# Patient Record
Sex: MALE | Race: WHITE | Employment: OTHER | URBAN - METROPOLITAN AREA
[De-identification: names, ages, dates, MRNs, and addresses within clinical notes are randomized per-mention and may not be internally consistent; named-entity substitution may affect disease eponyms.]

---

## 2017-02-24 ENCOUNTER — HOSPITAL ENCOUNTER (OUTPATIENT)
Dept: OTHER | Age: 67
Discharge: HOME | End: 2017-02-24
Attending: OPHTHALMOLOGY | Admitting: OPHTHALMOLOGY

## 2017-02-24 DIAGNOSIS — H35.352: ICD-10-CM

## 2017-02-24 LAB
A/G RATIO: 1.3 (ref 1.1–2.2)
ALBUMIN SERPL-MCNC: 4.1 G/DL (ref 3.4–5)
ALP BLD-CCNC: 68 U/L (ref 40–129)
ALT SERPL-CCNC: 29 U/L (ref 10–40)
ANION GAP SERPL CALCULATED.3IONS-SCNC: 12 MMOL/L (ref 3–16)
AST SERPL-CCNC: 31 U/L (ref 15–37)
BASOPHILS ABSOLUTE: 0.1 K/UL (ref 0–0.2)
BASOPHILS RELATIVE PERCENT: 0.7 %
BILIRUB SERPL-MCNC: 0.5 MG/DL (ref 0–1)
BUN BLDV-MCNC: 12 MG/DL (ref 7–20)
CALCIUM SERPL-MCNC: 9.2 MG/DL (ref 8.3–10.6)
CHLORIDE BLD-SCNC: 99 MMOL/L (ref 99–110)
CO2: 24 MMOL/L (ref 21–32)
CREAT SERPL-MCNC: 1.1 MG/DL (ref 0.8–1.3)
EOSINOPHILS ABSOLUTE: 0.1 K/UL (ref 0–0.6)
EOSINOPHILS RELATIVE PERCENT: 1.5 %
GFR AFRICAN AMERICAN: >60
GFR NON-AFRICAN AMERICAN: >60
GLOBULIN: 3.2 G/DL
GLUCOSE BLD-MCNC: 91 MG/DL (ref 70–99)
HCT VFR BLD CALC: 39.6 % (ref 40.5–52.5)
HEMOGLOBIN: 13.3 G/DL (ref 13.5–17.5)
LYMPHOCYTES ABSOLUTE: 2.3 K/UL (ref 1–5.1)
LYMPHOCYTES RELATIVE PERCENT: 29.9 %
MCH RBC QN AUTO: 32.1 PG (ref 26–34)
MCHC RBC AUTO-ENTMCNC: 33.6 G/DL (ref 31–36)
MCV RBC AUTO: 95.6 FL (ref 80–100)
MONOCYTES ABSOLUTE: 0.9 K/UL (ref 0–1.3)
MONOCYTES RELATIVE PERCENT: 11.8 %
NEUTROPHILS ABSOLUTE: 4.3 K/UL (ref 1.7–7.7)
NEUTROPHILS RELATIVE PERCENT: 56.1 %
PDW BLD-RTO: 14.1 % (ref 12.4–15.4)
PLATELET # BLD: 208 K/UL (ref 135–450)
PMV BLD AUTO: 9 FL (ref 5–10.5)
POTASSIUM SERPL-SCNC: 4.8 MMOL/L (ref 3.5–5.1)
RBC # BLD: 4.14 M/UL (ref 4.2–5.9)
SODIUM BLD-SCNC: 135 MMOL/L (ref 136–145)
TOTAL PROTEIN: 7.3 G/DL (ref 6.4–8.2)
WBC # BLD: 7.7 K/UL (ref 4–11)

## 2017-02-25 LAB
ANGIOTENSIN CONVERTING ENZYME: 52 U/L (ref 9–67)
HLA B27: NEGATIVE
RPR: NORMAL
TOXOPLASMA GONDI AB IGM: 4.8 AU/ML
TOXOPLASMA GONDII AB IGG: 99.4 IU/ML

## 2017-02-26 LAB — T PALLIDUM ANTIBODIES (TP-PA): NON REACTIVE

## 2017-02-27 LAB
ANA INTERPRETATION: NORMAL
ANTI-NUCLEAR ANTIBODY (ANA): NEGATIVE
LYSOZYME: 1.56 UG/ML (ref 0–2.75)

## 2017-02-28 LAB
QUANTIFERON (R) TB GOLD (INCUBATED): NEGATIVE
QUANTIFERON MITOGEN: >10 IU/ML
QUANTIFERON NIL: 0.1 IU/ML
QUANTIFERON TB AG MINUS NIL: 0 IU/ML (ref 0–0.34)

## 2017-03-01 LAB — MISCELLANEOUS LAB TEST ORDER: NORMAL

## 2017-07-20 RX ORDER — DILTIAZEM HYDROCHLORIDE 240 MG/1
240 CAPSULE, EXTENDED RELEASE ORAL DAILY
Qty: 30 CAPSULE | Refills: 0 | Status: SHIPPED | OUTPATIENT
Start: 2017-07-20 | End: 2017-08-07 | Stop reason: SDUPTHER

## 2017-07-21 RX ORDER — ATORVASTATIN CALCIUM 20 MG/1
TABLET, FILM COATED ORAL
Qty: 30 TABLET | Refills: 0 | Status: SHIPPED | OUTPATIENT
Start: 2017-07-21 | End: 2018-07-23

## 2017-07-21 RX ORDER — HYDROCHLOROTHIAZIDE 25 MG/1
TABLET ORAL
Qty: 30 TABLET | Refills: 0 | Status: SHIPPED | OUTPATIENT
Start: 2017-07-21 | End: 2018-02-05 | Stop reason: ALTCHOICE

## 2017-08-07 ENCOUNTER — HOSPITAL ENCOUNTER (OUTPATIENT)
Dept: OTHER | Age: 67
Discharge: HOME | End: 2017-08-07
Attending: NURSE PRACTITIONER | Admitting: NURSE PRACTITIONER

## 2017-08-07 ENCOUNTER — OFFICE VISIT (OUTPATIENT)
Dept: CARDIOLOGY CLINIC | Age: 67
End: 2017-08-07

## 2017-08-07 VITALS
WEIGHT: 269.8 LBS | DIASTOLIC BLOOD PRESSURE: 70 MMHG | SYSTOLIC BLOOD PRESSURE: 120 MMHG | BODY MASS INDEX: 37.77 KG/M2 | HEIGHT: 71 IN | OXYGEN SATURATION: 95 % | HEART RATE: 66 BPM

## 2017-08-07 DIAGNOSIS — E78.2 MIXED HYPERLIPIDEMIA: ICD-10-CM

## 2017-08-07 DIAGNOSIS — I10 ESSENTIAL HYPERTENSION: ICD-10-CM

## 2017-08-07 DIAGNOSIS — I25.10 CORONARY ARTERY DISEASE INVOLVING NATIVE CORONARY ARTERY OF NATIVE HEART WITHOUT ANGINA PECTORIS: Primary | ICD-10-CM

## 2017-08-07 LAB
A/G RATIO: 1.4 (ref 1.1–2.2)
ALBUMIN SERPL-MCNC: 4.6 G/DL (ref 3.4–5)
ALP BLD-CCNC: 62 U/L (ref 40–129)
ALT SERPL-CCNC: 29 U/L (ref 10–40)
ANION GAP SERPL CALCULATED.3IONS-SCNC: 17 MMOL/L (ref 3–16)
AST SERPL-CCNC: 29 U/L (ref 15–37)
BILIRUB SERPL-MCNC: 0.7 MG/DL (ref 0–1)
BUN BLDV-MCNC: 21 MG/DL (ref 7–20)
CALCIUM SERPL-MCNC: 10 MG/DL (ref 8.3–10.6)
CHLORIDE BLD-SCNC: 99 MMOL/L (ref 99–110)
CHOLESTEROL, TOTAL: 160 MG/DL (ref 0–199)
CO2: 24 MMOL/L (ref 21–32)
CREAT SERPL-MCNC: 1.4 MG/DL (ref 0.8–1.3)
GFR AFRICAN AMERICAN: >60
GFR NON-AFRICAN AMERICAN: 50
GLOBULIN: 3.3 G/DL
GLUCOSE BLD-MCNC: 98 MG/DL (ref 70–99)
HDLC SERPL-MCNC: 38 MG/DL (ref 40–60)
LDL CHOLESTEROL CALCULATED: 95 MG/DL
POTASSIUM SERPL-SCNC: 4.2 MMOL/L (ref 3.5–5.1)
SODIUM BLD-SCNC: 140 MMOL/L (ref 136–145)
TOTAL PROTEIN: 7.9 G/DL (ref 6.4–8.2)
TRIGL SERPL-MCNC: 136 MG/DL (ref 0–150)
VLDLC SERPL CALC-MCNC: 27 MG/DL

## 2017-08-07 PROCEDURE — G8598 ASA/ANTIPLAT THER USED: HCPCS | Performed by: NURSE PRACTITIONER

## 2017-08-07 PROCEDURE — G8417 CALC BMI ABV UP PARAM F/U: HCPCS | Performed by: NURSE PRACTITIONER

## 2017-08-07 PROCEDURE — 1123F ACP DISCUSS/DSCN MKR DOCD: CPT | Performed by: NURSE PRACTITIONER

## 2017-08-07 PROCEDURE — 99214 OFFICE O/P EST MOD 30 MIN: CPT | Performed by: NURSE PRACTITIONER

## 2017-08-07 PROCEDURE — 3017F COLORECTAL CA SCREEN DOC REV: CPT | Performed by: NURSE PRACTITIONER

## 2017-08-07 PROCEDURE — 93000 ELECTROCARDIOGRAM COMPLETE: CPT | Performed by: NURSE PRACTITIONER

## 2017-08-07 PROCEDURE — 4040F PNEUMOC VAC/ADMIN/RCVD: CPT | Performed by: NURSE PRACTITIONER

## 2017-08-07 PROCEDURE — 1036F TOBACCO NON-USER: CPT | Performed by: NURSE PRACTITIONER

## 2017-08-07 PROCEDURE — G8427 DOCREV CUR MEDS BY ELIG CLIN: HCPCS | Performed by: NURSE PRACTITIONER

## 2017-08-07 RX ORDER — DILTIAZEM HYDROCHLORIDE 240 MG/1
240 CAPSULE, EXTENDED RELEASE ORAL DAILY
Qty: 90 CAPSULE | Refills: 3 | Status: ON HOLD | OUTPATIENT
Start: 2017-08-07 | End: 2018-07-27 | Stop reason: HOSPADM

## 2017-08-08 ENCOUNTER — TELEPHONE (OUTPATIENT)
Dept: CARDIOLOGY CLINIC | Age: 67
End: 2017-08-08

## 2017-08-08 DIAGNOSIS — Z79.899 MEDICATION MANAGEMENT: Primary | ICD-10-CM

## 2017-08-18 ENCOUNTER — HOSPITAL ENCOUNTER (OUTPATIENT)
Dept: CARDIOLOGY | Facility: CLINIC | Age: 67
Discharge: HOME | End: 2017-08-18
Attending: NURSE PRACTITIONER | Admitting: NURSE PRACTITIONER

## 2017-08-18 LAB
LV EF: 60 %
LVEF MODALITY: NORMAL

## 2017-08-21 ENCOUNTER — OFFICE VISIT (OUTPATIENT)
Dept: FAMILY MEDICINE CLINIC | Age: 67
End: 2017-08-21

## 2017-08-21 ENCOUNTER — TELEPHONE (OUTPATIENT)
Dept: CARDIOLOGY CLINIC | Age: 67
End: 2017-08-21

## 2017-08-21 VITALS
WEIGHT: 270 LBS | BODY MASS INDEX: 37.66 KG/M2 | OXYGEN SATURATION: 97 % | HEART RATE: 85 BPM | SYSTOLIC BLOOD PRESSURE: 110 MMHG | DIASTOLIC BLOOD PRESSURE: 80 MMHG

## 2017-08-21 DIAGNOSIS — I10 ESSENTIAL HYPERTENSION: ICD-10-CM

## 2017-08-21 DIAGNOSIS — R79.89 ELEVATED SERUM CREATININE: ICD-10-CM

## 2017-08-21 DIAGNOSIS — E55.9 VITAMIN D DEFICIENCY: Primary | ICD-10-CM

## 2017-08-21 DIAGNOSIS — E03.9 HYPOTHYROIDISM, UNSPECIFIED TYPE: ICD-10-CM

## 2017-08-21 LAB
ANION GAP SERPL CALCULATED.3IONS-SCNC: 16 MMOL/L (ref 3–16)
BUN BLDV-MCNC: 16 MG/DL (ref 7–20)
CALCIUM SERPL-MCNC: 9.9 MG/DL (ref 8.3–10.6)
CHLORIDE BLD-SCNC: 99 MMOL/L (ref 99–110)
CO2: 24 MMOL/L (ref 21–32)
CREAT SERPL-MCNC: 1.2 MG/DL (ref 0.8–1.3)
CREATININE URINE: 81.3 MG/DL (ref 39–259)
GFR AFRICAN AMERICAN: >60
GFR NON-AFRICAN AMERICAN: >60
GLUCOSE BLD-MCNC: 92 MG/DL (ref 70–99)
MICROALBUMIN UR-MCNC: <1.2 MG/DL
MICROALBUMIN/CREAT UR-RTO: NORMAL MG/G (ref 0–30)
POTASSIUM SERPL-SCNC: 4.3 MMOL/L (ref 3.5–5.1)
SODIUM BLD-SCNC: 139 MMOL/L (ref 136–145)
T4 FREE: 1 NG/DL (ref 0.9–1.8)
TSH SERPL DL<=0.05 MIU/L-ACNC: 6.69 UIU/ML (ref 0.27–4.2)
VITAMIN D 25-HYDROXY: 34 NG/ML

## 2017-08-21 PROCEDURE — 99214 OFFICE O/P EST MOD 30 MIN: CPT | Performed by: FAMILY MEDICINE

## 2017-08-21 PROCEDURE — G8417 CALC BMI ABV UP PARAM F/U: HCPCS | Performed by: FAMILY MEDICINE

## 2017-08-21 PROCEDURE — 4040F PNEUMOC VAC/ADMIN/RCVD: CPT | Performed by: FAMILY MEDICINE

## 2017-08-21 PROCEDURE — 3017F COLORECTAL CA SCREEN DOC REV: CPT | Performed by: FAMILY MEDICINE

## 2017-08-21 PROCEDURE — 1123F ACP DISCUSS/DSCN MKR DOCD: CPT | Performed by: FAMILY MEDICINE

## 2017-08-21 PROCEDURE — 36415 COLL VENOUS BLD VENIPUNCTURE: CPT | Performed by: FAMILY MEDICINE

## 2017-08-21 PROCEDURE — G8427 DOCREV CUR MEDS BY ELIG CLIN: HCPCS | Performed by: FAMILY MEDICINE

## 2017-08-21 PROCEDURE — 1036F TOBACCO NON-USER: CPT | Performed by: FAMILY MEDICINE

## 2017-08-21 PROCEDURE — G8598 ASA/ANTIPLAT THER USED: HCPCS | Performed by: FAMILY MEDICINE

## 2017-08-22 RX ORDER — LEVOTHYROXINE SODIUM 0.07 MG/1
TABLET ORAL
Qty: 90 TABLET | Refills: 3 | Status: SHIPPED | OUTPATIENT
Start: 2017-08-22 | End: 2018-08-25 | Stop reason: SDUPTHER

## 2018-02-05 ENCOUNTER — OFFICE VISIT (OUTPATIENT)
Dept: CARDIOLOGY CLINIC | Age: 68
End: 2018-02-05

## 2018-02-05 VITALS
DIASTOLIC BLOOD PRESSURE: 70 MMHG | WEIGHT: 279 LBS | SYSTOLIC BLOOD PRESSURE: 120 MMHG | HEART RATE: 69 BPM | OXYGEN SATURATION: 96 % | BODY MASS INDEX: 39.06 KG/M2 | HEIGHT: 71 IN

## 2018-02-05 DIAGNOSIS — I10 ESSENTIAL HYPERTENSION: ICD-10-CM

## 2018-02-05 DIAGNOSIS — I25.10 CORONARY ARTERY DISEASE INVOLVING NATIVE CORONARY ARTERY OF NATIVE HEART WITHOUT ANGINA PECTORIS: Primary | ICD-10-CM

## 2018-02-05 DIAGNOSIS — E78.2 MIXED HYPERLIPIDEMIA: ICD-10-CM

## 2018-02-05 PROCEDURE — G8417 CALC BMI ABV UP PARAM F/U: HCPCS | Performed by: NURSE PRACTITIONER

## 2018-02-05 PROCEDURE — G8598 ASA/ANTIPLAT THER USED: HCPCS | Performed by: NURSE PRACTITIONER

## 2018-02-05 PROCEDURE — 1036F TOBACCO NON-USER: CPT | Performed by: NURSE PRACTITIONER

## 2018-02-05 PROCEDURE — G8427 DOCREV CUR MEDS BY ELIG CLIN: HCPCS | Performed by: NURSE PRACTITIONER

## 2018-02-05 PROCEDURE — G8484 FLU IMMUNIZE NO ADMIN: HCPCS | Performed by: NURSE PRACTITIONER

## 2018-02-05 PROCEDURE — 3017F COLORECTAL CA SCREEN DOC REV: CPT | Performed by: NURSE PRACTITIONER

## 2018-02-05 PROCEDURE — 4040F PNEUMOC VAC/ADMIN/RCVD: CPT | Performed by: NURSE PRACTITIONER

## 2018-02-05 PROCEDURE — 1123F ACP DISCUSS/DSCN MKR DOCD: CPT | Performed by: NURSE PRACTITIONER

## 2018-02-05 PROCEDURE — 99214 OFFICE O/P EST MOD 30 MIN: CPT | Performed by: NURSE PRACTITIONER

## 2018-02-05 RX ORDER — COVID-19 ANTIGEN TEST
KIT MISCELLANEOUS PRN
COMMUNITY

## 2018-02-05 NOTE — PROGRESS NOTES
[address]     Outpatient Follow Up Note    Cindia Gaucher is 75 y.o. male who presents today with a history of CAD s/p CABG '06, HTN and hyperlipidemia . Interval hx: nuclear stress Aug '17: neg for ischemia     CHIEF COMPLAINT / HPI:  Follow Up secondary to coronary artery disease    Subjective:   He denies significant chest pain. There is SOB going up steps which he attributes to painful knees and being over weight. The patient denies orthopnea/PND. The patient does not have swelling. The patients weight is stable . The patient is not experiencing palpitations or dizziness. These symptoms show no change since the last OV. His home BP runs ~ 130/80  With regard to medication therapy the patient has been compliant with prescribed regimen. They have tolerated therapy to date. Past Medical History:   Diagnosis Date    Actinic keratosis     Allergic rhinitis     CAD (coronary artery disease)     [facility] cardiology. Dr. Deeann Bamberger (hard of hearing)     Hyperlipidemia     Hypertension     MI (myocardial infarction)     Osteoarthritis     knees,      Social History:    History   Smoking Status    Former Smoker    Types: Cigars   Smokeless Tobacco    Never Used     Comment: cigar occasionally     Current Medications:  Current Outpatient Prescriptions   Medication Sig Dispense Refill    levothyroxine (SYNTHROID) 75 MCG tablet TAKE 1 TABLET BY MOUTH EVERY DAY 90 tablet 3    metoprolol tartrate (LOPRESSOR) 25 MG tablet TAKE ONE-HALF TABLET BY MOUTH TWICE DAILY 90 tablet 3    diltiazem (DILT-XR) 240 MG extended release capsule Take 1 capsule by mouth daily 90 capsule 3    atorvastatin (LIPITOR) 20 MG tablet TAKE 1 TABLET BY MOUTH EVERY DAY 30 tablet 0    Multiple Vitamins-Minerals (MULTIVITAMIN PO) Take by mouth      MAGNESIUM OXIDE PO Take 1 tablet by mouth daily       aspirin 81 MG EC tablet Take 81 mg by mouth 2 times daily.         fish oil-omega-3 fatty acids 1000 MG capsule Take 2

## 2018-07-23 ENCOUNTER — TELEPHONE (OUTPATIENT)
Dept: CARDIOLOGY CLINIC | Age: 68
End: 2018-07-23

## 2018-07-23 ENCOUNTER — OFFICE VISIT (OUTPATIENT)
Dept: CARDIOLOGY CLINIC | Age: 68
End: 2018-07-23

## 2018-07-23 VITALS
DIASTOLIC BLOOD PRESSURE: 74 MMHG | WEIGHT: 273 LBS | HEIGHT: 71 IN | HEART RATE: 65 BPM | BODY MASS INDEX: 38.22 KG/M2 | OXYGEN SATURATION: 96 % | SYSTOLIC BLOOD PRESSURE: 122 MMHG

## 2018-07-23 DIAGNOSIS — I20.0 UNSTABLE ANGINA (HCC): Primary | ICD-10-CM

## 2018-07-23 DIAGNOSIS — R06.09 DOE (DYSPNEA ON EXERTION): ICD-10-CM

## 2018-07-23 DIAGNOSIS — I10 ESSENTIAL HYPERTENSION: ICD-10-CM

## 2018-07-23 DIAGNOSIS — E66.09 CLASS 1 OBESITY DUE TO EXCESS CALORIES WITH SERIOUS COMORBIDITY IN ADULT, UNSPECIFIED BMI: ICD-10-CM

## 2018-07-23 DIAGNOSIS — E78.2 MIXED HYPERLIPIDEMIA: ICD-10-CM

## 2018-07-23 PROBLEM — E66.811 CLASS 1 OBESITY DUE TO EXCESS CALORIES WITH SERIOUS COMORBIDITY IN ADULT: Status: ACTIVE | Noted: 2018-07-23

## 2018-07-23 PROCEDURE — 4040F PNEUMOC VAC/ADMIN/RCVD: CPT | Performed by: INTERNAL MEDICINE

## 2018-07-23 PROCEDURE — 1101F PT FALLS ASSESS-DOCD LE1/YR: CPT | Performed by: INTERNAL MEDICINE

## 2018-07-23 PROCEDURE — G8427 DOCREV CUR MEDS BY ELIG CLIN: HCPCS | Performed by: INTERNAL MEDICINE

## 2018-07-23 PROCEDURE — G8417 CALC BMI ABV UP PARAM F/U: HCPCS | Performed by: INTERNAL MEDICINE

## 2018-07-23 PROCEDURE — 1036F TOBACCO NON-USER: CPT | Performed by: INTERNAL MEDICINE

## 2018-07-23 PROCEDURE — 3017F COLORECTAL CA SCREEN DOC REV: CPT | Performed by: INTERNAL MEDICINE

## 2018-07-23 PROCEDURE — G8598 ASA/ANTIPLAT THER USED: HCPCS | Performed by: INTERNAL MEDICINE

## 2018-07-23 PROCEDURE — 1123F ACP DISCUSS/DSCN MKR DOCD: CPT | Performed by: INTERNAL MEDICINE

## 2018-07-23 PROCEDURE — 99215 OFFICE O/P EST HI 40 MIN: CPT | Performed by: INTERNAL MEDICINE

## 2018-07-23 NOTE — PROGRESS NOTES
[address] [facility]   Cardiovascular Evaluation    PATIENT: Reena Mohan  DATE: 2018  MRN: [id number]  CSN: [id number]  : 1950    Primary Care Doctor: Jazmine Molina MD    Reason for evaluation:   Coronary Artery Disease and Shortness of Breath (exertional.)      History of present illness:   Reena Mohan is a 75 y.o. patient who is seen today for follow up for coronary artery disease, hypertension and hyperlipidemia. Today he reports he feels well over all from a cardiac standpoint. He does states he is slowing down somewhat. He has days where he feels more fatigued than normal. He continues to be bothered by knee pain. He is unable to walk 1 mile without pain or shortness of breath. His spouse, who is present at today's visit, states he becomes very short of breath with even minimal activity. Stress test 17 showed no ischemia. His spouse signed him up for exercise and nutrition counseling at the [facility] beginning in August. 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 He denies chest pain, dizziness, palpitations, or syncope. Patient Active Problem List   Diagnosis    Essential hypertension    Hypothyroidism    Vitamin D deficiency    Hyperlipidemia    Coronary artery disease involving native coronary artery of native heart without angina pectoris    Obesity (BMI 30-39. 9)    HOGUE (dyspnea on exertion)    Class 1 obesity due to excess calories with serious comorbidity in adult    Unstable angina [facility])         Cardiac Testing: I have reviewed the findings below. STRESS TEST:      Summary  There is a moderate sized inferior defect consistent with mainly infarction  with mild minna-infarct ischemia. The possibility of attenuation cannot be  excluded due to normal wall motion. Left ventricular wall motion and function are normal.  Left ventricular ejection fraction of 63 %. The LV is mildly dilated.       ECHO: 2016  Summary   Normal left ventricle size 81 mg by mouth 2 times daily.  fish oil-omega-3 fatty acids 1000 MG capsule Take 2 g by mouth daily. No current facility-administered medications for this visit. Allergies:  Simvastatin     Review of Systems:   Review of Systems:   All 14 point review of symptoms completed. Pertinent positives identified in the HPI, all other review of symptoms negative as below.     Review of Systems - History obtained from the patient  General ROS: negative for - chills, fever or night sweats  Psychological ROS: negative for - disorientation or hallucinations  Ophthalmic ROS: negative for - dry eyes, eye pain or loss of vision  ENT ROS: negative for - nasal discharge or sore throat  Allergy and Immunology ROS: negative for - hives or itchy/watery eyes  Hematological and Lymphatic ROS: negative for - jaundice or night sweats  Endocrine ROS: negative for - mood swings or temperature intolerance  Breast ROS: deferred  Respiratory ROS: negative for - hemoptysis or stridor  Cardiovascular ROS: negative for - chest pain, dyspnea on exertion or palpitations  Gastrointestinal ROS: no abdominal pain, change in bowel habits, or black or bloody stools  Genito-Urinary ROS: no dysuria, trouble voiding, or hematuria  Musculoskeletal ROS: negative for - gait disturbance, joint pain or joint stiffness  Neurological ROS: negative for - seizures or speech problems  Dermatological ROS: negative for - rash or skin lesion changes      Physical Examination:    Vitals:    07/23/18 0749   BP: 122/74   Pulse: 65   SpO2: 96%    Weight: 273 lb (123.8 kg)     Wt Readings from Last 3 Encounters:   07/23/18 273 lb (123.8 kg)   02/05/18 279 lb (126.6 kg)   08/21/17 270 lb (122.5 kg)         General Appearance:  Alert, cooperative, no distress, appears stated age   Head:  Normocephalic, without obvious abnormality, atraumatic   Eyes:  PERRL, conjunctiva/corneas clear       Nose: Nares normal, no drainage or sinus tenderness   Throat: Lips, mucosa, and tongue normal   Neck: Supple, symmetrical, trachea midline, no adenopathy, thyroid: not enlarged, symmetric, no tenderness/mass/nodules, no carotid bruit or JVD       Lungs:   Clear to auscultation bilaterally, respirations unlabored   Chest Wall:  No tenderness or deformity   Heart:  Regular rhythm and normal rate; S1, S2 are normal; no murmur noted; no rub or gallop   Abdomen:   Soft, non-tender, bowel sounds active all four quadrants,  no masses, no organomegaly           Extremities: Extremities normal, atraumatic, no cyanosis or edema   Pulses: 2+ and symmetric   Skin: Skin color, texture, turgor normal, no rashes or lesions   Pysch: Normal mood and affect   Neurologic: Normal gross motor and sensory exam.         Labs  CBC:   Lab Results   Component Value Date    WBC 7.7 02/24/2017    RBC 4.14 02/24/2017    HGB 13.3 02/24/2017    HCT 39.6 02/24/2017    MCV 95.6 02/24/2017    RDW 14.1 02/24/2017     02/24/2017     CMP:    Lab Results   Component Value Date     08/21/2017    K 4.3 08/21/2017    CL 99 08/21/2017    CO2 24 08/21/2017    BUN 16 08/21/2017    CREATININE 1.2 08/21/2017    GFRAA >60 08/21/2017    GFRAA >60 01/25/2013    AGRATIO 1.4 08/07/2017    LABGLOM >60 08/21/2017    GLUCOSE 92 08/21/2017    PROT 7.9 08/07/2017    PROT 7.1 01/25/2013    CALCIUM 9.9 08/21/2017    BILITOT 0.7 08/07/2017    ALKPHOS 62 08/07/2017    AST 29 08/07/2017    ALT 29 08/07/2017     Lab Results   Component Value Date    TRIG 136 08/07/2017    TRIG 119 10/26/2015    TRIG 117 06/15/2015     Lab Results   Component Value Date    HDL 38 (L) 08/07/2017    HDL 40 10/26/2015    HDL 43 06/15/2015     Lab Results   Component Value Date    LDLCALC 95 08/07/2017    LDLCALC 67 10/26/2015    LDLCALC 103 (H) 06/15/2015     Lab Results   Component Value Date    LABVLDL 27 08/07/2017    LABVLDL 24 10/26/2015    LABVLDL 23 06/15/2015     Lab Results   Component Value Date    ALT 29 08/07/2017    AST 29 08/07/2017

## 2018-07-23 NOTE — LETTER
stress minimization. Encourage a minimum of 150 minutes of exercise weekly. Water exercises would be beneficial to you without injuring your knees. He has been historically non-complaint and not committed to his overall care. 2. Left Heart Cath is recommended  3. Follow up with me after procedure  4. Referral to Dr. Casey Robles for weight loss    If you have questions, please do not hesitate to call me. I look forward to following Acuñaedu Lozano along with you.     Sincerely,        Victorino Jewell MD

## 2018-07-23 NOTE — TELEPHONE ENCOUNTER
Patient is scheduled with Dr. Cecy Huff for Left Heart Cath on 7/27/18 at [address], arrival time of 6:30am to the Cath Lab. Please have patient arrive to the main entrance of [facility] at 6:15am and check in with the registration desk. Please call patient regarding medication instructions.

## 2018-07-23 NOTE — TELEPHONE ENCOUNTER
I called and spoke with patient giving him medication instructions prior to his procedure. He may take his Aspirin, Synthroid, Diltiazem, and metoprolol. He can hold OTC medications and supplements. He is to remain NPO after midnight.

## 2018-07-27 ENCOUNTER — HOSPITAL ENCOUNTER (OUTPATIENT)
Dept: CARDIAC CATH/INVASIVE PROCEDURES | Age: 68
Discharge: HOME | End: 2018-07-27
Attending: INTERNAL MEDICINE | Admitting: INTERNAL MEDICINE
Payer: MEDICARE

## 2018-07-27 VITALS — HEIGHT: 71 IN | WEIGHT: 273 LBS | BODY MASS INDEX: 38.22 KG/M2

## 2018-07-27 LAB
ANION GAP SERPL CALCULATED.3IONS-SCNC: 13 MMOL/L (ref 3–16)
APTT: 29.6 SEC (ref 26–36)
BASOPHILS ABSOLUTE: 0.1 K/UL (ref 0–0.2)
BASOPHILS RELATIVE PERCENT: 1 %
BUN BLDV-MCNC: 20 MG/DL (ref 7–20)
CALCIUM SERPL-MCNC: 9.4 MG/DL (ref 8.3–10.6)
CHLORIDE BLD-SCNC: 101 MMOL/L (ref 99–110)
CHOLESTEROL, TOTAL: 174 MG/DL (ref 0–199)
CO2: 24 MMOL/L (ref 21–32)
CREAT SERPL-MCNC: 1.5 MG/DL (ref 0.8–1.3)
EOSINOPHILS ABSOLUTE: 0.2 K/UL (ref 0–0.6)
EOSINOPHILS RELATIVE PERCENT: 3.8 %
GFR AFRICAN AMERICAN: 56
GFR NON-AFRICAN AMERICAN: 46
GLUCOSE BLD-MCNC: 104 MG/DL (ref 70–99)
HCT VFR BLD CALC: 41.3 % (ref 40.5–52.5)
HDLC SERPL-MCNC: 34 MG/DL (ref 40–60)
HEMOGLOBIN: 14.2 G/DL (ref 13.5–17.5)
INR BLD: 1 (ref 0.86–1.14)
LDL CHOLESTEROL CALCULATED: 113 MG/DL
LYMPHOCYTES ABSOLUTE: 1.8 K/UL (ref 1–5.1)
LYMPHOCYTES RELATIVE PERCENT: 29 %
MCH RBC QN AUTO: 32.1 PG (ref 26–34)
MCHC RBC AUTO-ENTMCNC: 34.3 G/DL (ref 31–36)
MCV RBC AUTO: 93.5 FL (ref 80–100)
MONOCYTES ABSOLUTE: 1.1 K/UL (ref 0–1.3)
MONOCYTES RELATIVE PERCENT: 18.1 %
NEUTROPHILS ABSOLUTE: 2.9 K/UL (ref 1.7–7.7)
NEUTROPHILS RELATIVE PERCENT: 48.1 %
PDW BLD-RTO: 14.3 % (ref 12.4–15.4)
PLATELET # BLD: 210 K/UL (ref 135–450)
PMV BLD AUTO: 7.4 FL (ref 5–10.5)
POTASSIUM SERPL-SCNC: 4 MMOL/L (ref 3.5–5.1)
PROTHROMBIN TIME: 11.4 SEC (ref 9.8–13)
RBC # BLD: 4.42 M/UL (ref 4.2–5.9)
SODIUM BLD-SCNC: 138 MMOL/L (ref 136–145)
TRIGL SERPL-MCNC: 134 MG/DL (ref 0–150)
VLDLC SERPL CALC-MCNC: 27 MG/DL
WBC # BLD: 6 K/UL (ref 4–11)

## 2018-07-27 PROCEDURE — 2580000003 HC RX 258

## 2018-07-27 PROCEDURE — 85730 THROMBOPLASTIN TIME PARTIAL: CPT

## 2018-07-27 PROCEDURE — 80048 BASIC METABOLIC PNL TOTAL CA: CPT

## 2018-07-27 PROCEDURE — 99152 MOD SED SAME PHYS/QHP 5/>YRS: CPT

## 2018-07-27 PROCEDURE — 6370000000 HC RX 637 (ALT 250 FOR IP)

## 2018-07-27 PROCEDURE — C1760 CLOSURE DEV, VASC: HCPCS

## 2018-07-27 PROCEDURE — 93459 L HRT ART/GRFT ANGIO: CPT | Performed by: INTERNAL MEDICINE

## 2018-07-27 PROCEDURE — 99153 MOD SED SAME PHYS/QHP EA: CPT

## 2018-07-27 PROCEDURE — 2500000003 HC RX 250 WO HCPCS

## 2018-07-27 PROCEDURE — C1769 GUIDE WIRE: HCPCS

## 2018-07-27 PROCEDURE — 93005 ELECTROCARDIOGRAM TRACING: CPT | Performed by: INTERNAL MEDICINE

## 2018-07-27 PROCEDURE — 93010 ELECTROCARDIOGRAM REPORT: CPT | Performed by: INTERNAL MEDICINE

## 2018-07-27 PROCEDURE — 99152 MOD SED SAME PHYS/QHP 5/>YRS: CPT | Performed by: INTERNAL MEDICINE

## 2018-07-27 PROCEDURE — 2709999900 HC NON-CHARGEABLE SUPPLY

## 2018-07-27 PROCEDURE — 85610 PROTHROMBIN TIME: CPT

## 2018-07-27 PROCEDURE — 85025 COMPLETE CBC W/AUTO DIFF WBC: CPT

## 2018-07-27 PROCEDURE — C1894 INTRO/SHEATH, NON-LASER: HCPCS

## 2018-07-27 PROCEDURE — 93459 L HRT ART/GRFT ANGIO: CPT

## 2018-07-27 PROCEDURE — 6360000002 HC RX W HCPCS

## 2018-07-27 PROCEDURE — 80061 LIPID PANEL: CPT

## 2018-07-27 PROCEDURE — 6360000004 HC RX CONTRAST MEDICATION: Performed by: INTERNAL MEDICINE

## 2018-07-27 RX ORDER — FENTANYL CITRATE 50 UG/ML
50 INJECTION, SOLUTION INTRAMUSCULAR; INTRAVENOUS ONCE
Status: COMPLETED | OUTPATIENT
Start: 2018-07-27 | End: 2018-07-27

## 2018-07-27 RX ORDER — SODIUM CHLORIDE 9 MG/ML
1000 INJECTION, SOLUTION INTRAVENOUS CONTINUOUS
Status: DISCONTINUED | OUTPATIENT
Start: 2018-07-27 | End: 2018-07-27 | Stop reason: HOSPADM

## 2018-07-27 RX ORDER — SODIUM CHLORIDE 0.9 % (FLUSH) 0.9 %
10 SYRINGE (ML) INJECTION PRN
Status: DISCONTINUED | OUTPATIENT
Start: 2018-07-27 | End: 2018-07-27 | Stop reason: HOSPADM

## 2018-07-27 RX ORDER — ASPIRIN 81 MG/1
324 TABLET, CHEWABLE ORAL ONCE
Status: COMPLETED | OUTPATIENT
Start: 2018-07-27 | End: 2018-07-27

## 2018-07-27 RX ORDER — MIDAZOLAM HYDROCHLORIDE 1 MG/ML
2 INJECTION INTRAMUSCULAR; INTRAVENOUS ONCE
Status: COMPLETED | OUTPATIENT
Start: 2018-07-27 | End: 2018-07-27

## 2018-07-27 RX ORDER — SODIUM CHLORIDE 0.9 % (FLUSH) 0.9 %
10 SYRINGE (ML) INJECTION EVERY 12 HOURS SCHEDULED
Status: DISCONTINUED | OUTPATIENT
Start: 2018-07-27 | End: 2018-07-27 | Stop reason: HOSPADM

## 2018-07-27 RX ORDER — AMLODIPINE BESYLATE 2.5 MG/1
2.5 TABLET ORAL DAILY
Qty: 30 TABLET | Refills: 3 | Status: SHIPPED | OUTPATIENT
Start: 2018-07-27 | End: 2018-08-17 | Stop reason: SDUPTHER

## 2018-07-27 RX ORDER — ISOSORBIDE MONONITRATE 30 MG/1
30 TABLET, EXTENDED RELEASE ORAL DAILY
Qty: 30 TABLET | Refills: 3 | Status: SHIPPED | OUTPATIENT
Start: 2018-07-27 | End: 2018-08-17 | Stop reason: SDUPTHER

## 2018-07-27 RX ADMIN — MIDAZOLAM HYDROCHLORIDE 2 MG: 1 INJECTION INTRAMUSCULAR; INTRAVENOUS at 09:21

## 2018-07-27 RX ADMIN — SODIUM CHLORIDE 1000 ML: 9 INJECTION, SOLUTION INTRAVENOUS at 07:26

## 2018-07-27 RX ADMIN — IOVERSOL 75 ML: 741 INJECTION INTRA-ARTERIAL; INTRAVENOUS at 09:22

## 2018-07-27 RX ADMIN — ASPIRIN 324 MG: 81 TABLET, CHEWABLE ORAL at 07:28

## 2018-07-27 RX ADMIN — FENTANYL CITRATE 50 MCG: 50 INJECTION, SOLUTION INTRAMUSCULAR; INTRAVENOUS at 09:21

## 2018-07-27 ASSESSMENT — PAIN SCALES - GENERAL: PAINLEVEL_OUTOF10: 0

## 2018-07-27 NOTE — H&P
[address] [facility]   Cardiovascular Evaluation    PATIENT: Sarah Zhang  DATE: 2018  MRN: [id number]  CSN: [id number]  : 1950    Primary Care Doctor: Katelynn Austin MD    Reason for evaluation:   Pre-cath evaluation    History of present illness:   Sarah Zhang is a 75 y.o. patient who is seen today for follow up for coronary artery disease, hypertension and hyperlipidemia. Today he reports he feels well over all from a cardiac standpoint. He does states he is slowing down somewhat. He has days where he feels more fatigued than normal. He continues to be bothered by knee pain. He is unable to walk 1 mile without pain or shortness of breath. His spouse, who is present at today's visit, states he becomes very short of breath with even minimal activity. Stress test 17 showed no ischemia. His spouse signed him up for exercise and nutrition counseling at the [facility] beginning in August. 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 He denies chest pain, dizziness, palpitations, or syncope. Patient Active Problem List   Diagnosis    Essential hypertension    Hypothyroidism    Vitamin D deficiency    Hyperlipidemia    Coronary artery disease involving native coronary artery of native heart without angina pectoris    Obesity (BMI 30-39. 9)    HOGUE (dyspnea on exertion)    Class 1 obesity due to excess calories with serious comorbidity in adult    Unstable angina [facility])         Cardiac Testing: I have reviewed the findings below. STRESS TEST:      Summary  There is a moderate sized inferior defect consistent with mainly infarction  with mild mnina-infarct ischemia. The possibility of attenuation cannot be  excluded due to normal wall motion. Left ventricular wall motion and function are normal.  Left ventricular ejection fraction of 63 %. The LV is mildly dilated.       ECHO: 2016  Summary   Normal left ventricle size with mild concentric left ventricular hypertrophy. Normal systolic function with an estimated ejection fraction of 55%. No   regional wall motion abnormalities are seen. Diastolic filling parameters suggest normal diastolic filing pressure. The mitral valve is normal in structure. Mild mitral regurgitation. Aortic valve appears sclerotic but opens adequately. Systolic pulmonary artery pressure (SPAP) is normal and estimated at 23 mmHg   (RA pressure 3 mmHg). Past Medical History:   has a past medical history of Actinic keratosis; Allergic rhinitis; CAD (coronary artery disease); [place] (hard of hearing); Hyperlipidemia; Hypertension; MI (myocardial infarction); and Osteoarthritis. Surgical History:   has a past surgical history that includes Diagnostic Cardiac Cath Lab Procedure; Appendectomy; fracture surgery; Coronary artery bypass graft (8/06); Colonoscopy (2005); and Tonsillectomy. Social History:   reports that he has quit smoking. His smoking use included Cigars. He has never used smokeless tobacco. He reports that he drinks alcohol. He reports that he does not use drugs. Family History:  No evidence for sudden cardiac death or premature CAD    Home Medications:  Reviewed and are listed in nursing record. and/or listed below  Current Facility-Administered Medications   Medication Dose Route Frequency Provider Last Rate Last Dose    0.9 % sodium chloride infusion  1,000 mL Intravenous Continuous Rickey Corbin MD 30 mL/hr at 07/27/18 0726 1,000 mL at 07/27/18 0788        Allergies:  Simvastatin     Review of Systems:   Review of Systems:   All 14 point review of symptoms completed. Pertinent positives identified in the HPI, all other review of symptoms negative as below.     Review of Systems - History obtained from the patient  General ROS: negative for - chills, fever or night sweats  Psychological ROS: negative for - disorientation or hallucinations  Ophthalmic ROS: negative for - dry eyes, eye pain or loss of vision  ENT ROS: negative for - nasal discharge or sore throat  Allergy and Immunology ROS: negative for - hives or itchy/watery eyes  Hematological and Lymphatic ROS: negative for - jaundice or night sweats  Endocrine ROS: negative for - mood swings or temperature intolerance  Breast ROS: deferred  Respiratory ROS: negative for - hemoptysis or stridor  Cardiovascular ROS: negative for - chest pain, dyspnea on exertion or palpitations  Gastrointestinal ROS: no abdominal pain, change in bowel habits, or black or bloody stools  Genito-Urinary ROS: no dysuria, trouble voiding, or hematuria  Musculoskeletal ROS: negative for - gait disturbance, joint pain or joint stiffness  Neurological ROS: negative for - seizures or speech problems  Dermatological ROS: negative for - rash or skin lesion changes      Physical Examination:    There were no vitals filed for this visit.  Weight: 273 lb (123.8 kg)     Wt Readings from Last 3 Encounters:   07/27/18 273 lb (123.8 kg)   07/23/18 273 lb (123.8 kg)   02/05/18 279 lb (126.6 kg)         General Appearance:  Alert, cooperative, no distress, appears stated age   Head:  Normocephalic, without obvious abnormality, atraumatic   Eyes:  PERRL, conjunctiva/corneas clear       Nose: Nares normal, no drainage or sinus tenderness   Throat: Lips, mucosa, and tongue normal   Neck: Supple, symmetrical, trachea midline, no adenopathy, thyroid: not enlarged, symmetric, no tenderness/mass/nodules, no carotid bruit or JVD       Lungs:   Clear to auscultation bilaterally, respirations unlabored   Chest Wall:  No tenderness or deformity   Heart:  Regular rhythm and normal rate; S1, S2 are normal; no murmur noted; no rub or gallop   Abdomen:   Soft, non-tender, bowel sounds active all four quadrants,  no masses, no organomegaly           Extremities: Extremities normal, atraumatic, no cyanosis or edema   Pulses: 2+ and symmetric   Skin: Skin color, texture, turgor normal, no rashes or lesions Pysch: Normal mood and affect   Neurologic: Normal gross motor and sensory exam.         Labs  CBC:   Lab Results   Component Value Date    WBC 6.0 07/27/2018    RBC 4.42 07/27/2018    HGB 14.2 07/27/2018    HCT 41.3 07/27/2018    MCV 93.5 07/27/2018    RDW 14.3 07/27/2018     07/27/2018     CMP:    Lab Results   Component Value Date     07/27/2018    K 4.0 07/27/2018     07/27/2018    CO2 24 07/27/2018    BUN 20 07/27/2018    CREATININE 1.5 07/27/2018    GFRAA 56 07/27/2018    GFRAA >60 01/25/2013    AGRATIO 1.4 08/07/2017    LABGLOM 46 07/27/2018    GLUCOSE 104 07/27/2018    PROT 7.9 08/07/2017    PROT 7.1 01/25/2013    CALCIUM 9.4 07/27/2018    BILITOT 0.7 08/07/2017    ALKPHOS 62 08/07/2017    AST 29 08/07/2017    ALT 29 08/07/2017     Lab Results   Component Value Date    TRIG 136 08/07/2017    TRIG 119 10/26/2015    TRIG 117 06/15/2015     Lab Results   Component Value Date    HDL 38 (L) 08/07/2017    HDL 40 10/26/2015    HDL 43 06/15/2015     Lab Results   Component Value Date    LDLCALC 95 08/07/2017    LDLCALC 67 10/26/2015    LDLCALC 103 (H) 06/15/2015     Lab Results   Component Value Date    LABVLDL 27 08/07/2017    LABVLDL 24 10/26/2015    LABVLDL 23 06/15/2015     Lab Results   Component Value Date    ALT 29 08/07/2017    AST 29 08/07/2017    ALKPHOS 62 08/07/2017    BILITOT 0.7 08/07/2017         Assessment:  75 y.o. male with:  1. Coronary artery disease   ~Increased shortness of breath anginal equivalent - unstable angina   ~Plan for [facility] to assess - Last angiogram 8/28/2006 Donny Florez MD) and s/p bypass   2. Hyperlipidemia   ~lipitor   ~optimal  3. Hypertension   ~    4. Leg pains   ~appear from non-cardiac issues: ongoing   5. Obesity   ~BMI 38.08 7/23/18    Plan:  1. The patient has been given instructions on addressing diet, regular exercise, weight control, smoking abstention, medication compliance, and stress minimization.   Encourage a minimum of 150 minutes of exercise weekly. Water exercises would be beneficial to you without injuring your knees. He has been historically non-complaint and not committed to his overall care. 2. Left Heart Cath is recommended  3. Follow up with me after procedure  4. Referral to Dr. Vijay Mohan for weight loss    All questions and concerns were addressed to the patient/family. Alternatives to my treatment were discussed. The note was completed using EMR. Every effort was made to ensure accuracy; however, inadvertent computerized transcription errors may be present.     Penny Gutiérrez MD, [address], [place] [address] Main central office  [phone] [facility] office  [phone] [facility] office  7/27/2018  8:29 AM
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
## 2018-07-31 LAB
EKG ATRIAL RATE: 64 BPM
EKG DIAGNOSIS: NORMAL
EKG P AXIS: 57 DEGREES
EKG P-R INTERVAL: 218 MS
EKG Q-T INTERVAL: 432 MS
EKG QRS DURATION: 90 MS
EKG QTC CALCULATION (BAZETT): 445 MS
EKG R AXIS: 5 DEGREES
EKG T AXIS: 75 DEGREES
EKG VENTRICULAR RATE: 64 BPM

## 2018-08-17 ENCOUNTER — HOSPITAL ENCOUNTER (OUTPATIENT)
Age: 68
Discharge: HOME | End: 2018-08-17
Payer: MEDICARE

## 2018-08-17 ENCOUNTER — OFFICE VISIT (OUTPATIENT)
Dept: CARDIOLOGY CLINIC | Age: 68
End: 2018-08-17

## 2018-08-17 VITALS
HEIGHT: 71 IN | SYSTOLIC BLOOD PRESSURE: 140 MMHG | HEART RATE: 69 BPM | BODY MASS INDEX: 37.8 KG/M2 | OXYGEN SATURATION: 95 % | DIASTOLIC BLOOD PRESSURE: 82 MMHG | WEIGHT: 270 LBS

## 2018-08-17 DIAGNOSIS — I25.10 CORONARY ARTERY DISEASE INVOLVING NATIVE CORONARY ARTERY OF NATIVE HEART WITHOUT ANGINA PECTORIS: Primary | ICD-10-CM

## 2018-08-17 DIAGNOSIS — I10 ESSENTIAL HYPERTENSION: ICD-10-CM

## 2018-08-17 DIAGNOSIS — I51.9 LV DYSFUNCTION: ICD-10-CM

## 2018-08-17 DIAGNOSIS — E78.2 MIXED HYPERLIPIDEMIA: ICD-10-CM

## 2018-08-17 DIAGNOSIS — I20.0 UNSTABLE ANGINA (HCC): ICD-10-CM

## 2018-08-17 LAB
ANION GAP SERPL CALCULATED.3IONS-SCNC: 12 MMOL/L (ref 3–16)
BUN BLDV-MCNC: 16 MG/DL (ref 7–20)
CALCIUM SERPL-MCNC: 9.4 MG/DL (ref 8.3–10.6)
CHLORIDE BLD-SCNC: 101 MMOL/L (ref 99–110)
CO2: 24 MMOL/L (ref 21–32)
CREAT SERPL-MCNC: 1.4 MG/DL (ref 0.8–1.3)
GFR AFRICAN AMERICAN: >60
GFR NON-AFRICAN AMERICAN: 50
GLUCOSE BLD-MCNC: 85 MG/DL (ref 70–99)
POTASSIUM SERPL-SCNC: 4.1 MMOL/L (ref 3.5–5.1)
SODIUM BLD-SCNC: 137 MMOL/L (ref 136–145)

## 2018-08-17 PROCEDURE — 99214 OFFICE O/P EST MOD 30 MIN: CPT | Performed by: NURSE PRACTITIONER

## 2018-08-17 PROCEDURE — G8427 DOCREV CUR MEDS BY ELIG CLIN: HCPCS | Performed by: NURSE PRACTITIONER

## 2018-08-17 PROCEDURE — G8417 CALC BMI ABV UP PARAM F/U: HCPCS | Performed by: NURSE PRACTITIONER

## 2018-08-17 PROCEDURE — 4040F PNEUMOC VAC/ADMIN/RCVD: CPT | Performed by: NURSE PRACTITIONER

## 2018-08-17 PROCEDURE — 80048 BASIC METABOLIC PNL TOTAL CA: CPT

## 2018-08-17 PROCEDURE — 1123F ACP DISCUSS/DSCN MKR DOCD: CPT | Performed by: NURSE PRACTITIONER

## 2018-08-17 PROCEDURE — G8598 ASA/ANTIPLAT THER USED: HCPCS | Performed by: NURSE PRACTITIONER

## 2018-08-17 PROCEDURE — 1036F TOBACCO NON-USER: CPT | Performed by: NURSE PRACTITIONER

## 2018-08-17 PROCEDURE — 36415 COLL VENOUS BLD VENIPUNCTURE: CPT

## 2018-08-17 PROCEDURE — 1101F PT FALLS ASSESS-DOCD LE1/YR: CPT | Performed by: NURSE PRACTITIONER

## 2018-08-17 PROCEDURE — 3017F COLORECTAL CA SCREEN DOC REV: CPT | Performed by: NURSE PRACTITIONER

## 2018-08-17 RX ORDER — ISOSORBIDE MONONITRATE 30 MG/1
30 TABLET, EXTENDED RELEASE ORAL DAILY
Qty: 90 TABLET | Refills: 3 | Status: SHIPPED | OUTPATIENT
Start: 2018-08-17 | End: 2019-07-01 | Stop reason: SDUPTHER

## 2018-08-17 RX ORDER — METOPROLOL SUCCINATE 25 MG/1
25 TABLET, EXTENDED RELEASE ORAL DAILY
Qty: 30 TABLET | Refills: 3 | Status: SHIPPED | OUTPATIENT
Start: 2018-08-17 | End: 2018-08-20 | Stop reason: SDUPTHER

## 2018-08-17 RX ORDER — AMLODIPINE BESYLATE 2.5 MG/1
2.5 TABLET ORAL DAILY
Qty: 90 TABLET | Refills: 3 | Status: SHIPPED | OUTPATIENT
Start: 2018-08-17 | End: 2019-02-28 | Stop reason: SDUPTHER

## 2018-08-17 RX ORDER — ATORVASTATIN CALCIUM 20 MG/1
20 TABLET, FILM COATED ORAL DAILY
Qty: 90 TABLET | Refills: 3 | Status: SHIPPED | OUTPATIENT
Start: 2018-08-17 | End: 2019-07-01 | Stop reason: SDUPTHER

## 2018-08-17 NOTE — PROGRESS NOTES
[facility]vi [facility]ays   Cardiac Evaluation    Primary Care Doctor:  Erica Faria MD    Chief Complaint   Patient presents with    Follow-Up from Hospital     s/p  cath 7/27/18 vsp /no cardiac complaints        History of Present Illness:   I had the pleasure of seeing Jeison Reza in follow up for CAD, s/p CABG, HTN, HLD. He underwent LHC for symptoms of increased shortness of breath felt to be anginal equivalent. His bypass grafts were patent. Antianginal medications were added (Imdur and amlodipine). Of note his creatinine was 1.5, was to be rechecked in 1-2 weeks, consider addition of ACEi for LV dysfunction and angina. He had some numbness and discomfort at groin site for a few days but has now resolved. His actvity is limited due to knee pain. 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 He has shortness of breath with exertion, worsened over the past year. He feels this is a bit better over the past couple of weeks since starting new medications. He reports taking Aleve once daily for knee pain. He has tried tylenol arthritis in past but not as good of relief. His sleep is good but has nocturia 2-3 times per night. He is not currently taking statin, unsure why it was stopped or not refilled. Jeison Reza describes symptoms including dyspnea, fatigue but denies chest pain, palpitations, orthopnea, PND, early saiety, edema, syncope. NYHA:   II  ACC/ AHA Stage:    C    Past Medical History:   has a past medical history of Actinic keratosis; Allergic rhinitis; CAD (coronary artery disease); [place] (hard of hearing); Hyperlipidemia; Hypertension; MI (myocardial infarction) ([facility] [address] and Osteoarthritis. Surgical History:   has a past surgical history that includes Diagnostic Cardiac Cath Lab Procedure; Appendectomy; fracture surgery; Coronary artery bypass graft (8/06); Colonoscopy (2005);  Tonsillectomy; and Coronary angioplasty (07/27/2018). Social History:   reports that he has quit smoking. His smoking use included Cigars. He has never used smokeless tobacco. He reports that he drinks alcohol. He reports that he does not use drugs. Family History:   Family History   Problem Relation Age of Onset    Diabetes Mother        Home Medications:  Prior to Admission medications    Medication Sig Start Date End Date Taking? Authorizing Provider   amLODIPine (NORVASC) 2.5 MG tablet Take 1 tablet by mouth daily 7/27/18  Yes Joseph Minaya MD   isosorbide mononitrate (IMDUR) 30 MG extended release tablet Take 1 tablet by mouth daily 7/27/18  Yes Joseph Minaya MD   Naproxen Sodium (ALEVE) 220 MG CAPS Take by mouth as needed for Pain   Yes Historical Provider, MD   levothyroxine (SYNTHROID) 75 MCG tablet TAKE 1 TABLET BY MOUTH EVERY DAY 8/22/17  Yes Giuliano Hernandez MD   metoprolol tartrate (LOPRESSOR) 25 MG tablet TAKE ONE-HALF TABLET BY MOUTH TWICE DAILY 8/18/17  Yes Joseph Minaya MD   aspirin 81 MG EC tablet Take 81 mg by mouth 2 times daily. Yes Historical Provider, MD   fish oil-omega-3 fatty acids 1000 MG capsule Take 2 g by mouth daily. Yes Historical Provider, MD   Multiple Vitamins-Minerals (MULTIVITAMIN PO) Take by mouth    Historical Provider, MD   MAGNESIUM OXIDE PO Take 1 tablet by mouth daily     Historical Provider, MD        Allergies:  Simvastatin     Review of Systems:   · Constitutional: there has been no unanticipated weight loss. There's been no change in energy level, sleep pattern, or activity level. · Eyes: No visual changes or diplopia. No scleral icterus. · ENT: No Headaches, hearing loss or vertigo. No mouth sores or sore throat. · Cardiovascular: Reviewed in HPI  · Respiratory: No cough or wheezing, no sputum production. No hematemesis. · Gastrointestinal: No abdominal pain, appetite loss, blood in stools. No change in bowel or bladder habits.   · Genitourinary: No dysuria, trouble voiding, or hematuria. · Musculoskeletal:  No gait disturbance, weakness or joint complaints. · Integumentary: No rash or pruritis. · Neurological: No headache, diplopia, change in muscle strength, numbness or tingling. No change in gait, balance, coordination, mood, affect, memory, mentation, behavior. · Psychiatric: No anxiety, no depression. · Endocrine: No malaise, fatigue or temperature intolerance. No excessive thirst, fluid intake, or urination. No tremor. · Hematologic/Lymphatic: No abnormal bruising or bleeding, blood clots or swollen lymph nodes. · Allergic/Immunologic: No nasal congestion or hives. Physical Examination:    Vitals:    08/17/18 1430 08/17/18 1448   BP: 138/88 (!) 140/82   Site:  Left Arm   Position:  Sitting   Cuff Size:  Large Adult   Pulse: 69    SpO2: 95%    Weight: 270 lb (122.5 kg)    Height: 5' 11\" (1.803 m)         Constitutional and General Appearance: Warm and dry, no apparent distress, normal coloration  HEENT:  Normocephalic, atraumatic  Respiratory:  · Normal excursion and expansion without use of accessory muscles  · Resp Auscultation: Normal breath sounds without dullness  Cardiovascular:  · The apical impulses not displaced  · Heart tones are crisp and normal  · JVP 8 cm H2O  · Regular rate and rhythm, normal S1S2, no m/g/r  · Peripheral pulses are symmetrical and full  · There is no clubbing, cyanosis of the extremities.   · No edema  · Pedal Pulses: 2+ and equal   Abdomen:  · No masses or tenderness  · Liver/Spleen: No Abnormalities Noted  Neurological/Psychiatric:  · Alert and oriented in all spheres  · Moves all extremities well  · Exhibits normal gait balance and coordination  · No abnormalities of mood, affect, memory, mentation, or behavior are noted    Lab Data:    CBC:   Lab Results   Component Value Date    WBC 6.0 07/27/2018    WBC 7.7 02/24/2017    WBC 7.3 01/25/2013    RBC 4.42 07/27/2018    RBC 4.14 02/24/2017    RBC 4.32 01/25/2013    HGB 14.2 07/27/2018    HGB parameters suggest normal diastolic filing pressure. The mitral valve is normal in structure. Mild mitral regurgitation. Aortic valve appears sclerotic but opens adequately. Systolic pulmonary artery pressure (SPAP) is normal and estimated at 23 mmHg   (RA pressure 3 mmHg). Assessment:    1. Coronary artery disease involving native coronary artery of native heart without angina pectoris    2. Unstable angina ([address]    3. Essential hypertension    4. Mixed hyperlipidemia    5. LV dysfunction          Plan:   1. Have blood work to recheck kidney function (consider adding ACEi if renal function stable)  2. Change the metoprolol tartrate (Lopressor) to metoprolol succinate (Toprol XL) 25 mg once daily (compliance and EBBB)  3. No change in other heart medicines  4. Consider alternating Aleve with Tylenol Arthritis to decrease the amount of NSAIDS you are taking (CRI, HTN)  5. Follow up with me in 6 weeks      I appreciate the opportunity of cooperating in the care of this individual.    David Dinh, CNP, 8/17/2018, 2:34 PM    QUALITY MEASURES  1. Tobacco Cessation Counseling: NA  2. Retake of BP if >140/90:   Yes  3. Documentation to PCP/referring for new patient:  Sent to PCP at close of office visit  4. CAD patient on anti-platelet: Yes  5. CAD patient on STATIN therapy:  Yes  6.  Patient with CHF and aFib on anticoagulation:  NA

## 2018-08-17 NOTE — PATIENT INSTRUCTIONS
1. Have blood work to recheck kidney function  2. Change the metoprolol tartrate (Lopressor) to metoprolol succinate (Toprol XL) 25 mg once daily  3. No change in other heart medicines  4. Consider alternating Aleve with Tylenol Arthritis to decrease the amount of NSAIDS you are taking  5. Restart the atorvastatin (Lipitor) 20 mg once daily  6.  Follow up with me in 6 weeks

## 2018-08-20 ENCOUNTER — TELEPHONE (OUTPATIENT)
Dept: CARDIOLOGY CLINIC | Age: 68
End: 2018-08-20

## 2018-08-20 RX ORDER — METOPROLOL SUCCINATE 25 MG/1
25 TABLET, EXTENDED RELEASE ORAL DAILY
Qty: 90 TABLET | Refills: 3 | Status: SHIPPED | OUTPATIENT
Start: 2018-08-20 | End: 2019-07-01 | Stop reason: SDUPTHER

## 2018-08-20 NOTE — TELEPHONE ENCOUNTER
Created telephone encounter. Per Pt HIPAA from can leave results on machine. LMOM relaying message per [employer] regarding lab results. Pt to call the office with any concerns.

## 2018-09-24 ENCOUNTER — OFFICE VISIT (OUTPATIENT)
Dept: CARDIOLOGY CLINIC | Age: 68
End: 2018-09-24
Payer: MEDICARE

## 2018-09-24 VITALS
HEIGHT: 71 IN | BODY MASS INDEX: 37.8 KG/M2 | OXYGEN SATURATION: 97 % | WEIGHT: 270 LBS | DIASTOLIC BLOOD PRESSURE: 84 MMHG | HEART RATE: 78 BPM | SYSTOLIC BLOOD PRESSURE: 122 MMHG

## 2018-09-24 DIAGNOSIS — I51.9 LV DYSFUNCTION: ICD-10-CM

## 2018-09-24 DIAGNOSIS — I10 ESSENTIAL HYPERTENSION: ICD-10-CM

## 2018-09-24 DIAGNOSIS — E78.2 MIXED HYPERLIPIDEMIA: ICD-10-CM

## 2018-09-24 DIAGNOSIS — I25.10 CORONARY ARTERY DISEASE INVOLVING NATIVE CORONARY ARTERY OF NATIVE HEART WITHOUT ANGINA PECTORIS: Primary | ICD-10-CM

## 2018-09-24 PROCEDURE — G8598 ASA/ANTIPLAT THER USED: HCPCS | Performed by: NURSE PRACTITIONER

## 2018-09-24 PROCEDURE — 1036F TOBACCO NON-USER: CPT | Performed by: NURSE PRACTITIONER

## 2018-09-24 PROCEDURE — G8427 DOCREV CUR MEDS BY ELIG CLIN: HCPCS | Performed by: NURSE PRACTITIONER

## 2018-09-24 PROCEDURE — G8417 CALC BMI ABV UP PARAM F/U: HCPCS | Performed by: NURSE PRACTITIONER

## 2018-09-24 PROCEDURE — 4040F PNEUMOC VAC/ADMIN/RCVD: CPT | Performed by: NURSE PRACTITIONER

## 2018-09-24 PROCEDURE — 99213 OFFICE O/P EST LOW 20 MIN: CPT | Performed by: NURSE PRACTITIONER

## 2018-09-24 PROCEDURE — 3017F COLORECTAL CA SCREEN DOC REV: CPT | Performed by: NURSE PRACTITIONER

## 2018-09-24 PROCEDURE — 1101F PT FALLS ASSESS-DOCD LE1/YR: CPT | Performed by: NURSE PRACTITIONER

## 2018-09-24 PROCEDURE — 1123F ACP DISCUSS/DSCN MKR DOCD: CPT | Performed by: NURSE PRACTITIONER

## 2018-09-24 NOTE — PROGRESS NOTES
[address]   Cardiac Evaluation    Primary Care Doctor:  Marguerite Jay MD    Chief Complaint   Patient presents with    1 Month Follow-Up        History of Present Illness:   I had the pleasure of seeing Amee Sherman in follow up for CAD, hx CABG, HTN, HLD with recent LHC showing patent grafts, LV dysfunction. At last visit the metoprolol was changed to Toprol XL 25 mg daily. Blood work reviewed and stable. He is feeling well. He still has some dyspnea on exertion but is improved. Though his weight has not changed he feels his clothes are looser. His sleep and appetite are good. Amee Sherman describes symptoms including dyspnea, fatigue but denies chest pain, palpitations, orthopnea, PND, early saiety, edema, syncope. NYHA:   II  ACC/ AHA Stage:    C    Past Medical History:   has a past medical history of Actinic keratosis; Allergic rhinitis; CAD (coronary artery disease); [place] (hard of hearing); Hyperlipidemia; Hypertension; MI (myocardial infarction) ([address] and Osteoarthritis. Surgical History:   has a past surgical history that includes Diagnostic Cardiac Cath Lab Procedure; Appendectomy; fracture surgery; Coronary artery bypass graft (8/06); Colonoscopy (2005); Tonsillectomy; and Coronary angioplasty (07/27/2018). Social History:   reports that he has quit smoking. His smoking use included Cigars. He has never used smokeless tobacco. He reports that he drinks alcohol. He reports that he does not use drugs. Family History:   Family History   Problem Relation Age of Onset    Diabetes Mother        Home Medications:  Prior to Admission medications    Medication Sig Start Date End Date Taking?  Authorizing Provider   levothyroxine (SYNTHROID) 75 MCG tablet TAKE 1 TABLET BY MOUTH EVERY DAY 8/27/18  Yes Marguerite Jay MD   metoprolol succinate (TOPROL XL) 25 MG extended release tablet Take 1 tablet by mouth daily 8/20/18  Yes Cortney Vines APRN - CNP   atorvastatin (LIPITOR) 20 nodes.  · Allergic/Immunologic: No nasal congestion or hives. Physical Examination:    Vitals:    09/24/18 0841   BP: 122/84   Pulse: 78   SpO2: 97%   Weight: 270 lb (122.5 kg)   Height: 5' 11\" (1.803 m)        Constitutional and General Appearance: Warm and dry, no apparent distress, normal coloration  HEENT:  Normocephalic, atraumatic  Respiratory:  · Normal excursion and expansion without use of accessory muscles  · Resp Auscultation: Normal breath sounds without dullness  Cardiovascular:  · The apical impulses not displaced  · Heart tones are crisp and normal  · JVP 8 cm H2O  · Regular rate and rhythm, normal S1S2, no m/g/r  · Peripheral pulses are symmetrical and full  · There is no clubbing, cyanosis of the extremities.   · Trace BLE edema  · Pedal Pulses: 2+ and equal   Abdomen:  · No masses or tenderness  · Liver/Spleen: No Abnormalities Noted  Neurological/Psychiatric:  · Alert and oriented in all spheres  · Moves all extremities well  · Exhibits normal gait balance and coordination  · No abnormalities of mood, affect, memory, mentation, or behavior are noted    Lab Data:    CBC:   Lab Results   Component Value Date    WBC 6.0 07/27/2018    WBC 7.7 02/24/2017    WBC 7.3 01/25/2013    RBC 4.42 07/27/2018    RBC 4.14 02/24/2017    RBC 4.32 01/25/2013    HGB 14.2 07/27/2018    HGB 13.3 02/24/2017    HGB 14.0 01/25/2013    HCT 41.3 07/27/2018    HCT 39.6 02/24/2017    HCT 40.5 01/25/2013    MCV 93.5 07/27/2018    MCV 95.6 02/24/2017    MCV 93.8 01/25/2013    RDW 14.3 07/27/2018    RDW 14.1 02/24/2017    RDW 14.7 01/25/2013     07/27/2018     02/24/2017     01/25/2013     BMP:  Lab Results   Component Value Date     08/17/2018     07/27/2018     08/21/2017    K 4.1 08/17/2018    K 4.0 07/27/2018    K 4.3 08/21/2017     08/17/2018     07/27/2018    CL 99 08/21/2017    CO2 24 08/17/2018    CO2 24 07/27/2018    CO2 24 08/21/2017    BUN 16 08/17/2018    BUN 20

## 2018-09-24 NOTE — LETTER
[address] Cardiology - [facility]  Phone: [phone]  Fax: [phone]    Barrera Thomas APRN - CNP        September 25, 2018     Lilli Myrick MD  [address] [facility]. [place] [id number]    Patient: Jose Alejandro Cohen  MR Number: [id number]  YOB: 1950  Date of Visit: 9/24/2018    Dear Dr. Lilli Myrick:    I recently saw our mutual patient, listed above. Below are the relevant portions of my assessment and plan of care. [address]   Cardiac Evaluation    Primary Care Doctor:  Lilli Myrick MD    Chief Complaint   Patient presents with    1 Month Follow-Up        History of Present Illness:   I had the pleasure of seeing Jose Alejandro Cohen in follow up for CAD, hx CABG, HTN, HLD with recent LHC showing patent grafts, LV dysfunction. At last visit the metoprolol was changed to Toprol XL 25 mg daily. Blood work reviewed and stable. He is feeling well. He still has some dyspnea on exertion but is improved. Though his weight has not changed he feels his clothes are looser. His sleep and appetite are good. Jose Alejandro Cohen describes symptoms including dyspnea, fatigue but denies chest pain, palpitations, orthopnea, PND, early saiety, edema, syncope. NYHA:   II  ACC/ AHA Stage:    C    Past Medical History:   has a past medical history of Actinic keratosis; Allergic rhinitis; CAD (coronary artery disease); [place] (hard of hearing); Hyperlipidemia; Hypertension; MI (myocardial infarction) ([address] and Osteoarthritis. Surgical History:   has a past surgical history that includes Diagnostic Cardiac Cath Lab Procedure; Appendectomy; fracture surgery; Coronary artery bypass graft (8/06); Colonoscopy (2005); Tonsillectomy; and Coronary angioplasty (07/27/2018). Social History:   reports that he has quit smoking. His smoking use included Cigars. He has never used smokeless tobacco. He reports that he drinks alcohol.  He reports that he does not use drugs. Family History:   Family History   Problem Relation Age of Onset    Diabetes Mother        Home Medications:  Prior to Admission medications    Medication Sig Start Date End Date Taking? Authorizing Provider   levothyroxine (SYNTHROID) 75 MCG tablet TAKE 1 TABLET BY MOUTH EVERY DAY 8/27/18  Yes Tramaine Leary MD   metoprolol succinate (TOPROL XL) 25 MG extended release tablet Take 1 tablet by mouth daily 8/20/18  Yes JOHNY Escamilla CNP   atorvastatin (LIPITOR) 20 MG tablet Take 1 tablet by mouth daily 8/17/18  Yes JOHNY Escamilla CNP   amLODIPine (NORVASC) 2.5 MG tablet Take 1 tablet by mouth daily 8/17/18  Yes JOHNY Escamilla CNP   isosorbide mononitrate (IMDUR) 30 MG extended release tablet Take 1 tablet by mouth daily 8/17/18  Yes JOHNY Escamilla CNP   Naproxen Sodium (ALEVE) 220 MG CAPS Take by mouth as needed for Pain   Yes Historical Provider, MD   Multiple Vitamins-Minerals (MULTIVITAMIN PO) Take by mouth   Yes Historical Provider, MD   MAGNESIUM OXIDE PO Take 1 tablet by mouth daily    Yes Historical Provider, MD   aspirin 81 MG EC tablet Take 81 mg by mouth 2 times daily. Yes Historical Provider, MD   fish oil-omega-3 fatty acids 1000 MG capsule Take 2 g by mouth daily. Yes Historical Provider, MD        Allergies:  Simvastatin     Review of Systems:   · Constitutional: there has been no unanticipated weight loss. There's been no change in energy level, sleep pattern, or activity level. · Eyes: No visual changes or diplopia. No scleral icterus. · ENT: No Headaches, hearing loss or vertigo. No mouth sores or sore throat. · Cardiovascular: Reviewed in HPI  · Respiratory: No cough or wheezing, no sputum production. No hematemesis. · Gastrointestinal: No abdominal pain, appetite loss, blood in stools. No change in bowel or bladder habits. · Genitourinary: No dysuria, trouble voiding, or hematuria. · Musculoskeletal:  No gait disturbance, weakness or joint complaints. · Integumentary: No rash or pruritis. · Neurological: No headache, diplopia, change in muscle strength, numbness or tingling. No change in gait, balance, coordination, mood, affect, memory, mentation, behavior. · Psychiatric: No anxiety, no depression. · Endocrine: No malaise, fatigue or temperature intolerance. No excessive thirst, fluid intake, or urination. No tremor. · Hematologic/Lymphatic: No abnormal bruising or bleeding, blood clots or swollen lymph nodes. · Allergic/Immunologic: No nasal congestion or hives. Physical Examination:    Vitals:    09/24/18 0841   BP: 122/84   Pulse: 78   SpO2: 97%   Weight: 270 lb (122.5 kg)   Height: 5' 11\" (1.803 m)        Constitutional and General Appearance: Warm and dry, no apparent distress, normal coloration  HEENT:  Normocephalic, atraumatic  Respiratory:  · Normal excursion and expansion without use of accessory muscles  · Resp Auscultation: Normal breath sounds without dullness  Cardiovascular:  · The apical impulses not displaced  · Heart tones are crisp and normal  · JVP 8 cm H2O  · Regular rate and rhythm, normal S1S2, no m/g/r  · Peripheral pulses are symmetrical and full  · There is no clubbing, cyanosis of the extremities.   · Trace BLE edema  · Pedal Pulses: 2+ and equal   Abdomen:  · No masses or tenderness  · Liver/Spleen: No Abnormalities Noted  Neurological/Psychiatric:  · Alert and oriented in all spheres  · Moves all extremities well  · Exhibits normal gait balance and coordination  · No abnormalities of mood, affect, memory, mentation, or behavior are noted    Lab Data:    CBC:   Lab Results   Component Value Date    WBC 6.0 07/27/2018    WBC 7.7 02/24/2017    WBC 7.3 01/25/2013    RBC 4.42 07/27/2018    RBC 4.14 02/24/2017    RBC 4.32 01/25/2013    HGB 14.2 07/27/2018    HGB 13.3 02/24/2017    HGB 14.0 01/25/2013    HCT 41.3 07/27/2018    HCT 39.6 02/24/2017

## 2018-09-25 NOTE — COMMUNICATION BODY
[facility]   [facility]    Primary Care Doctor:  Mira Ferrara MD    Chief Complaint   Patient presents with    1 Month Follow-Up        History of Present Illness:   I had the pleasure of seeing Leatha Putnam in follow up for CAD, hx CABG, HTN, HLD with recent LHC showing patent grafts, LV dysfunction. At last visit the metoprolol was changed to Toprol XL 25 mg daily. Blood work reviewed and stable. He is feeling well. He still has some dyspnea on exertion but is improved. Though his weight has not changed he feels his clothes are looser. His sleep and appetite are good. Leatha Putnam describes symptoms including dyspnea, fatigue but denies chest pain, palpitations, orthopnea, PND, early saiety, edema, syncope. NYHA:   II  ACC/ AHA Stage:    C    Past Medical History:   has a past medical history of Actinic keratosis; Allergic rhinitis; CAD (coronary artery disease); [place] (hard of hearing); Hyperlipidemia; Hypertension; MI (myocardial infarction) ([address] and Osteoarthritis. Surgical History:   has a past surgical history that includes Diagnostic Cardiac Cath Lab Procedure; Appendectomy; fracture surgery; Coronary artery bypass graft (8/06); Colonoscopy (2005); Tonsillectomy; and Coronary angioplasty (07/27/2018). Social History:   reports that he has quit smoking. His smoking use included Cigars. He has never used smokeless tobacco. He reports that he drinks alcohol. He reports that he does not use drugs. Family History:   Family History   Problem Relation Age of Onset    Diabetes Mother        Home Medications:  Prior to Admission medications    Medication Sig Start Date End Date Taking?  Authorizing Provider   levothyroxine (SYNTHROID) 75 MCG tablet TAKE 1 TABLET BY MOUTH EVERY DAY 8/27/18  Yes Mira Ferrara MD   metoprolol succinate (TOPROL XL) 25 MG extended release tablet Take 1 tablet by mouth daily 8/20/18  Yes Jessica Vanessa, APRN - CNP   atorvastatin (LIPITOR) 20 nodes.  · Allergic/Immunologic: No nasal congestion or hives. Physical Examination:    Vitals:    09/24/18 0841   BP: 122/84   Pulse: 78   SpO2: 97%   Weight: 270 lb (122.5 kg)   Height: 5' 11\" (1.803 m)        Constitutional and General Appearance: Warm and dry, no apparent distress, normal coloration  HEENT:  Normocephalic, atraumatic  Respiratory:  · Normal excursion and expansion without use of accessory muscles  · Resp Auscultation: Normal breath sounds without dullness  Cardiovascular:  · The apical impulses not displaced  · Heart tones are crisp and normal  · JVP 8 cm H2O  · Regular rate and rhythm, normal S1S2, no m/g/r  · Peripheral pulses are symmetrical and full  · There is no clubbing, cyanosis of the extremities.   · Trace BLE edema  · Pedal Pulses: 2+ and equal   Abdomen:  · No masses or tenderness  · Liver/Spleen: No Abnormalities Noted  Neurological/Psychiatric:  · Alert and oriented in all spheres  · Moves all extremities well  · Exhibits normal gait balance and coordination  · No abnormalities of mood, affect, memory, mentation, or behavior are noted    Lab Data:    CBC:   Lab Results   Component Value Date    WBC 6.0 07/27/2018    WBC 7.7 02/24/2017    WBC 7.3 01/25/2013    RBC 4.42 07/27/2018    RBC 4.14 02/24/2017    RBC 4.32 01/25/2013    HGB 14.2 07/27/2018    HGB 13.3 02/24/2017    HGB 14.0 01/25/2013    HCT 41.3 07/27/2018    HCT 39.6 02/24/2017    HCT 40.5 01/25/2013    MCV 93.5 07/27/2018    MCV 95.6 02/24/2017    MCV 93.8 01/25/2013    RDW 14.3 07/27/2018    RDW 14.1 02/24/2017    RDW 14.7 01/25/2013     07/27/2018     02/24/2017     01/25/2013     BMP:  Lab Results   Component Value Date     08/17/2018     07/27/2018     08/21/2017    K 4.1 08/17/2018    K 4.0 07/27/2018    K 4.3 08/21/2017     08/17/2018     07/27/2018    CL 99 08/21/2017    CO2 24 08/17/2018    CO2 24 07/27/2018    CO2 24 08/21/2017    BUN 16 08/17/2018    BUN 20 07/27/2018    BUN 16 08/21/2017    CREATININE 1.4 08/17/2018    CREATININE 1.5 07/27/2018    CREATININE 1.2 08/21/2017     BNP: No results found for: PROBNP     Cardiac Imaging:  Cardiac cath 7/27/18:  Procedure Performed:  1. Coronary angiogram  2. Left heart cath  3. Left ventriculogram  4. LIMA angiogram  5. Radial artery angiogram  6. SVG angiogram  7. Mynx   Findings:  1. Native three vessel CAD  2. Reduced LVEF 45%  3. Normal hemodynamics  4. Patent LIMA to the LAD  5. Radial to OM/CIRC patent  6. SVG to the PDA patent   Conclusion/plan of care:  1. Medical management  2. EECP  3. Add anti-anginals  4. Check CR in 1-2 weeks and start ACE if stable  5. See Nephrology if CR remains elevated      Stress MPI Aug 2017:    Abnormal low risk myocardial perfusion study.   Elda [place] is a fixed defect within the inferior wall and apex consistent with    prior infarction vs attenuation artifact.    There is no ischemia noted.    The left ventricular size is mildly dilated.    The estimated left ventricular function is 60%. STRESS TEST: 2014     Summary  There is a moderate sized inferior defect consistent with mainly infarction  with mild minna-infarct ischemia. The possibility of attenuation cannot be  excluded due to normal wall motion. Left ventricular wall motion and function are normal.  Left ventricular ejection fraction of 63 %. The LV is mildly dilated. ECHO: 2/19/2016  Summary   Normal left ventricle size with mild concentric left ventricular hypertrophy. Normal systolic function with an estimated ejection fraction of 55%. No   regional wall motion abnormalities are seen. Diastolic filling parameters suggest normal diastolic filing pressure. The mitral valve is normal in structure. Mild mitral regurgitation. Aortic valve appears sclerotic but opens adequately. Systolic pulmonary artery pressure (SPAP) is normal and estimated at 23 mmHg   (RA pressure 3 mmHg). Assessment:    1. Coronary artery disease involving native coronary artery of native heart without angina pectoris    2. Essential hypertension    3. Mixed hyperlipidemia    4. LV dysfunction          Plan:   1. Continue current medicine regimen   2. Follow up with Dr. Makenna Young in 3 months with limited echocardiogram      I appreciate the opportunity of cooperating in the care of this individual.    Elena Casey, JOHNY - CNP, 9/24/2018, 8:51 AM      QUALITY MEASURES  1. Tobacco Cessation Counseling: NA  2. Retake of BP if >140/90:   NA  3. Documentation to PCP/referring for new patient:  Sent to PCP at close of office visit  4. CAD patient on anti-platelet: Yes  5. CAD patient on STATIN therapy:  Yes  6.  Patient with CHF and aFib on anticoagulation:  NA

## 2018-12-20 ENCOUNTER — HOSPITAL ENCOUNTER (OUTPATIENT)
Dept: CARDIOLOGY | Age: 68
Discharge: HOME | End: 2018-12-20
Payer: MEDICARE

## 2018-12-20 ENCOUNTER — OFFICE VISIT (OUTPATIENT)
Dept: CARDIOLOGY CLINIC | Age: 68
End: 2018-12-20
Payer: MEDICARE

## 2018-12-20 VITALS
SYSTOLIC BLOOD PRESSURE: 130 MMHG | WEIGHT: 266 LBS | BODY MASS INDEX: 37.24 KG/M2 | DIASTOLIC BLOOD PRESSURE: 84 MMHG | HEIGHT: 71 IN | OXYGEN SATURATION: 98 % | HEART RATE: 68 BPM

## 2018-12-20 DIAGNOSIS — E78.2 MIXED HYPERLIPIDEMIA: ICD-10-CM

## 2018-12-20 DIAGNOSIS — I10 ESSENTIAL HYPERTENSION: ICD-10-CM

## 2018-12-20 DIAGNOSIS — E66.9 OBESITY (BMI 30-39.9): ICD-10-CM

## 2018-12-20 DIAGNOSIS — I20.8 CHRONIC STABLE ANGINA (HCC): ICD-10-CM

## 2018-12-20 DIAGNOSIS — I25.10 CORONARY ARTERY DISEASE INVOLVING NATIVE CORONARY ARTERY OF NATIVE HEART WITHOUT ANGINA PECTORIS: Primary | ICD-10-CM

## 2018-12-20 DIAGNOSIS — I51.9 LV DYSFUNCTION: ICD-10-CM

## 2018-12-20 DIAGNOSIS — I25.10 CORONARY ARTERY DISEASE INVOLVING NATIVE CORONARY ARTERY OF NATIVE HEART WITHOUT ANGINA PECTORIS: ICD-10-CM

## 2018-12-20 PROCEDURE — G8598 ASA/ANTIPLAT THER USED: HCPCS | Performed by: INTERNAL MEDICINE

## 2018-12-20 PROCEDURE — 93308 TTE F-UP OR LMTD: CPT

## 2018-12-20 PROCEDURE — 99214 OFFICE O/P EST MOD 30 MIN: CPT | Performed by: INTERNAL MEDICINE

## 2018-12-20 PROCEDURE — 1036F TOBACCO NON-USER: CPT | Performed by: INTERNAL MEDICINE

## 2018-12-20 PROCEDURE — G8427 DOCREV CUR MEDS BY ELIG CLIN: HCPCS | Performed by: INTERNAL MEDICINE

## 2018-12-20 PROCEDURE — 3017F COLORECTAL CA SCREEN DOC REV: CPT | Performed by: INTERNAL MEDICINE

## 2018-12-20 PROCEDURE — 1101F PT FALLS ASSESS-DOCD LE1/YR: CPT | Performed by: INTERNAL MEDICINE

## 2018-12-20 PROCEDURE — 6360000004 HC RX CONTRAST MEDICATION: Performed by: FAMILY MEDICINE

## 2018-12-20 PROCEDURE — 4040F PNEUMOC VAC/ADMIN/RCVD: CPT | Performed by: INTERNAL MEDICINE

## 2018-12-20 PROCEDURE — G8417 CALC BMI ABV UP PARAM F/U: HCPCS | Performed by: INTERNAL MEDICINE

## 2018-12-20 PROCEDURE — 1123F ACP DISCUSS/DSCN MKR DOCD: CPT | Performed by: INTERNAL MEDICINE

## 2018-12-20 PROCEDURE — G8484 FLU IMMUNIZE NO ADMIN: HCPCS | Performed by: INTERNAL MEDICINE

## 2018-12-20 RX ADMIN — PERFLUTREN 2.2 MG: 6.52 INJECTION, SUSPENSION INTRAVENOUS at 13:00

## 2018-12-20 NOTE — PATIENT INSTRUCTIONS
Plan:  1. The patient was seen for >25 minutes. >50% of the time was devoted to giving the patient detailed instructions instructions on addressing diet, regular exercise, weight control, smoking abstention, medication compliance, and stress minimization. The patient was provided written and verbal instructions regarding risk factor modification. 2. I recommend that the patient continue their currently prescribed medications. Their drug modifiable risk factors appear to be well controlled. I will continue to address the need/dosing of medications in future visits.    3. Referral to weight loss team.   4. Referral cardiac rehab.   5. Follow up with me in  6 month

## 2019-02-20 RX ORDER — LEVOTHYROXINE SODIUM 0.07 MG/1
TABLET ORAL
Qty: 90 TABLET | Refills: 1 | OUTPATIENT
Start: 2019-02-20

## 2019-02-26 ENCOUNTER — OFFICE VISIT (OUTPATIENT)
Dept: FAMILY MEDICINE CLINIC | Age: 69
End: 2019-02-26
Payer: MEDICARE

## 2019-02-26 VITALS
OXYGEN SATURATION: 97 % | SYSTOLIC BLOOD PRESSURE: 118 MMHG | WEIGHT: 274 LBS | DIASTOLIC BLOOD PRESSURE: 78 MMHG | HEART RATE: 81 BPM | BODY MASS INDEX: 38.22 KG/M2

## 2019-02-26 DIAGNOSIS — R20.9 DISTURBANCE OF SKIN SENSATION: ICD-10-CM

## 2019-02-26 DIAGNOSIS — I10 ESSENTIAL HYPERTENSION: ICD-10-CM

## 2019-02-26 DIAGNOSIS — E03.9 HYPOTHYROIDISM, UNSPECIFIED TYPE: Primary | ICD-10-CM

## 2019-02-26 DIAGNOSIS — B07.9 VIRAL WARTS, UNSPECIFIED TYPE: ICD-10-CM

## 2019-02-26 DIAGNOSIS — E55.9 VITAMIN D DEFICIENCY: ICD-10-CM

## 2019-02-26 LAB
A/G RATIO: 1.5 (ref 1.1–2.2)
ALBUMIN SERPL-MCNC: 4.2 G/DL (ref 3.4–5)
ALP BLD-CCNC: 85 U/L (ref 40–129)
ALT SERPL-CCNC: 35 U/L (ref 10–40)
ANION GAP SERPL CALCULATED.3IONS-SCNC: 15 MMOL/L (ref 3–16)
AST SERPL-CCNC: 40 U/L (ref 15–37)
BILIRUB SERPL-MCNC: 0.5 MG/DL (ref 0–1)
BUN BLDV-MCNC: 21 MG/DL (ref 7–20)
CALCIUM SERPL-MCNC: 9.6 MG/DL (ref 8.3–10.6)
CHLORIDE BLD-SCNC: 99 MMOL/L (ref 99–110)
CO2: 25 MMOL/L (ref 21–32)
CREAT SERPL-MCNC: 1.4 MG/DL (ref 0.8–1.3)
GFR AFRICAN AMERICAN: >60
GFR NON-AFRICAN AMERICAN: 50
GLOBULIN: 2.8 G/DL
GLUCOSE BLD-MCNC: 108 MG/DL (ref 70–99)
POTASSIUM SERPL-SCNC: 4.6 MMOL/L (ref 3.5–5.1)
SODIUM BLD-SCNC: 139 MMOL/L (ref 136–145)
T4 FREE: 1.4 NG/DL (ref 0.9–1.8)
TOTAL PROTEIN: 7 G/DL (ref 6.4–8.2)
TSH SERPL DL<=0.05 MIU/L-ACNC: 2.21 UIU/ML (ref 0.27–4.2)
VITAMIN D 25-HYDROXY: 33.2 NG/ML

## 2019-02-26 PROCEDURE — 1123F ACP DISCUSS/DSCN MKR DOCD: CPT | Performed by: FAMILY MEDICINE

## 2019-02-26 PROCEDURE — G8484 FLU IMMUNIZE NO ADMIN: HCPCS | Performed by: FAMILY MEDICINE

## 2019-02-26 PROCEDURE — G8598 ASA/ANTIPLAT THER USED: HCPCS | Performed by: FAMILY MEDICINE

## 2019-02-26 PROCEDURE — 3017F COLORECTAL CA SCREEN DOC REV: CPT | Performed by: FAMILY MEDICINE

## 2019-02-26 PROCEDURE — 36415 COLL VENOUS BLD VENIPUNCTURE: CPT | Performed by: FAMILY MEDICINE

## 2019-02-26 PROCEDURE — 1036F TOBACCO NON-USER: CPT | Performed by: FAMILY MEDICINE

## 2019-02-26 PROCEDURE — G8427 DOCREV CUR MEDS BY ELIG CLIN: HCPCS | Performed by: FAMILY MEDICINE

## 2019-02-26 PROCEDURE — 1101F PT FALLS ASSESS-DOCD LE1/YR: CPT | Performed by: FAMILY MEDICINE

## 2019-02-26 PROCEDURE — 17110 DESTRUCTION B9 LES UP TO 14: CPT | Performed by: FAMILY MEDICINE

## 2019-02-26 PROCEDURE — 99214 OFFICE O/P EST MOD 30 MIN: CPT | Performed by: FAMILY MEDICINE

## 2019-02-26 PROCEDURE — 4040F PNEUMOC VAC/ADMIN/RCVD: CPT | Performed by: FAMILY MEDICINE

## 2019-02-26 PROCEDURE — G8417 CALC BMI ABV UP PARAM F/U: HCPCS | Performed by: FAMILY MEDICINE

## 2019-02-26 ASSESSMENT — PATIENT HEALTH QUESTIONNAIRE - PHQ9
SUM OF ALL RESPONSES TO PHQ QUESTIONS 1-9: 0
SUM OF ALL RESPONSES TO PHQ9 QUESTIONS 1 & 2: 0
SUM OF ALL RESPONSES TO PHQ QUESTIONS 1-9: 0
2. FEELING DOWN, DEPRESSED OR HOPELESS: 0
1. LITTLE INTEREST OR PLEASURE IN DOING THINGS: 0

## 2019-02-28 RX ORDER — LEVOTHYROXINE SODIUM 0.07 MG/1
TABLET ORAL
Qty: 90 TABLET | Refills: 1 | Status: SHIPPED | OUTPATIENT
Start: 2019-02-28 | End: 2019-08-23 | Stop reason: SDUPTHER

## 2019-03-01 RX ORDER — AMLODIPINE BESYLATE 2.5 MG/1
2.5 TABLET ORAL DAILY
Qty: 90 TABLET | Refills: 5 | Status: SHIPPED | OUTPATIENT
Start: 2019-03-01 | End: 2019-07-01 | Stop reason: SDUPTHER

## 2019-03-25 ENCOUNTER — PROCEDURE VISIT (OUTPATIENT)
Dept: FAMILY MEDICINE CLINIC | Age: 69
End: 2019-03-25
Payer: MEDICARE

## 2019-03-25 VITALS
HEART RATE: 68 BPM | OXYGEN SATURATION: 98 % | DIASTOLIC BLOOD PRESSURE: 80 MMHG | BODY MASS INDEX: 37.66 KG/M2 | WEIGHT: 270 LBS | SYSTOLIC BLOOD PRESSURE: 138 MMHG

## 2019-03-25 DIAGNOSIS — J40 BRONCHITIS: Primary | ICD-10-CM

## 2019-03-25 DIAGNOSIS — B07.9 VIRAL WARTS, UNSPECIFIED TYPE: ICD-10-CM

## 2019-03-25 DIAGNOSIS — R20.9 DISTURBANCE OF SKIN SENSATION: ICD-10-CM

## 2019-03-25 PROCEDURE — 17110 DESTRUCTION B9 LES UP TO 14: CPT | Performed by: FAMILY MEDICINE

## 2019-03-25 RX ORDER — BENZONATATE 100 MG/1
100-200 CAPSULE ORAL 3 TIMES DAILY PRN
Qty: 60 CAPSULE | Refills: 0 | Status: SHIPPED | OUTPATIENT
Start: 2019-03-25 | End: 2019-04-01

## 2019-03-25 RX ORDER — AZITHROMYCIN 250 MG/1
250 TABLET, FILM COATED ORAL SEE ADMIN INSTRUCTIONS
Qty: 6 TABLET | Refills: 0 | Status: SHIPPED | OUTPATIENT
Start: 2019-03-25 | End: 2019-03-30

## 2019-03-25 ASSESSMENT — ENCOUNTER SYMPTOMS: COUGH: 1

## 2019-04-15 ENCOUNTER — OFFICE VISIT (OUTPATIENT)
Dept: ORTHOPEDIC SURGERY | Age: 69
End: 2019-04-15

## 2019-04-15 VITALS — WEIGHT: 270.06 LBS | HEIGHT: 71 IN | BODY MASS INDEX: 37.81 KG/M2

## 2019-04-15 DIAGNOSIS — R52 PAIN: Primary | ICD-10-CM

## 2019-04-15 DIAGNOSIS — M17.0 PRIMARY OSTEOARTHRITIS OF BOTH KNEES: ICD-10-CM

## 2019-04-15 PROCEDURE — 1123F ACP DISCUSS/DSCN MKR DOCD: CPT | Performed by: ORTHOPAEDIC SURGERY

## 2019-04-15 PROCEDURE — G8427 DOCREV CUR MEDS BY ELIG CLIN: HCPCS | Performed by: ORTHOPAEDIC SURGERY

## 2019-04-15 PROCEDURE — 3017F COLORECTAL CA SCREEN DOC REV: CPT | Performed by: ORTHOPAEDIC SURGERY

## 2019-04-15 PROCEDURE — G8598 ASA/ANTIPLAT THER USED: HCPCS | Performed by: ORTHOPAEDIC SURGERY

## 2019-04-15 PROCEDURE — G8417 CALC BMI ABV UP PARAM F/U: HCPCS | Performed by: ORTHOPAEDIC SURGERY

## 2019-04-15 PROCEDURE — 4040F PNEUMOC VAC/ADMIN/RCVD: CPT | Performed by: ORTHOPAEDIC SURGERY

## 2019-04-15 PROCEDURE — 1036F TOBACCO NON-USER: CPT | Performed by: ORTHOPAEDIC SURGERY
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
## 2019-04-22 ENCOUNTER — OFFICE VISIT (OUTPATIENT)
Dept: ORTHOPEDIC SURGERY | Age: 69
End: 2019-04-22
Payer: MEDICARE

## 2019-04-22 DIAGNOSIS — M17.0 PRIMARY OSTEOARTHRITIS OF BOTH KNEES: Primary | ICD-10-CM

## 2019-04-22 PROCEDURE — 99999 PR OFFICE/OUTPT VISIT,PROCEDURE ONLY: CPT | Performed by: PHYSICIAN ASSISTANT

## 2019-04-22 NOTE — PROGRESS NOTES
Chief Complaint   Patient presents with    Injections     #2 Feli Rios     The patient returns today for their second right and left knee injection for osteoarthritis. The risks, benefits, and complications of the injections were again discussed in detail with the patient. The risks discussed included but are not limited to infection, skin reactions, hot swollen joints, and anaphylaxis. The patient gave verbal informed consent for the injection. The patient skin was prepped with 3 sterile gauze pads soaked with alcohol solution and the knee joint was injected with 2cc Supartz intra-articularly under sterile conditions . Technique: Under sterile conditions a [employer] ultrasound unit with a variable frequency (6.0-15.0 MHz) linear transducer was used to localize the placement of a 22-gauge needle into the right and left knee joint. Findings: Successful needle placement for intra-articular Visco supplementation injection. Final images were taken and saved for permanent record. The patient tolerated the injection reasonably well. The patient was given instructions to ice the the knee and avoid strenuous activities for 24-48 hours. The patient was instructed to call the office immediately if there is increased pain, redness, warmth, fever, or chills. We will see the patient back in one week for the third injection. Izabela Alfonso, [facility]    This dictation was performed with a verbal recognition program [facility]) and it was checked for errors. It is possible that there are still dictated errors within this office note. If so, please bring any errors to my attention for an addendum. All efforts were made to ensure that this office note is accurate.

## 2019-04-23 ENCOUNTER — OFFICE VISIT (OUTPATIENT)
Dept: FAMILY MEDICINE CLINIC | Age: 69
End: 2019-04-23
Payer: MEDICARE

## 2019-04-23 VITALS
WEIGHT: 269 LBS | SYSTOLIC BLOOD PRESSURE: 124 MMHG | HEART RATE: 73 BPM | OXYGEN SATURATION: 98 % | DIASTOLIC BLOOD PRESSURE: 76 MMHG | BODY MASS INDEX: 37.53 KG/M2

## 2019-04-23 DIAGNOSIS — B07.9 VIRAL WARTS, UNSPECIFIED TYPE: Primary | ICD-10-CM

## 2019-04-23 PROCEDURE — 17110 DESTRUCTION B9 LES UP TO 14: CPT | Performed by: FAMILY MEDICINE

## 2019-04-23 ASSESSMENT — PATIENT HEALTH QUESTIONNAIRE - PHQ9
SUM OF ALL RESPONSES TO PHQ QUESTIONS 1-9: 0
SUM OF ALL RESPONSES TO PHQ9 QUESTIONS 1 & 2: 0
1. LITTLE INTEREST OR PLEASURE IN DOING THINGS: 0
SUM OF ALL RESPONSES TO PHQ QUESTIONS 1-9: 0
2. FEELING DOWN, DEPRESSED OR HOPELESS: 0

## 2019-04-29 ENCOUNTER — OFFICE VISIT (OUTPATIENT)
Dept: ORTHOPEDIC SURGERY | Age: 69
End: 2019-04-29
Payer: MEDICARE

## 2019-04-29 DIAGNOSIS — M17.0 PRIMARY OSTEOARTHRITIS OF BOTH KNEES: Primary | ICD-10-CM

## 2019-04-29 PROCEDURE — 20610 DRAIN/INJ JOINT/BURSA W/O US: CPT | Performed by: PHYSICIAN ASSISTANT

## 2019-04-29 NOTE — LETTER
[place] [address]  Phone: [phone]  Fax: Debra [address]        April 29, 2019     Patient: Milan Bianchi   YOB: 1950   Date of Visit: 4/29/2019       To Whom It May Concern: It is my medical opinion that Milan Bianchi requires a disability parking placard for the following reasons:  He has limited walking ability due to an orthopedic condition. Duration of need: 3 months    If you have any questions or concerns, please don't hesitate to call.     Sincerely,        JOSIE Almonte

## 2019-04-29 NOTE — PROGRESS NOTES
Chief Complaint   Patient presents with    Injections     #3 SUPARTZ CANDICE KNEES     The patient returns today for their third and final right and left knee injection for osteoarthritis. The risks, benefits, and complications of the injections were again discussed in detail with the patient. The risks discussed include but are not limited to infection, skin reactions, hot swollen joints, and anaphylaxis. The patient gave verbal informed consent for the injection. The patient's skin was prepped with 3 sterile gauze pads soaked with alcohol solution and the knee joint was injected with 2cc Supartz intra-articularly under sterile conditions. Technique: Under sterile conditions a [employer] ultrasound unit with a variable frequency (6.0-15.0 MHz) linear transducer was used to localize the placement of a 22-gauge needle into the right and left knee joint. Findings: Successful needle placement for intra-articular Visco supplementation injection. Final images were taken and saved for permanent record. The patient tolerated the injection reasonably well. The patient was given instructions to ice of the knee and avoid strenuous activity for 24-48 hours. The patient was instructed to call the office immediately if there is increased pain, redness, warmth, fever, or chills. We will see the patient back on an as-needed basis  from this point. He was given a temporary handicap placard as he states he has been having some difficulty with walking. He states that really struggles most with shortness of breath when walking longer distances. I discussed with him that he will need to obtain any long-term handicap placard from his cardiologist or primary care physician as it is not an orthopedic related issue. Mario Quiroga, [facility]    This dictation was performed with a verbal recognition program [facility]) and it was checked for errors. It is possible that there are still dictated errors within this office note.  If so, please bring any errors to my attention for an addendum. All efforts were made to ensure that this office note is accurate.

## 2019-05-04 NOTE — PROGRESS NOTES
SpO2: 98%   Weight: 269 lb (122 kg)     Estimated body mass index is 37.53 kg/m² as calculated from the following:    Height as of 4/15/19: 5' 10.98\" (1.803 m). Weight as of this encounter: 269 lb (122 kg). Physical Exam   Constitutional: He is oriented to person, place, and time. He appears well-developed and well-nourished. HENT:   Head: Normocephalic and atraumatic. Right Ear: External ear normal.   Left Ear: External ear normal.   Nose: Nose normal.   Mouth/Throat: Oropharynx is clear and moist.   Eyes: Pupils are equal, round, and reactive to light. Conjunctivae and EOM are normal.   Neck: Normal range of motion. Neck supple. Musculoskeletal: Normal range of motion. He exhibits no edema or tenderness. Neurological: He is alert and oriented to person, place, and time. He has normal reflexes. Skin: Skin is warm and dry. Right hand with wart. Liquid nitrogen was applied for 10-12 seconds to the skin lesions and the expected blistering or scabbing reaction explained. Do not pick at the areas. Patient reminded to expect hypopigmented scars from the procedure. Return if lesions fail to fully resolve. Psychiatric: He has a normal mood and affect. His behavior is normal. Judgment and thought content normal.   Nursing note and vitals reviewed. ASSESSMENT/PLAN:  1. Viral warts, unspecified type    - 77735 - OR DESTRUCTION BENIGN LESIONS UP TO 14            Return in about 1 month (around 5/21/2019) for skin procedure-30 min. An electronic signature was used to authenticate this note.     --Morales Mi MD on 5/4/2019 at 10:28 AM

## 2019-05-28 ENCOUNTER — OFFICE VISIT (OUTPATIENT)
Dept: FAMILY MEDICINE CLINIC | Age: 69
End: 2019-05-28
Payer: MEDICARE

## 2019-05-28 VITALS
HEART RATE: 78 BPM | DIASTOLIC BLOOD PRESSURE: 80 MMHG | OXYGEN SATURATION: 97 % | BODY MASS INDEX: 36.7 KG/M2 | SYSTOLIC BLOOD PRESSURE: 130 MMHG | WEIGHT: 263 LBS

## 2019-05-28 DIAGNOSIS — B07.9 VIRAL WARTS, UNSPECIFIED TYPE: Primary | ICD-10-CM

## 2019-05-28 PROCEDURE — 17110 DESTRUCTION B9 LES UP TO 14: CPT | Performed by: FAMILY MEDICINE

## 2019-06-03 NOTE — PROGRESS NOTES
2019     Britton Contreras (:  1950) is a 70 y.o. male, here for evaluation of the following medical concerns:    Britton Contreras is a 70 y.o. male. Patient presents with:  Procedure: wart removal   Cough      Patient is here for wart retreatment on right hand. It is smaller now. He notes it has been doing much better. He notes no other problems with that. The patients PMH, surgical history, family history, medications, allergies were all reviewed and updated as appropriate today. Review of Systems    Prior to Visit Medications    Medication Sig Taking? Authorizing Provider   amLODIPine (NORVASC) 2.5 MG tablet Take 1 tablet by mouth daily Yes Nilda Rivero MD   levothyroxine (SYNTHROID) 75 MCG tablet TAKE 1 TABLET BY MOUTH EVERY DAY Yes JOHNY Marie CNP   metoprolol succinate (TOPROL XL) 25 MG extended release tablet Take 1 tablet by mouth daily Yes JOHNY Sanz CNP   atorvastatin (LIPITOR) 20 MG tablet Take 1 tablet by mouth daily Yes JOHNY Sanz CNP   isosorbide mononitrate (IMDUR) 30 MG extended release tablet Take 1 tablet by mouth daily Yes JOHNY Sanz CNP   Naproxen Sodium (ALEVE) 220 MG CAPS Take by mouth as needed for Pain Yes Historical Provider, MD   Multiple Vitamins-Minerals (MULTIVITAMIN PO) Take by mouth Yes Historical Provider, MD   MAGNESIUM OXIDE PO Take 1 tablet by mouth daily  Yes Historical Provider, MD   aspirin 81 MG EC tablet Take 81 mg by mouth 2 times daily. Yes Historical Provider, MD   fish oil-omega-3 fatty acids 1000 MG capsule Take 2 g by mouth daily. Yes Historical Provider, MD        Social History     Tobacco Use    Smoking status: Former Smoker     Packs/day: 0.00     Types: Cigars    Smokeless tobacco: Never Used    Tobacco comment: cigar occasionally   Substance Use Topics    Alcohol use:  Yes     Alcohol/week: 0.0 oz     Comment: occ        Vitals:    19 0824   BP: 130/80   Pulse: 78 SpO2: 97%   Weight: 263 lb (119.3 kg)     Estimated body mass index is 36.7 kg/m² as calculated from the following:    Height as of 4/15/19: 5' 10.98\" (1.803 m). Weight as of this encounter: 263 lb (119.3 kg). Physical Exam   Constitutional: He is oriented to person, place, and time. He appears well-developed and well-nourished. HENT:   Head: Normocephalic and atraumatic. Right Ear: External ear normal.   Left Ear: External ear normal.   Nose: Nose normal.   Mouth/Throat: Oropharynx is clear and moist.   Eyes: Pupils are equal, round, and reactive to light. Conjunctivae and EOM are normal.   Neck: Normal range of motion. Neck supple. Musculoskeletal: Normal range of motion. He exhibits no edema or tenderness. Neurological: He is alert and oriented to person, place, and time. He has normal reflexes. Skin: Skin is warm and dry. Right hand with wart. Liquid nitrogen was applied for 10-12 seconds to the skin lesions and the expected blistering or scabbing reaction explained. Do not pick at the areas. Patient reminded to expect hypopigmented scars from the procedure. Return if lesions fail to fully resolve. Psychiatric: He has a normal mood and affect. His behavior is normal. Judgment and thought content normal.   Nursing note and vitals reviewed. ASSESSMENT/PLAN:  1. Viral warts, unspecified type    - 44922 - ME DESTRUCTION BENIGN LESIONS UP TO 14            Return in about 1 month (around 6/25/2019). An electronic signature was used to authenticate this note.     --Bridgette Krishnan MD on 6/3/2019 at 12:36 PM

## 2019-06-28 NOTE — PROGRESS NOTES
Hyperlipidemia   ~Lipitor   ~Stable  3. Hypertension   ~BP: 132/78   4. Leg pains   ~appear from non-cardiac issues: ongoing    ~improved with injections   5. Obesity   ~Body mass index is 36.71 kg/m². Plan:  1. The patient was seen for >25 minutes. >50% of the time was devoted to giving the patient detailed instructions instructions on addressing diet, regular exercise, weight control, smoking abstention, medication compliance, and stress minimization. The patient was provided written and verbal instructions regarding risk factor modification. 2. I recommend that the patient continue their currently prescribed medications. Their drug modifiable risk factors appear to be well controlled. I will continue to address the need/dosing of medications in future visits. 3. Follow up with me in 1 year. This note was scribed in the presence of Dr. Baron To HERNÁNDEZ by Mary Scott RN.      I, Dr. Baron Ariza, personally performed the services described in this documentation, as scribed by the above signed scribe in my presence. It is both accurate and complete to my knowledge. I agree with the details independently gathered by the clinical support staff, while the remaining scribed note accurately describes my personal service to the patient. All questions and concerns were addressed to the patient/family. Alternatives to my treatment were discussed. The note was completed using EMR. Every effort was made to ensure accuracy; however, inadvertent computerized transcription errors may be present.     Baron To MD, [facility] [address], [address] central office  [phone] [facility] office  [phone] [facility] office  7/1/2019  9:09 AM

## 2019-07-01 ENCOUNTER — OFFICE VISIT (OUTPATIENT)
Dept: CARDIOLOGY CLINIC | Age: 69
End: 2019-07-01
Payer: MEDICARE

## 2019-07-01 VITALS
WEIGHT: 263.2 LBS | SYSTOLIC BLOOD PRESSURE: 132 MMHG | OXYGEN SATURATION: 98 % | HEART RATE: 77 BPM | DIASTOLIC BLOOD PRESSURE: 78 MMHG | HEIGHT: 71 IN | BODY MASS INDEX: 36.85 KG/M2

## 2019-07-01 DIAGNOSIS — E78.2 MIXED HYPERLIPIDEMIA: ICD-10-CM

## 2019-07-01 DIAGNOSIS — I25.10 CORONARY ARTERY DISEASE INVOLVING NATIVE CORONARY ARTERY OF NATIVE HEART WITHOUT ANGINA PECTORIS: Primary | ICD-10-CM

## 2019-07-01 DIAGNOSIS — I10 ESSENTIAL HYPERTENSION: ICD-10-CM

## 2019-07-01 PROCEDURE — G8417 CALC BMI ABV UP PARAM F/U: HCPCS | Performed by: INTERNAL MEDICINE

## 2019-07-01 PROCEDURE — 99214 OFFICE O/P EST MOD 30 MIN: CPT | Performed by: INTERNAL MEDICINE

## 2019-07-01 PROCEDURE — 3017F COLORECTAL CA SCREEN DOC REV: CPT | Performed by: INTERNAL MEDICINE

## 2019-07-01 PROCEDURE — G8427 DOCREV CUR MEDS BY ELIG CLIN: HCPCS | Performed by: INTERNAL MEDICINE

## 2019-07-01 PROCEDURE — G8598 ASA/ANTIPLAT THER USED: HCPCS | Performed by: INTERNAL MEDICINE

## 2019-07-01 PROCEDURE — 4040F PNEUMOC VAC/ADMIN/RCVD: CPT | Performed by: INTERNAL MEDICINE

## 2019-07-01 PROCEDURE — 1123F ACP DISCUSS/DSCN MKR DOCD: CPT | Performed by: INTERNAL MEDICINE

## 2019-07-01 PROCEDURE — 1036F TOBACCO NON-USER: CPT | Performed by: INTERNAL MEDICINE

## 2019-07-01 RX ORDER — ATORVASTATIN CALCIUM 20 MG/1
20 TABLET, FILM COATED ORAL DAILY
Qty: 90 TABLET | Refills: 3 | Status: SHIPPED | OUTPATIENT
Start: 2019-07-01 | End: 2021-09-07 | Stop reason: SDUPTHER

## 2019-07-01 RX ORDER — AMLODIPINE BESYLATE 2.5 MG/1
2.5 TABLET ORAL DAILY
Qty: 90 TABLET | Refills: 3 | Status: SHIPPED | OUTPATIENT
Start: 2019-07-01 | End: 2020-04-20 | Stop reason: SDUPTHER

## 2019-07-01 RX ORDER — ISOSORBIDE MONONITRATE 30 MG/1
30 TABLET, EXTENDED RELEASE ORAL DAILY
Qty: 90 TABLET | Refills: 3 | Status: SHIPPED | OUTPATIENT
Start: 2019-07-01 | End: 2019-09-09 | Stop reason: SDUPTHER

## 2019-07-01 RX ORDER — METOPROLOL SUCCINATE 25 MG/1
25 TABLET, EXTENDED RELEASE ORAL DAILY
Qty: 90 TABLET | Refills: 3 | Status: SHIPPED | OUTPATIENT
Start: 2019-07-01 | End: 2019-09-09 | Stop reason: SDUPTHER

## 2019-07-05 ENCOUNTER — OFFICE VISIT (OUTPATIENT)
Dept: FAMILY MEDICINE CLINIC | Age: 69
End: 2019-07-05
Payer: MEDICARE

## 2019-07-05 VITALS
HEART RATE: 68 BPM | DIASTOLIC BLOOD PRESSURE: 80 MMHG | BODY MASS INDEX: 36.96 KG/M2 | SYSTOLIC BLOOD PRESSURE: 136 MMHG | WEIGHT: 265 LBS | OXYGEN SATURATION: 96 % | TEMPERATURE: 98.1 F

## 2019-07-05 DIAGNOSIS — B07.9 VIRAL WARTS, UNSPECIFIED TYPE: Primary | ICD-10-CM

## 2019-07-05 PROCEDURE — 17110 DESTRUCTION B9 LES UP TO 14: CPT | Performed by: FAMILY MEDICINE

## 2019-07-14 NOTE — PROGRESS NOTES
standard drinks     Comment: occ        Vitals:    07/05/19 0811   BP: 136/80   Pulse: 68   Temp: 98.1 °F (36.7 °C)   TempSrc: Temporal   SpO2: 96%   Weight: 265 lb (120.2 kg)     Estimated body mass index is 36.96 kg/m² as calculated from the following:    Height as of 7/1/19: 5' 11\" (1.803 m). Weight as of this encounter: 265 lb (120.2 kg). Physical Exam   Constitutional: He is oriented to person, place, and time. He appears well-developed and well-nourished. HENT:   Head: Normocephalic and atraumatic. Right Ear: External ear normal.   Left Ear: External ear normal.   Nose: Nose normal.   Mouth/Throat: Oropharynx is clear and moist.   Eyes: Pupils are equal, round, and reactive to light. Conjunctivae and EOM are normal.   Neck: Normal range of motion. Neck supple. Musculoskeletal: Normal range of motion. He exhibits no edema or tenderness. Neurological: He is alert and oriented to person, place, and time. He has normal reflexes. Skin: Skin is warm and dry. Right hand with wart. Liquid nitrogen was applied for 10-12 seconds to the skin lesions and the expected blistering or scabbing reaction explained. Do not pick at the areas. Patient reminded to expect hypopigmented scars from the procedure. Return if lesions fail to fully resolve. Psychiatric: He has a normal mood and affect. His behavior is normal. Judgment and thought content normal.   Nursing note and vitals reviewed. ASSESSMENT/PLAN:  1. Viral warts, unspecified type    - 85106 - MS DESTRUCTION BENIGN LESIONS UP TO 14            No follow-ups on file. An electronic signature was used to authenticate this note.     --Cecelia Summers MD on 7/14/2019 at 9:57 AM

## 2019-08-23 ENCOUNTER — OFFICE VISIT (OUTPATIENT)
Dept: FAMILY MEDICINE CLINIC | Age: 69
End: 2019-08-23
Payer: MEDICARE

## 2019-08-23 VITALS
SYSTOLIC BLOOD PRESSURE: 138 MMHG | WEIGHT: 270 LBS | BODY MASS INDEX: 37.66 KG/M2 | HEART RATE: 61 BPM | DIASTOLIC BLOOD PRESSURE: 84 MMHG | OXYGEN SATURATION: 100 %

## 2019-08-23 DIAGNOSIS — Z12.5 SCREENING FOR PROSTATE CANCER: ICD-10-CM

## 2019-08-23 DIAGNOSIS — B07.9 VIRAL WARTS, UNSPECIFIED TYPE: ICD-10-CM

## 2019-08-23 DIAGNOSIS — Z23 NEED FOR PNEUMOCOCCAL VACCINE: ICD-10-CM

## 2019-08-23 DIAGNOSIS — E03.9 HYPOTHYROIDISM, UNSPECIFIED TYPE: Primary | ICD-10-CM

## 2019-08-23 LAB
PROSTATE SPECIFIC ANTIGEN: 0.98 NG/ML (ref 0–4)
T4 FREE: 1.4 NG/DL (ref 0.9–1.8)
TSH SERPL DL<=0.05 MIU/L-ACNC: 1.9 UIU/ML (ref 0.27–4.2)

## 2019-08-23 PROCEDURE — G8598 ASA/ANTIPLAT THER USED: HCPCS | Performed by: FAMILY MEDICINE

## 2019-08-23 PROCEDURE — G8417 CALC BMI ABV UP PARAM F/U: HCPCS | Performed by: FAMILY MEDICINE

## 2019-08-23 PROCEDURE — 1036F TOBACCO NON-USER: CPT | Performed by: FAMILY MEDICINE

## 2019-08-23 PROCEDURE — 99213 OFFICE O/P EST LOW 20 MIN: CPT | Performed by: FAMILY MEDICINE

## 2019-08-23 PROCEDURE — 36415 COLL VENOUS BLD VENIPUNCTURE: CPT | Performed by: FAMILY MEDICINE

## 2019-08-23 PROCEDURE — G0009 ADMIN PNEUMOCOCCAL VACCINE: HCPCS | Performed by: FAMILY MEDICINE

## 2019-08-23 PROCEDURE — 4040F PNEUMOC VAC/ADMIN/RCVD: CPT | Performed by: FAMILY MEDICINE

## 2019-08-23 PROCEDURE — 1123F ACP DISCUSS/DSCN MKR DOCD: CPT | Performed by: FAMILY MEDICINE

## 2019-08-23 PROCEDURE — 90732 PPSV23 VACC 2 YRS+ SUBQ/IM: CPT | Performed by: FAMILY MEDICINE

## 2019-08-23 PROCEDURE — G8427 DOCREV CUR MEDS BY ELIG CLIN: HCPCS | Performed by: FAMILY MEDICINE

## 2019-08-23 PROCEDURE — 3017F COLORECTAL CA SCREEN DOC REV: CPT | Performed by: FAMILY MEDICINE

## 2019-08-23 RX ORDER — LEVOTHYROXINE SODIUM 0.07 MG/1
TABLET ORAL
Qty: 90 TABLET | Refills: 1 | Status: SHIPPED | OUTPATIENT
Start: 2019-08-23 | End: 2019-09-13 | Stop reason: SDUPTHER

## 2019-09-03 NOTE — PROGRESS NOTES
Never Used    Tobacco comment: cigar occasionally   Substance Use Topics    Alcohol use: Yes     Alcohol/week: 0.0 standard drinks     Comment: occ        Vitals:    08/23/19 0856 08/23/19 0908   BP: (!) 142/84 138/84   Pulse: 61    SpO2: 100%    Weight: 270 lb (122.5 kg)      Estimated body mass index is 37.66 kg/m² as calculated from the following:    Height as of 7/1/19: 5' 11\" (1.803 m). Weight as of this encounter: 270 lb (122.5 kg). Physical Exam   Constitutional: He is oriented to person, place, and time. He appears well-developed and well-nourished. HENT:   Head: Normocephalic and atraumatic. Right Ear: External ear normal.   Left Ear: External ear normal.   Nose: Nose normal.   Mouth/Throat: Oropharynx is clear and moist.   Eyes: Pupils are equal, round, and reactive to light. Conjunctivae and EOM are normal.   Neck: Normal range of motion. Neck supple. Cardiovascular: Normal rate, regular rhythm, normal heart sounds and intact distal pulses. Exam reveals no gallop and no friction rub. No murmur heard. Pulmonary/Chest: Effort normal and breath sounds normal. No respiratory distress. He has no wheezes. Abdominal: Soft. Bowel sounds are normal. He exhibits no distension. There is no tenderness. Musculoskeletal: Normal range of motion. He exhibits no edema or tenderness. Neurological: He is alert and oriented to person, place, and time. He has normal reflexes. Skin: Skin is warm and dry. Psychiatric: He has a normal mood and affect. His behavior is normal. Judgment and thought content normal.   Nursing note and vitals reviewed. ASSESSMENT/PLAN:  1. Need for pneumococcal vaccine    - Pneumococcal polysaccharide vaccine 23-valent greater than or equal to 3yo subcutaneous/IM    2. Screening for prostate cancer    - Psa screening    3. Viral warts, unspecified type      4.  Hypothyroidism, unspecified type    - TSH without Reflex  - T4, FREE  - levothyroxine (SYNTHROID) 75 MCG

## 2019-09-09 RX ORDER — ISOSORBIDE MONONITRATE 30 MG/1
30 TABLET, EXTENDED RELEASE ORAL DAILY
Qty: 90 TABLET | Refills: 3 | Status: SHIPPED | OUTPATIENT
Start: 2019-09-09 | End: 2021-09-07 | Stop reason: SDUPTHER

## 2019-09-09 RX ORDER — METOPROLOL SUCCINATE 25 MG/1
25 TABLET, EXTENDED RELEASE ORAL DAILY
Qty: 90 TABLET | Refills: 3 | Status: SHIPPED | OUTPATIENT
Start: 2019-09-09 | End: 2020-04-20

## 2019-09-13 DIAGNOSIS — E03.9 HYPOTHYROIDISM, UNSPECIFIED TYPE: ICD-10-CM

## 2019-09-13 RX ORDER — LEVOTHYROXINE SODIUM 0.07 MG/1
TABLET ORAL
Qty: 90 TABLET | Refills: 3 | Status: SHIPPED | OUTPATIENT
Start: 2019-09-13 | End: 2020-04-20 | Stop reason: SDUPTHER

## 2019-09-13 NOTE — TELEPHONE ENCOUNTER
[place] [facility] [place]  Last seen 8/23/19

## 2020-02-10 ENCOUNTER — OFFICE VISIT (OUTPATIENT)
Dept: ORTHOPEDIC SURGERY | Age: 70
End: 2020-02-10
Payer: MEDICARE

## 2020-02-17 ENCOUNTER — NURSE ONLY (OUTPATIENT)
Dept: ORTHOPEDIC SURGERY | Age: 70
End: 2020-02-17
Payer: MEDICARE

## 2020-02-17 PROCEDURE — 20611 DRAIN/INJ JOINT/BURSA W/US: CPT | Performed by: PHYSICIAN ASSISTANT

## 2020-02-17 NOTE — PROGRESS NOTES
Chief Complaint   Patient presents with    Knee Pain     #2 Charls Mukasey     The patient returns today for their second right and left knee Visco supplementation injection. The risks, benefits, and complications of the injections were again discussed in detail with the patient. The risks discussed included but are not limited to infection, skin reactions, hot swollen joints, and anaphylaxis. The patient gave verbal informed consent for the injection. The patient skin was prepped with 3 sterile gauze pads soaked with alcohol solution and the knee joint was injected with 2cc Supartz intra-articularly under sterile conditions . Technique: Under sterile conditions a [employer] ultrasound unit with a variable frequency (6.0-15.0 MHz) linear transducer was used to localize the placement of a 22-gauge needle into the right and left knee joint. Findings: Successful needle placement for intra-articular Visco supplementation injection. Final images were taken and saved for permanent record. The patient tolerated the injection reasonably well. The patient was given instructions to ice the the knee and avoid strenuous activities for 24-48 hours. The patient was instructed to call the office immediately if there is increased pain, redness, warmth, fever, or chills. We will see the patient back in one week for the third injection. Danilo Alvarez, [facility]    This dictation was performed with a verbal recognition program [facility]) and it was checked for errors. It is possible that there are still dictated errors within this office note. If so, please bring any errors to my attention for an addendum. All efforts were made to ensure that this office note is accurate.

## 2020-02-24 ENCOUNTER — NURSE ONLY (OUTPATIENT)
Dept: ORTHOPEDIC SURGERY | Age: 70
End: 2020-02-24
Payer: MEDICARE

## 2020-02-24 PROCEDURE — 20611 DRAIN/INJ JOINT/BURSA W/US: CPT | Performed by: PHYSICIAN ASSISTANT

## 2020-02-24 NOTE — PROGRESS NOTES
Supartz No. 3 right and Left knee  Osteoarthritis of the right and LEFT knee    The patient returns today for their third and final Right and LEFT knee Visco supplementation injection. The risks, benefits, and complications of the injections were again discussed in detail with the patient. The risks discussed include but are not limited to infection, skin reactions, hot swollen joints, and anaphylaxis. The patient gave verbal informed consent for the injection. The patient's skin was prepped with 3 sterile gauze pads soaked with alcohol solution and the knee joint was injected with 2cc Supartz right and LEFT intra-articularly under sterile conditions. Technique: Under sterile conditions a [employer] ultrasound unit with a variable frequency (6.0-15.0 MHz) linear transducer was used to localize the placement of a 22-gauge needle into the Right and Left knee joint. Findings: Successful needle placement for intra-articular Visco supplementation injection. Final images were taken and saved for permanent record. The patient tolerated the injection reasonably well. The patient was given instructions to ice of the knee and avoid strenuous activity for 24-48 hours. The patient was instructed to call the office immediately if there is increased pain, redness, warmth, fever, or chills. We will see the patient back on an as-needed basis  from this point.

## 2020-03-24 ENCOUNTER — OFFICE VISIT (OUTPATIENT)
Dept: FAMILY MEDICINE CLINIC | Age: 70
End: 2020-03-24
Payer: MEDICARE

## 2020-03-24 VITALS
TEMPERATURE: 98 F | OXYGEN SATURATION: 96 % | HEART RATE: 71 BPM | SYSTOLIC BLOOD PRESSURE: 122 MMHG | BODY MASS INDEX: 36.4 KG/M2 | DIASTOLIC BLOOD PRESSURE: 86 MMHG | WEIGHT: 261 LBS

## 2020-03-24 PROCEDURE — 4040F PNEUMOC VAC/ADMIN/RCVD: CPT | Performed by: NURSE PRACTITIONER

## 2020-03-24 PROCEDURE — 3017F COLORECTAL CA SCREEN DOC REV: CPT | Performed by: NURSE PRACTITIONER

## 2020-03-24 PROCEDURE — 1036F TOBACCO NON-USER: CPT | Performed by: NURSE PRACTITIONER

## 2020-03-24 PROCEDURE — G8427 DOCREV CUR MEDS BY ELIG CLIN: HCPCS | Performed by: NURSE PRACTITIONER

## 2020-03-24 PROCEDURE — G8482 FLU IMMUNIZE ORDER/ADMIN: HCPCS | Performed by: NURSE PRACTITIONER

## 2020-03-24 PROCEDURE — 99213 OFFICE O/P EST LOW 20 MIN: CPT | Performed by: NURSE PRACTITIONER

## 2020-03-24 PROCEDURE — 1123F ACP DISCUSS/DSCN MKR DOCD: CPT | Performed by: NURSE PRACTITIONER

## 2020-03-24 PROCEDURE — G8417 CALC BMI ABV UP PARAM F/U: HCPCS | Performed by: NURSE PRACTITIONER

## 2020-03-24 RX ORDER — AMOXICILLIN AND CLAVULANATE POTASSIUM 875; 125 MG/1; MG/1
1 TABLET, FILM COATED ORAL 2 TIMES DAILY
Qty: 20 TABLET | Refills: 0 | Status: SHIPPED | OUTPATIENT
Start: 2020-03-24 | End: 2020-04-03

## 2020-03-24 ASSESSMENT — ENCOUNTER SYMPTOMS
SHORTNESS OF BREATH: 0
WHEEZING: 0
GASTROINTESTINAL NEGATIVE: 1
RHINORRHEA: 1
COUGH: 0
SORE THROAT: 0
SINUS PRESSURE: 1
SINUS PAIN: 1

## 2020-03-24 NOTE — PROGRESS NOTES
Vitamins-Minerals (MULTIVITAMIN PO) Take by mouth Yes Historical Provider, MD   MAGNESIUM OXIDE PO Take 1 tablet by mouth daily  Yes Historical Provider, MD   aspirin 81 MG EC tablet Take 81 mg by mouth 2 times daily. Yes Historical Provider, MD   fish oil-omega-3 fatty acids 1000 MG capsule Take 2 g by mouth daily. Yes Historical Provider, MD        Social History     Tobacco Use    Smoking status: Former Smoker     Packs/day: 0.00     Years: 10.00     Pack years: 0.00     Types: Cigars     Last attempt to quit: 2013     Years since quittin.7    Smokeless tobacco: Never Used    Tobacco comment: cigar occasionally   Substance Use Topics    Alcohol use: Yes     Alcohol/week: 0.0 standard drinks     Comment: occ        Vitals:    20 1019   BP: 122/86   Pulse: 71   Temp: 98 °F (36.7 °C)   TempSrc: Oral   SpO2: 96%   Weight: 261 lb (118.4 kg)     Estimated body mass index is 36.4 kg/m² as calculated from the following:    Height as of 19: 5' 11\" (1.803 m). Weight as of this encounter: 261 lb (118.4 kg). Physical Exam  Vitals signs reviewed. HENT:      Head: Normocephalic. Right Ear: Hearing, tympanic membrane, ear canal and external ear normal.      Left Ear: Hearing, tympanic membrane, ear canal and external ear normal.      Nose: Nasal tenderness and congestion present. Right Sinus: No maxillary sinus tenderness or frontal sinus tenderness. Left Sinus: Maxillary sinus tenderness and frontal sinus tenderness present. Mouth/Throat:      Lips: Pink. Mouth: Mucous membranes are moist.      Pharynx: Posterior oropharyngeal erythema present. No pharyngeal swelling. Cardiovascular:      Rate and Rhythm: Normal rate and regular rhythm. Heart sounds: Normal heart sounds. Pulmonary:      Effort: Pulmonary effort is normal.      Breath sounds: Normal breath sounds. Skin:     General: Skin is warm and dry.    Neurological:      Mental Status: He is alert and

## 2020-04-20 RX ORDER — AMLODIPINE BESYLATE 2.5 MG/1
2.5 TABLET ORAL DAILY
Qty: 90 TABLET | Refills: 1 | Status: SHIPPED | OUTPATIENT
Start: 2020-04-20 | End: 2021-01-04 | Stop reason: SDUPTHER

## 2020-04-20 RX ORDER — LEVOTHYROXINE SODIUM 0.07 MG/1
TABLET ORAL
Qty: 90 TABLET | Refills: 3 | Status: SHIPPED | OUTPATIENT
Start: 2020-04-20 | End: 2021-06-07

## 2020-04-20 RX ORDER — METOPROLOL SUCCINATE 25 MG/1
25 TABLET, EXTENDED RELEASE ORAL DAILY
Qty: 90 TABLET | Refills: 3 | Status: SHIPPED | OUTPATIENT
Start: 2020-04-20 | End: 2021-04-26

## 2020-05-11 ENCOUNTER — TELEPHONE (OUTPATIENT)
Dept: FAMILY MEDICINE CLINIC | Age: 70
End: 2020-05-11

## 2020-05-11 DIAGNOSIS — H53.2 DIPLOPIA: Primary | ICD-10-CM

## 2020-05-15 DIAGNOSIS — H53.2 DIPLOPIA: ICD-10-CM

## 2020-05-15 LAB
A/G RATIO: 1.4 (ref 1.1–2.2)
ALBUMIN SERPL-MCNC: 4.3 G/DL (ref 3.4–5)
ALP BLD-CCNC: 66 U/L (ref 40–129)
ALT SERPL-CCNC: 34 U/L (ref 10–40)
ANION GAP SERPL CALCULATED.3IONS-SCNC: 12 MMOL/L (ref 3–16)
AST SERPL-CCNC: 40 U/L (ref 15–37)
BILIRUB SERPL-MCNC: 0.8 MG/DL (ref 0–1)
BUN BLDV-MCNC: 19 MG/DL (ref 7–20)
CALCIUM SERPL-MCNC: 9.3 MG/DL (ref 8.3–10.6)
CHLORIDE BLD-SCNC: 100 MMOL/L (ref 99–110)
CO2: 24 MMOL/L (ref 21–32)
CREAT SERPL-MCNC: 1.4 MG/DL (ref 0.8–1.3)
GFR AFRICAN AMERICAN: >60
GFR NON-AFRICAN AMERICAN: 50
GLOBULIN: 3.1 G/DL
GLUCOSE BLD-MCNC: 99 MG/DL (ref 70–99)
HCT VFR BLD CALC: 40.4 % (ref 40.5–52.5)
HEMOGLOBIN: 13.7 G/DL (ref 13.5–17.5)
MCH RBC QN AUTO: 32.6 PG (ref 26–34)
MCHC RBC AUTO-ENTMCNC: 33.8 G/DL (ref 31–36)
MCV RBC AUTO: 96.2 FL (ref 80–100)
PDW BLD-RTO: 14.8 % (ref 12.4–15.4)
PLATELET # BLD: 228 K/UL (ref 135–450)
PMV BLD AUTO: 8.6 FL (ref 5–10.5)
POTASSIUM SERPL-SCNC: 4.3 MMOL/L (ref 3.5–5.1)
RBC # BLD: 4.2 M/UL (ref 4.2–5.9)
SODIUM BLD-SCNC: 136 MMOL/L (ref 136–145)
TOTAL PROTEIN: 7.4 G/DL (ref 6.4–8.2)
TSH REFLEX: 1.12 UIU/ML (ref 0.27–4.2)
WBC # BLD: 7.3 K/UL (ref 4–11)

## 2020-06-19 NOTE — PROGRESS NOTES
[address] [facility]   Cardiovascular Evaluation    PATIENT: Camryn Iqbal  DATE: 2020  MRN: <[id number]>  CSN: [id number]  : 1950    Primary Care Doctor: Emmy Castro MD    Reason for evaluation:   1 Year Follow Up; Coronary Artery Disease; and Hyperlipidemia    Subjective:     History of present illness:   Camryn Iqbal is a 78 y.o. male who is here today for follow up for coronary artery disease, hypertension, and hyperlipidemia. He has a history of a cardiac cath in  and then coronary artery bypass surgery. He underwent a repeat cath in 2018 and medication management was recommended. Most recent limited echocardiogram from 18 showed an ejection fraction of 45-50%, LV gram showed EF at 45%. Today he states he feel well overall. 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 He states he works morning to night every day. He was having knee pain that has decreased with CBD oil and cortisone injections. He is considering a knee replacement at some point but is unsure. He has been taking his medications as prescribed and is tolerating them well. His weight is down 13 lbs from 2019. Patient Active Problem List   Diagnosis    Essential hypertension    Hypothyroidism    Vitamin D deficiency    Hyperlipidemia    Coronary artery disease involving native coronary artery of native heart without angina pectoris    Obesity (BMI 30-39. 9)    HOGUE (dyspnea on exertion)    Class 1 obesity due to excess calories with serious comorbidity in adult    Unstable angina (HCC)    LV dysfunction         Cardiac Testing: I have reviewed the findings below. STRESS TEST: 2014     Summary  There is a moderate sized inferior defect consistent with mainly infarction  with mild minna-infarct ischemia. The possibility of attenuation cannot be  excluded due to normal wall motion. Left ventricular wall motion and function are normal.  Left ventricular ejection fraction of 63 %. The LV is mildly dilated. ECHO: 2/19/2016  Summary   Normal left ventricle size with mild concentric left ventricular   hypertrophy. Normal systolic function with an estimated ejection fraction of 55%. No   regional wall motion abnormalities are seen. Diastolic filling parameters suggest normal diastolic filing pressure. The mitral valve is normal in structure. Mild mitral regurgitation. Aortic valve appears sclerotic but opens adequately. Systolic pulmonary artery pressure (SPAP) is normal and estimated at 23 mmHg   (RA pressure 3 mmHg). Past Medical History:   has a past medical history of Actinic keratosis, Allergic rhinitis, CAD (coronary artery disease), [place] (hard of hearing), Hyperlipidemia, Hypertension, MI (myocardial infarction) ([facility] [address] and Osteoarthritis. Surgical History:   has a past surgical history that includes Diagnostic Cardiac Cath Lab Procedure; Appendectomy; fracture surgery; Coronary artery bypass graft (8/06); Colonoscopy (2005); Tonsillectomy; and Coronary angioplasty (07/27/2018). Social History:   reports that he quit smoking about 6 years ago. His smoking use included cigars. He smoked 0.00 packs per day for 10.00 years. He has never used smokeless tobacco. He reports current alcohol use. He reports that he does not use drugs. Family History:  No evidence for sudden cardiac death or premature CAD    Home Medications:  Reviewed and are listed in nursing record.  and/or listed below  Current Outpatient Medications   Medication Sig Dispense Refill    metoprolol succinate (TOPROL XL) 25 MG extended release tablet TAKE 1 TABLET BY MOUTH DAILY 90 tablet 3    levothyroxine (SYNTHROID) 75 MCG tablet TAKE 1 TABLET BY MOUTH EVERY DAY 90 tablet 3    amLODIPine (NORVASC) 2.5 MG tablet Take 1 tablet by mouth daily 90 tablet 1    NONFORMULARY cbd gummies      Cetirizine HCl (ZYRTEC PO) Take by mouth      isosorbide mononitrate (IMDUR) 30 MG extended release tablet Wt Readings from Last 3 Encounters:   06/22/20 257 lb (116.6 kg)   03/24/20 261 lb (118.4 kg)   08/23/19 270 lb (122.5 kg)         General Appearance:  Alert, cooperative, no distress, appears stated age   Head:  Normocephalic, without obvious abnormality, atraumatic   Eyes:  PERRL, conjunctiva/corneas clear       Nose: Nares normal, no drainage or sinus tenderness   Throat: Lips, mucosa, and tongue normal   Neck: Supple, symmetrical, trachea midline, no adenopathy, thyroid: not enlarged, symmetric, no tenderness/mass/nodules, no carotid bruit or JVD       Lungs:   Clear to auscultation bilaterally, respirations unlabored   Chest Wall:  No tenderness or deformity   Heart:  Regular rhythm and normal rate; S1, S2 are normal; no murmur noted; no rub or gallop   Abdomen:   Soft, non-tender, bowel sounds active all four quadrants,  no masses, no organomegaly           Extremities: Extremities normal, atraumatic, no cyanosis or edema   Pulses: 2+ and symmetric   Skin: Skin color, texture, turgor normal, no rashes or lesions   Pysch: Normal mood and affect   Neurologic: Normal gross motor and sensory exam.         Labs  CBC:   Lab Results   Component Value Date    WBC 7.3 05/15/2020    RBC 4.20 05/15/2020    HGB 13.7 05/15/2020    HCT 40.4 05/15/2020    MCV 96.2 05/15/2020    RDW 14.8 05/15/2020     05/15/2020     CMP:    Lab Results   Component Value Date     05/15/2020    K 4.3 05/15/2020     05/15/2020    CO2 24 05/15/2020    BUN 19 05/15/2020    CREATININE 1.4 05/15/2020    GFRAA >60 05/15/2020    GFRAA >60 01/25/2013    AGRATIO 1.4 05/15/2020    LABGLOM 50 05/15/2020    GLUCOSE 99 05/15/2020    PROT 7.4 05/15/2020    PROT 7.1 01/25/2013    CALCIUM 9.3 05/15/2020    BILITOT 0.8 05/15/2020    ALKPHOS 66 05/15/2020    AST 40 05/15/2020    ALT 34 05/15/2020     Lab Results   Component Value Date    TRIG 134 07/27/2018    TRIG 136 08/07/2017    TRIG 119 10/26/2015     Lab Results   Component Value Date

## 2020-06-22 ENCOUNTER — OFFICE VISIT (OUTPATIENT)
Dept: CARDIOLOGY CLINIC | Age: 70
End: 2020-06-22
Payer: MEDICARE

## 2020-06-22 VITALS
BODY MASS INDEX: 35.98 KG/M2 | SYSTOLIC BLOOD PRESSURE: 136 MMHG | OXYGEN SATURATION: 98 % | HEART RATE: 63 BPM | WEIGHT: 257 LBS | HEIGHT: 71 IN | DIASTOLIC BLOOD PRESSURE: 80 MMHG

## 2020-06-22 PROCEDURE — 99214 OFFICE O/P EST MOD 30 MIN: CPT | Performed by: INTERNAL MEDICINE

## 2020-06-22 PROCEDURE — 1123F ACP DISCUSS/DSCN MKR DOCD: CPT | Performed by: INTERNAL MEDICINE

## 2020-06-22 PROCEDURE — G8427 DOCREV CUR MEDS BY ELIG CLIN: HCPCS | Performed by: INTERNAL MEDICINE

## 2020-06-22 PROCEDURE — 1036F TOBACCO NON-USER: CPT | Performed by: INTERNAL MEDICINE

## 2020-06-22 PROCEDURE — 3017F COLORECTAL CA SCREEN DOC REV: CPT | Performed by: INTERNAL MEDICINE

## 2020-06-22 PROCEDURE — 4040F PNEUMOC VAC/ADMIN/RCVD: CPT | Performed by: INTERNAL MEDICINE

## 2020-06-22 PROCEDURE — G8417 CALC BMI ABV UP PARAM F/U: HCPCS | Performed by: INTERNAL MEDICINE

## 2020-06-22 NOTE — PATIENT INSTRUCTIONS
Plan:  1. I recommend that the patient continue their currently prescribed medications. Their drug modifiable risk factors appear to be well controlled. I will continue to address the need/dosing of medications in future visits. 2. The patient was seen for >25 minutes. >50% of the time was devoted to giving the patient detailed instructions instructions on addressing diet, regular exercise, weight control, smoking abstention, medication compliance, and stress minimization. The patient was provided written and verbal instructions regarding risk factor modification  3.  Follow up with me in 1 year

## 2020-06-22 NOTE — LETTER
ejection fraction of 63 %. The LV is mildly dilated. ECHO: 2/19/2016  Summary   Normal left ventricle size with mild concentric left ventricular   hypertrophy. Normal systolic function with an estimated ejection fraction of 55%. No   regional wall motion abnormalities are seen. Diastolic filling parameters suggest normal diastolic filing pressure. The mitral valve is normal in structure. Mild mitral regurgitation. Aortic valve appears sclerotic but opens adequately. Systolic pulmonary artery pressure (SPAP) is normal and estimated at 23 mmHg   (RA pressure 3 mmHg). Past Medical History:   has a past medical history of Actinic keratosis, Allergic rhinitis, CAD (coronary artery disease), [place] (hard of hearing), Hyperlipidemia, Hypertension, MI (myocardial infarction) ([facility] [address] and Osteoarthritis. Surgical History:   has a past surgical history that includes Diagnostic Cardiac Cath Lab Procedure; Appendectomy; fracture surgery; Coronary artery bypass graft (8/06); Colonoscopy (2005); Tonsillectomy; and Coronary angioplasty (07/27/2018). Social History:   reports that he quit smoking about 6 years ago. His smoking use included cigars. He smoked 0.00 packs per day for 10.00 years. He has never used smokeless tobacco. He reports current alcohol use. He reports that he does not use drugs. Family History:  No evidence for sudden cardiac death or premature CAD    Home Medications:  Reviewed and are listed in nursing record.  and/or listed below  Current Outpatient Medications   Medication Sig Dispense Refill    metoprolol succinate (TOPROL XL) 25 MG extended release tablet TAKE 1 TABLET BY MOUTH DAILY 90 tablet 3    levothyroxine (SYNTHROID) 75 MCG tablet TAKE 1 TABLET BY MOUTH EVERY DAY 90 tablet 3    amLODIPine (NORVASC) 2.5 MG tablet Take 1 tablet by mouth daily 90 tablet 1    NONFORMULARY cbd gummies      Cetirizine HCl (ZYRTEC PO) Take by mouth TRIG 136 08/07/2017    TRIG 119 10/26/2015     Lab Results   Component Value Date    HDL 34 (L) 07/27/2018    HDL 38 (L) 08/07/2017    HDL 40 10/26/2015     Lab Results   Component Value Date    LDLCALC 113 (H) 07/27/2018    LDLCALC 95 08/07/2017    LDLCALC 67 10/26/2015     Lab Results   Component Value Date    LABVLDL 27 07/27/2018    LABVLDL 27 08/07/2017    LABVLDL 24 10/26/2015     Lab Results   Component Value Date    ALT 34 05/15/2020    AST 40 (H) 05/15/2020    ALKPHOS 66 05/15/2020    BILITOT 0.8 05/15/2020         Assessment:  78 y.o. male with:  1. Coronary artery disease   ~Angiogram 8/28/2006 Daryl Mejia MD) and s/p bypass 2006   [facility] 7/2018 med management  EF 45%   ~Limited echo 12/20/2018 45%   ~Stable   2. Hyperlipidemia   ~Lipitor   ~Stable   3. Hypertension   ~BP: 136/80   4. Leg/knee pains   ~appear from non-cardiac issues: ongoing    ~improved with injections and CBD oil   5. Obesity   ~Body mass index is 35.84 kg/m². ~weight is down 13 lbs from 8/2019        Plan:  1. I recommend that the patient continue their currently prescribed medications. Their drug modifiable risk factors appear to be well controlled. I will continue to address the need/dosing of medications in future visits. 2. The patient was seen for >25 minutes. >50% of the time was devoted to giving the patient detailed instructions instructions on addressing diet, regular exercise, weight control, smoking abstention, medication compliance, and stress minimization. The patient was provided written and verbal instructions regarding risk factor modification  3. Follow up with me in 1 year     Scribe's attestation: This note was scribed in the presence of Dr. Clarence Fernández M.D. By Tracey Cortés, Dr. Clarence Fernández, personally performed the services described in this documentation, as scribed by the above signed scribe in my presence. It is both accurate and complete to my knowledge.  I agree with the details

## 2020-09-10 ENCOUNTER — NURSE TRIAGE (OUTPATIENT)
Dept: OTHER | Facility: CLINIC | Age: 70
End: 2020-09-10

## 2020-09-10 NOTE — TELEPHONE ENCOUNTER
Reason for Disposition   Nasal discharge present > 10 days    Answer Assessment - Initial Assessment Questions  1. LOCATION: \"Where does it hurt? \"       *No Answer*  2. ONSET: \"When did the sinus pain start? \"  (e.g., hours, days)       *No Answer*  3. SEVERITY: \"How bad is the pain? \"   (Scale 1-10; mild, moderate or severe)    - MILD (1-3): doesn't interfere with normal activities     - MODERATE (4-7): interferes with normal activities (e.g., work or school) or awakens from sleep    - SEVERE (8-10): excruciating pain and patient unable to do any normal activities         *No Answer*  4. RECURRENT SYMPTOM: \"Have you ever had sinus problems before? \" If so, ask: \"When was the last time? \" and \"What happened that time? \"       *No Answer*  5. NASAL CONGESTION: \"Is the nose blocked? \" If so, ask, \"Can you open it or must you breathe through the mouth? \"      *No Answer*  6. NASAL DISCHARGE: \"Do you have discharge from your nose? \" If so ask, \"What color? \"      *No Answer*  7. FEVER: \"Do you have a fever? \" If so, ask: \"What is it, how was it measured, and when did it start? \"       *No Answer*  8. OTHER SYMPTOMS: \"Do you have any other symptoms? \" (e.g., sore throat, cough, earache, difficulty breathing)      *No Answer*  9. PREGNANCY: \"Is there any chance you are pregnant? \" \"When was your last menstrual period? \"      *No Answer*    Protocols used: SINUS PAIN AND CONGESTION-ADULT-[place]    Call transferred to [facility]

## 2020-09-14 ENCOUNTER — OFFICE VISIT (OUTPATIENT)
Dept: FAMILY MEDICINE CLINIC | Age: 70
End: 2020-09-14
Payer: MEDICARE

## 2020-09-14 VITALS
TEMPERATURE: 97.7 F | WEIGHT: 263 LBS | OXYGEN SATURATION: 95 % | SYSTOLIC BLOOD PRESSURE: 110 MMHG | HEART RATE: 76 BPM | DIASTOLIC BLOOD PRESSURE: 76 MMHG | BODY MASS INDEX: 36.68 KG/M2

## 2020-09-14 PROCEDURE — 99213 OFFICE O/P EST LOW 20 MIN: CPT | Performed by: NURSE PRACTITIONER

## 2020-09-14 PROCEDURE — G8427 DOCREV CUR MEDS BY ELIG CLIN: HCPCS | Performed by: NURSE PRACTITIONER

## 2020-09-14 PROCEDURE — 1036F TOBACCO NON-USER: CPT | Performed by: NURSE PRACTITIONER

## 2020-09-14 PROCEDURE — 1123F ACP DISCUSS/DSCN MKR DOCD: CPT | Performed by: NURSE PRACTITIONER

## 2020-09-14 PROCEDURE — 4040F PNEUMOC VAC/ADMIN/RCVD: CPT | Performed by: NURSE PRACTITIONER

## 2020-09-14 PROCEDURE — 3017F COLORECTAL CA SCREEN DOC REV: CPT | Performed by: NURSE PRACTITIONER

## 2020-09-14 PROCEDURE — G8417 CALC BMI ABV UP PARAM F/U: HCPCS | Performed by: NURSE PRACTITIONER

## 2020-09-14 RX ORDER — LORATADINE 10 MG/1
10 TABLET ORAL DAILY
Qty: 30 TABLET | Refills: 3 | Status: SHIPPED | OUTPATIENT
Start: 2020-09-14 | End: 2020-10-01 | Stop reason: SDUPTHER

## 2020-09-14 ASSESSMENT — ENCOUNTER SYMPTOMS
SINUS PAIN: 0
WHEEZING: 0
SINUS PRESSURE: 0
SORE THROAT: 0
COUGH: 1
SHORTNESS OF BREATH: 0
GASTROINTESTINAL NEGATIVE: 1

## 2020-09-14 NOTE — PROGRESS NOTES
2020     Kirsten Corbett (:  1950) is a 69 y.o. male, here for evaluation of the following medical concerns:    HPI  Patient presents today with complaints of cough and congestion. Cough for the past 6 months and congestion for the past 2 months. He has been using mucinex frequently. He states he stopped taking it about a week ago. He was using flonase along with a daily zyrtec. He stopped the flonase completely and is currently on using the zyrtec as needed. He has been having issues with double vision. He feels it has been off and on for the past year maybe. Since stopping the flonase he has noticed improvement with the double vision, but his eye doctor also gave him glasses with prisms to help sunny his eyes. He has seen an optometrist but not an ophthalmologist  He goes through coughing spells. Sometimes at night it wakes him up. He knows he has seasonal allergies and states he has been dealing with them for most of his life. He is still active outside and works outdoors. Review of Systems   Constitutional: Negative for fatigue. HENT: Positive for sneezing. Negative for congestion, sinus pressure, sinus pain and sore throat. Eyes:        Double vision   Respiratory: Positive for cough. Negative for shortness of breath and wheezing. Cardiovascular: Negative. Negative for chest pain and palpitations. Gastrointestinal: Negative. Genitourinary: Negative. Musculoskeletal: Negative. Skin: Negative. Neurological: Negative. Negative for dizziness and headaches. Psychiatric/Behavioral: Negative. Prior to Visit Medications    Medication Sig Taking?  Authorizing Provider   loratadine (CLARITIN) 10 MG tablet Take 1 tablet by mouth daily Yes JOHNY Girard CNP   metoprolol succinate (TOPROL XL) 25 MG extended release tablet TAKE 1 TABLET BY MOUTH DAILY Yes Ethan Richmond MD   levothyroxine (SYNTHROID) 75 MCG tablet TAKE 1 TABLET BY MOUTH EVERY DAY Yes Mady Lennox Shahriar Paez MD   amLODIPine (NORVASC) 2.5 MG tablet Take 1 tablet by mouth daily Yes Steven Willard MD   NONFORMULARY cbd gummies Yes Historical Provider, MD   isosorbide mononitrate (IMDUR) 30 MG extended release tablet Take 1 tablet by mouth daily Yes Steven Willard MD   atorvastatin (LIPITOR) 20 MG tablet Take 1 tablet by mouth daily Yes Steven Willard MD   Naproxen Sodium (ALEVE) 220 MG CAPS Take by mouth as needed for Pain Yes Historical Provider, MD   Multiple Vitamins-Minerals (MULTIVITAMIN PO) Take by mouth Yes Historical Provider, MD   aspirin 81 MG EC tablet Take 81 mg by mouth 2 times daily. Yes Historical Provider, MD   fish oil-omega-3 fatty acids 1000 MG capsule Take 2 g by mouth daily. Yes Historical Provider, MD        Social History     Tobacco Use    Smoking status: Former Smoker     Packs/day: 0.00     Years: 10.00     Pack years: 0.00     Types: Cigars     Last attempt to quit: 2013     Years since quittin.2    Smokeless tobacco: Never Used    Tobacco comment: cigar occasionally   Substance Use Topics    Alcohol use: Yes     Alcohol/week: 0.0 standard drinks     Comment: occ        Vitals:    20 0913   BP: 110/76   Pulse: 76   Temp: 97.7 °F (36.5 °C)   TempSrc: Temporal   SpO2: 95%   Weight: 263 lb (119.3 kg)     Estimated body mass index is 36.68 kg/m² as calculated from the following:    Height as of 20: 5' 11\" (1.803 m). Weight as of this encounter: 263 lb (119.3 kg). Physical Exam  Vitals signs reviewed. HENT:      Right Ear: Tympanic membrane, ear canal and external ear normal.      Left Ear: Tympanic membrane, ear canal and external ear normal.      Nose: Nose normal. No congestion or rhinorrhea. Right Sinus: No maxillary sinus tenderness or frontal sinus tenderness. Left Sinus: No maxillary sinus tenderness or frontal sinus tenderness. Cardiovascular:      Rate and Rhythm: Normal rate and regular rhythm.       Heart sounds: Normal heart sounds. Pulmonary:      Effort: Pulmonary effort is normal.      Breath sounds: Normal breath sounds. Skin:     General: Skin is warm and dry. Neurological:      Mental Status: He is alert. ASSESSMENT/PLAN:  1. Cough  Likely related to allergies. Will switch to Claritin daily and stop zyrtec and Flonase. 2. Double vision  Seems to be getting better. I advised him to follow up with [facility]. We did discuss the possibility of this being caused from a stroke but the symptom has been going on for so long that I am not sure a Ct scan or MrI would show anything at this point and patient has no other stroke like symptoms. Keep follow up with Dr. Joseluis Gomez    An electronic signature was used to authenticate this note.     --JOHNY Sutton - CNP on 9/14/2020 at 12:49 PM

## 2020-10-01 ENCOUNTER — OFFICE VISIT (OUTPATIENT)
Dept: FAMILY MEDICINE CLINIC | Age: 70
End: 2020-10-01
Payer: MEDICARE

## 2020-10-01 VITALS
OXYGEN SATURATION: 98 % | WEIGHT: 261 LBS | TEMPERATURE: 96 F | HEART RATE: 69 BPM | DIASTOLIC BLOOD PRESSURE: 80 MMHG | BODY MASS INDEX: 36.4 KG/M2 | SYSTOLIC BLOOD PRESSURE: 130 MMHG

## 2020-10-01 PROBLEM — I20.8 CHRONIC STABLE ANGINA (HCC): Status: ACTIVE | Noted: 2020-10-01

## 2020-10-01 PROBLEM — I20.89 CHRONIC STABLE ANGINA: Status: ACTIVE | Noted: 2020-10-01

## 2020-10-01 PROBLEM — E66.01 MORBIDLY OBESE (HCC): Status: ACTIVE | Noted: 2020-10-01

## 2020-10-01 PROCEDURE — 3017F COLORECTAL CA SCREEN DOC REV: CPT | Performed by: FAMILY MEDICINE

## 2020-10-01 PROCEDURE — 1036F TOBACCO NON-USER: CPT | Performed by: FAMILY MEDICINE

## 2020-10-01 PROCEDURE — G8427 DOCREV CUR MEDS BY ELIG CLIN: HCPCS | Performed by: FAMILY MEDICINE

## 2020-10-01 PROCEDURE — 1123F ACP DISCUSS/DSCN MKR DOCD: CPT | Performed by: FAMILY MEDICINE

## 2020-10-01 PROCEDURE — 4040F PNEUMOC VAC/ADMIN/RCVD: CPT | Performed by: FAMILY MEDICINE

## 2020-10-01 PROCEDURE — G8484 FLU IMMUNIZE NO ADMIN: HCPCS | Performed by: FAMILY MEDICINE

## 2020-10-01 PROCEDURE — G8417 CALC BMI ABV UP PARAM F/U: HCPCS | Performed by: FAMILY MEDICINE

## 2020-10-01 PROCEDURE — 99214 OFFICE O/P EST MOD 30 MIN: CPT | Performed by: FAMILY MEDICINE

## 2020-10-01 RX ORDER — FLUTICASONE PROPIONATE 50 MCG
1 SPRAY, SUSPENSION (ML) NASAL DAILY
COMMUNITY
End: 2020-10-01

## 2020-10-01 RX ORDER — LORATADINE 10 MG/1
10 TABLET ORAL DAILY
Qty: 30 TABLET | Refills: 3 | Status: SHIPPED | OUTPATIENT
Start: 2020-10-01 | End: 2022-01-25

## 2020-10-01 RX ORDER — AZELASTINE 1 MG/ML
2 SPRAY, METERED NASAL 2 TIMES DAILY
Qty: 4 BOTTLE | Refills: 1 | Status: SHIPPED | OUTPATIENT
Start: 2020-10-01 | End: 2021-08-31 | Stop reason: SDUPTHER

## 2020-10-01 ASSESSMENT — ENCOUNTER SYMPTOMS
COUGH: 1
RHINORRHEA: 1
SHORTNESS OF BREATH: 1
BLURRED VISION: 0
HEMOPTYSIS: 0

## 2020-10-01 ASSESSMENT — PATIENT HEALTH QUESTIONNAIRE - PHQ9
SUM OF ALL RESPONSES TO PHQ QUESTIONS 1-9: 0
2. FEELING DOWN, DEPRESSED OR HOPELESS: 0
SUM OF ALL RESPONSES TO PHQ QUESTIONS 1-9: 0
1. LITTLE INTEREST OR PLEASURE IN DOING THINGS: 0
SUM OF ALL RESPONSES TO PHQ9 QUESTIONS 1 & 2: 0

## 2020-10-01 NOTE — PROGRESS NOTES
10/1/2020     Dawit Hameed (:  1950) is a 69 y.o. male, here for evaluation of the following medical concerns:    Dawit Hameed is a 69 y.o. male. Patient presents with:  Hypothyroidism  Cough: all year  Hypertension  Other: double vision     Has been using flonase for allergies. Had some double vision for a couple months. Went to eye doctor in [facility]. Was diagnosed with weak muscle of the eyes that were not 'syncing\" together. The patients PMH, surgical history, family history, medications, allergies were all reviewed and updated as appropriate today. Cough   This is a chronic problem. The current episode started more than 1 year ago. The problem has been waxing and waning. The problem occurs constantly. The cough is non-productive. Associated symptoms include nasal congestion, postnasal drip, rhinorrhea and shortness of breath. Pertinent negatives include no chest pain or hemoptysis. Nothing aggravates the symptoms. He has tried nothing for the symptoms. The treatment provided no relief. Hypertension   This is a chronic problem. The current episode started more than 1 year ago. The problem is unchanged. The problem is controlled. Associated symptoms include shortness of breath. Pertinent negatives include no anxiety, blurred vision or chest pain. There are no associated agents to hypertension. There are no known risk factors for coronary artery disease. Past treatments include nothing. There are no compliance problems. There is no history of angina. There is no history of chronic renal disease. Other   Associated symptoms include coughing. Pertinent negatives include no chest pain. Review of Systems   HENT: Positive for postnasal drip and rhinorrhea. Eyes: Negative for blurred vision. Respiratory: Positive for cough and shortness of breath. Negative for hemoptysis. Cardiovascular: Negative for chest pain. Prior to Visit Medications    Medication Sig Taking? Constitutional:       Appearance: Normal appearance. He is well-developed. HENT:      Head: Normocephalic and atraumatic. Right Ear: External ear normal.      Left Ear: External ear normal.      Nose: Nose normal.   Eyes:      Conjunctiva/sclera: Conjunctivae normal.      Pupils: Pupils are equal, round, and reactive to light. Neck:      Musculoskeletal: Normal range of motion and neck supple. Cardiovascular:      Rate and Rhythm: Normal rate and regular rhythm. Heart sounds: Normal heart sounds. No murmur. No friction rub. No gallop. Pulmonary:      Effort: Pulmonary effort is normal. No respiratory distress. Breath sounds: Normal breath sounds. No wheezing. Abdominal:      General: Bowel sounds are normal. There is no distension. Palpations: Abdomen is soft. Tenderness: There is no abdominal tenderness. Musculoskeletal: Normal range of motion. General: No tenderness. Skin:     General: Skin is warm and dry. Neurological:      Mental Status: He is alert and oriented to person, place, and time. Deep Tendon Reflexes: Reflexes are normal and symmetric. Psychiatric:         Behavior: Behavior normal.         Thought Content: Thought content normal.         Judgment: Judgment normal.         ASSESSMENT/PLAN:  1. Diplopia  MRI will get scheduled. R/o CVA, mass  - MRI BRAIN W WO CONTRAST; Future    2. Chronic stable angina (HCC)  stable    3. Morbidly obese (HCC)  Stable    4. Seasonal allergies  Following orders, stop flonase, may be related to diplopia  - azelastine (ASTELIN) 0.1 % nasal spray; 2 sprays by Nasal route 2 times daily Use in each nostril as directed  Dispense: 4 Bottle; Refill: 1  - loratadine (CLARITIN) 10 MG tablet; Take 1 tablet by mouth daily  Dispense: 30 tablet; Refill: 3    5. Essential hypertension  Stable, continue current meds. No follow-ups on file. An electronic signature was used to authenticate this note.     --Julianne Remy, MD on 10/1/2020 at 9:05 AM

## 2020-10-20 ENCOUNTER — TELEPHONE (OUTPATIENT)
Dept: FAMILY MEDICINE CLINIC | Age: 70
End: 2020-10-20

## 2020-10-20 NOTE — TELEPHONE ENCOUNTER
Consuelo harding contacted the office, patients is scheduled for test tomorrow and they need the order to state creat serum.       Please advise [facility] imaging

## 2020-10-21 ENCOUNTER — HOSPITAL ENCOUNTER (OUTPATIENT)
Dept: MRI IMAGING | Age: 70
Discharge: HOME | End: 2020-10-21
Payer: MEDICARE

## 2020-10-21 ENCOUNTER — TELEPHONE (OUTPATIENT)
Dept: FAMILY MEDICINE CLINIC | Age: 70
End: 2020-10-21

## 2020-10-21 LAB
GFR AFRICAN AMERICAN: 48
GFR NON-AFRICAN AMERICAN: 40
PERFORMED ON: ABNORMAL
POC CREATININE: 1.7 MG/DL (ref 0.8–1.3)
POC SAMPLE TYPE: ABNORMAL

## 2020-10-21 PROCEDURE — 70543 MRI ORBT/FAC/NCK W/O &W/DYE: CPT

## 2020-10-21 PROCEDURE — 6360000004 HC RX CONTRAST MEDICATION: Performed by: FAMILY MEDICINE

## 2020-10-21 PROCEDURE — 70553 MRI BRAIN STEM W/O & W/DYE: CPT

## 2020-10-21 PROCEDURE — A9579 GAD-BASE MR CONTRAST NOS,1ML: HCPCS | Performed by: FAMILY MEDICINE

## 2020-10-21 PROCEDURE — 82565 ASSAY OF CREATININE: CPT

## 2020-10-21 RX ADMIN — GADOTERIDOL 20 ML: 279.3 INJECTION, SOLUTION INTRAVENOUS at 09:45

## 2020-10-21 NOTE — TELEPHONE ENCOUNTER
Patient was scheduled for MRI of brain w/wo contrast. Since patient is having double tigre orbital one was done. Lukasz Levine from MRI dept, [facility] is requesting a new order.  Call back number [phone] Advise

## 2020-11-24 ENCOUNTER — OFFICE VISIT (OUTPATIENT)
Dept: PRIMARY CARE CLINIC | Age: 70
End: 2020-11-24
Payer: MEDICARE

## 2020-11-24 PROCEDURE — G8428 CUR MEDS NOT DOCUMENT: HCPCS | Performed by: NURSE PRACTITIONER

## 2020-11-24 PROCEDURE — G8417 CALC BMI ABV UP PARAM F/U: HCPCS | Performed by: NURSE PRACTITIONER

## 2020-11-24 PROCEDURE — 99211 OFF/OP EST MAY X REQ PHY/QHP: CPT | Performed by: NURSE PRACTITIONER

## 2020-11-24 NOTE — PROGRESS NOTES
Lexi Alamo received a viral test for COVID-19. They were educated on isolation and quarantine as appropriate. For any symptoms, they were directed to seek care from their PCP, given contact information to establish with a doctor, directed to an urgent care or the emergency room.

## 2020-11-24 NOTE — PATIENT INSTRUCTIONS

## 2020-11-26 LAB — SARS-COV-2, NAA: NOT DETECTED

## 2020-12-03 ENCOUNTER — NURSE ONLY (OUTPATIENT)
Dept: ORTHOPEDIC SURGERY | Age: 70
End: 2020-12-03
Payer: MEDICARE

## 2020-12-03 PROCEDURE — 20611 DRAIN/INJ JOINT/BURSA W/US: CPT | Performed by: PHYSICIAN ASSISTANT

## 2020-12-11 ENCOUNTER — NURSE ONLY (OUTPATIENT)
Dept: ORTHOPEDIC SURGERY | Age: 70
End: 2020-12-11
Payer: MEDICARE

## 2020-12-11 PROCEDURE — 20611 DRAIN/INJ JOINT/BURSA W/US: CPT | Performed by: PHYSICIAN ASSISTANT

## 2020-12-11 NOTE — PROGRESS NOTES
Chief Complaint   Patient presents with    Injections     #2 Edosn Pastor     Dx: Osteoarthritis right and left knee      The patient returns today for their second right and left knee Visco supplementation injection. The risks, benefits, and complications of the injections were again discussed in detail with the patient. The risks discussed included but are not limited to infection, skin reactions, hot swollen joints, and anaphylaxis. The patient gave verbal informed consent for the injection. The patient skin was prepped with 3 sterile gauze pads soaked with alcohol solution and the knee joint was injected with 2cc Supartz intra-articularly under sterile conditions . Technique: Under sterile conditions a SonSidestage ultrasound unit with a variable frequency (6.0-15.0 MHz) linear transducer was used to localize the placement of a 22-gauge needle into the right and left knee joint. Findings: Successful needle placement for intra-articular Visco supplementation injection. Final images were taken and saved for permanent record. The patient tolerated the injection reasonably well. The patient was given instructions to ice the the knee and avoid strenuous activities for 24-48 hours. The patient was instructed to call the office immediately if there is increased pain, redness, warmth, fever, or chills. We will see the patient back in one week for the third injection. [facility]    This dictation was performed with a verbal recognition program [facility]) and it was checked for errors. It is possible that there are still dictated errors within this office note. If so, please bring any errors to my attention for an addendum. All efforts were made to ensure that this office note is accurate.

## 2020-12-18 ENCOUNTER — NURSE ONLY (OUTPATIENT)
Dept: ORTHOPEDIC SURGERY | Age: 70
End: 2020-12-18
Payer: MEDICARE

## 2020-12-18 NOTE — PROGRESS NOTES
Chief Complaint   Patient presents with    Injections     #3 CANDICE Jed Model     Dx: Osteoarthritis right and left knee      The patient returns today for their third and final right and left knee Visco supplementation injection. The risks, benefits, and complications of the injections were again discussed in detail with the patient. The risks discussed include but are not limited to infection, skin reactions, hot swollen joints, and anaphylaxis. The patient gave verbal informed consent for the injection. The patient's skin was prepped with 3 sterile gauze pads soaked with alcohol solution and the knee joint was injected with 2cc Supartz intra-articularly under sterile conditions. Technique: Under sterile conditions a SonStyleQ ultrasound unit with a variable frequency (6.0-15.0 MHz) linear transducer was used to localize the placement of a 22-gauge needle into the right and left knee joint. Findings: Successful needle placement for intra-articular Visco supplementation injection. Final images were taken and saved for permanent record. The patient tolerated the injection reasonably well. The patient was given instructions to ice of the knee and avoid strenuous activity for 24-48 hours. The patient was instructed to call the office immediately if there is increased pain, redness, warmth, fever, or chills. We will see the patient back on an as-needed basis  from this point. [facility]    This dictation was performed with a verbal recognition program [facility]) and it was checked for errors. It is possible that there are still dictated errors within this office note. If so, please bring any errors to my attention for an addendum. All efforts were made to ensure that this office note is accurate.

## 2021-01-04 RX ORDER — AMLODIPINE BESYLATE 2.5 MG/1
2.5 TABLET ORAL DAILY
Qty: 90 TABLET | Refills: 1 | Status: SHIPPED | OUTPATIENT
Start: 2021-01-04 | End: 2021-07-20

## 2021-04-26 RX ORDER — METOPROLOL SUCCINATE 25 MG/1
TABLET, EXTENDED RELEASE ORAL
Qty: 90 TABLET | Refills: 1 | Status: SHIPPED | OUTPATIENT
Start: 2021-04-26 | End: 2021-11-02

## 2021-06-07 DIAGNOSIS — E03.9 HYPOTHYROIDISM, UNSPECIFIED TYPE: ICD-10-CM

## 2021-06-07 RX ORDER — LEVOTHYROXINE SODIUM 0.07 MG/1
TABLET ORAL
Qty: 90 TABLET | Refills: 3 | Status: SHIPPED | OUTPATIENT
Start: 2021-06-07 | End: 2021-08-31 | Stop reason: SDUPTHER

## 2021-06-07 NOTE — TELEPHONE ENCOUNTER
Last Office Visit  -  10/1/20  Next Office Visit  -  n/a    Last Filled  -    Last UDS -    Contract -

## 2021-06-24 ENCOUNTER — OFFICE VISIT (OUTPATIENT)
Dept: FAMILY MEDICINE CLINIC | Age: 71
End: 2021-06-24
Payer: MEDICARE

## 2021-06-24 ENCOUNTER — HOSPITAL ENCOUNTER (OUTPATIENT)
Age: 71
Discharge: HOME | End: 2021-06-24
Payer: MEDICARE

## 2021-06-24 ENCOUNTER — HOSPITAL ENCOUNTER (OUTPATIENT)
Dept: GENERAL RADIOLOGY | Age: 71
Discharge: HOME | End: 2021-06-24
Payer: MEDICARE

## 2021-06-24 VITALS
OXYGEN SATURATION: 99 % | HEART RATE: 83 BPM | HEIGHT: 71 IN | WEIGHT: 262 LBS | SYSTOLIC BLOOD PRESSURE: 120 MMHG | DIASTOLIC BLOOD PRESSURE: 78 MMHG | BODY MASS INDEX: 36.68 KG/M2

## 2021-06-24 DIAGNOSIS — I20.8 CHRONIC STABLE ANGINA (HCC): ICD-10-CM

## 2021-06-24 DIAGNOSIS — R05.3 CHRONIC COUGH: ICD-10-CM

## 2021-06-24 DIAGNOSIS — R05.3 CHRONIC COUGH: Primary | ICD-10-CM

## 2021-06-24 DIAGNOSIS — E66.01 SEVERE OBESITY (BMI 35.0-35.9 WITH COMORBIDITY) (HCC): ICD-10-CM

## 2021-06-24 PROCEDURE — G8427 DOCREV CUR MEDS BY ELIG CLIN: HCPCS | Performed by: FAMILY MEDICINE

## 2021-06-24 PROCEDURE — 99214 OFFICE O/P EST MOD 30 MIN: CPT | Performed by: FAMILY MEDICINE

## 2021-06-24 PROCEDURE — 1036F TOBACCO NON-USER: CPT | Performed by: FAMILY MEDICINE

## 2021-06-24 PROCEDURE — G8417 CALC BMI ABV UP PARAM F/U: HCPCS | Performed by: FAMILY MEDICINE

## 2021-06-24 PROCEDURE — 4040F PNEUMOC VAC/ADMIN/RCVD: CPT | Performed by: FAMILY MEDICINE

## 2021-06-24 PROCEDURE — 3017F COLORECTAL CA SCREEN DOC REV: CPT | Performed by: FAMILY MEDICINE

## 2021-06-24 PROCEDURE — 1123F ACP DISCUSS/DSCN MKR DOCD: CPT | Performed by: FAMILY MEDICINE

## 2021-06-24 PROCEDURE — 71046 X-RAY EXAM CHEST 2 VIEWS: CPT

## 2021-06-24 RX ORDER — METHYLPREDNISOLONE 4 MG/1
TABLET ORAL
Qty: 1 KIT | Refills: 0 | Status: SHIPPED | OUTPATIENT
Start: 2021-06-24

## 2021-06-24 ASSESSMENT — PATIENT HEALTH QUESTIONNAIRE - PHQ9
SUM OF ALL RESPONSES TO PHQ QUESTIONS 1-9: 0
SUM OF ALL RESPONSES TO PHQ QUESTIONS 1-9: 0
2. FEELING DOWN, DEPRESSED OR HOPELESS: 0
SUM OF ALL RESPONSES TO PHQ QUESTIONS 1-9: 0
1. LITTLE INTEREST OR PLEASURE IN DOING THINGS: 0
SUM OF ALL RESPONSES TO PHQ9 QUESTIONS 1 & 2: 0

## 2021-07-02 ASSESSMENT — ENCOUNTER SYMPTOMS: COUGH: 1

## 2021-07-02 NOTE — PROGRESS NOTES
Julia De Leon (:  1950) is a 69 y.o. male,Established patient, here for evaluation of the following chief complaint(s):  Cough (x 1 year )         ASSESSMENT/PLAN:  1. Chronic cough  -     XR CHEST STANDARD (2 VW); Future  -     methylPREDNISolone (MEDROL, XIAO,) 4 MG tablet; Take by mouth as directed, Disp-1 kit, R-0Normal  2. Chronic stable angina ([address]  3. Severe obesity (BMI 35.0-35.9 with comorbidity) (HCC)      No follow-ups on file. Subjective   SUBJECTIVE/OBJECTIVE:  Julia De Leon is a 69 y.o. male. Patient presents with:  Cough: x 1 year       2year-old male who has had 1 year of cough. This has been getting worse over time. Patient notes it is a dry cough. He notes he occasionally feels wheezy. He has been having some increased allergy issues. He notes some fullness in his sinuses. He notes some nasal drainage. He has otherwise had no chest pain or shortness of breath with this. He is not wheezing. He is otherwise stable. The patients PMH, surgical history, family history, medications, allergies were all reviewed and updated as appropriate today. Cough        Review of Systems   Respiratory: Positive for cough. Objective   Physical Exam  Vitals and nursing note reviewed. Constitutional:       Appearance: Normal appearance. He is well-developed. HENT:      Head: Normocephalic and atraumatic. Right Ear: External ear normal.      Left Ear: External ear normal.      Nose: Nose normal.   Eyes:      Conjunctiva/sclera: Conjunctivae normal.      Pupils: Pupils are equal, round, and reactive to light. Cardiovascular:      Rate and Rhythm: Normal rate and regular rhythm. Heart sounds: Normal heart sounds. No murmur heard. No friction rub. No gallop. Pulmonary:      Effort: Pulmonary effort is normal. No respiratory distress. Breath sounds: Normal breath sounds. No wheezing.    Abdominal:      General: Bowel sounds are normal. There is no distension. Palpations: Abdomen is soft. Tenderness: There is no abdominal tenderness. Musculoskeletal:         General: No tenderness. Normal range of motion. Cervical back: Normal range of motion and neck supple. Skin:     General: Skin is warm and dry. Neurological:      Mental Status: He is alert and oriented to person, place, and time. Deep Tendon Reflexes: Reflexes are normal and symmetric. Psychiatric:         Behavior: Behavior normal.         Thought Content: Thought content normal.         Judgment: Judgment normal.              An electronic signature was used to authenticate this note.     --Ambreen Muhammad MD

## 2021-07-20 RX ORDER — AMLODIPINE BESYLATE 2.5 MG/1
2.5 TABLET ORAL DAILY
Qty: 90 TABLET | Refills: 1 | Status: SHIPPED | OUTPATIENT
Start: 2021-07-20 | End: 2022-01-21

## 2021-08-31 DIAGNOSIS — E03.9 HYPOTHYROIDISM, UNSPECIFIED TYPE: ICD-10-CM

## 2021-08-31 DIAGNOSIS — J30.2 SEASONAL ALLERGIES: ICD-10-CM

## 2021-08-31 RX ORDER — AZELASTINE 1 MG/ML
2 SPRAY, METERED NASAL 2 TIMES DAILY
Qty: 4 BOTTLE | Refills: 1 | Status: SHIPPED | OUTPATIENT
Start: 2021-08-31

## 2021-08-31 RX ORDER — LEVOTHYROXINE SODIUM 0.07 MG/1
TABLET ORAL
Qty: 90 TABLET | Refills: 3 | Status: SHIPPED | OUTPATIENT
Start: 2021-08-31 | End: 2022-06-16

## 2021-08-31 NOTE — TELEPHONE ENCOUNTER
Last Office Visit  -6-  Next Office Visit  -      Last Filled  -   Last UDS -   Contract -     Refill azelastine 137 mcg 0.1 % nasel spray   Refill levothyroxine 75 mcg

## 2021-09-07 ENCOUNTER — OFFICE VISIT (OUTPATIENT)
Dept: CARDIOLOGY CLINIC | Age: 71
End: 2021-09-07
Payer: MEDICARE

## 2021-09-07 VITALS
HEIGHT: 71 IN | HEART RATE: 76 BPM | DIASTOLIC BLOOD PRESSURE: 72 MMHG | OXYGEN SATURATION: 98 % | BODY MASS INDEX: 36.12 KG/M2 | SYSTOLIC BLOOD PRESSURE: 122 MMHG | WEIGHT: 258 LBS

## 2021-09-07 DIAGNOSIS — I25.10 CORONARY ARTERY DISEASE INVOLVING NATIVE CORONARY ARTERY OF NATIVE HEART WITHOUT ANGINA PECTORIS: Primary | ICD-10-CM

## 2021-09-07 DIAGNOSIS — I10 ESSENTIAL HYPERTENSION: ICD-10-CM

## 2021-09-07 DIAGNOSIS — E78.2 MIXED HYPERLIPIDEMIA: ICD-10-CM

## 2021-09-07 DIAGNOSIS — I20.0 UNSTABLE ANGINA (HCC): Primary | ICD-10-CM

## 2021-09-07 DIAGNOSIS — R06.02 SOB (SHORTNESS OF BREATH): ICD-10-CM

## 2021-09-07 DIAGNOSIS — N17.9 ACUTE KIDNEY INJURY (HCC): ICD-10-CM

## 2021-09-07 PROCEDURE — 1123F ACP DISCUSS/DSCN MKR DOCD: CPT | Performed by: INTERNAL MEDICINE

## 2021-09-07 PROCEDURE — 4040F PNEUMOC VAC/ADMIN/RCVD: CPT | Performed by: INTERNAL MEDICINE

## 2021-09-07 PROCEDURE — G8417 CALC BMI ABV UP PARAM F/U: HCPCS | Performed by: INTERNAL MEDICINE

## 2021-09-07 PROCEDURE — 3017F COLORECTAL CA SCREEN DOC REV: CPT | Performed by: INTERNAL MEDICINE

## 2021-09-07 PROCEDURE — G8427 DOCREV CUR MEDS BY ELIG CLIN: HCPCS | Performed by: INTERNAL MEDICINE

## 2021-09-07 PROCEDURE — 1036F TOBACCO NON-USER: CPT | Performed by: INTERNAL MEDICINE

## 2021-09-07 PROCEDURE — 99214 OFFICE O/P EST MOD 30 MIN: CPT | Performed by: INTERNAL MEDICINE

## 2021-09-07 RX ORDER — ATORVASTATIN CALCIUM 20 MG/1
20 TABLET, FILM COATED ORAL DAILY
Qty: 90 TABLET | Refills: 3 | Status: SHIPPED | OUTPATIENT
Start: 2021-09-07

## 2021-09-07 RX ORDER — ISOSORBIDE MONONITRATE 30 MG/1
30 TABLET, EXTENDED RELEASE ORAL DAILY
Qty: 90 TABLET | Refills: 3 | Status: SHIPPED | OUTPATIENT
Start: 2021-09-07

## 2021-09-07 NOTE — PROGRESS NOTES
[facility]   Cardiovascular Evaluation    PATIENT: Sherwin Bazzi  DATE: 2021  MRN: <[id number]>  CSN: [id number]  : 1950    Primary Care Doctor: Ilsa Medellin MD    Reason for evaluation:   1 Year Follow Up, Shortness of Breath, and Coronary Artery Disease    Subjective:     History of present illness:   Sherwin Bazzi is a 66 y.o. male who is here today for follow up for coronary artery disease, hypertension, and hyperlipidemia. He has a history of a cardiac cath in  and then coronary artery bypass surgery. He underwent a repeat cath in 2018 and medication management was recommended. Most recent limited echocardiogram from 18 showed an ejection fraction of 45-50%, LV gram showed EF at 45%. Today he states he is doing better than he has for a long time. He states this year he has dealt with sinus and allergies. He states he experiences shortness of breath and cough regularly. He states the shortness of breath has been chronic since his bypass. Patient currently denies any weight gain, edema, palpitations, chest pain, dizziness, and syncope. He is taking all medications as prescribed, tolerating well. He states he is not following with nephrologist, states he take Aleve for arthritis pain. He states this year he is planning to retire. Patient Active Problem List   Diagnosis    Essential hypertension    Hypothyroidism    Vitamin D deficiency    Hyperlipidemia    Coronary artery disease involving native coronary artery of native heart without angina pectoris    Obesity (BMI 30-39. 9)    SOB (shortness of breath)    Class 1 obesity due to excess calories with serious comorbidity in adult    Unstable angina (HCC)    LV dysfunction    Chronic stable angina ([address]    Morbidly obese ([address]         Cardiac Testing: I have reviewed the findings below.     STRESS TEST: 2014     Summary  There is a moderate sized inferior defect consistent with mainly infarction levothyroxine (SYNTHROID) 75 MCG tablet TAKE 1 TABLET BY MOUTH EVERY DAY 90 tablet 3    amLODIPine (NORVASC) 2.5 MG tablet TAKE 1 TABLET BY MOUTH DAILY 90 tablet 1    metoprolol succinate (TOPROL XL) 25 MG extended release tablet TAKE ONE TABLET BY MOUTH DAILY 90 tablet 1    NONFORMULARY cbd gummies      isosorbide mononitrate (IMDUR) 30 MG extended release tablet Take 1 tablet by mouth daily 90 tablet 3    Naproxen Sodium (ALEVE) 220 MG CAPS Take by mouth as needed for Pain      Multiple Vitamins-Minerals (MULTIVITAMIN PO) Take by mouth      fish oil-omega-3 fatty acids 1000 MG capsule Take 2 g by mouth daily.  methylPREDNISolone (MEDROL, XIAO,) 4 MG tablet Take by mouth as directed (Patient not taking: Reported on 9/7/2021) 1 kit 0    loratadine (CLARITIN) 10 MG tablet Take 1 tablet by mouth daily (Patient not taking: Reported on 6/24/2021) 30 tablet 3    aspirin 81 MG EC tablet Take 81 mg by mouth 2 times daily. (Patient not taking: Reported on 9/7/2021)       No current facility-administered medications for this visit. Allergies:  Simvastatin     Review of Systems:   All 14 point review of symptoms completed. Pertinent positives identified in the HPI, all other review of symptoms negative as below.     Review of Systems - History obtained from the patient  General ROS: negative for - chills, fever or night sweats  Psychological ROS: negative for - disorientation or hallucinations  Ophthalmic ROS: negative for - dry eyes, eye pain or loss of vision  ENT ROS: negative for - nasal discharge or sore throat  Allergy and Immunology ROS: negative for - hives or itchy/watery eyes  Hematological and Lymphatic ROS: negative for - jaundice or night sweats  Endocrine ROS: negative for - mood swings or temperature intolerance  Breast ROS: deferred  Respiratory ROS: negative for - hemoptysis or stridor  Cardiovascular ROS: negative for - chest pain, dyspnea on exertion or palpitations  Gastrointestinal 05/15/2020    CREATININE 1.7 10/21/2020    CREATININE 1.4 05/15/2020    GFRAA 48 10/21/2020    GFRAA >60 01/25/2013    AGRATIO 1.4 05/15/2020    LABGLOM 40 10/21/2020    GLUCOSE 99 05/15/2020    PROT 7.4 05/15/2020    PROT 7.1 01/25/2013    CALCIUM 9.3 05/15/2020    BILITOT 0.8 05/15/2020    ALKPHOS 66 05/15/2020    AST 40 05/15/2020    ALT 34 05/15/2020     Lab Results   Component Value Date    TRIG 134 07/27/2018    TRIG 136 08/07/2017    TRIG 119 10/26/2015     Lab Results   Component Value Date    HDL 34 (L) 07/27/2018    HDL 38 (L) 08/07/2017    HDL 40 10/26/2015     Lab Results   Component Value Date    LDLCALC 113 (H) 07/27/2018    LDLCALC 95 08/07/2017    LDLCALC 67 10/26/2015     Lab Results   Component Value Date    LABVLDL 27 07/27/2018    LABVLDL 27 08/07/2017    LABVLDL 24 10/26/2015     Lab Results   Component Value Date    ALT 34 05/15/2020    AST 40 (H) 05/15/2020    ALKPHOS 66 05/15/2020    BILITOT 0.8 05/15/2020         Assessment:  66 y.o. male with:  1. Coronary artery disease   ~Angiogram 8/28/2006 Kings Sapp MD) and s/p bypass 2006   [facility] 7/2018 med management  EF 45%   ~Limited echo 12/20/2018 45%   ~Stable   2. Hyperlipidemia   ~Lipitor   ~Stable   3. Hypertension   ~BP: 122/72   4. AMARILYS  5. SOBOE      Plan:  1. Echocardiogram ~ evaluate shortness of breath  ~ A test that records movement of your heart valves and chambers by ultrasound. Evaluates heart valves, any chamber enlargement, abnormal openings, or any fluid in the sac surrounding the heart.   ~We will call you with these results. 2. Referral to Nephrology ~ acute kidney injury   3. Continue current medication regimen. No changes. 4. Follow up in one year. Scribe's attestation: This note was scribed in the presence of David Clark by Beto Duggan RN     I, Dr. Vanesa Knott, personally performed the services described in this documentation, as scribed by the above signed scribe in my presence.  It is both accurate and complete to my knowledge. I agree with the details independently gathered by the clinical support staff, while the remaining scribed note accurately describes my personal service to the patient. Medical Decision Making: The following items were considered in medical decision making:  Independent review of images  Review / order clinical lab tests  Review / order radiology tests  Decision to obtain old records  Review and summation of old records as accessed through [employer] (a summary of my findings in these old records)                All questions and concerns were addressed to the patient/family. Alternatives to my treatment were discussed. The note was completed using EMR. Every effort was made to ensure accuracy; however, inadvertent computerized transcription errors may be present.     Elizabeth Luna MD, Daren Dom[address]uez [address], [address] central office  [phone] [facility] office  [phone] [facility] office  9/7/2021  8:30 AM

## 2021-09-07 NOTE — TELEPHONE ENCOUNTER
[address] called on answering service for the following refill.  Pls call to advise THank you      Medication Refill    Medication needing refilled:isosorbide mononitrate (IMDUR) 30 MG extended release tablet     Dosage of the medication: 30 mg    How are you taking this medication (QD, BID, TID, QID, PRN): 1 tab daily    30 or 90 day supply called in: 90    When will you run out of your medication:    Which Pharmacy are we sending the medication to?:[facility] - [place] [phone] - F [phone]   [address] [place] [address]. [address]   Phone:  [phone]  Fax:  [phone]

## 2021-10-08 ENCOUNTER — TELEPHONE (OUTPATIENT)
Dept: CARDIOLOGY CLINIC | Age: 71
End: 2021-10-08

## 2021-10-08 ENCOUNTER — HOSPITAL ENCOUNTER (OUTPATIENT)
Dept: CARDIOLOGY | Age: 71
Discharge: HOME | End: 2021-10-08
Payer: MEDICARE

## 2021-10-08 DIAGNOSIS — R06.02 SOB (SHORTNESS OF BREATH): ICD-10-CM

## 2021-10-08 LAB
LV EF: 43 %
LVEF MODALITY: NORMAL

## 2021-10-08 PROCEDURE — 93306 TTE W/DOPPLER COMPLETE: CPT

## 2021-10-08 NOTE — TELEPHONE ENCOUNTER
VSP OOT. please advise, per last office visit 09/2021. He has a history of CAD with bypass 2006. [facility] 7/2018 non-obstructive CAD EF 45%. I will be able to provide handicap letter if you approve.

## 2021-10-08 NOTE — LETTER
[address] Cardiology - [address]  Phone: [phone]  Fax: [phone]    Jaleesa Cook MD         October 8, 2021     Patient: Deanna Morrison   YOB: 1950   Date of Visit: 10/8/2021       To Whom It May Concern: It is my medical opinion that Desirae Henderson requires a disability parking placard for the following reasons:  He cannot walk 200 feet without stopping to rest.  Duration of need: 5 years    If you have any questions or concerns, please don't hesitate to call.     Sincerely,        Jaleesa Cook MD

## 2021-10-11 ENCOUNTER — TELEPHONE (OUTPATIENT)
Dept: CARDIOLOGY CLINIC | Age: 71
End: 2021-10-11

## 2021-10-11 DIAGNOSIS — I25.10 CORONARY ARTERY DISEASE INVOLVING NATIVE CORONARY ARTERY OF NATIVE HEART WITHOUT ANGINA PECTORIS: ICD-10-CM

## 2021-10-11 DIAGNOSIS — I20.0 UNSTABLE ANGINA (HCC): ICD-10-CM

## 2021-10-11 DIAGNOSIS — I20.8 CHRONIC STABLE ANGINA (HCC): Primary | ICD-10-CM

## 2021-10-11 DIAGNOSIS — E78.2 MIXED HYPERLIPIDEMIA: ICD-10-CM

## 2021-10-11 DIAGNOSIS — R06.02 SOB (SHORTNESS OF BREATH): ICD-10-CM

## 2021-10-11 DIAGNOSIS — I10 ESSENTIAL HYPERTENSION: ICD-10-CM

## 2021-10-11 NOTE — TELEPHONE ENCOUNTER
----- Message from Shima Villafuerte MD sent at 10/10/2021  1:08 PM EDT -----  Let patient know their echo test is similar to prev results but was a technically difficult study, rec: contrast echo to f/u on this study. Thank you.

## 2021-10-11 NOTE — TELEPHONE ENCOUNTER
Created telephone encounter. Spoke with Ian Wagner relayed message per [facility] regarding echo. Pt verbalized understanding. Order placed in epic per RN Rima Anderson.

## 2021-10-12 ENCOUNTER — OFFICE VISIT (OUTPATIENT)
Dept: ORTHOPEDIC SURGERY | Age: 71
End: 2021-10-12
Payer: MEDICARE

## 2021-10-12 VITALS — BODY MASS INDEX: 35.84 KG/M2 | HEIGHT: 71 IN | WEIGHT: 256 LBS

## 2021-10-12 DIAGNOSIS — M25.562 PAIN IN BOTH KNEES, UNSPECIFIED CHRONICITY: Primary | ICD-10-CM

## 2021-10-12 DIAGNOSIS — M17.0 PRIMARY OSTEOARTHRITIS OF BOTH KNEES: ICD-10-CM

## 2021-10-12 DIAGNOSIS — E66.01 CLASS 2 SEVERE OBESITY DUE TO EXCESS CALORIES WITH SERIOUS COMORBIDITY AND BODY MASS INDEX (BMI) OF 35.0 TO 35.9 IN ADULT (HCC): ICD-10-CM

## 2021-10-12 DIAGNOSIS — M25.561 PAIN IN BOTH KNEES, UNSPECIFIED CHRONICITY: Primary | ICD-10-CM

## 2021-10-12 PROCEDURE — 3017F COLORECTAL CA SCREEN DOC REV: CPT | Performed by: PHYSICIAN ASSISTANT

## 2021-10-12 PROCEDURE — 1036F TOBACCO NON-USER: CPT | Performed by: PHYSICIAN ASSISTANT

## 2021-10-12 PROCEDURE — 4040F PNEUMOC VAC/ADMIN/RCVD: CPT | Performed by: PHYSICIAN ASSISTANT

## 2021-10-12 PROCEDURE — 1123F ACP DISCUSS/DSCN MKR DOCD: CPT | Performed by: PHYSICIAN ASSISTANT

## 2021-10-12 PROCEDURE — G8427 DOCREV CUR MEDS BY ELIG CLIN: HCPCS | Performed by: PHYSICIAN ASSISTANT

## 2021-10-12 PROCEDURE — G8417 CALC BMI ABV UP PARAM F/U: HCPCS | Performed by: PHYSICIAN ASSISTANT

## 2021-10-12 PROCEDURE — 20611 DRAIN/INJ JOINT/BURSA W/US: CPT | Performed by: PHYSICIAN ASSISTANT

## 2021-10-12 PROCEDURE — 99214 OFFICE O/P EST MOD 30 MIN: CPT | Performed by: PHYSICIAN ASSISTANT

## 2021-10-12 PROCEDURE — G8484 FLU IMMUNIZE NO ADMIN: HCPCS | Performed by: PHYSICIAN ASSISTANT

## 2021-10-12 RX ORDER — HYALURONATE SODIUM 10 MG/ML
40 SYRINGE (ML) INTRAARTICULAR ONCE
Status: COMPLETED | OUTPATIENT
Start: 2021-10-12 | End: 2021-10-12

## 2021-10-12 RX ADMIN — Medication 40 MG: at 08:58
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
## 2021-10-19 ENCOUNTER — NURSE ONLY (OUTPATIENT)
Dept: ORTHOPEDIC SURGERY | Age: 71
End: 2021-10-19
Payer: MEDICARE

## 2021-10-19 DIAGNOSIS — M17.0 PRIMARY OSTEOARTHRITIS OF BOTH KNEES: Primary | ICD-10-CM

## 2021-10-19 PROCEDURE — 20611 DRAIN/INJ JOINT/BURSA W/US: CPT | Performed by: PHYSICIAN ASSISTANT

## 2021-10-19 RX ORDER — HYALURONATE SODIUM 10 MG/ML
40 SYRINGE (ML) INTRAARTICULAR ONCE
Status: COMPLETED | OUTPATIENT
Start: 2021-10-19 | End: 2021-10-19

## 2021-10-19 RX ADMIN — Medication 40 MG: at 08:44

## 2021-10-26 ENCOUNTER — OFFICE VISIT (OUTPATIENT)
Dept: ORTHOPEDIC SURGERY | Age: 71
End: 2021-10-26
Payer: MEDICARE

## 2021-10-26 DIAGNOSIS — M17.0 PRIMARY OSTEOARTHRITIS OF BOTH KNEES: Primary | ICD-10-CM

## 2021-10-26 PROCEDURE — 20611 DRAIN/INJ JOINT/BURSA W/US: CPT | Performed by: ORTHOPAEDIC SURGERY

## 2021-10-26 RX ORDER — HYALURONATE SODIUM 10 MG/ML
40 SYRINGE (ML) INTRAARTICULAR ONCE
Status: COMPLETED | OUTPATIENT
Start: 2021-10-26 | End: 2021-10-26

## 2021-10-26 RX ADMIN — Medication 40 MG: at 08:25

## 2021-10-26 NOTE — PROGRESS NOTES
Diagnosis: Right and left knee osteoarthritis    Procedure: Right and left knee viscosupplementation #3    The patient returns today for their third and final Euflexxa injection in the right and left knee. The risks, benefits, and complications of the injections were again discussed in detail with the patient. The risks discussed include but are not limited to infection, skin reactions, hot swollen joints, and anaphylaxis. The patient gave verbal informed consent for the injection. The patient's skin was prepped with 3 sterile gauze pads soaked with alcohol solution and the knee joint was injected with 2 mL of Euflexxa intra-articularly under sterile conditions. Technique: Under sterile conditions a [employer] ultrasound unit with a variable frequency (6.0-15.0 MHz) linear transducer was used to localize the placement of a 22-gauge needle into the knee joint. Findings: Successful needle placement for intra-articular Visco supplementation injection. Final images were taken and saved for permanent record. The patient tolerated the injection reasonably well. The patient was given instructions to ice of the knee and avoid strenuous activity for 24-48 hours. The patient was instructed to call the office immediately if there is increased pain, redness, warmth, fever, or chills. We will see the patient back on an as-needed basis  from this point.

## 2021-11-02 RX ORDER — METOPROLOL SUCCINATE 25 MG/1
TABLET, EXTENDED RELEASE ORAL
Qty: 90 TABLET | Refills: 3 | Status: SHIPPED | OUTPATIENT
Start: 2021-11-02 | End: 2022-09-15

## 2021-12-02 ENCOUNTER — VIRTUAL VISIT (OUTPATIENT)
Dept: FAMILY MEDICINE CLINIC | Age: 71
End: 2021-12-02
Payer: MEDICARE

## 2021-12-02 DIAGNOSIS — Z00.00 ROUTINE GENERAL MEDICAL EXAMINATION AT A HEALTH CARE FACILITY: Primary | ICD-10-CM

## 2021-12-02 PROCEDURE — 4040F PNEUMOC VAC/ADMIN/RCVD: CPT | Performed by: FAMILY MEDICINE

## 2021-12-02 PROCEDURE — 3017F COLORECTAL CA SCREEN DOC REV: CPT | Performed by: FAMILY MEDICINE

## 2021-12-02 PROCEDURE — G0439 PPPS, SUBSEQ VISIT: HCPCS | Performed by: FAMILY MEDICINE

## 2021-12-02 PROCEDURE — 1123F ACP DISCUSS/DSCN MKR DOCD: CPT | Performed by: FAMILY MEDICINE

## 2021-12-02 SDOH — ECONOMIC STABILITY: FOOD INSECURITY: WITHIN THE PAST 12 MONTHS, YOU WORRIED THAT YOUR FOOD WOULD RUN OUT BEFORE YOU GOT MONEY TO BUY MORE.: NEVER TRUE

## 2021-12-02 SDOH — ECONOMIC STABILITY: FOOD INSECURITY: WITHIN THE PAST 12 MONTHS, THE FOOD YOU BOUGHT JUST DIDN'T LAST AND YOU DIDN'T HAVE MONEY TO GET MORE.: NEVER TRUE

## 2021-12-02 ASSESSMENT — LIFESTYLE VARIABLES
HOW OFTEN DO YOU HAVE SIX OR MORE DRINKS ON ONE OCCASION: 0
HOW OFTEN DO YOU HAVE A DRINK CONTAINING ALCOHOL: 1
AUDIT TOTAL SCORE: 1
HOW OFTEN DURING THE LAST YEAR HAVE YOU HAD A FEELING OF GUILT OR REMORSE AFTER DRINKING: 0
AUDIT-C TOTAL SCORE: 1
HOW OFTEN DURING THE LAST YEAR HAVE YOU NEEDED AN ALCOHOLIC DRINK FIRST THING IN THE MORNING TO GET YOURSELF GOING AFTER A NIGHT OF HEAVY DRINKING: 0
HAS A RELATIVE, FRIEND, DOCTOR, OR ANOTHER HEALTH PROFESSIONAL EXPRESSED CONCERN ABOUT YOUR DRINKING OR SUGGESTED YOU CUT DOWN: 0
HOW MANY STANDARD DRINKS CONTAINING ALCOHOL DO YOU HAVE ON A TYPICAL DAY: 0
HOW OFTEN DURING THE LAST YEAR HAVE YOU FOUND THAT YOU WERE NOT ABLE TO STOP DRINKING ONCE YOU HAD STARTED: 0
HOW OFTEN DURING THE LAST YEAR HAVE YOU BEEN UNABLE TO REMEMBER WHAT HAPPENED THE NIGHT BEFORE BECAUSE YOU HAD BEEN DRINKING: 0
HAVE YOU OR SOMEONE ELSE BEEN INJURED AS A RESULT OF YOUR DRINKING: 0
HOW OFTEN DURING THE LAST YEAR HAVE YOU FAILED TO DO WHAT WAS NORMALLY EXPECTED FROM YOU BECAUSE OF DRINKING: 0

## 2021-12-02 ASSESSMENT — PATIENT HEALTH QUESTIONNAIRE - PHQ9
SUM OF ALL RESPONSES TO PHQ9 QUESTIONS 1 & 2: 0
2. FEELING DOWN, DEPRESSED OR HOPELESS: 0
SUM OF ALL RESPONSES TO PHQ QUESTIONS 1-9: 0
1. LITTLE INTEREST OR PLEASURE IN DOING THINGS: 0

## 2021-12-02 ASSESSMENT — SOCIAL DETERMINANTS OF HEALTH (SDOH): HOW HARD IS IT FOR YOU TO PAY FOR THE VERY BASICS LIKE FOOD, HOUSING, MEDICAL CARE, AND HEATING?: NOT HARD AT ALL
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
## 2021-12-02 NOTE — PATIENT INSTRUCTIONS
Personalized Preventive Plan for Nancy Olvera - 12/2/2021  Medicare offers a range of preventive health benefits. Some of the tests and screenings are paid in full while other may be subject to a deductible, co-insurance, and/or copay. Some of these benefits include a comprehensive review of your medical history including lifestyle, illnesses that may run in your family, and various assessments and screenings as appropriate. After reviewing your medical record and screening and assessments performed today your provider may have ordered immunizations, labs, imaging, and/or referrals for you. A list of these orders (if applicable) as well as your Preventive Care list are included within your After Visit Summary for your review. Other Preventive Recommendations:    · A preventive eye exam performed by an eye specialist is recommended every 1-2 years to screen for glaucoma; cataracts, macular degeneration, and other eye disorders. · A preventive dental visit is recommended every 6 months. · Try to get at least 150 minutes of exercise per week or 10,000 steps per day on a pedometer . · Order or download the FREE \"Exercise & Physical Activity: Your Everyday Guide\" from [employer]. Call 9-265.722.2714 or search [employer] online. · You need 6537-6901 mg of calcium and 9712-8890 IU of vitamin D per day. It is possible to meet your calcium requirement with diet alone, but a vitamin D supplement is usually necessary to meet this goal.  · When exposed to the sun, use a sunscreen that protects against both UVA and UVB radiation with an SPF of 30 or greater. Reapply every 2 to 3 hours or after sweating, drying off with a towel, or swimming. · Always wear a seat belt when traveling in a car. Always wear a helmet when riding a bicycle or motorcycle.

## 2022-01-21 RX ORDER — AMLODIPINE BESYLATE 2.5 MG/1
TABLET ORAL
Qty: 90 TABLET | Refills: 1 | Status: SHIPPED | OUTPATIENT
Start: 2022-01-21 | End: 2022-06-16 | Stop reason: SDUPTHER

## 2022-01-24 DIAGNOSIS — J30.2 SEASONAL ALLERGIES: ICD-10-CM

## 2022-01-25 RX ORDER — LORATADINE 10 MG/1
10 TABLET ORAL DAILY
Qty: 30 TABLET | Refills: 3 | Status: SHIPPED | OUTPATIENT
Start: 2022-01-25

## 2022-06-13 ENCOUNTER — OFFICE VISIT (OUTPATIENT)
Dept: ORTHOPEDIC SURGERY | Age: 72
End: 2022-06-13
Payer: MEDICARE

## 2022-06-13 VITALS — HEIGHT: 66 IN | WEIGHT: 262 LBS | BODY MASS INDEX: 42.11 KG/M2

## 2022-06-13 DIAGNOSIS — M17.0 PRIMARY OSTEOARTHRITIS OF BOTH KNEES: ICD-10-CM

## 2022-06-13 DIAGNOSIS — M25.562 PAIN IN BOTH KNEES, UNSPECIFIED CHRONICITY: Primary | ICD-10-CM

## 2022-06-13 DIAGNOSIS — M25.561 PAIN IN BOTH KNEES, UNSPECIFIED CHRONICITY: Primary | ICD-10-CM

## 2022-06-13 PROCEDURE — 20611 DRAIN/INJ JOINT/BURSA W/US: CPT | Performed by: PHYSICIAN ASSISTANT

## 2022-06-13 RX ORDER — HYALURONATE SODIUM 10 MG/ML
40 SYRINGE (ML) INTRAARTICULAR ONCE
Status: COMPLETED | OUTPATIENT
Start: 2022-06-13 | End: 2022-06-13

## 2022-06-13 RX ADMIN — Medication 40 MG: at 08:28
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
## 2022-06-15 DIAGNOSIS — E03.9 HYPOTHYROIDISM, UNSPECIFIED TYPE: ICD-10-CM

## 2022-06-16 RX ORDER — AMLODIPINE BESYLATE 2.5 MG/1
TABLET ORAL
Qty: 90 TABLET | Refills: 1 | Status: SHIPPED | OUTPATIENT
Start: 2022-06-16

## 2022-06-16 RX ORDER — AMLODIPINE BESYLATE 2.5 MG/1
TABLET ORAL
Qty: 90 TABLET | Refills: 1 | OUTPATIENT
Start: 2022-06-16

## 2022-06-16 RX ORDER — LEVOTHYROXINE SODIUM 0.07 MG/1
TABLET ORAL
Qty: 90 TABLET | Refills: 3 | Status: SHIPPED | OUTPATIENT
Start: 2022-06-16

## 2022-06-21 ENCOUNTER — NURSE ONLY (OUTPATIENT)
Dept: ORTHOPEDIC SURGERY | Age: 72
End: 2022-06-21
Payer: MEDICARE

## 2022-06-21 DIAGNOSIS — M17.0 PRIMARY OSTEOARTHRITIS OF BOTH KNEES: Primary | ICD-10-CM

## 2022-06-21 PROCEDURE — 99999 PR OFFICE/OUTPT VISIT,PROCEDURE ONLY: CPT | Performed by: PHYSICIAN ASSISTANT

## 2022-06-21 PROCEDURE — 20611 DRAIN/INJ JOINT/BURSA W/US: CPT | Performed by: PHYSICIAN ASSISTANT

## 2022-06-21 RX ORDER — HYALURONATE SODIUM 10 MG/ML
40 SYRINGE (ML) INTRAARTICULAR ONCE
Status: COMPLETED | OUTPATIENT
Start: 2022-06-21 | End: 2022-06-21

## 2022-06-21 RX ADMIN — Medication 40 MG: at 08:48

## 2022-06-21 NOTE — PROGRESS NOTES
Diagnosis: Right and left knee osteoarthritis    Procedure: Right and left knee viscosupplementation #2    The patient returns today for their second Euflexxa injection in the right and left knee. The risks, benefits, and complications of the injections were again discussed in detail with the patient. The risks discussed included but are not limited to infection, skin reactions, hot swollen joints, and anaphylaxis. The patient gave verbal informed consent for the injection. The patient skin was prepped with 3 sterile gauze pads soaked with alcohol solution and the knee joint was injected with 2 mL of Euflexxa intra-articularly under sterile conditions . Technique: Under sterile conditions a Son[employer] ultrasound unit with a variable frequency (6.0-15.0 MHz) linear transducer was used to localize the placement of a 22-gauge needle into the jenny joint. Findings: Successful needle placement for intra-articular Visco supplementation injection. Final images were taken and saved for permanent record. The patient tolerated the injection reasonably well. The patient was given instructions to ice the the knee and avoid strenuous activities for 24-48 hours. The patient was instructed to call the office immediately if there is increased pain, redness, warmth, fever, or chills. We will see the patient back in one week for the third injection.

## 2022-06-27 ENCOUNTER — NURSE ONLY (OUTPATIENT)
Dept: ORTHOPEDIC SURGERY | Age: 72
End: 2022-06-27
Payer: MEDICARE

## 2022-06-27 DIAGNOSIS — M17.0 PRIMARY OSTEOARTHRITIS OF BOTH KNEES: Primary | ICD-10-CM

## 2022-06-27 PROCEDURE — 99999 PR OFFICE/OUTPT VISIT,PROCEDURE ONLY: CPT | Performed by: PHYSICIAN ASSISTANT

## 2022-06-27 PROCEDURE — 20611 DRAIN/INJ JOINT/BURSA W/US: CPT | Performed by: PHYSICIAN ASSISTANT

## 2022-06-27 RX ORDER — HYALURONATE SODIUM 10 MG/ML
40 SYRINGE (ML) INTRAARTICULAR ONCE
Status: COMPLETED | OUTPATIENT
Start: 2022-06-27 | End: 2022-06-27

## 2022-06-27 RX ADMIN — Medication 40 MG: at 08:40

## 2022-06-27 NOTE — PROGRESS NOTES
Diagnosis: Right and left knee osteoarthritis    Procedure: Right and left knee viscosupplementation #3    The patient returns today for their third and final Euflexxa injection in the right and left knee. The risks, benefits, and complications of the injections were again discussed in detail with the patient. The risks discussed include but are not limited to infection, skin reactions, hot swollen joints, and anaphylaxis. The patient gave verbal informed consent for the injection. The patient's skin was prepped with 3 sterile gauze pads soaked with alcohol solution and the knee joint was injected with 2 mL of Euflexxa intra-articularly under sterile conditions. Technique: Under sterile conditions a SonFormotus ultrasound unit with a variable frequency (6.0-15.0 MHz) linear transducer was used to localize the placement of a 22-gauge needle into the knee joint. Findings: Successful needle placement for intra-articular Visco supplementation injection. Final images were taken and saved for permanent record. The patient tolerated the injection reasonably well. The patient was given instructions to ice of the knee and avoid strenuous activity for 24-48 hours. The patient was instructed to call the office immediately if there is increased pain, redness, warmth, fever, or chills. We will see the patient back on an as-needed basis  from this point.

## 2022-09-15 RX ORDER — METOPROLOL SUCCINATE 25 MG/1
TABLET, EXTENDED RELEASE ORAL
Qty: 90 TABLET | Refills: 1 | Status: SHIPPED | OUTPATIENT
Start: 2022-09-15

## 2022-10-17 NOTE — PROGRESS NOTES
[facility]   Cardiovascular Evaluation    PATIENT: Fidencio Boone  DATE: 10/18/2022  MRN: [id number]  CSN: [id number]  : 1950    Primary Care Doctor: Mario Alberto Sherwood MD    Reason for evaluation:   Follow-up, Coronary Artery Disease, Hypertension, Hyperlipidemia, and Shortness of Breath (Always has SOB)    Subjective:     History of present illness:   Fidencio Boone is a 67 y.o. male who is here today for follow up for coronary artery disease, hypertension, and hyperlipidemia. He has a history of a cardiac cath in  and then coronary artery bypass surgery. He underwent a repeat cath in 2018 and medication management was recommended. Most recent limited echocardiogram from 18 showed an ejection fraction of 45-50%, LV gram showed EF at 45%. Since last office visit he had a echo 10/08/21 EF 40-45%, mild aortic regurgitation, and dilation of aortic arch, ascending aorta, and aortic root. Today he states he has SOB with exertion. He states SOB with stairs. Patient currently denies any weight gain, edema, palpitations, chest pain, dizziness, and syncope. He is taking medications as prescribed, tolerating well. He is following with [facility] for issues with vision, this has improved with medication management. Patient Active Problem List   Diagnosis    Essential hypertension    Hypothyroidism    Vitamin D deficiency    Hyperlipidemia    Coronary artery disease involving native coronary artery of native heart without angina pectoris    Obesity (BMI 30-39. 9)    SOB (shortness of breath)    Class 1 obesity due to excess calories with serious comorbidity in adult    Unstable angina (HCC)    LV dysfunction    Chronic stable angina (HCC)    Morbidly obese ([address] [address]    Acute kidney injury [facility])         Cardiac Testing: I have reviewed the findings below.     STRESS TEST: 2014     Summary  There is a moderate sized inferior defect consistent with mainly infarction  with mild minna-infarct ischemia. The possibility of attenuation cannot be  excluded due to normal wall motion. Left ventricular wall motion and function are normal.  Left ventricular ejection fraction of 63 %. The LV is mildly dilated. ECHO: 2/19/2016  Summary   Normal left ventricle size with mild concentric left ventricular   hypertrophy. Normal systolic function with an estimated ejection fraction of 55%. No   regional wall motion abnormalities are seen. Diastolic filling parameters suggest normal diastolic filing pressure. The mitral valve is normal in structure. Mild mitral regurgitation. Aortic valve appears sclerotic but opens adequately. Systolic pulmonary artery pressure (SPAP) is normal and estimated at 23 mmHg   (RA pressure 3 mmHg). Past Medical History:   has a past medical history of Actinic keratosis, Allergic rhinitis, CAD (coronary artery disease), [place] (hard of hearing), Hyperlipidemia, Hypertension, MI (myocardial infarction) ([facility] [address] and Osteoarthritis. Surgical History:   has a past surgical history that includes Diagnostic Cardiac Cath Lab Procedure; Appendectomy; fracture surgery; Coronary artery bypass graft (8/06); Colonoscopy (2005); Tonsillectomy; and Coronary angioplasty (07/27/2018). Social History:   reports that he quit smoking about 9 years ago. His smoking use included cigars and cigarettes. He has never used smokeless tobacco. He reports current alcohol use. He reports that he does not use drugs. Family History:  No evidence for sudden cardiac death or premature CAD    Home Medications:  Reviewed and are listed in nursing record.  and/or listed below  Current Outpatient Medications   Medication Sig Dispense Refill    metoprolol succinate (TOPROL XL) 25 MG extended release tablet TAKE 1 TABLET EVERY DAY 90 tablet 1    levothyroxine (SYNTHROID) 75 MCG tablet TAKE 1 TABLET EVERY DAY 90 tablet 3    amLODIPine (NORVASC) 2.5 MG tablet TAKE 1 TABLET EVERY DAY 90 tablet 1 atorvastatin (LIPITOR) 20 MG tablet Take 1 tablet by mouth daily 90 tablet 3    azelastine (ASTELIN) 0.1 % nasal spray 2 sprays by Nasal route 2 times daily Use in each nostril as directed 4 Bottle 1    NONFORMULARY cbd gummies      Multiple Vitamins-Minerals (MULTIVITAMIN PO) Take by mouth      aspirin 81 MG EC tablet Take 81 mg by mouth in the morning and 81 mg in the evening. .      fish oil-omega-3 fatty acids 1000 MG capsule Take 2 g by mouth daily. loratadine (CLARITIN) 10 MG tablet TAKE 1 TABLET BY MOUTH DAILY (Patient not taking: Reported on 10/18/2022) 30 tablet 3    isosorbide mononitrate (IMDUR) 30 MG extended release tablet Take 1 tablet by mouth daily 90 tablet 3    methylPREDNISolone (MEDROL, XIAO,) 4 MG tablet Take by mouth as directed (Patient not taking: No sig reported) 1 kit 0    Naproxen Sodium 220 MG CAPS Take by mouth as needed for Pain (Patient not taking: No sig reported)       No current facility-administered medications for this visit. Allergies:  Simvastatin     Review of Systems:   All 14 point review of symptoms completed. Pertinent positives identified in the HPI, all other review of symptoms negative as below.     Review of Systems - History obtained from the patient  General ROS: negative for - chills, fever or night sweats  Psychological ROS: negative for - disorientation or hallucinations  Ophthalmic ROS: negative for - dry eyes, eye pain or loss of vision  ENT ROS: negative for - nasal discharge or sore throat  Allergy and Immunology ROS: negative for - hives or itchy/watery eyes  Hematological and Lymphatic ROS: negative for - jaundice or night sweats  Endocrine ROS: negative for - mood swings or temperature intolerance  Breast ROS: deferred  Respiratory ROS: negative for - hemoptysis or stridor  Cardiovascular ROS: negative for - chest pain, dyspnea on exertion or palpitations  Gastrointestinal ROS: no abdominal pain, change in bowel habits, or black or bloody stools  Genito-Urinary ROS: no dysuria, trouble voiding, or hematuria  Musculoskeletal ROS: negative for - gait disturbance, joint pain or joint stiffness  Neurological ROS: negative for - seizures or speech problems  Dermatological ROS: negative for - rash or skin lesion changes      Physical Examination:    Vitals:    10/18/22 0914   BP: (!) 147/89   Pulse:    SpO2:       Weight: 262 lb (118.8 kg)     Wt Readings from Last 3 Encounters:   10/18/22 262 lb (118.8 kg)   06/13/22 262 lb (118.8 kg)   12/20/21 262 lb (118.8 kg)         General Appearance:  Alert, cooperative, no distress, appears stated age   Head:  Normocephalic, without obvious abnormality, atraumatic   Eyes:  PERRL, conjunctiva/corneas clear       Nose: Nares normal, no drainage or sinus tenderness   Throat: Lips, mucosa, and tongue normal   Neck: Supple, symmetrical, trachea midline, no adenopathy, thyroid: not enlarged, symmetric, no tenderness/mass/nodules, no carotid bruit or JVD       Lungs:   Clear to auscultation bilaterally, respirations unlabored   Chest Wall:  No tenderness or deformity   Heart:  Regular rhythm and normal rate; S1, S2 are normal; no murmur noted; no rub or gallop   Abdomen:   Soft, non-tender, bowel sounds active all four quadrants,  no masses, no organomegaly           Extremities: Extremities normal, atraumatic, no cyanosis or edema   Pulses: 2+ and symmetric   Skin: Skin color, texture, turgor normal, no rashes or lesions   Pysch: Normal mood and affect   Neurologic: Normal gross motor and sensory exam.         Labs  CBC:   Lab Results   Component Value Date/Time    WBC 7.3 09/24/2021 03:50 PM    RBC 4.07 09/24/2021 03:50 PM    HGB 13.1 09/24/2021 03:50 PM    HCT 39.1 09/24/2021 03:50 PM    MCV 96.3 09/24/2021 03:50 PM    RDW 14.7 09/24/2021 03:50 PM     09/24/2021 03:50 PM     CMP:    Lab Results   Component Value Date/Time     09/24/2021 03:50 PM    K 4.7 09/24/2021 03:50 PM     09/24/2021 03:50 PM CO2 25 09/24/2021 03:50 PM    BUN 21 09/24/2021 03:50 PM    CREATININE 1.4 09/24/2021 03:50 PM    GFRAA >60 09/24/2021 03:50 PM    GFRAA >60 01/25/2013 08:24 AM    AGRATIO 1.4 05/15/2020 01:38 PM    LABGLOM 50 09/24/2021 03:50 PM    GLUCOSE 103 09/24/2021 03:50 PM    PROT 7.4 05/15/2020 01:38 PM    PROT 7.1 01/25/2013 08:24 AM    CALCIUM 9.7 09/24/2021 03:50 PM    BILITOT 0.8 05/15/2020 01:38 PM    ALKPHOS 66 05/15/2020 01:38 PM    AST 40 05/15/2020 01:38 PM    ALT 34 05/15/2020 01:38 PM     Lab Results   Component Value Date    TRIG 134 07/27/2018    TRIG 136 08/07/2017    TRIG 119 10/26/2015     Lab Results   Component Value Date    HDL 34 (L) 07/27/2018    HDL 38 (L) 08/07/2017    HDL 40 10/26/2015     Lab Results   Component Value Date    LDLCALC 113 (H) 07/27/2018    LDLCALC 95 08/07/2017    LDLCALC 67 10/26/2015     Lab Results   Component Value Date    LABVLDL 27 07/27/2018    LABVLDL 27 08/07/2017    LABVLDL 24 10/26/2015     Lab Results   Component Value Date    ALT 34 05/15/2020    AST 40 (H) 05/15/2020    ALKPHOS 66 05/15/2020    BILITOT 0.8 05/15/2020         Assessment:  67 y.o. male with:   Diagnosis Orders   1. Coronary artery disease involving native coronary artery of native heart without angina pectoris        2. Essential hypertension        3. Mixed hyperlipidemia        4. Obesity (BMI 30-39.9)        5. Stage 3a chronic kidney disease ([facility] [address]                Plan:  1. Referral to heart failure team ~ Miranda Edwards   ~ discussed adding Franci Magi, will defer to heart failure team given history of CKD. 2. I recommend that the patient continue their currently prescribed medications. Their drug modifiable risk factors appear to be well controlled. I will continue to address the need/dosing of medications in future visits. 3. Discussed importance of routine exercise routine   ~Get at least 150 minutes per week of moderate-intensity aerobic activity.   Examples of high intensity activity- hiking, running, swimming laps, heavy yard work, playing tennis, or biking. 4. Follow up with me in one year. Scribe's attestation: This note was scribed in the presence of Linsey Maurer by Osiris Hurley RN     I, Dr. Dalila Pichardo, personally performed the services described in this documentation, as scribed by the above signed scribe in my presence. It is both accurate and complete to my knowledge. I agree with the details independently gathered by the clinical support staff, while the remaining scribed note accurately describes my personal service to the patient. Medical Decision Making: The following items were considered in medical decision making:  Independent review of images  Review / order clinical lab tests  Review / order radiology tests  Decision to obtain old records  Review and summation [facility] (a summary of my findings in these old records)      Time Based Itemization  A total of 30 minutes was spent on today's patient encounter. If applicable, non-patient-facing activities:  (X)Preparing to see the patient and reviewing records  (X) Individual interpretation of results  ( ) Discussion or coordination of care with other health care professionals  () Ordering of unique tests, medications, or procedures  (X) Documentation within the EHR        All questions and concerns were addressed to the patient/family. Alternatives to my treatment were discussed. The note was completed using EMR. Every effort was made to ensure accuracy; however, inadvertent computerized transcription errors may be present.     Dalila Pichardo MD, [facility] [address] [address], [address] office  [phone] [place] office  [phone] [facility] office  10/18/2022  10:18 AM

## 2022-10-18 ENCOUNTER — OFFICE VISIT (OUTPATIENT)
Dept: CARDIOLOGY CLINIC | Age: 72
End: 2022-10-18
Payer: MEDICARE

## 2022-10-18 VITALS
HEIGHT: 66 IN | WEIGHT: 262 LBS | BODY MASS INDEX: 42.11 KG/M2 | DIASTOLIC BLOOD PRESSURE: 89 MMHG | OXYGEN SATURATION: 97 % | SYSTOLIC BLOOD PRESSURE: 147 MMHG | HEART RATE: 76 BPM

## 2022-10-18 DIAGNOSIS — I25.10 CORONARY ARTERY DISEASE INVOLVING NATIVE CORONARY ARTERY OF NATIVE HEART WITHOUT ANGINA PECTORIS: Primary | ICD-10-CM

## 2022-10-18 DIAGNOSIS — E78.2 MIXED HYPERLIPIDEMIA: ICD-10-CM

## 2022-10-18 DIAGNOSIS — E66.9 OBESITY (BMI 30-39.9): ICD-10-CM

## 2022-10-18 DIAGNOSIS — I10 ESSENTIAL HYPERTENSION: ICD-10-CM

## 2022-10-18 DIAGNOSIS — N18.31 STAGE 3A CHRONIC KIDNEY DISEASE (HCC): ICD-10-CM

## 2022-10-18 PROCEDURE — 1123F ACP DISCUSS/DSCN MKR DOCD: CPT | Performed by: INTERNAL MEDICINE

## 2022-10-18 PROCEDURE — 1036F TOBACCO NON-USER: CPT | Performed by: INTERNAL MEDICINE

## 2022-10-18 PROCEDURE — 3017F COLORECTAL CA SCREEN DOC REV: CPT | Performed by: INTERNAL MEDICINE

## 2022-10-18 PROCEDURE — G8484 FLU IMMUNIZE NO ADMIN: HCPCS | Performed by: INTERNAL MEDICINE

## 2022-10-18 PROCEDURE — G8417 CALC BMI ABV UP PARAM F/U: HCPCS | Performed by: INTERNAL MEDICINE

## 2022-10-18 PROCEDURE — 99214 OFFICE O/P EST MOD 30 MIN: CPT | Performed by: INTERNAL MEDICINE

## 2022-10-18 PROCEDURE — G8427 DOCREV CUR MEDS BY ELIG CLIN: HCPCS | Performed by: INTERNAL MEDICINE

## 2022-10-18 RX ORDER — METOPROLOL SUCCINATE 25 MG/1
TABLET, EXTENDED RELEASE ORAL
Qty: 90 TABLET | Refills: 3 | Status: CANCELLED | OUTPATIENT
Start: 2022-10-18

## 2022-10-18 RX ORDER — ATORVASTATIN CALCIUM 20 MG/1
20 TABLET, FILM COATED ORAL DAILY
Qty: 90 TABLET | Refills: 3 | Status: CANCELLED | OUTPATIENT
Start: 2022-10-18

## 2022-10-18 RX ORDER — ISOSORBIDE MONONITRATE 30 MG/1
30 TABLET, EXTENDED RELEASE ORAL DAILY
Qty: 90 TABLET | Refills: 3 | Status: CANCELLED | OUTPATIENT
Start: 2022-10-18

## 2022-10-18 RX ORDER — AMLODIPINE BESYLATE 2.5 MG/1
TABLET ORAL
Qty: 90 TABLET | Refills: 3 | Status: CANCELLED | OUTPATIENT
Start: 2022-10-18

## 2022-10-18 NOTE — PATIENT INSTRUCTIONS
Plan:  1. Referral to heart failure team ~ Misael Camps   ~ discussed adding [facility], will defer to heart failure team given history of CKD. 2. I recommend that the patient continue their currently prescribed medications. Their drug modifiable risk factors appear to be well controlled. I will continue to address the need/dosing of medications in future visits. 3. Discussed importance of routine exercise routine   ~Get at least 150 minutes per week of moderate-intensity aerobic activity. Examples of high intensity activity- hiking, running, swimming laps, heavy yard work, playing tennis, or biking. 4. Follow up with me in one year.

## 2022-12-22 RX ORDER — AMLODIPINE BESYLATE 2.5 MG/1
TABLET ORAL
Qty: 90 TABLET | Refills: 1 | OUTPATIENT
Start: 2022-12-22

## 2022-12-27 RX ORDER — AMLODIPINE BESYLATE 2.5 MG/1
TABLET ORAL
Qty: 90 TABLET | Refills: 3 | Status: SHIPPED | OUTPATIENT
Start: 2022-12-27

## 2023-01-03 ENCOUNTER — TELEPHONE (OUTPATIENT)
Dept: CARDIOLOGY CLINIC | Age: 73
End: 2023-01-03

## 2023-01-03 NOTE — TELEPHONE ENCOUNTER
Pt called into [facility] stated he missed an appt w/TINY. Asked pt if he would like to reschedule the appt, pt stated he would like to hear from [facility] first because he was unsure of why he needed to see TINY. Please advise.

## 2023-01-03 NOTE — TELEPHONE ENCOUNTER
He needs to still see CHF team. Would encourage patient to connect with them. Mainly to have them review his medications and plans as my note.

## 2023-01-03 NOTE — TELEPHONE ENCOUNTER
10/18/2022 [facility] plan-1. Referral to heart failure team ~ Kristel Nunez   ~ discussed adding Michela Oakley, will defer to heart failure team given history of CKD.

## 2023-01-03 NOTE — TELEPHONE ENCOUNTER
Spoke with pt relayed message per VSP. Pt would like to schedule with TINY. Please contact and schedule TINY New Pt appt. Pt would like to schedule appt with TINY at the [facility] office. I will forward this message to FF to have him scheduled at [facility] for TINY.

## 2023-02-01 ENCOUNTER — OFFICE VISIT (OUTPATIENT)
Dept: FAMILY MEDICINE CLINIC | Age: 73
End: 2023-02-01

## 2023-02-01 VITALS
BODY MASS INDEX: 41.78 KG/M2 | DIASTOLIC BLOOD PRESSURE: 76 MMHG | HEART RATE: 59 BPM | SYSTOLIC BLOOD PRESSURE: 138 MMHG | WEIGHT: 260 LBS | OXYGEN SATURATION: 96 % | HEIGHT: 66 IN

## 2023-02-01 DIAGNOSIS — S51.801A OPEN WOUND OF RIGHT FOREARM, INITIAL ENCOUNTER: Primary | ICD-10-CM

## 2023-02-01 RX ORDER — PREDNISONE 10 MG/1
TABLET ORAL
COMMUNITY
Start: 2023-01-24

## 2023-02-01 ASSESSMENT — PATIENT HEALTH QUESTIONNAIRE - PHQ9
SUM OF ALL RESPONSES TO PHQ9 QUESTIONS 1 & 2: 0
2. FEELING DOWN, DEPRESSED OR HOPELESS: 0
SUM OF ALL RESPONSES TO PHQ QUESTIONS 1-9: 0
1. LITTLE INTEREST OR PLEASURE IN DOING THINGS: 0
SUM OF ALL RESPONSES TO PHQ QUESTIONS 1-9: 0
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
## 2023-02-06 ENCOUNTER — OFFICE VISIT (OUTPATIENT)
Dept: CARDIOLOGY CLINIC | Age: 73
End: 2023-02-06

## 2023-02-06 VITALS
DIASTOLIC BLOOD PRESSURE: 78 MMHG | BODY MASS INDEX: 40.88 KG/M2 | SYSTOLIC BLOOD PRESSURE: 146 MMHG | WEIGHT: 254.4 LBS | OXYGEN SATURATION: 97 % | HEART RATE: 66 BPM | HEIGHT: 66 IN

## 2023-02-06 DIAGNOSIS — I25.10 CORONARY ARTERY DISEASE INVOLVING NATIVE CORONARY ARTERY OF NATIVE HEART WITHOUT ANGINA PECTORIS: ICD-10-CM

## 2023-02-06 DIAGNOSIS — R06.02 SOB (SHORTNESS OF BREATH): ICD-10-CM

## 2023-02-06 DIAGNOSIS — I20.8 CHRONIC STABLE ANGINA (HCC): ICD-10-CM

## 2023-02-06 DIAGNOSIS — I50.22 CHF NYHA CLASS I, CHRONIC, SYSTOLIC (HCC): ICD-10-CM

## 2023-02-06 DIAGNOSIS — I50.22 SYSTOLIC CHF, CHRONIC (HCC): Primary | ICD-10-CM
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
## 2023-02-11 NOTE — PROGRESS NOTES
Bernett Cooks (:  1950) is a 67 y.o. male,Established patient, here for evaluation of the following chief complaint(s): Other (Board with tate nails fell on pt. Has wounds )         ASSESSMENT/PLAN:  1. Open wound of right forearm, initial encounter  -     Edward, [employer], (age 8 yrs+), IM  Wound was cleansed with Hibiclens approximated with Steri-Strips. Because there are shallow flaps likely the shallow layer of skin is nonviable but will serve as a bandage. Patient instructed on wound care instructions and signs and symptoms of infection. Tetanus shot given today. No follow-ups on file. Subjective   SUBJECTIVE/OBJECTIVE:  Bernett Cooks is a 67 y.o. male. Patient presents with: Other: Board with tate nails fell on pt. Has wounds       67-year-old male had a board with tate nails fall on him. He had a small abrasion/puncture wound to the right forearm. Patient states that he did clean this and dressed this is doing well but he is not sure if he is up-to-date on his tetanus shot. Patient has had no redness numbness or tingling in the area. The patients PMH, surgical history, family history, medications, allergies were all reviewed and updated as appropriate today. Other      Review of Systems       Objective   Physical Exam  Vitals and nursing note reviewed. Constitutional:       Appearance: Normal appearance. He is well-developed. HENT:      Head: Normocephalic and atraumatic. Right Ear: External ear normal.      Left Ear: External ear normal.      Nose: Nose normal.   Eyes:      Conjunctiva/sclera: Conjunctivae normal.      Pupils: Pupils are equal, round, and reactive to light. Cardiovascular:      Rate and Rhythm: Normal rate and regular rhythm. Heart sounds: Normal heart sounds. No murmur heard. No friction rub. No gallop. Pulmonary:      Effort: Pulmonary effort is normal. No respiratory distress. Breath sounds: Normal breath sounds.  No wheezing. Abdominal:      General: Bowel sounds are normal. There is no distension. Palpations: Abdomen is soft. Tenderness: There is no abdominal tenderness. Musculoskeletal:         General: No tenderness. Normal range of motion. Cervical back: Normal range of motion and neck supple. Skin:     General: Skin is warm and dry. Comments: 2 shallow flaps on right forearm. Neurological:      Mental Status: He is alert and oriented to person, place, and time. Deep Tendon Reflexes: Reflexes are normal and symmetric. Psychiatric:         Behavior: Behavior normal.         Thought Content: Thought content normal.         Judgment: Judgment normal.              An electronic signature was used to authenticate this note.     --Jo Ann Aponte MD

## 2023-02-13 ENCOUNTER — OFFICE VISIT (OUTPATIENT)
Dept: FAMILY MEDICINE CLINIC | Age: 73
End: 2023-02-13

## 2023-02-13 VITALS
OXYGEN SATURATION: 97 % | DIASTOLIC BLOOD PRESSURE: 70 MMHG | BODY MASS INDEX: 40.82 KG/M2 | HEIGHT: 66 IN | WEIGHT: 254 LBS | HEART RATE: 89 BPM | SYSTOLIC BLOOD PRESSURE: 120 MMHG

## 2023-02-13 DIAGNOSIS — Z12.5 SCREENING FOR MALIGNANT NEOPLASM OF PROSTATE: ICD-10-CM

## 2023-02-13 DIAGNOSIS — Z13.220 SCREENING, LIPID: ICD-10-CM

## 2023-02-13 DIAGNOSIS — E03.9 HYPOTHYROIDISM, UNSPECIFIED TYPE: ICD-10-CM

## 2023-02-13 DIAGNOSIS — Z00.00 MEDICARE ANNUAL WELLNESS VISIT, SUBSEQUENT: Primary | ICD-10-CM

## 2023-02-13 DIAGNOSIS — N18.31 STAGE 3A CHRONIC KIDNEY DISEASE (HCC): ICD-10-CM

## 2023-02-13 DIAGNOSIS — E66.01 OBESITY, CLASS III, BMI 40-49.9 (MORBID OBESITY) (HCC): ICD-10-CM

## 2023-02-13 DIAGNOSIS — J30.2 SEASONAL ALLERGIES: ICD-10-CM

## 2023-02-13 RX ORDER — AZELASTINE 1 MG/ML
2 SPRAY, METERED NASAL 2 TIMES DAILY
Qty: 4 EACH | Refills: 1 | Status: SHIPPED | OUTPATIENT
Start: 2023-02-13

## 2023-02-13 ASSESSMENT — PATIENT HEALTH QUESTIONNAIRE - PHQ9
SUM OF ALL RESPONSES TO PHQ QUESTIONS 1-9: 0
SUM OF ALL RESPONSES TO PHQ9 QUESTIONS 1 & 2: 0
1. LITTLE INTEREST OR PLEASURE IN DOING THINGS: 0
SUM OF ALL RESPONSES TO PHQ QUESTIONS 1-9: 0
SUM OF ALL RESPONSES TO PHQ QUESTIONS 1-9: 0
2. FEELING DOWN, DEPRESSED OR HOPELESS: 0
SUM OF ALL RESPONSES TO PHQ QUESTIONS 1-9: 0

## 2023-02-13 ASSESSMENT — LIFESTYLE VARIABLES
HOW MANY STANDARD DRINKS CONTAINING ALCOHOL DO YOU HAVE ON A TYPICAL DAY: 1 OR 2
HOW OFTEN DO YOU HAVE A DRINK CONTAINING ALCOHOL: 2-3 TIMES A WEEK

## 2023-02-13 NOTE — PATIENT INSTRUCTIONS
Learning About Dental Care for Older Adults  Dental care for older adults: Overview  Dental care for older people is much the same as for younger adults. But older adults do have concerns that younger adults do not. Older adults may have problems with gum disease and decay on the roots of their teeth. They may need missing teeth replaced or broken fillings fixed. Or they may have dentures that need to be cared for. Some older adults may have trouble holding a toothbrush. You can help remind the person you are caring for to brush and floss their teeth or to clean their dentures. In some cases, you may need to do the brushing and other dental care tasks. People who have trouble using their hands or who have dementia may need this extra help. How can you help with dental care? Normal dental care  To keep the teeth and gums healthy:  Brush the teeth with fluoride toothpaste twice a day--in the morning and at night--and floss at least once a day. Plaque can quickly build up on the teeth of older adults. Watch for the signs of gum disease. These signs include gums that bleed after brushing or after eating hard foods, such as apples. See a dentist regularly. Many experts recommend checkups every 6 months. Keep the dentist up to date on any new medications the person is taking. Encourage a balanced diet that includes whole grains, vegetables, and fruits, and that is low in saturated fat and sodium. Encourage the person you're caring for not to use tobacco products. They can affect dental and general health. Many older adults have a fixed income and feel that they can't afford dental care. But most [facility] and [facility] have programs in which dentists help older adults by lowering fees. Contact your area's public health offices or  for information about dental care in your area.   Using a toothbrush  Older adults with arthritis sometimes have trouble brushing their teeth because they can't easily hold the toothbrush. Their hands and fingers may be stiff, painful, or weak. If this is the case, you can: Offer an electric toothbrush. Enlarge the handle of a non-electric toothbrush by wrapping a sponge, an elastic bandage, or adhesive tape around it. Push the toothbrush handle through a ball made of rubber or soft foam.  Make the handle longer and thicker by taping Popsicle sticks or tongue depressors to it. You may also be able to buy special toothbrushes, toothpaste dispensers, and floss holders. Your doctor may recommend a soft-bristle toothbrush if the person you care for bleeds easily. Bleeding can happen because of a health problem or from certain medicines. A toothpaste for sensitive teeth may help if the person you care for has sensitive teeth. How do you brush and floss someone's teeth? If the person you are caring for has a hard time cleaning their teeth on their own, you may need to brush and floss their teeth for them. It may be easiest to have the person sit and face away from you, and to sit or stand behind them. That way you can steady their head against your arm as you reach around to floss and brush their teeth. Choose a place that has good lighting and is comfortable for both of you. Before you begin, gather your supplies. You will need gloves, floss, a toothbrush, and a container to hold water if you are not near a sink. Wash and dry your hands well and put on gloves. Start by flossing:  Gently work a piece of floss between each of the teeth toward the gums. A plastic flossing tool may make this easier, and they are available at most [facility]. Curve the floss around each tooth into a U-shape and gently slide it under the gum line. Move the floss firmly up and down several times to scrape off the plaque. After you've finished flossing, throw away the used floss and begin brushing:  Wet the brush and apply toothpaste. Place the brush at a 45-degree angle where the teeth meet the gums. Press firmly, and move the brush in small circles over the surface of the teeth. Be careful not to brush too hard. Vigorous brushing can make the gums pull away from the teeth and can scratch the tooth enamel. Brush all surfaces of the teeth, on the tongue side and on the cheek side. Pay special attention to the front teeth and all surfaces of the back teeth. Brush chewing surfaces with short back-and-forth strokes. After you've finished, help the person rinse the remaining toothpaste from their mouth. Where can you learn more? Go to http://www.woods.com/ and enter F944 to learn more about \"Learning About Dental Care for Older Adults. \"  Current as of: June 16, 2022               Content Version: 13.5  © 2006-2022 Healthwise, [employer]. Care instructions adapted under license by [facility] ([facility]). If you have questions about a medical condition or this instruction, always ask your healthcare professional. [place] [address] any warranty or liability for your use of this information. Advance Directives: Care Instructions  Overview  An advance directive is a legal way to state your wishes at the end of your life. It tells your family and your doctor what to do if you can't say what you want. There are two main types of advance directives. You can change them any time your wishes change. Living will. This form tells your family and your doctor your wishes about life support and other treatment. The form is also called a declaration. Medical power of . This form lets you name a person to make treatment decisions for you when you can't speak for yourself. This person is called a health care agent (health care proxy, health care surrogate). The form is also called a durable power of  for health care.   If you do not have an advance directive, decisions about your medical care may be made by a family member, or by a doctor or a  who doesn't know you.  It may help to think of an advance directive as a gift to the people who care for you. If you have one, they won't have to make tough decisions by themselves. For more information, including forms for your state, see the [address] website (www.caringinfo.org/planning/advance-directives/). Follow-up care is a key part of your treatment and safety. Be sure to make and go to all appointments, and call your doctor if you are having problems. It's also a good idea to know your test results and keep a list of the medicines you take. What should you include in an advance directive? Many states have a unique advance directive form. (It may ask you to address specific issues.) Or you might use a universal form that's approved by many states. If your form doesn't tell you what to address, it may be hard to know what to include in your advance directive. Use the questions below to help you get started. Who do you want to make decisions about your medical care if you are not able to? What life-support measures do you want if you have a serious illness that gets worse over time or can't be cured? What are you most afraid of that might happen? (Maybe you're afraid of having pain, losing your independence, or being kept alive by machines.)  Where would you prefer to die? (Your home? A hospital? A nursing home?)  Do you want to donate your organs when you die? Do you want certain Scientologist practices performed before you die? When should you call for help? Be sure to contact your doctor if you have any questions. Where can you learn more? Go to http://www.jones.com/ and enter R264 to learn more about \"Advance Directives: Care Instructions. \"  Current as of: June 16, 2022               Content Version: 13.5  © 1913-5593 Healthwise, [employer]. Care instructions adapted under license by [facility] ([facility]).  If you have questions about a medical condition or this instruction, always ask your healthcare professional. [address] any warranty or liability for your use of this information. Starting a Weight Loss Plan: Care Instructions  Overview     If you're thinking about losing weight, it can be hard to know where to start. Your doctor can help you set up a weight loss plan that best meets your needs. You may want to take a class on nutrition or exercise, or you could join a weight loss support group. If you have questions about how to make changes to your eating or exercise habits, ask your doctor about seeing a registered dietitian or an exercise specialist.  It can be a big challenge to lose weight. But you don't have to make huge changes at once. Make small changes, and stick with them. When those changes become habit, add a few more changes. If you don't think you're ready to make changes right now, try to pick a date in the future. Make an appointment to see your doctor to discuss whether the time is right for you to start a plan. Follow-up care is a key part of your treatment and safety. Be sure to make and go to all appointments, and call your doctor if you are having problems. It's also a good idea to know your test results and keep a list of the medicines you take. How can you care for yourself at home? Set realistic goals. Many people expect to lose much more weight than is likely. A weight loss of 5% to 10% of your body weight may be enough to improve your health. Get family and friends involved to provide support. Talk to them about why you are trying to lose weight, and ask them to help. They can help by participating in exercise and having meals with you, even if they may be eating something different. Find what works best for you. If you do not have time or do not like to cook, a program that offers meal replacement bars or shakes may be better for you.  Or if you like to prepare meals, finding a plan that includes daily menus and recipes may be best.  Ask your doctor about other health professionals who can help you achieve your weight loss goals. A dietitian can help you make healthy changes in your diet. An exercise specialist or  can help you develop a safe and effective exercise program.  A counselor or psychiatrist can help you cope with issues such as depression, anxiety, or family problems that can make it hard to focus on weight loss. Consider joining a support group for people who are trying to lose weight. Your doctor can suggest groups in your area. Where can you learn more? Go to http://www.woods.com/ and enter U357 to learn more about \"Starting a Weight Loss Plan: Care Instructions. \"  Current as of: August 25, 2022               Content Version: 13.5  © 2006-2022 [employer]. Care instructions adapted under license by [facility] ([facility]). If you have questions about a medical condition or this instruction, always ask your healthcare professional. [place] [address] any warranty or liability for your use of this information. A Healthy Heart: Care Instructions  Your Care Instructions     Coronary artery disease, also called heart disease, occurs when a substance called plaque builds up in the vessels that supply oxygen-rich blood to your heart muscle. This can narrow the blood vessels and reduce blood flow. A heart attack happens when blood flow is completely blocked. A high-fat diet, smoking, and other factors increase the risk of heart disease. Your doctor has found that you have a chance of having heart disease. You can do lots of things to keep your heart healthy. It may not be easy, but you can change your diet, exercise more, and quit smoking. These steps really work to lower your chance of heart disease. Follow-up care is a key part of your treatment and safety. Be sure to make and go to all appointments, and call your doctor if you are having problems.  It's also a good idea to know your test results and keep a list of the medicines you take. How can you care for yourself at home? Diet    Use less salt when you cook and eat. This helps lower your blood pressure. Taste food before salting. Add only a little salt when you think you need it. With time, your taste buds will adjust to less salt.     Eat fewer snack items, fast foods, canned soups, and other high-salt, high-fat, processed foods.     Read food labels and try to avoid saturated and trans fats. They increase your risk of heart disease by raising cholesterol levels.     Limit the amount of solid fat-butter, margarine, and shortening-you eat. Use olive, peanut, or canola oil when you cook. Bake, broil, and steam foods instead of frying them.     Eat a variety of fruit and vegetables every day. Dark green, deep orange, red, or yellow fruits and vegetables are especially good for you. Examples include spinach, carrots, peaches, and berries.     Foods high in fiber can reduce your cholesterol and provide important vitamins and minerals. High-fiber foods include whole-grain cereals and breads, oatmeal, beans, brown rice, citrus fruits, and apples.     Eat lean proteins. Heart-healthy proteins include seafood, lean meats and poultry, eggs, beans, peas, nuts, seeds, and soy products.     Limit drinks and foods with added sugar. These include candy, desserts, and soda pop. Lifestyle changes    If your doctor recommends it, get more exercise. Walking is a good choice. Bit by bit, increase the amount you walk every day. Try for at least 30 minutes on most days of the week. You also may want to swim, bike, or do other activities.     Do not smoke. If you need help quitting, talk to your doctor about stop-smoking programs and medicines. These can increase your chances of quitting for good. Quitting smoking may be the most important step you can take to protect your heart.  It is never too late to quit.     Limit alcohol to 2 drinks a day for men and 1 drink a day for women. Too much alcohol can cause health problems.     Manage other health problems such as diabetes, high blood pressure, and high cholesterol. If you think you may have a problem with alcohol or drug use, talk to your doctor. Medicines    Take your medicines exactly as prescribed. Call your doctor if you think you are having a problem with your medicine.     If your doctor recommends aspirin, take the amount directed each day. Make sure you take aspirin and not another kind of pain reliever, such as acetaminophen (Tylenol). When should you call for help? Call 911 if you have symptoms of a heart attack. These may include:    Chest pain or pressure, or a strange feeling in the chest.     Sweating.     Shortness of breath.     Pain, pressure, or a strange feeling in the back, neck, jaw, or upper belly or in one or both shoulders or arms.     Lightheadedness or sudden weakness.     A fast or irregular heartbeat. After you call 911, the  may tell you to chew 1 adult-strength or 2 to 4 low-dose aspirin. Wait for an ambulance. Do not try to drive yourself. Watch closely for changes in your health, and be sure to contact your doctor if you have any problems. Where can you learn more? Go to http://www.jones.com/ and enter F075 to learn more about \"A Healthy Heart: Care Instructions. \"  Current as of: September 7, 2022               Content Version: 13.5  © 2006-2022 Healthwise, [employer]. Care instructions adapted under license by [address] [place] [address]. If you have questions about a medical condition or this instruction, always ask your healthcare professional. [place] [address] any warranty or liability for your use of this information. Personalized Preventive Plan for Jt Mini - 2/13/2023  Medicare offers a range of preventive health benefits.  Some of the tests and screenings are paid in full while other may be subject to a deductible, co-insurance, and/or copay.    Some of these benefits include a comprehensive review of your medical history including lifestyle, illnesses that may run in your family, and various assessments and screenings as appropriate.    After reviewing your medical record and screening and assessments performed today your provider may have ordered immunizations, labs, imaging, and/or referrals for you.  A list of these orders (if applicable) as well as your Preventive Care list are included within your After Visit Summary for your review.    Other Preventive Recommendations:    A preventive eye exam performed by an eye specialist is recommended every 1-2 years to screen for glaucoma; cataracts, macular degeneration, and other eye disorders.  A preventive dental visit is recommended every 6 months.  Try to get at least 150 minutes of exercise per week or 10,000 steps per day on a pedometer .  Order or download the FREE \"Exercise & Physical Activity: Your Everyday Guide\" from [employer] [place] [employer]. Call 1-421.710.5535 or search [employer] [place] [employer] online.  You need 7218-5831 mg of calcium and 5172-9359 IU of vitamin D per day. It is possible to meet your calcium requirement with diet alone, but a vitamin D supplement is usually necessary to meet this goal.  When exposed to the sun, use a sunscreen that protects against both UVA and UVB radiation with an SPF of 30 or greater. Reapply every 2 to 3 hours or after sweating, drying off with a towel, or swimming.  Always wear a seat belt when traveling in a car. Always wear a helmet when riding a bicycle or motorcycle.

## 2023-02-13 NOTE — PROGRESS NOTES
Medicare Annual Wellness Visit    [facility] is here for Medicare AWV and Hypothyroidism (Needs labs )    Assessment & Plan   Medicare annual wellness visit, subsequent  Stage 3a chronic kidney disease ([address]  -     Comprehensive Metabolic Panel; Future  Screening for malignant neoplasm of prostate  -     PSA Screening; Future  Screening, lipid  -     LIPID PANEL; Future  Obesity, Class III, BMI 40-49.9 (morbid obesity) (HCC)  Seasonal allergies  -     azelastine (ASTELIN) 0.1 % nasal spray; 2 sprays by Nasal route 2 times daily Use in each nostril as directed, Disp-4 each, R-1Normal  Hypothyroidism, unspecified type  -     TSH with Reflex; Future      Recommendations for Preventive Services Due: see orders and patient instructions/AVS.  Recommended screening schedule for the next 5-10 years is provided to the patient in written form: see Patient Instructions/AVS.     Return for Medicare Annual Wellness Visit in 1 year. Subjective   The following acute and/or chronic problems were also addressed today: On prednisone for Uveitis. Seeing Dr. Kait Mendoza for CHF    Patient's complete Health Risk Assessment and screening values have been reviewed and are found in Flowsheets. The following problems were reviewed today and where indicated follow up appointments were made and/or referrals ordered.     Positive Risk Factor Screenings with Interventions:                 Weight and Activity:  Physical Activity: Sufficiently Active    Days of Exercise per Week: 7 days    Minutes of Exercise per Session: 150+ min     On average, how many days per week do you engage in moderate to strenuous exercise (like a brisk walk)?: 7 days  Have you lost any weight without trying in the past 3 months?: No  Body mass index: (!) 40.99    Obesity Interventions:  See AVS for additional education material  See A/P for plan and any pertinent orders          Dentist Screen:  Have you seen the dentist within the past year?: (!) No    Intervention:  Advised to schedule with their dentist    Hearing Screen:  Do you or your family notice any trouble with your hearing that hasn't been managed with hearing aids?: (!) Yes    Interventions:  Patient declines any further evaluation or treatment                       Objective   Vitals:    02/13/23 1035   BP: 120/70   Pulse: 89   SpO2: 97%   Weight: 254 lb (115.2 kg)   Height: 5' 6\" (1.676 m)      Body mass index is 41 kg/m². General Appearance: alert and oriented to person, place and time, well developed and well- nourished, in no acute distress  Skin: warm and dry, no rash or erythema  Head: normocephalic and atraumatic  Eyes: pupils equal, round, and reactive to light, extraocular eye movements intact, conjunctivae normal  ENT: tympanic membrane, external ear and ear canal normal bilaterally, nose without deformity, nasal mucosa and turbinates normal without polyps  Neck: supple and non-tender without mass, no thyromegaly or thyroid nodules, no cervical lymphadenopathy  Pulmonary/Chest: clear to auscultation bilaterally- no wheezes, rales or rhonchi, normal air movement, no respiratory distress  Cardiovascular: normal rate, regular rhythm, normal S1 and S2, no murmurs, rubs, clicks, or gallops, distal pulses intact, no carotid bruits  Abdomen: soft, non-tender, non-distended, normal bowel sounds, no masses or organomegaly  Extremities: no cyanosis, clubbing or edema  Musculoskeletal: normal range of motion, no joint swelling, deformity or tenderness  Neurologic: reflexes normal and symmetric, no cranial nerve deficit, gait, coordination and speech normal       Allergies   Allergen Reactions    Simvastatin      Muscle aches     Prior to Visit Medications    Medication Sig Taking?  Authorizing Provider   sacubitril-valsartan (ENTRESTO) 24-26 MG per tablet Take 1 tablet by mouth 2 times daily Yes Bambi Zuniga MD   predniSONE (DELTASONE) 10 MG tablet TAKE 4 TABLETS BY MOUTH EVERY DAY Yes Historical Provider, MD   amLODIPine (NORVASC) 2.5 MG tablet TAKE 1 TABLET EVERY DAY Yes Katherine Mims MD   metoprolol succinate (TOPROL XL) 25 MG extended release tablet TAKE 1 TABLET EVERY DAY Yes Sy Carter DO   levothyroxine (SYNTHROID) 75 MCG tablet TAKE 1 TABLET EVERY DAY Yes Leroy Cano MD   atorvastatin (LIPITOR) 20 MG tablet Take 1 tablet by mouth daily  Patient taking differently: Take 20 mg by mouth Twice a Week Yes Katherine Mims MD   azelastine (ASTELIN) 0.1 % nasal spray 2 sprays by Nasal route 2 times daily Use in each nostril as directed Yes Leroy Cano MD   NONFORMULARY cbd gummies Yes Historical Provider, MD   Multiple Vitamins-Minerals (MULTIVITAMIN PO) Take by mouth Yes Historical Provider, MD   aspirin 81 MG EC tablet Take 81 mg by mouth in the morning and 81 mg in the evening.   . Yes Historical Provider, MD   isosorbide mononitrate (IMDUR) 30 MG extended release tablet Take 1 tablet by mouth daily  Patient not taking: Reported on 2/13/2023  Katherine Mims MD       [facility] (Including outside providers/suppliers regularly involved in providing care):   Patient Care Team:  Leroy Cano MD as PCP - General (Family Medicine)  Leroy Cano MD as PCP - Empaneled Provider     Reviewed and updated this visit:  Tobacco  Allergies  Meds  Problems  Med Hx  Surg Hx  Soc Hx  Fam Hx               Leroy Cano MD

## 2023-02-21 ENCOUNTER — OFFICE VISIT (OUTPATIENT)
Dept: ORTHOPEDIC SURGERY | Age: 73
End: 2023-02-21
Payer: MEDICARE

## 2023-02-21 ENCOUNTER — TELEPHONE (OUTPATIENT)
Dept: FAMILY MEDICINE CLINIC | Age: 73
End: 2023-02-21

## 2023-02-21 VITALS — BODY MASS INDEX: 40.82 KG/M2 | HEIGHT: 66 IN | WEIGHT: 254 LBS

## 2023-02-21 DIAGNOSIS — R06.02 SOB (SHORTNESS OF BREATH): ICD-10-CM

## 2023-02-21 DIAGNOSIS — M17.0 PRIMARY OSTEOARTHRITIS OF BOTH KNEES: Primary | ICD-10-CM

## 2023-02-21 DIAGNOSIS — I50.22 SYSTOLIC CHF, CHRONIC (HCC): ICD-10-CM

## 2023-02-21 LAB
A/G RATIO: 1.4 (ref 1.1–2.2)
ALBUMIN SERPL-MCNC: 4.2 G/DL (ref 3.4–5)
ALP BLD-CCNC: 63 U/L (ref 40–129)
ALT SERPL-CCNC: 27 U/L (ref 10–40)
ANION GAP SERPL CALCULATED.3IONS-SCNC: 12 MMOL/L (ref 3–16)
AST SERPL-CCNC: 29 U/L (ref 15–37)
BASOPHILS ABSOLUTE: 0.1 K/UL (ref 0–0.2)
BASOPHILS RELATIVE PERCENT: 0.5 %
BILIRUB SERPL-MCNC: 0.6 MG/DL (ref 0–1)
BUN BLDV-MCNC: 18 MG/DL (ref 7–20)
CALCIUM SERPL-MCNC: 9.4 MG/DL (ref 8.3–10.6)
CHLORIDE BLD-SCNC: 101 MMOL/L (ref 99–110)
CO2: 26 MMOL/L (ref 21–32)
CREAT SERPL-MCNC: 1.4 MG/DL (ref 0.8–1.3)
EOSINOPHILS ABSOLUTE: 0.1 K/UL (ref 0–0.6)
EOSINOPHILS RELATIVE PERCENT: 0.6 %
GFR SERPL CREATININE-BSD FRML MDRD: 53 ML/MIN/{1.73_M2}
GLUCOSE BLD-MCNC: 109 MG/DL (ref 70–99)
HCT VFR BLD CALC: 38.9 % (ref 40.5–52.5)
HEMOGLOBIN: 13.2 G/DL (ref 13.5–17.5)
LYMPHOCYTES ABSOLUTE: 1.5 K/UL (ref 1–5.1)
LYMPHOCYTES RELATIVE PERCENT: 11 %
MCH RBC QN AUTO: 32.4 PG (ref 26–34)
MCHC RBC AUTO-ENTMCNC: 34 G/DL (ref 31–36)
MCV RBC AUTO: 95.5 FL (ref 80–100)
MONOCYTES ABSOLUTE: 0.8 K/UL (ref 0–1.3)
MONOCYTES RELATIVE PERCENT: 6 %
NEUTROPHILS ABSOLUTE: 11 K/UL (ref 1.7–7.7)
NEUTROPHILS RELATIVE PERCENT: 81.9 %
PDW BLD-RTO: 16.4 % (ref 12.4–15.4)
PLATELET # BLD: 190 K/UL (ref 135–450)
PMV BLD AUTO: 8.1 FL (ref 5–10.5)
POTASSIUM SERPL-SCNC: 4.6 MMOL/L (ref 3.5–5.1)
PRO-BNP: 368 PG/ML (ref 0–124)
RBC # BLD: 4.07 M/UL (ref 4.2–5.9)
SODIUM BLD-SCNC: 139 MMOL/L (ref 136–145)
TOTAL PROTEIN: 7.1 G/DL (ref 6.4–8.2)
WBC # BLD: 13.5 K/UL (ref 4–11)

## 2023-02-21 PROCEDURE — 3017F COLORECTAL CA SCREEN DOC REV: CPT | Performed by: PHYSICIAN ASSISTANT

## 2023-02-21 PROCEDURE — 99214 OFFICE O/P EST MOD 30 MIN: CPT | Performed by: PHYSICIAN ASSISTANT

## 2023-02-21 PROCEDURE — 20611 DRAIN/INJ JOINT/BURSA W/US: CPT | Performed by: PHYSICIAN ASSISTANT

## 2023-02-21 PROCEDURE — G8417 CALC BMI ABV UP PARAM F/U: HCPCS | Performed by: PHYSICIAN ASSISTANT

## 2023-02-21 PROCEDURE — G8484 FLU IMMUNIZE NO ADMIN: HCPCS | Performed by: PHYSICIAN ASSISTANT

## 2023-02-21 PROCEDURE — 1036F TOBACCO NON-USER: CPT | Performed by: PHYSICIAN ASSISTANT

## 2023-02-21 PROCEDURE — G8427 DOCREV CUR MEDS BY ELIG CLIN: HCPCS | Performed by: PHYSICIAN ASSISTANT

## 2023-02-21 PROCEDURE — 1123F ACP DISCUSS/DSCN MKR DOCD: CPT | Performed by: PHYSICIAN ASSISTANT

## 2023-02-21 RX ORDER — HYALURONATE SODIUM 10 MG/ML
20 SYRINGE (ML) INTRAARTICULAR ONCE
Status: COMPLETED | OUTPATIENT
Start: 2023-02-21 | End: 2023-02-21

## 2023-02-21 RX ADMIN — Medication 20 MG: at 09:34

## 2023-02-21 NOTE — TELEPHONE ENCOUNTER
I left patient a VM, informing him of message.
More labs were put in today, will see results soon.
Patient is requesting lab results from 2/13/23.
Pt states that he is drinking lots of water and feels like he spends to much time urinating.
Thyroid, cholesterol, PSA are normal.  Kidneys are a bit high. Would make sure that he is drinking water.
Yes
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
## 2023-02-22 ENCOUNTER — TELEPHONE (OUTPATIENT)
Dept: CARDIOLOGY CLINIC | Age: 73
End: 2023-02-22

## 2023-02-22 NOTE — TELEPHONE ENCOUNTER
----- Message from Alyson Rahman MD sent at 2/21/2023 10:11 PM EST -----  Please call patient and let him know that his labs look good. No changes.   TINY

## 2023-02-28 ENCOUNTER — OFFICE VISIT (OUTPATIENT)
Dept: ORTHOPEDIC SURGERY | Age: 73
End: 2023-02-28

## 2023-02-28 VITALS — WEIGHT: 254 LBS | HEIGHT: 66 IN | BODY MASS INDEX: 40.82 KG/M2

## 2023-02-28 DIAGNOSIS — M17.0 PRIMARY OSTEOARTHRITIS OF BOTH KNEES: Primary | ICD-10-CM

## 2023-02-28 RX ORDER — HYALURONATE SODIUM 10 MG/ML
20 SYRINGE (ML) INTRAARTICULAR ONCE
Status: COMPLETED | OUTPATIENT
Start: 2023-02-28 | End: 2023-02-28

## 2023-02-28 RX ADMIN — Medication 20 MG: at 08:38

## 2023-02-28 NOTE — PROGRESS NOTES
[address]  Cardiac Follow-up    Primary Care Doctor:  Cher Lester MD    Chief Complaint   Patient presents with    Follow-up    Congestive Heart Failure        History of Present Illness:  I had the pleasure of seeing Desi Schneider in follow up for CHF  history of CAD with CABG x4 in 2006 for multivessel CAD, HTN, HLD, obesity, CKD stage 3a. Since last visit, started on entresto 24/26mg. Taking it twice a day. Having shortness of breath. Issues with steps and walking  Trying to lose weight  Stopped eating out. If he is up and down a lot he gets some dizziness. Lives [place]- starting to clean up property  Takes prednisone daily for the eye. - tapering     Desi Schneider describes symptoms including dyspnea but denies chest pain, chest pressure/discomfort    Appetite: unchanged  Fluid intake:     Taking medications as prescribed: Yes  Able to afford medications: Yes    Past Medical History:   has a past medical history of Actinic keratosis, Allergic rhinitis, CAD (coronary artery disease), Chronic uveitis, bilateral, [place] (hard of hearing), Hyperlipidemia, Hypertension, MI (myocardial infarction) ([facility] Osteoarthritis, and Uveitis of both eyes. Surgical History:   has a past surgical history that includes Diagnostic Cardiac Cath Lab Procedure; Appendectomy; fracture surgery; Coronary artery bypass graft (8/06); Colonoscopy (2005); Tonsillectomy; and Coronary angioplasty (07/27/2018). Social History:   reports that he quit smoking about 9 years ago. His smoking use included cigars and cigarettes. He has never used smokeless tobacco. He reports current alcohol use. He reports that he does not use drugs. Family History:   Family History   Problem Relation Age of Onset    Diabetes Mother      Home Medications:  Prior to Admission medications    Medication Sig Start Date End Date Taking?  Authorizing Provider   azelastine (ASTELIN) 0.1 % nasal spray 2 sprays by Nasal route 2 times daily Use in each nostril as directed 2/13/23  Yes Sharon Orr MD   sacubitril-valsartan (ENTRESTO) 24-26 MG per tablet Take 1 tablet by mouth 2 times daily 2/6/23  Yes Clint Tierney MD   predniSONE (DELTASONE) 10 MG tablet Take 10 mg by mouth daily 1/24/23  Yes Historical Provider, MD   amLODIPine (NORVASC) 2.5 MG tablet TAKE 1 TABLET EVERY DAY 12/27/22  Yes Josi Foote MD   metoprolol succinate (TOPROL XL) 25 MG extended release tablet TAKE 1 TABLET EVERY DAY 9/15/22  Yes Sy Carter DO   levothyroxine (SYNTHROID) 75 MCG tablet TAKE 1 TABLET EVERY DAY 6/16/22  Yes Sharon Orr MD   atorvastatin (LIPITOR) 20 MG tablet Take 1 tablet by mouth daily 9/7/21  Yes Josi Foote MD   NONFORMULARY cbd gummies   Yes Historical Provider, MD   Multiple Vitamins-Minerals (MULTIVITAMIN PO) Take by mouth   Yes Historical Provider, MD   aspirin 81 MG EC tablet Take 81 mg by mouth in the morning and 81 mg in the evening. .   Yes Historical Provider, MD   isosorbide mononitrate (IMDUR) 30 MG extended release tablet Take 1 tablet by mouth daily  Patient not taking: No sig reported 9/7/21   Josi Foote MD      Allergies:  Simvastatin     Physical Examination:    Vitals:    03/01/23 1003   BP: 120/74   Pulse: 83   SpO2: 96%   Weight: 255 lb (115.7 kg)   Height: 5' 6\" (1.676 m)        Constitutional and General Appearance: no apparent distress  HEENT: non-icteric sclera, oropharynx without exudate, oral mucosa moist  Respiratory:  No use of accessory muscles  Clear breath sounds throughout, no wheezing, no crackles, no rhonchi  Cardiovascular:   The apical impulses not displaced  Heart tones are crisp and normal, no murmur/rub/gallop  Regular rate and rhythm, S1,S2 normal  Radial pulses 2+ and equal bilaterally  No edema  Pedal Pulses: 2+ and equal   Abdomen:  No masses or tenderness  Liver: No Abnormalities Noted  Musculoskeletal/Skin:  Exhibits normal gait balance and coordination  There is no clubbing, cyanosis of the extremities  Skin is warm and dry  Moves all extremities well  Neurological/Psychiatric:  Alert and oriented in all spheres  No abnormalities of mood, affect, memory, mentation, or behavior are noted    Lab Data reviewed and analyzed   CBC:   WBC   Date Value Ref Range Status   02/21/2023 13.5 (H) 4.0 - 11.0 K/uL Final   09/24/2021 7.3 4.0 - 11.0 K/uL Final   05/15/2020 7.3 4.0 - 11.0 K/uL Final     RBC   Date Value Ref Range Status   02/21/2023 4.07 (L) 4.20 - 5.90 M/uL Final   09/24/2021 4.07 (L) 4.20 - 5.90 M/uL Final   05/15/2020 4.20 4. 20 - 5.90 M/uL Final     Hemoglobin   Date Value Ref Range Status   02/21/2023 13.2 (L) 13.5 - 17.5 g/dL Final   09/24/2021 13.1 (L) 13.5 - 17.5 g/dL Final   05/15/2020 13.7 13.5 - 17.5 g/dL Final     Hematocrit   Date Value Ref Range Status   02/21/2023 38.9 (L) 40.5 - 52.5 % Final   09/24/2021 39.1 (L) 40.5 - 52.5 % Final   05/15/2020 40.4 (L) 40.5 - 52.5 % Final     MCV   Date Value Ref Range Status   02/21/2023 95.5 80.0 - 100.0 fL Final   09/24/2021 96.3 80.0 - 100.0 fL Final   05/15/2020 96.2 80.0 - 100.0 fL Final     RDW   Date Value Ref Range Status   02/21/2023 16.4 (H) 12.4 - 15.4 % Final   09/24/2021 14.7 12.4 - 15.4 % Final   05/15/2020 14.8 12.4 - 15.4 % Final     Platelets   Date Value Ref Range Status   02/21/2023 190 135 - 450 K/uL Final   09/24/2021 209 135 - 450 K/uL Final   05/15/2020 228 135 - 450 K/uL Final     Iron:  No results found for: IRON, TIBC, FERRITIN  BMP:   Sodium   Date Value Ref Range Status   02/21/2023 139 136 - 145 mmol/L Final   02/13/2023 138 136 - 145 mmol/L Final   09/24/2021 136 136 - 145 mmol/L Final     Potassium   Date Value Ref Range Status   02/21/2023 4.6 3.5 - 5.1 mmol/L Final   02/13/2023 4.6 3.5 - 5.1 mmol/L Final   09/24/2021 4.7 3.5 - 5.1 mmol/L Final     Chloride   Date Value Ref Range Status   02/21/2023 101 99 - 110 mmol/L Final   02/13/2023 101 99 - 110 mmol/L Final   09/24/2021 100 99 - 110 mmol/L Final     CO2   Date Value Ref Range Status   02/21/2023 26 21 - 32 mmol/L Final   02/13/2023 22 21 - 32 mmol/L Final   09/24/2021 25 21 - 32 mmol/L Final     BUN   Date Value Ref Range Status   02/21/2023 18 7 - 20 mg/dL Final   02/13/2023 35 (H) 7 - 20 mg/dL Final   09/24/2021 21 (H) 7 - 20 mg/dL Final     Creatinine   Date Value Ref Range Status   02/21/2023 1.4 (H) 0.8 - 1.3 mg/dL Final   02/13/2023 1.6 (H) 0.8 - 1.3 mg/dL Final   09/24/2021 1.4 (H) 0.8 - 1.3 mg/dL Final     BNP:   Lab Results   Component Value Date    PROBNP 368 (H) 02/21/2023     Lipids: No components found for: T                Triglycerides   Date Value Ref Range Status   02/13/2023 70 0 - 150 mg/dL Final                   HDL   Date Value Ref Range Status   02/13/2023 69 (H) 40 - 60 mg/dL Final     Comment:     An HDL cholesterol less than 40 mg/dL is low and  constitutes a coronary heart disease risk factor. An HDL cholesterol greater than 60 mg/dL is a  negative risk factor for coronary heart disease. LDL Calculated   Date Value Ref Range Status   02/13/2023 61 <100 mg/dL Final                   VLDL Cholesterol Calculated   Date Value Ref Range Status   02/13/2023 14 Not Established mg/dL Final                 No results found for: CHOLHDLRATIO    EF:   Lab Results   Component Value Date/Time    LVEF 43 10/08/2021 08:16 AM    LVEF 45 12/20/2018 12:34 PM       Testing reviewed:   Echo 10/8/21   Technically difficult examination. Rec: contrast echo to further assess LV function. Left ventricular systolic function is low normal to mildly reduced with a visually estimated ejection fraction of 40-45%. Left ventricular function/wall motion is difficult to estimate due to poor endocardial visualization. The left ventricle is normal in size with mild concentric hypertrophy. Normal left ventricular diastolic function. Right ventricular systolic function is reduced. The left atrium is mildly enlarged.    Dilation of the aortic root, ascending aorta, and aortic arch. Mild aortic regurgitation. Inadequate tricuspid valve regurgitation to estimate systolic pulmonary artery pressure. Echo 12/20/2018   Summary   Limited echo for LV function (LV gram on 7/2018 showed EF of 45%). Normal left ventricle size. Mild concentric left ventricular hypertrophy. Inferior basal   akinesia. Inferolateral hypokinesia. Mild systolic dysfunction with estimated ejection fraction of 45-50%. No evidence of left ventricular mass or thrombus noted by definity contrast.   Grade I diastolic dysfunction with normal filing pressure. [facility] DATE OF PROCEDURE:  07/27/2018 Dr. Mcgowan Lot:  1. Selective coronary angiography. 2.  Left heart catheterization. 3.  Left ventriculography. 4.  Left internal mammary artery angiography. 5.  Free radial artery angiography. 6.  SVG angiography. ANGIOGRAPHY FINDINGS:  1. Native three-vessel coronary artery disease. 2.  Reduced left ventricular function with an EF of 45% and basal lateral  hypokinesis. 3.  Patent left internal mammary artery to the LAD. 4.  Patent radial graft to the circ and OM. 5.  Patent SVG to the right PDA. [facility] 8/28/06 Dr. Lito Cruz   ~Occluded LAD with antegrade and retro grade collaterals  ~Severe stenosis of the Circ and OM  ~RCA ectasia   ~Preserved LV systolic function. NYHA:                        Class II:  Slight limitation of physical activity. ACC/ AHA Stage:      Stage B:  Structural heart disease but without signs or symptoms of HF    Assessment:  HFpEF borderline 41%-49%  Ischemic cardiomyopathy  CAD s/p CABG   HTN  HLD  CKD stage 3    Visit Diagnosis:    1. Systolic CHF, chronic ([address]    2. Essential hypertension    3. Stage 3a chronic kidney disease ([address]    4.  Coronary artery disease involving native coronary artery of native heart without angina pectoris         Plan:   Daily weights- monitor for weight gain of 3lbs in a day or 5lbs in a week- if your weight jumps up quickly then this could mean you need additional water pill- call the office   Recommend increase  Entresto 49/51 mg tablet take 1 tab twice a day  Stop norvasc for now   Repeat labs in about 1-2 weeks  Follow up in 6 weeks     I appreciate the opportunity for caring for this patient.      Shoaib Benavides, JOHNY - CNP, 3/1/2023, 3:16 PM

## 2023-02-28 NOTE — PROGRESS NOTES
Diagnosis: Right and left knee osteoarthritis    Procedure: Right and left knee viscosupplementation #2    The patient returns today for their second Euflexxa injection in the right and left knee. The risks, benefits, and complications of the injections were again discussed in detail with the patient. The risks discussed included but are not limited to infection, skin reactions, hot swollen joints, and anaphylaxis. The patient gave verbal informed consent for the injection. The patient skin was prepped with 3 sterile gauze pads soaked with alcohol solution and the knee joint was injected with 2 mL of Euflexxa intra-articularly under sterile conditions . Technique: Under sterile conditions a [employer] ultrasound unit with a variable frequency (6.0-15.0 MHz) linear transducer was used to localize the placement of a 22-gauge needle into the jenny joint. Findings: Successful needle placement for intra-articular Visco supplementation injection. Final images were taken and saved for permanent record. The patient tolerated the injection reasonably well. The patient was given instructions to ice the the knee and avoid strenuous activities for 24-48 hours. The patient was instructed to call the office immediately if there is increased pain, redness, warmth, fever, or chills. We will see the patient back in one week for the third injection.

## 2023-03-01 ENCOUNTER — OFFICE VISIT (OUTPATIENT)
Dept: CARDIOLOGY CLINIC | Age: 73
End: 2023-03-01
Payer: MEDICARE

## 2023-03-01 VITALS
OXYGEN SATURATION: 96 % | BODY MASS INDEX: 40.98 KG/M2 | DIASTOLIC BLOOD PRESSURE: 74 MMHG | HEIGHT: 66 IN | HEART RATE: 83 BPM | SYSTOLIC BLOOD PRESSURE: 120 MMHG | WEIGHT: 255 LBS

## 2023-03-01 DIAGNOSIS — I10 ESSENTIAL HYPERTENSION: ICD-10-CM

## 2023-03-01 DIAGNOSIS — I50.22 SYSTOLIC CHF, CHRONIC (HCC): Primary | ICD-10-CM

## 2023-03-01 DIAGNOSIS — I25.10 CORONARY ARTERY DISEASE INVOLVING NATIVE CORONARY ARTERY OF NATIVE HEART WITHOUT ANGINA PECTORIS: ICD-10-CM

## 2023-03-01 DIAGNOSIS — N18.31 STAGE 3A CHRONIC KIDNEY DISEASE (HCC): ICD-10-CM

## 2023-03-01 PROCEDURE — G8484 FLU IMMUNIZE NO ADMIN: HCPCS | Performed by: NURSE PRACTITIONER

## 2023-03-01 PROCEDURE — G8417 CALC BMI ABV UP PARAM F/U: HCPCS | Performed by: NURSE PRACTITIONER

## 2023-03-01 PROCEDURE — 1123F ACP DISCUSS/DSCN MKR DOCD: CPT | Performed by: NURSE PRACTITIONER

## 2023-03-01 PROCEDURE — 3017F COLORECTAL CA SCREEN DOC REV: CPT | Performed by: NURSE PRACTITIONER

## 2023-03-01 PROCEDURE — 3074F SYST BP LT 130 MM HG: CPT | Performed by: NURSE PRACTITIONER

## 2023-03-01 PROCEDURE — 1036F TOBACCO NON-USER: CPT | Performed by: NURSE PRACTITIONER

## 2023-03-01 PROCEDURE — 99214 OFFICE O/P EST MOD 30 MIN: CPT | Performed by: NURSE PRACTITIONER

## 2023-03-01 PROCEDURE — 3078F DIAST BP <80 MM HG: CPT | Performed by: NURSE PRACTITIONER

## 2023-03-01 PROCEDURE — G8427 DOCREV CUR MEDS BY ELIG CLIN: HCPCS | Performed by: NURSE PRACTITIONER

## 2023-03-01 NOTE — PATIENT INSTRUCTIONS
Plan:   Daily weights- monitor for weight gain of 3lbs in a day or 5lbs in a week- if your weight jumps up quickly then this could mean you need additional water pill- call the office   Recommend increase  Entresto 49/51 mg tablet take 1 tab twice a day  Stop norvasc for now   Repeat labs in about 1-2 weeks  Follow up in 6 weeks     Avoid over the counter cold medications that contain pseudo-ephredrines, phenylephrines, neosynephrines = decongestants  Okay to take antihistamines without the decongestant (Claritin, Allegra, Zyrtec, Benadryl without the \"D\")  Okay to take guaifenesin (Mucinex or Robitussion) to help clear phlegm, okay to take dextromethorphan (Delsym) as cough suppressant. Avoid anti-inflammatory agents including ibuprofen, Motrin, Advil, Aleve, Naprosyn or Naproxen as these can interact with your kidneys and heart medications.   Avoid sodium based antacids (Unique-Merino)  Avoid natural supplements containing sodium (Airborne)

## 2023-03-07 ENCOUNTER — NURSE ONLY (OUTPATIENT)
Dept: ORTHOPEDIC SURGERY | Age: 73
End: 2023-03-07
Payer: MEDICARE

## 2023-03-07 VITALS — BODY MASS INDEX: 40.98 KG/M2 | WEIGHT: 255 LBS | HEIGHT: 66 IN

## 2023-03-07 DIAGNOSIS — M17.0 PRIMARY OSTEOARTHRITIS OF BOTH KNEES: Primary | ICD-10-CM

## 2023-03-07 PROCEDURE — 20611 DRAIN/INJ JOINT/BURSA W/US: CPT | Performed by: PHYSICIAN ASSISTANT

## 2023-03-07 PROCEDURE — 99999 PR OFFICE/OUTPT VISIT,PROCEDURE ONLY: CPT | Performed by: PHYSICIAN ASSISTANT

## 2023-03-07 RX ORDER — HYALURONATE SODIUM 10 MG/ML
20 SYRINGE (ML) INTRAARTICULAR ONCE
Status: COMPLETED | OUTPATIENT
Start: 2023-03-07 | End: 2023-03-07

## 2023-03-07 RX ADMIN — Medication 20 MG: at 08:52

## 2023-03-07 NOTE — PROGRESS NOTES
Diagnosis: Right and left knee osteoarthritis    Procedure: Right and left knee viscosupplementation #3    The patient returns today for their third and final Euflexxa injection in the right and and left knee. The risks, benefits, and complications of the injections were again discussed in detail with the patient. The risks discussed include but are not limited to infection, skin reactions, hot swollen joints, and anaphylaxis. The patient gave verbal informed consent for the injection. The patient's skin was prepped with 3 sterile gauze pads soaked with alcohol solution and the knee joint was injected with 2 mL of Euflexxa intra-articularly under sterile conditions. Technique: Under sterile conditions a [employer] ultrasound unit with a variable frequency (6.0-15.0 MHz) linear transducer was used to localize the placement of a 22-gauge needle into the knee joint. Findings: Successful needle placement for intra-articular Visco supplementation injection. Final images were taken and saved for permanent record. The patient tolerated the injection reasonably well. The patient was given instructions to ice of the knee and avoid strenuous activity for 24-48 hours. The patient was instructed to call the office immediately if there is increased pain, redness, warmth, fever, or chills. We will see the patient back on an as-needed basis  from this point. No

## 2023-03-28 DIAGNOSIS — I10 ESSENTIAL HYPERTENSION: ICD-10-CM

## 2023-03-28 DIAGNOSIS — N18.31 STAGE 3A CHRONIC KIDNEY DISEASE (HCC): ICD-10-CM

## 2023-03-28 DIAGNOSIS — I50.22 SYSTOLIC CHF, CHRONIC (HCC): ICD-10-CM

## 2023-03-28 DIAGNOSIS — I25.10 CORONARY ARTERY DISEASE INVOLVING NATIVE CORONARY ARTERY OF NATIVE HEART WITHOUT ANGINA PECTORIS: ICD-10-CM

## 2023-03-28 LAB
ANION GAP SERPL CALCULATED.3IONS-SCNC: 15 MMOL/L (ref 3–16)
BUN SERPL-MCNC: 17 MG/DL (ref 7–20)
CALCIUM SERPL-MCNC: 10 MG/DL (ref 8.3–10.6)
CHLORIDE SERPL-SCNC: 103 MMOL/L (ref 99–110)
CO2 SERPL-SCNC: 24 MMOL/L (ref 21–32)
CREAT SERPL-MCNC: 1.6 MG/DL (ref 0.8–1.3)
GFR SERPLBLD CREATININE-BSD FMLA CKD-EPI: 45 ML/MIN/{1.73_M2}
GLUCOSE SERPL-MCNC: 130 MG/DL (ref 70–99)
POTASSIUM SERPL-SCNC: 5.3 MMOL/L (ref 3.5–5.1)
SODIUM SERPL-SCNC: 142 MMOL/L (ref 136–145)

## 2023-03-29 ENCOUNTER — TELEPHONE (OUTPATIENT)
Dept: CARDIOLOGY CLINIC | Age: 73
End: 2023-03-29

## 2023-03-29 DIAGNOSIS — Z79.899 MEDICATION MANAGEMENT: Primary | ICD-10-CM

## 2023-03-29 NOTE — TELEPHONE ENCOUNTER
Attempted to reach patient. No answer. Left VM to call office to discuss results and instructions. Order placed for BMP in 3 weeks.

## 2023-03-29 NOTE — TELEPHONE ENCOUNTER
----- Message from JOHNY Dye CNP sent at 3/29/2023 10:11 AM EDT -----  Please notify patient results. Kidney function is stable. Electrolytes show a mild increase in potassium With adding the Entresto. Please have patient limit calcium in his diet. Make sure he is not using any salt substitutes such as  \"No Salt\" as this is potassium chloride.    Recommend repeat BMP in 3 to 4 weeks

## 2023-04-03 ENCOUNTER — OFFICE VISIT (OUTPATIENT)
Dept: CARDIOLOGY CLINIC | Age: 73
End: 2023-04-03
Payer: MEDICARE

## 2023-04-03 VITALS
WEIGHT: 261 LBS | HEIGHT: 66 IN | DIASTOLIC BLOOD PRESSURE: 68 MMHG | BODY MASS INDEX: 41.95 KG/M2 | OXYGEN SATURATION: 94 % | SYSTOLIC BLOOD PRESSURE: 100 MMHG | HEART RATE: 98 BPM

## 2023-04-03 DIAGNOSIS — Z79.899 MEDICATION MANAGEMENT: ICD-10-CM

## 2023-04-03 DIAGNOSIS — I10 ESSENTIAL HYPERTENSION: ICD-10-CM

## 2023-04-03 DIAGNOSIS — I50.22 SYSTOLIC CHF, CHRONIC (HCC): Primary | ICD-10-CM

## 2023-04-03 DIAGNOSIS — N18.31 STAGE 3A CHRONIC KIDNEY DISEASE (HCC): ICD-10-CM

## 2023-04-03 PROCEDURE — G8417 CALC BMI ABV UP PARAM F/U: HCPCS | Performed by: NURSE PRACTITIONER

## 2023-04-03 PROCEDURE — 3074F SYST BP LT 130 MM HG: CPT | Performed by: NURSE PRACTITIONER

## 2023-04-03 PROCEDURE — 99214 OFFICE O/P EST MOD 30 MIN: CPT | Performed by: NURSE PRACTITIONER

## 2023-04-03 PROCEDURE — 3017F COLORECTAL CA SCREEN DOC REV: CPT | Performed by: NURSE PRACTITIONER

## 2023-04-03 PROCEDURE — 1036F TOBACCO NON-USER: CPT | Performed by: NURSE PRACTITIONER

## 2023-04-03 PROCEDURE — G8427 DOCREV CUR MEDS BY ELIG CLIN: HCPCS | Performed by: NURSE PRACTITIONER

## 2023-04-03 PROCEDURE — 1123F ACP DISCUSS/DSCN MKR DOCD: CPT | Performed by: NURSE PRACTITIONER

## 2023-04-03 PROCEDURE — 3078F DIAST BP <80 MM HG: CPT | Performed by: NURSE PRACTITIONER

## 2023-04-03 NOTE — PROGRESS NOTES
Circ and OM  ~RCA ectasia   ~Preserved LV systolic function. NYHA:                        Class II:  Slight limitation of physical activity. ACC/ AHA Stage:      Stage B:  Structural heart disease but without signs or symptoms of HF    Assessment:  HFpEF borderline 41%-49%  Ischemic cardiomyopathy  CAD s/p CABG   HTN  HLD  CKD stage 3    Visit Diagnosis:    1. Systolic CHF, chronic ([facility]    2. Essential hypertension    3. Stage 3a chronic kidney disease ([facility]    4. Medication management        Plan:   Daily weights- monitor for weight gain of 3lbs in a day or 5lbs in a week- if your weight jumps up quickly then this could mean you need additional water pill- call the office   Adjust the entresto to 1 tab (49/51mg) in the am and 1/2 tab of the 49/51mg in the pm; discussed importance of compliance with getting 2nd dose it, setting an alarm or routine. Call the office if the dizziness reoccurs like last week   Repeat labs in about 3-4 weeks, remain off of potassium supplements and low potassium diet. Follow up in 2-3 months     I appreciate the opportunity for caring for this patient.      JOHNY Smith - CNP, 4/3/2023, 10:35 AM

## 2023-04-03 NOTE — PATIENT INSTRUCTIONS
Plan:   Daily weights- monitor for weight gain of 3lbs in a day or 5lbs in a week- if your weight jumps up quickly then this could mean you need additional water pill- call the office   Adjust the entresto to 1 tab (49/51mg) in the am and 1/2 tab of the 49/51mg in the pm    Call the office if the dizziness reoccurs like last week   Repeat labs in about 3-4 weeks   Follow up in 2-3 months

## 2023-06-09 ENCOUNTER — COMMUNITY OUTREACH (OUTPATIENT)
Dept: FAMILY MEDICINE CLINIC | Age: 73
End: 2023-06-09

## 2023-06-23 DIAGNOSIS — E03.9 HYPOTHYROIDISM, UNSPECIFIED TYPE: ICD-10-CM

## 2023-06-23 RX ORDER — LEVOTHYROXINE SODIUM 0.07 MG/1
TABLET ORAL
Qty: 90 TABLET | Refills: 3 | Status: SHIPPED | OUTPATIENT
Start: 2023-06-23

## 2023-06-23 NOTE — TELEPHONE ENCOUNTER
Last Office Visit  -  2/13/23  Next Office Visit  -  n/a    Last Filled  -  6/16/22  Last UDS -    Contract -

## 2023-06-26 RX ORDER — METOPROLOL SUCCINATE 25 MG/1
TABLET, EXTENDED RELEASE ORAL
Qty: 90 TABLET | Refills: 2 | Status: SHIPPED | OUTPATIENT
Start: 2023-06-26

## 2023-07-11 DIAGNOSIS — N18.31 STAGE 3A CHRONIC KIDNEY DISEASE (HCC): ICD-10-CM

## 2023-07-11 DIAGNOSIS — I10 ESSENTIAL HYPERTENSION: ICD-10-CM

## 2023-07-11 DIAGNOSIS — I50.22 SYSTOLIC CHF, CHRONIC (HCC): ICD-10-CM

## 2023-07-12 LAB — NT-PROBNP SERPL-MCNC: 252 PG/ML (ref 0–124)

## 2023-07-13 ENCOUNTER — TELEPHONE (OUTPATIENT)
Dept: CARDIOLOGY CLINIC | Age: 73
End: 2023-07-13

## 2023-07-13 DIAGNOSIS — Z79.899 MEDICATION MANAGEMENT: Primary | ICD-10-CM

## 2023-07-13 NOTE — TELEPHONE ENCOUNTER
Called and spoke with patient. Relayed RB NP results. He VU. He VU to have BMP prior to appt with TINY.

## 2023-07-13 NOTE — TELEPHONE ENCOUNTER
----- Message from JOHNY Parra CNP sent at 7/13/2023 12:01 PM EDT -----  Please notify patient results.    Pro-BNP (heart failure number) is improved  Continue current regimen recommend to basic metabolic panel prior to his appointment with Dr. Antwan Valero next week

## 2023-07-17 DIAGNOSIS — Z79.899 MEDICATION MANAGEMENT: ICD-10-CM

## 2023-07-17 LAB
ANION GAP SERPL CALCULATED.3IONS-SCNC: 12 MMOL/L (ref 3–16)
BUN SERPL-MCNC: 25 MG/DL (ref 7–20)
CALCIUM SERPL-MCNC: 9.8 MG/DL (ref 8.3–10.6)
CHLORIDE SERPL-SCNC: 102 MMOL/L (ref 99–110)
CO2 SERPL-SCNC: 22 MMOL/L (ref 21–32)
CREAT SERPL-MCNC: 1.8 MG/DL (ref 0.8–1.3)
GFR SERPLBLD CREATININE-BSD FMLA CKD-EPI: 39 ML/MIN/{1.73_M2}
GLUCOSE SERPL-MCNC: 123 MG/DL (ref 70–99)
POTASSIUM SERPL-SCNC: 4.7 MMOL/L (ref 3.5–5.1)
SODIUM SERPL-SCNC: 136 MMOL/L (ref 136–145)

## 2023-07-17 NOTE — PROGRESS NOTES
[facility]  Advanced CHF/Pulmonary Hypertension   Cardiac Evaluation      Angela Sandoval  YOB: 1950    Date of Visit:  7/18/23    Chief Complaint   Patient presents with    Follow-up    Congestive Heart Failure      History of Present Illness:  Angela Sandoval is a 67 y.o. male who presents from referral from Dr. Jayden Loaiza for consultation and management of heart failure in the presence of CKD. Angela Sandoval is 67 y.o. has has a current medication history of CAD with CABG x4 in 2006 for multivessel CAD, HTN, HLD, obesity, CKD stage 3a. He f/w Dr. Jayden Loaiza for his heart disease. He underwent a repeat left heart cath in 7/2018 showing patent grafts x3 and medication management was recommended. EF per Echo 10/8/2021 40-45%. He retired years ago from [employer] and then [employer] all over [place]. It was extremely stressful. LOV 2/6/2023, he reports SOB that is not resolving but improving. He denies exertional chest pain, PND, palpitations, light-headedness, edema. He states he has lost 10# this past year. He has a chronic eye condition that causes cloudy vision for which he receives injections and is on prednisone (f/w CEI). 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 Today, 7/18/2023, he reports he is taking entresto 49-51 half tablet twice daily due to dizziness. 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 Patient denies current edema, chest pain, sob, palpitations, or syncope.        Allergies   Allergen Reactions    Simvastatin      Muscle aches     Current Outpatient Medications   Medication Sig Dispense Refill    metoprolol succinate (TOPROL XL) 25 MG extended release tablet TAKE 1 TABLET EVERY DAY 90 tablet 2    levothyroxine (SYNTHROID) 75 MCG tablet TAKE 1 TABLET EVERY DAY 90 tablet 3    loratadine (CLARITIN) 10 MG tablet TAKE 1 TABLET BY MOUTH DAILY 30 tablet 3    sacubitril-valsartan (ENTRESTO) 49-51 MG per tablet Take 1 tablet by mouth

## 2023-07-18 ENCOUNTER — OFFICE VISIT (OUTPATIENT)
Dept: CARDIOLOGY CLINIC | Age: 73
End: 2023-07-18
Payer: MEDICARE

## 2023-07-18 VITALS
DIASTOLIC BLOOD PRESSURE: 82 MMHG | SYSTOLIC BLOOD PRESSURE: 118 MMHG | HEIGHT: 66 IN | WEIGHT: 250.5 LBS | BODY MASS INDEX: 40.26 KG/M2 | HEART RATE: 72 BPM | OXYGEN SATURATION: 97 %

## 2023-07-18 DIAGNOSIS — I50.22 SYSTOLIC CHF, CHRONIC (HCC): Primary | ICD-10-CM

## 2023-07-18 DIAGNOSIS — E78.2 MIXED HYPERLIPIDEMIA: ICD-10-CM

## 2023-07-18 DIAGNOSIS — I25.10 CORONARY ARTERY DISEASE INVOLVING NATIVE CORONARY ARTERY OF NATIVE HEART WITHOUT ANGINA PECTORIS: ICD-10-CM

## 2023-07-18 DIAGNOSIS — I10 ESSENTIAL HYPERTENSION: ICD-10-CM

## 2023-07-18 DIAGNOSIS — N18.31 STAGE 3A CHRONIC KIDNEY DISEASE (HCC): ICD-10-CM

## 2023-07-18 LAB — NT-PROBNP SERPL-MCNC: 160 PG/ML (ref 0–124)

## 2023-07-18 PROCEDURE — G8427 DOCREV CUR MEDS BY ELIG CLIN: HCPCS | Performed by: INTERNAL MEDICINE

## 2023-07-18 PROCEDURE — 3017F COLORECTAL CA SCREEN DOC REV: CPT | Performed by: INTERNAL MEDICINE

## 2023-07-18 PROCEDURE — 99215 OFFICE O/P EST HI 40 MIN: CPT | Performed by: INTERNAL MEDICINE

## 2023-07-18 PROCEDURE — 3074F SYST BP LT 130 MM HG: CPT | Performed by: INTERNAL MEDICINE

## 2023-07-18 PROCEDURE — 1036F TOBACCO NON-USER: CPT | Performed by: INTERNAL MEDICINE

## 2023-07-18 PROCEDURE — 1123F ACP DISCUSS/DSCN MKR DOCD: CPT | Performed by: INTERNAL MEDICINE

## 2023-07-18 PROCEDURE — 3078F DIAST BP <80 MM HG: CPT | Performed by: INTERNAL MEDICINE

## 2023-07-18 PROCEDURE — G8417 CALC BMI ABV UP PARAM F/U: HCPCS | Performed by: INTERNAL MEDICINE

## 2023-07-18 RX ORDER — DORZOLAMIDE HYDROCHLORIDE AND TIMOLOL MALEATE 20; 5 MG/ML; MG/ML
SOLUTION/ DROPS OPHTHALMIC
COMMUNITY
Start: 2023-07-01

## 2023-07-18 NOTE — PATIENT INSTRUCTIONS
PLAN:  Continue entresto 49-51 half tablet twice daily  Echocardiogram to assess heart function and strength  Labs before next visit: BMP, BNP  Follow up in 4 months with me or Linnette Meckel, APRN-CNP and echo

## 2023-09-25 ENCOUNTER — TELEPHONE (OUTPATIENT)
Dept: FAMILY MEDICINE CLINIC | Age: 73
End: 2023-09-25

## 2023-09-25 NOTE — TELEPHONE ENCOUNTER
I looked through his chart and agree that RSV vaccine is important for him to get. No reason not to do so.
If he doesn't have any way of finding out if he was given the actual vaccine, I'd recommend that he go and ahead and get the RSV vaccine.
Please advise
Pt informed
Pt informed and he was c/o because he was part of a vaccine trial and he doesn't know if he was given the vaccine or the placebo. He stated that the trial was two years ago.
Pt wanting to know if the RSV Vaccine is safe for him to receive? Please Advise.
Yes

## 2023-12-05 ENCOUNTER — HOSPITAL ENCOUNTER (OUTPATIENT)
Dept: CARDIOLOGY | Age: 73
Discharge: HOME | End: 2023-12-05
Payer: MEDICARE

## 2023-12-05 ENCOUNTER — OFFICE VISIT (OUTPATIENT)
Dept: CARDIOLOGY CLINIC | Age: 73
End: 2023-12-05

## 2023-12-05 VITALS
HEIGHT: 66 IN | DIASTOLIC BLOOD PRESSURE: 80 MMHG | OXYGEN SATURATION: 96 % | SYSTOLIC BLOOD PRESSURE: 110 MMHG | WEIGHT: 260 LBS | BODY MASS INDEX: 41.78 KG/M2 | HEART RATE: 77 BPM

## 2023-12-05 DIAGNOSIS — E66.9 OBESITY (BMI 30-39.9): ICD-10-CM

## 2023-12-05 DIAGNOSIS — I50.22 SYSTOLIC CHF, CHRONIC (HCC): ICD-10-CM

## 2023-12-05 DIAGNOSIS — I50.22 SYSTOLIC CHF, CHRONIC (HCC): Primary | ICD-10-CM

## 2023-12-05 DIAGNOSIS — R53.83 OTHER FATIGUE: ICD-10-CM

## 2023-12-05 DIAGNOSIS — I35.0 NONRHEUMATIC AORTIC VALVE STENOSIS: ICD-10-CM

## 2023-12-05 DIAGNOSIS — R06.02 SOB (SHORTNESS OF BREATH): ICD-10-CM

## 2023-12-05 DIAGNOSIS — I10 ESSENTIAL HYPERTENSION: ICD-10-CM

## 2023-12-05 DIAGNOSIS — E78.2 MIXED HYPERLIPIDEMIA: ICD-10-CM

## 2023-12-05 DIAGNOSIS — I25.10 CORONARY ARTERY DISEASE INVOLVING NATIVE CORONARY ARTERY OF NATIVE HEART WITHOUT ANGINA PECTORIS: ICD-10-CM

## 2023-12-05 PROCEDURE — 93306 TTE W/DOPPLER COMPLETE: CPT

## 2023-12-05 PROCEDURE — C8929 TTE W OR WO FOL WCON,DOPPLER: HCPCS

## 2023-12-05 PROCEDURE — 6360000004 HC RX CONTRAST MEDICATION: Performed by: INTERNAL MEDICINE

## 2023-12-05 RX ORDER — ATORVASTATIN CALCIUM 20 MG/1
20 TABLET, FILM COATED ORAL DAILY
Qty: 90 TABLET | Refills: 3 | Status: SHIPPED | OUTPATIENT
Start: 2023-12-05

## 2023-12-05 RX ADMIN — PERFLUTREN 1.82 ML: 6.52 INJECTION, SUSPENSION INTRAVENOUS at 12:00

## 2023-12-05 NOTE — PATIENT INSTRUCTIONS
PLAN:  Labs now: fasting lipids, cbc, cmp, bnp  Continue taking entresto 49-51 mg half tablet twice daily given dizziness  Follow up in 6 months

## 2023-12-06 ENCOUNTER — TELEPHONE (OUTPATIENT)
Dept: CARDIOLOGY CLINIC | Age: 73
End: 2023-12-06

## 2023-12-06 DIAGNOSIS — I20.0 UNSTABLE ANGINA (HCC): ICD-10-CM

## 2023-12-06 DIAGNOSIS — Z95.1 S/P CABG X 4: ICD-10-CM

## 2023-12-06 DIAGNOSIS — I25.10 CORONARY ARTERY DISEASE INVOLVING NATIVE CORONARY ARTERY OF NATIVE HEART WITHOUT ANGINA PECTORIS: Primary | ICD-10-CM

## 2023-12-06 NOTE — TELEPHONE ENCOUNTER
Will Poe called to inform tegan that she needs another diagnosis for the Pt to attend Cardiac Rehab. It can not be CHF because his EF needs to be below 35%, Pt is 45%. SOB, Fatigue is not covered. Please advise.   Thank you

## 2023-12-27 ENCOUNTER — APPOINTMENT (OUTPATIENT)
Dept: CARDIAC REHAB | Age: 73
End: 2023-12-27
Attending: INTERNAL MEDICINE
Payer: MEDICARE

## 2023-12-27 ENCOUNTER — HOSPITAL ENCOUNTER (OUTPATIENT)
Dept: CARDIAC REHAB | Age: 73
Setting detail: THERAPIES SERIES
Discharge: HOME | End: 2023-12-27
Attending: INTERNAL MEDICINE
Payer: MEDICARE

## 2023-12-27 PROCEDURE — 93798 PHYS/QHP OP CAR RHAB W/ECG: CPT

## 2023-12-29 ENCOUNTER — HOSPITAL ENCOUNTER (OUTPATIENT)
Dept: CARDIAC REHAB | Age: 73
Setting detail: THERAPIES SERIES
Discharge: HOME | End: 2023-12-29
Attending: INTERNAL MEDICINE
Payer: MEDICARE

## 2023-12-29 PROCEDURE — 93798 PHYS/QHP OP CAR RHAB W/ECG: CPT

## 2024-01-03 ENCOUNTER — HOSPITAL ENCOUNTER (OUTPATIENT)
Dept: CARDIAC REHAB | Age: 74
Setting detail: THERAPIES SERIES
Discharge: HOME | End: 2024-01-03
Attending: INTERNAL MEDICINE
Payer: MEDICARE

## 2024-01-03 PROCEDURE — 93798 PHYS/QHP OP CAR RHAB W/ECG: CPT

## 2024-01-04 ENCOUNTER — TELEPHONE (OUTPATIENT)
Dept: CARDIOLOGY CLINIC | Age: 74
End: 2024-01-04

## 2024-01-04 NOTE — TELEPHONE ENCOUNTER
Pt called and stated he was passing a lot of blood through urine since Christmas eve, stated it was real bad on Edwin day and since then has gotten to a light pink but its still occurring. Pt did go to see a urologist and CT was done but awaiting results. Pt wondering if it could be from entresto? States no pain or other symptoms.Spoke w/ PMKW and she told me to relay to pt to go to ED, pt refused. Please advise

## 2024-01-04 NOTE — TELEPHONE ENCOUNTER
Spoke with pt. He reports the blood in his urine has been an ongoing side effect since starting entresto. He says the bleeding has got better since [place]. Pt went to the urologist today and had a CT. He does not have the results yet. Advised he go to the ER for further evaluation. Pt says he is busy \"on the road today\". Advised he continue to follow with urology and PCP. Pt asking if TINY can order labs and if he should stop entresto.     LETTY FRANKS 12/5/2023

## 2024-01-04 NOTE — TELEPHONE ENCOUNTER
He has been on Entresto since February.   Entresto does not cause bleeding.  If he was on a blood thinner, this would promote bleeding, but Entresto would not do this.    More likely than not, there would be an issue with the bladder or the kidney causing the bleeding, so we have to wait on the scan and the urology evaluation.    If it has been a chronic issue, he doesn't need to go to the ER.  I would not stop entresto.  He just needs to follow up with the urologist.    TINY

## 2024-01-05 ENCOUNTER — HOSPITAL ENCOUNTER (OUTPATIENT)
Dept: CARDIAC REHAB | Age: 74
Setting detail: THERAPIES SERIES
Discharge: HOME | End: 2024-01-05
Attending: INTERNAL MEDICINE
Payer: MEDICARE

## 2024-01-05 PROCEDURE — 93798 PHYS/QHP OP CAR RHAB W/ECG: CPT

## 2024-01-08 ENCOUNTER — HOSPITAL ENCOUNTER (OUTPATIENT)
Dept: CARDIAC REHAB | Age: 74
Setting detail: THERAPIES SERIES
Discharge: HOME | End: 2024-01-08
Attending: INTERNAL MEDICINE
Payer: MEDICARE

## 2024-01-08 PROCEDURE — 93798 PHYS/QHP OP CAR RHAB W/ECG: CPT

## 2024-01-10 ENCOUNTER — HOSPITAL ENCOUNTER (OUTPATIENT)
Dept: CARDIAC REHAB | Age: 74
Setting detail: THERAPIES SERIES
Discharge: HOME | End: 2024-01-10
Attending: INTERNAL MEDICINE
Payer: MEDICARE

## 2024-01-10 PROCEDURE — 93798 PHYS/QHP OP CAR RHAB W/ECG: CPT

## 2024-01-12 ENCOUNTER — TELEMEDICINE (OUTPATIENT)
Dept: FAMILY MEDICINE CLINIC | Age: 74
End: 2024-01-12
Payer: MEDICARE

## 2024-01-12 ENCOUNTER — HOSPITAL ENCOUNTER (OUTPATIENT)
Dept: CARDIAC REHAB | Age: 74
Setting detail: THERAPIES SERIES
Discharge: HOME | End: 2024-01-12
Attending: INTERNAL MEDICINE
Payer: MEDICARE

## 2024-01-12 DIAGNOSIS — E78.2 MIXED HYPERLIPIDEMIA: ICD-10-CM

## 2024-01-12 DIAGNOSIS — I50.22 SYSTOLIC CHF, CHRONIC (HCC): ICD-10-CM

## 2024-01-12 DIAGNOSIS — E03.9 HYPOTHYROIDISM, UNSPECIFIED TYPE: Primary | ICD-10-CM

## 2024-01-12 DIAGNOSIS — R31.9 HEMATURIA, UNSPECIFIED TYPE: ICD-10-CM

## 2024-01-12 DIAGNOSIS — I25.10 CORONARY ARTERY DISEASE INVOLVING NATIVE CORONARY ARTERY OF NATIVE HEART WITHOUT ANGINA PECTORIS: ICD-10-CM

## 2024-01-12 DIAGNOSIS — I10 ESSENTIAL HYPERTENSION: ICD-10-CM

## 2024-01-12 DIAGNOSIS — E66.01 OBESITY, CLASS III, BMI 40-49.9 (MORBID OBESITY) (HCC): ICD-10-CM

## 2024-01-12 PROCEDURE — 3017F COLORECTAL CA SCREEN DOC REV: CPT | Performed by: FAMILY MEDICINE

## 2024-01-12 PROCEDURE — 99214 OFFICE O/P EST MOD 30 MIN: CPT | Performed by: FAMILY MEDICINE

## 2024-01-12 PROCEDURE — 1036F TOBACCO NON-USER: CPT | Performed by: FAMILY MEDICINE

## 2024-01-12 PROCEDURE — G8417 CALC BMI ABV UP PARAM F/U: HCPCS | Performed by: FAMILY MEDICINE

## 2024-01-12 PROCEDURE — 93798 PHYS/QHP OP CAR RHAB W/ECG: CPT

## 2024-01-12 PROCEDURE — 1123F ACP DISCUSS/DSCN MKR DOCD: CPT | Performed by: FAMILY MEDICINE

## 2024-01-12 PROCEDURE — G8484 FLU IMMUNIZE NO ADMIN: HCPCS | Performed by: FAMILY MEDICINE

## 2024-01-12 PROCEDURE — G8427 DOCREV CUR MEDS BY ELIG CLIN: HCPCS | Performed by: FAMILY MEDICINE

## 2024-01-12 ASSESSMENT — PATIENT HEALTH QUESTIONNAIRE - PHQ9
SUM OF ALL RESPONSES TO PHQ QUESTIONS 1-9: 0
SUM OF ALL RESPONSES TO PHQ QUESTIONS 1-9: 0
1. LITTLE INTEREST OR PLEASURE IN DOING THINGS: 0
SUM OF ALL RESPONSES TO PHQ QUESTIONS 1-9: 0
2. FEELING DOWN, DEPRESSED OR HOPELESS: 0
SUM OF ALL RESPONSES TO PHQ QUESTIONS 1-9: 0
SUM OF ALL RESPONSES TO PHQ9 QUESTIONS 1 & 2: 0

## 2024-01-15 ENCOUNTER — HOSPITAL ENCOUNTER (OUTPATIENT)
Dept: CARDIAC REHAB | Age: 74
Setting detail: THERAPIES SERIES
Discharge: HOME | End: 2024-01-15
Attending: INTERNAL MEDICINE
Payer: MEDICARE

## 2024-01-15 PROCEDURE — 93798 PHYS/QHP OP CAR RHAB W/ECG: CPT

## 2024-01-17 ENCOUNTER — HOSPITAL ENCOUNTER (OUTPATIENT)
Dept: CARDIAC REHAB | Age: 74
Setting detail: THERAPIES SERIES
Discharge: HOME | End: 2024-01-17
Attending: INTERNAL MEDICINE
Payer: MEDICARE

## 2024-01-17 PROCEDURE — 93798 PHYS/QHP OP CAR RHAB W/ECG: CPT

## 2024-01-19 ENCOUNTER — APPOINTMENT (OUTPATIENT)
Dept: CARDIAC REHAB | Age: 74
End: 2024-01-19
Attending: INTERNAL MEDICINE
Payer: MEDICARE

## 2024-01-22 ENCOUNTER — APPOINTMENT (OUTPATIENT)
Dept: CARDIAC REHAB | Age: 74
End: 2024-01-22
Attending: INTERNAL MEDICINE
Payer: MEDICARE

## 2024-01-22 ENCOUNTER — HOSPITAL ENCOUNTER (OUTPATIENT)
Dept: CARDIAC REHAB | Age: 74
Setting detail: THERAPIES SERIES
Discharge: HOME | End: 2024-01-22
Attending: INTERNAL MEDICINE
Payer: MEDICARE

## 2024-01-22 PROCEDURE — 93798 PHYS/QHP OP CAR RHAB W/ECG: CPT

## 2024-01-23 ENCOUNTER — TELEPHONE (OUTPATIENT)
Dept: FAMILY MEDICINE CLINIC | Age: 74
End: 2024-01-23

## 2024-01-23 PROBLEM — N18.32 STAGE 3B CHRONIC KIDNEY DISEASE (HCC): Status: ACTIVE | Noted: 2024-01-23

## 2024-01-23 PROBLEM — N18.32 STAGE 3B CHRONIC KIDNEY DISEASE (HCC): Status: RESOLVED | Noted: 2024-01-23 | Resolved: 2024-01-23

## 2024-01-23 PROBLEM — N18.31 STAGE 3A CHRONIC KIDNEY DISEASE (HCC): Status: RESOLVED | Noted: 2022-10-18 | Resolved: 2024-01-23

## 2024-01-23 NOTE — TELEPHONE ENCOUNTER
Patient called office today, asking if PCP has received any additional information regarding urology issues. States he was passing blood in 12/2023.   He is waiting on test results, if have been received and/or reviewed. What is next step for treatment. Possible referral to another provider.      1/12/2024 telemedicine visit OD.

## 2024-01-24 ENCOUNTER — APPOINTMENT (OUTPATIENT)
Dept: CARDIAC REHAB | Age: 74
End: 2024-01-24
Attending: INTERNAL MEDICINE
Payer: MEDICARE

## 2024-01-24 ENCOUNTER — HOSPITAL ENCOUNTER (OUTPATIENT)
Dept: CARDIAC REHAB | Age: 74
Setting detail: THERAPIES SERIES
Discharge: HOME | End: 2024-01-24
Attending: INTERNAL MEDICINE
Payer: MEDICARE

## 2024-01-24 PROCEDURE — 93798 PHYS/QHP OP CAR RHAB W/ECG: CPT

## 2024-01-26 ENCOUNTER — HOSPITAL ENCOUNTER (OUTPATIENT)
Dept: CARDIAC REHAB | Age: 74
Setting detail: THERAPIES SERIES
Discharge: HOME | End: 2024-01-26
Attending: INTERNAL MEDICINE
Payer: MEDICARE

## 2024-01-26 ENCOUNTER — APPOINTMENT (OUTPATIENT)
Dept: CARDIAC REHAB | Age: 74
End: 2024-01-26
Attending: INTERNAL MEDICINE
Payer: MEDICARE

## 2024-01-26 PROCEDURE — 93798 PHYS/QHP OP CAR RHAB W/ECG: CPT

## 2024-01-29 ENCOUNTER — APPOINTMENT (OUTPATIENT)
Dept: CARDIAC REHAB | Age: 74
End: 2024-01-29
Attending: INTERNAL MEDICINE
Payer: MEDICARE

## 2024-01-29 ENCOUNTER — HOSPITAL ENCOUNTER (OUTPATIENT)
Dept: CARDIAC REHAB | Age: 74
Setting detail: THERAPIES SERIES
Discharge: HOME | End: 2024-01-29
Attending: INTERNAL MEDICINE
Payer: MEDICARE

## 2024-01-29 PROCEDURE — 93798 PHYS/QHP OP CAR RHAB W/ECG: CPT

## 2024-01-31 ENCOUNTER — APPOINTMENT (OUTPATIENT)
Dept: CARDIAC REHAB | Age: 74
End: 2024-01-31
Attending: INTERNAL MEDICINE
Payer: MEDICARE

## 2024-01-31 ENCOUNTER — HOSPITAL ENCOUNTER (OUTPATIENT)
Dept: CARDIAC REHAB | Age: 74
Setting detail: THERAPIES SERIES
Discharge: HOME | End: 2024-01-31
Attending: INTERNAL MEDICINE
Payer: MEDICARE

## 2024-01-31 PROCEDURE — 93798 PHYS/QHP OP CAR RHAB W/ECG: CPT

## 2024-02-02 ENCOUNTER — HOSPITAL ENCOUNTER (OUTPATIENT)
Dept: CARDIAC REHAB | Age: 74
Setting detail: THERAPIES SERIES
Discharge: HOME | End: 2024-02-02
Attending: INTERNAL MEDICINE
Payer: MEDICARE

## 2024-02-02 PROCEDURE — 93798 PHYS/QHP OP CAR RHAB W/ECG: CPT

## 2024-02-07 ENCOUNTER — HOSPITAL ENCOUNTER (OUTPATIENT)
Dept: CARDIAC REHAB | Age: 74
Setting detail: THERAPIES SERIES
Discharge: HOME | End: 2024-02-07
Attending: INTERNAL MEDICINE
Payer: MEDICARE

## 2024-02-07 PROCEDURE — 93798 PHYS/QHP OP CAR RHAB W/ECG: CPT

## 2024-02-09 ENCOUNTER — HOSPITAL ENCOUNTER (OUTPATIENT)
Dept: CARDIAC REHAB | Age: 74
Setting detail: THERAPIES SERIES
Discharge: HOME | End: 2024-02-09
Attending: INTERNAL MEDICINE
Payer: MEDICARE

## 2024-02-09 PROCEDURE — 93798 PHYS/QHP OP CAR RHAB W/ECG: CPT

## 2024-02-14 ENCOUNTER — HOSPITAL ENCOUNTER (OUTPATIENT)
Dept: CARDIAC REHAB | Age: 74
Setting detail: THERAPIES SERIES
Discharge: HOME | End: 2024-02-14
Attending: INTERNAL MEDICINE
Payer: MEDICARE

## 2024-02-14 PROCEDURE — 93798 PHYS/QHP OP CAR RHAB W/ECG: CPT

## 2024-02-16 ENCOUNTER — HOSPITAL ENCOUNTER (OUTPATIENT)
Dept: CARDIAC REHAB | Age: 74
Setting detail: THERAPIES SERIES
Discharge: HOME | End: 2024-02-16
Attending: INTERNAL MEDICINE
Payer: MEDICARE

## 2024-02-16 PROCEDURE — 93798 PHYS/QHP OP CAR RHAB W/ECG: CPT

## 2024-02-19 ENCOUNTER — HOSPITAL ENCOUNTER (OUTPATIENT)
Dept: CARDIAC REHAB | Age: 74
Setting detail: THERAPIES SERIES
Discharge: HOME | End: 2024-02-19
Attending: INTERNAL MEDICINE
Payer: MEDICARE

## 2024-02-19 PROCEDURE — 93798 PHYS/QHP OP CAR RHAB W/ECG: CPT

## 2024-02-21 ENCOUNTER — HOSPITAL ENCOUNTER (OUTPATIENT)
Dept: CARDIAC REHAB | Age: 74
Setting detail: THERAPIES SERIES
Discharge: HOME | End: 2024-02-21
Attending: INTERNAL MEDICINE
Payer: MEDICARE

## 2024-02-21 PROCEDURE — 93798 PHYS/QHP OP CAR RHAB W/ECG: CPT

## 2024-02-23 ENCOUNTER — HOSPITAL ENCOUNTER (OUTPATIENT)
Dept: CARDIAC REHAB | Age: 74
Setting detail: THERAPIES SERIES
Discharge: HOME | End: 2024-02-23
Attending: INTERNAL MEDICINE
Payer: MEDICARE

## 2024-02-23 PROCEDURE — 93798 PHYS/QHP OP CAR RHAB W/ECG: CPT

## 2024-02-26 ENCOUNTER — HOSPITAL ENCOUNTER (OUTPATIENT)
Dept: CARDIAC REHAB | Age: 74
Setting detail: THERAPIES SERIES
Discharge: HOME | End: 2024-02-26
Attending: INTERNAL MEDICINE
Payer: MEDICARE

## 2024-02-26 PROCEDURE — 93798 PHYS/QHP OP CAR RHAB W/ECG: CPT

## 2024-02-28 ENCOUNTER — OFFICE VISIT (OUTPATIENT)
Dept: FAMILY MEDICINE CLINIC | Age: 74
End: 2024-02-28
Payer: MEDICARE

## 2024-02-28 ENCOUNTER — HOSPITAL ENCOUNTER (OUTPATIENT)
Dept: CARDIAC REHAB | Age: 74
Setting detail: THERAPIES SERIES
Discharge: HOME | End: 2024-02-28
Attending: INTERNAL MEDICINE
Payer: MEDICARE

## 2024-02-28 VITALS
WEIGHT: 256 LBS | SYSTOLIC BLOOD PRESSURE: 134 MMHG | BODY MASS INDEX: 38.8 KG/M2 | DIASTOLIC BLOOD PRESSURE: 72 MMHG | HEART RATE: 76 BPM | HEIGHT: 68 IN | OXYGEN SATURATION: 98 %

## 2024-02-28 DIAGNOSIS — Z01.818 PREOP EXAMINATION: Primary | ICD-10-CM

## 2024-02-28 PROCEDURE — 1123F ACP DISCUSS/DSCN MKR DOCD: CPT | Performed by: STUDENT IN AN ORGANIZED HEALTH CARE EDUCATION/TRAINING PROGRAM

## 2024-02-28 PROCEDURE — 3017F COLORECTAL CA SCREEN DOC REV: CPT | Performed by: STUDENT IN AN ORGANIZED HEALTH CARE EDUCATION/TRAINING PROGRAM

## 2024-02-28 PROCEDURE — G8484 FLU IMMUNIZE NO ADMIN: HCPCS | Performed by: STUDENT IN AN ORGANIZED HEALTH CARE EDUCATION/TRAINING PROGRAM

## 2024-02-28 PROCEDURE — G8427 DOCREV CUR MEDS BY ELIG CLIN: HCPCS | Performed by: STUDENT IN AN ORGANIZED HEALTH CARE EDUCATION/TRAINING PROGRAM

## 2024-02-28 PROCEDURE — 3075F SYST BP GE 130 - 139MM HG: CPT | Performed by: STUDENT IN AN ORGANIZED HEALTH CARE EDUCATION/TRAINING PROGRAM

## 2024-02-28 PROCEDURE — 99214 OFFICE O/P EST MOD 30 MIN: CPT | Performed by: STUDENT IN AN ORGANIZED HEALTH CARE EDUCATION/TRAINING PROGRAM

## 2024-02-28 PROCEDURE — G8417 CALC BMI ABV UP PARAM F/U: HCPCS | Performed by: STUDENT IN AN ORGANIZED HEALTH CARE EDUCATION/TRAINING PROGRAM

## 2024-02-28 PROCEDURE — 93798 PHYS/QHP OP CAR RHAB W/ECG: CPT

## 2024-02-28 PROCEDURE — 1036F TOBACCO NON-USER: CPT | Performed by: STUDENT IN AN ORGANIZED HEALTH CARE EDUCATION/TRAINING PROGRAM

## 2024-02-28 PROCEDURE — 3078F DIAST BP <80 MM HG: CPT | Performed by: STUDENT IN AN ORGANIZED HEALTH CARE EDUCATION/TRAINING PROGRAM

## 2024-02-28 RX ORDER — FLUOROURACIL 50 MG/G
1 CREAM TOPICAL 2 TIMES DAILY
Qty: 40 G | Refills: 1 | Status: SHIPPED | OUTPATIENT
Start: 2024-02-28

## 2024-02-28 NOTE — PROGRESS NOTES
[facility] Medicine  Preoperative visit   2/28/2024    Patient is here for preop evaluation at the request of Dr. Estrada for Left Knee Replacement. Is scheduled for surgery on 03/19/2024.     Functional Assessment:  Exercise tolerance: >4 METS using the DASI calculator   Denies any current chest pain or discomfort, sob or HOGUE, orthopnea, PND or leg swelling.     Medications: reviewed, Over the counter (NSAIDs) use: None    Cardiac Risk:  High-risk Surgery: No / Hx of ischemic heart disease: No / Hx of CHF: Yes / Hx of CVA: No / Pre-op insulin: No / Pre-op creatinine >2.0: No (last cr 1.8 0n 07/17/2023)    DEYANIRA Screen:  Snore loudly? No / Feel tired/fatigued during the day? No / Stop breathing while sleeping? No / High blood pressure? No / Morning HA? No     History of surgery/ anesthesia:  - No hx of complications, anesthesia reaction, bleeding, bruising, clots  - No family Hx of reactions to anesthesia/ bleeding/clots  - No loose teeth/ dentures  - Support systems after surgery: Jennie Brasher is taking to and from hospital.   - Complicating medical diseases are currently well controlled.     Problem List  Patient Active Problem List   Diagnosis    Essential hypertension    Hypothyroidism    Vitamin D deficiency    Hyperlipidemia    Obesity (BMI 30-39.9)    SOB (shortness of breath)    Class 1 obesity due to excess calories with serious comorbidity in adult    Unstable angina (HCC)    LV dysfunction    Chronic stable angina    Morbidly obese (HCC)    Acute kidney injury (HCC)    Systolic CHF, chronic (HCC)       Medical and Surgical History  Past Medical History:   Diagnosis Date    Actinic keratosis     Allergic rhinitis     CAD (coronary artery disease)     [facility] cardiology.  Dr. Reveles    Chronic uveitis, bilateral     [place] (hard of hearing)     Hyperlipidemia     Hypertension     MI (myocardial infarction) (HCC)     Osteoarthritis     knees,     Uveitis of both eyes      Past Surgical History:

## 2024-02-29 ENCOUNTER — HOSPITAL ENCOUNTER (OUTPATIENT)
Age: 74
Discharge: HOME | End: 2024-02-29
Payer: MEDICARE

## 2024-02-29 LAB
ALBUMIN SERPL-MCNC: 4.1 G/DL (ref 3.4–5)
ANION GAP SERPL CALCULATED.3IONS-SCNC: 9 MMOL/L (ref 3–16)
APTT BLD: 30.6 SEC (ref 22.7–35.9)
BACTERIA URNS QL MICRO: ABNORMAL /HPF
BASOPHILS # BLD: 0.1 K/UL (ref 0–0.2)
BASOPHILS NFR BLD: 1 %
BILIRUB UR QL STRIP.AUTO: NEGATIVE
BUN SERPL-MCNC: 23 MG/DL (ref 7–20)
CALCIUM SERPL-MCNC: 9.6 MG/DL (ref 8.3–10.6)
CHLORIDE SERPL-SCNC: 104 MMOL/L (ref 99–110)
CLARITY UR: CLEAR
CO2 SERPL-SCNC: 24 MMOL/L (ref 21–32)
COLOR UR: YELLOW
CREAT SERPL-MCNC: 1.9 MG/DL (ref 0.8–1.3)
DEPRECATED RDW RBC AUTO: 14.4 % (ref 12.4–15.4)
EKG ATRIAL RATE: 74 BPM
EKG DIAGNOSIS: NORMAL
EKG P AXIS: 99 DEGREES
EKG P-R INTERVAL: 194 MS
EKG Q-T INTERVAL: 432 MS
EKG QRS DURATION: 114 MS
EKG QTC CALCULATION (BAZETT): 479 MS
EKG R AXIS: 83 DEGREES
EKG T AXIS: 86 DEGREES
EKG VENTRICULAR RATE: 74 BPM
EOSINOPHIL # BLD: 0.2 K/UL (ref 0–0.6)
EOSINOPHIL NFR BLD: 2.3 %
GFR SERPLBLD CREATININE-BSD FMLA CKD-EPI: 36 ML/MIN/{1.73_M2}
GLUCOSE SERPL-MCNC: 77 MG/DL (ref 70–99)
GLUCOSE UR STRIP.AUTO-MCNC: NEGATIVE MG/DL
HCT VFR BLD AUTO: 34.8 % (ref 40.5–52.5)
HGB BLD-MCNC: 12 G/DL (ref 13.5–17.5)
HGB UR QL STRIP.AUTO: ABNORMAL
INR PPP: 1.05 (ref 0.84–1.16)
KETONES UR STRIP.AUTO-MCNC: NEGATIVE MG/DL
LEUKOCYTE ESTERASE UR QL STRIP.AUTO: NEGATIVE
LYMPHOCYTES # BLD: 1.9 K/UL (ref 1–5.1)
LYMPHOCYTES NFR BLD: 20.5 %
MCH RBC QN AUTO: 32.7 PG (ref 26–34)
MCHC RBC AUTO-ENTMCNC: 34.6 G/DL (ref 31–36)
MCV RBC AUTO: 94.5 FL (ref 80–100)
MONOCYTES # BLD: 1.4 K/UL (ref 0–1.3)
MONOCYTES NFR BLD: 14.8 %
NEUTROPHILS # BLD: 5.6 K/UL (ref 1.7–7.7)
NEUTROPHILS NFR BLD: 61.4 %
NITRITE UR QL STRIP.AUTO: NEGATIVE
PH UR STRIP.AUTO: 5.5 [PH] (ref 5–8)
PLATELET # BLD AUTO: 266 K/UL (ref 135–450)
PMV BLD AUTO: 8.7 FL (ref 5–10.5)
POTASSIUM SERPL-SCNC: 4.9 MMOL/L (ref 3.5–5.1)
PROT UR STRIP.AUTO-MCNC: NEGATIVE MG/DL
PROTHROMBIN TIME: 13.7 SEC (ref 11.5–14.8)
RBC # BLD AUTO: 3.68 M/UL (ref 4.2–5.9)
RBC #/AREA URNS HPF: ABNORMAL /HPF (ref 0–4)
SODIUM SERPL-SCNC: 137 MMOL/L (ref 136–145)
SP GR UR STRIP.AUTO: 1.01 (ref 1–1.03)
UA DIPSTICK W REFLEX MICRO PNL UR: YES
URN SPEC COLLECT METH UR: ABNORMAL
UROBILINOGEN UR STRIP-ACNC: 0.2 E.U./DL
WBC # BLD AUTO: 9.2 K/UL (ref 4–11)
WBC #/AREA URNS HPF: ABNORMAL /HPF (ref 0–5)

## 2024-02-29 PROCEDURE — 80048 BASIC METABOLIC PNL TOTAL CA: CPT

## 2024-02-29 PROCEDURE — 93005 ELECTROCARDIOGRAM TRACING: CPT | Performed by: ORTHOPAEDIC SURGERY

## 2024-02-29 PROCEDURE — 85025 COMPLETE CBC W/AUTO DIFF WBC: CPT

## 2024-02-29 PROCEDURE — 82040 ASSAY OF SERUM ALBUMIN: CPT

## 2024-02-29 PROCEDURE — 85610 PROTHROMBIN TIME: CPT

## 2024-02-29 PROCEDURE — 81001 URINALYSIS AUTO W/SCOPE: CPT

## 2024-02-29 PROCEDURE — 85730 THROMBOPLASTIN TIME PARTIAL: CPT

## 2024-02-29 PROCEDURE — 83036 HEMOGLOBIN GLYCOSYLATED A1C: CPT

## 2024-02-29 PROCEDURE — 87086 URINE CULTURE/COLONY COUNT: CPT

## 2024-02-29 PROCEDURE — 36415 COLL VENOUS BLD VENIPUNCTURE: CPT

## 2024-03-01 ENCOUNTER — APPOINTMENT (OUTPATIENT)
Dept: CARDIAC REHAB | Age: 74
End: 2024-03-01
Attending: INTERNAL MEDICINE
Payer: MEDICARE

## 2024-03-01 ENCOUNTER — HOSPITAL ENCOUNTER (OUTPATIENT)
Dept: CARDIAC REHAB | Age: 74
Setting detail: THERAPIES SERIES
Discharge: HOME | End: 2024-03-01
Attending: INTERNAL MEDICINE
Payer: MEDICARE

## 2024-03-01 ENCOUNTER — TELEPHONE (OUTPATIENT)
Dept: FAMILY MEDICINE CLINIC | Age: 74
End: 2024-03-01

## 2024-03-01 LAB
BACTERIA UR CULT: NORMAL
EST. AVERAGE GLUCOSE BLD GHB EST-MCNC: 102.5 MG/DL
HBA1C MFR BLD: 5.2 %

## 2024-03-01 PROCEDURE — 93798 PHYS/QHP OP CAR RHAB W/ECG: CPT

## 2024-03-01 NOTE — TELEPHONE ENCOUNTER
Pt is having 2 surgeries one with Dr. Estrada and one with Dr. Tapia's office. Patient will be calling back with Dr. Tapia's fax number to fax pre op papers to him as well.

## 2024-03-04 ENCOUNTER — APPOINTMENT (OUTPATIENT)
Dept: CARDIAC REHAB | Age: 74
End: 2024-03-04
Attending: INTERNAL MEDICINE
Payer: MEDICARE

## 2024-03-04 ENCOUNTER — HOSPITAL ENCOUNTER (OUTPATIENT)
Dept: CARDIAC REHAB | Age: 74
Setting detail: THERAPIES SERIES
Discharge: HOME | End: 2024-03-04
Attending: INTERNAL MEDICINE
Payer: MEDICARE

## 2024-03-04 PROCEDURE — 93798 PHYS/QHP OP CAR RHAB W/ECG: CPT

## 2024-03-06 ENCOUNTER — HOSPITAL ENCOUNTER (OUTPATIENT)
Dept: CARDIAC REHAB | Age: 74
Setting detail: THERAPIES SERIES
End: 2024-03-06
Attending: INTERNAL MEDICINE
Payer: MEDICARE

## 2024-03-06 ENCOUNTER — APPOINTMENT (OUTPATIENT)
Dept: CARDIAC REHAB | Age: 74
End: 2024-03-06
Attending: INTERNAL MEDICINE
Payer: MEDICARE

## 2024-03-08 ENCOUNTER — HOSPITAL ENCOUNTER (OUTPATIENT)
Dept: CARDIAC REHAB | Age: 74
Setting detail: THERAPIES SERIES
Discharge: HOME | End: 2024-03-08
Attending: INTERNAL MEDICINE
Payer: MEDICARE

## 2024-03-08 ENCOUNTER — APPOINTMENT (OUTPATIENT)
Dept: CARDIAC REHAB | Age: 74
End: 2024-03-08
Attending: INTERNAL MEDICINE
Payer: MEDICARE

## 2024-03-08 PROCEDURE — 93798 PHYS/QHP OP CAR RHAB W/ECG: CPT

## 2024-03-11 ENCOUNTER — APPOINTMENT (OUTPATIENT)
Dept: CARDIAC REHAB | Age: 74
End: 2024-03-11
Attending: INTERNAL MEDICINE
Payer: MEDICARE

## 2024-03-11 ENCOUNTER — TELEPHONE (OUTPATIENT)
Dept: CARDIOLOGY CLINIC | Age: 74
End: 2024-03-11

## 2024-03-11 ENCOUNTER — HOSPITAL ENCOUNTER (OUTPATIENT)
Dept: CARDIAC REHAB | Age: 74
Setting detail: THERAPIES SERIES
Discharge: HOME | End: 2024-03-11
Attending: INTERNAL MEDICINE
Payer: MEDICARE

## 2024-03-11 PROCEDURE — 93798 PHYS/QHP OP CAR RHAB W/ECG: CPT

## 2024-03-11 NOTE — TELEPHONE ENCOUNTER
Spoke to patient - he states that for the entresto, they are asking him just to not take it in the morning before the procedure. Then after procedure tomorrow, he can take it.  He stopped taking aspirin already last Thursday. They had already asked him to stop it.   Patient states he is supposed to be there tomorrow morning @ 530.   He had an EKG done last week at [facility]. Do you still want another one?   As for the knee replacement- he has an appt with ortho this Wednesday to find out if the surgery is next week for sure.   Patient stopped in the office this morning when he was finished with cardiac rehab.     I just spoke to urology office, they will fax form over. As soon as I receive it, I will scan it in to patients chart.

## 2024-03-11 NOTE — TELEPHONE ENCOUNTER
CARDIAC CLEARANCE REQUEST    What type of procedure are you having: cystoscopy, possible transurethral resection of bladder tumor, possible left retrograde ureteropyelogram, possible left ureteral stent placement     Are you taking any blood thinners:Entresto    Type on anesthesia: General    When is your procedure scheduled for:03/12/2024    What physician is performing your procedure:Ever Tapia    Phone Number:[phone]    Fax number to send the letter: [phone]    Advised pt that we can not guarantee that TINY will see this in time.

## 2024-03-11 NOTE — TELEPHONE ENCOUNTER
Spoke to patient - he states that for the entresto, they are asking him just to not take it in the morning before the procedure. Then after procedure tomorrow, he can take it.  He stopped taking aspirin already last Thursday. They had already asked him to stop it.   Patient states he is supposed to be there tomorrow morning @ 530.   He had an EKG done last week at [facility]. Do you still want another one?   As for the knee replacement- he has an appt with ortho this Wednesday to find out if the surgery is next week for sure.

## 2024-03-11 NOTE — TELEPHONE ENCOUNTER
Do we have a form for this?  He needs an EKG.  He doesn't need to see me, but needs to come in and get an EKG.  Tell him that entresto is not a blood thinner.  Not sure how many days the surgeon wants him to hold aspirin.  TINY

## 2024-03-11 NOTE — TELEPHONE ENCOUNTER
He can hold entresto the morning of surgery.  If I don't have a form and am not being asked for clearance for tomorrow, just let him know the okay on entresto.    As for the knee replacement (bigger operation), I would like an EKG, if he is definitely having that next week.    TINY

## 2024-03-11 NOTE — TELEPHONE ENCOUNTER
CARDIAC CLEARANCE REQUEST    What type of procedure are you having: Left knee Replacement    Are you taking any blood thinners: Entresto    Type on anesthesia:General    When is your procedure scheduled for: 3/19/23    What physician is performing your procedure: Jose Estrada    Phone Number: [phone]    Fax number to send the letter: [phone]

## 2024-03-13 ENCOUNTER — HOSPITAL ENCOUNTER (OUTPATIENT)
Dept: CARDIAC REHAB | Age: 74
Setting detail: THERAPIES SERIES
End: 2024-03-13
Attending: INTERNAL MEDICINE
Payer: MEDICARE

## 2024-03-13 ENCOUNTER — APPOINTMENT (OUTPATIENT)
Dept: CARDIAC REHAB | Age: 74
End: 2024-03-13
Attending: INTERNAL MEDICINE
Payer: MEDICARE

## 2024-03-15 ENCOUNTER — TELEPHONE (OUTPATIENT)
Dept: CARDIOLOGY CLINIC | Age: 74
End: 2024-03-15

## 2024-03-15 ENCOUNTER — NURSE ONLY (OUTPATIENT)
Dept: CARDIOLOGY CLINIC | Age: 74
End: 2024-03-15

## 2024-03-15 ENCOUNTER — APPOINTMENT (OUTPATIENT)
Dept: CARDIAC REHAB | Age: 74
End: 2024-03-15
Attending: INTERNAL MEDICINE
Payer: MEDICARE

## 2024-03-15 NOTE — TELEPHONE ENCOUNTER
Chelsie [phone] with [facility] is checking to see if pt is cleared to have sx on 3/19.    Please advise.

## 2024-03-15 NOTE — TELEPHONE ENCOUNTER
Where is the surgery being performed?  His EKG today looks okay.  If the patient is having surgery at a hospital attached facility, he is okay to proceed with surgery, low risk.  If he is having it at an outpatient facility, I will need to re-review.  TINY

## 2024-03-15 NOTE — TELEPHONE ENCOUNTER
Spoke with pt. He says the procedure is at the [facility] center

## 2024-03-18 ENCOUNTER — APPOINTMENT (OUTPATIENT)
Dept: CARDIAC REHAB | Age: 74
End: 2024-03-18
Attending: INTERNAL MEDICINE
Payer: MEDICARE

## 2024-03-20 ENCOUNTER — APPOINTMENT (OUTPATIENT)
Dept: CARDIAC REHAB | Age: 74
End: 2024-03-20
Attending: INTERNAL MEDICINE
Payer: MEDICARE

## 2024-03-22 ENCOUNTER — APPOINTMENT (OUTPATIENT)
Dept: CARDIAC REHAB | Age: 74
End: 2024-03-22
Attending: INTERNAL MEDICINE
Payer: MEDICARE

## 2024-03-25 ENCOUNTER — APPOINTMENT (OUTPATIENT)
Dept: CARDIAC REHAB | Age: 74
End: 2024-03-25
Attending: INTERNAL MEDICINE
Payer: MEDICARE

## 2024-03-27 ENCOUNTER — APPOINTMENT (OUTPATIENT)
Dept: CARDIAC REHAB | Age: 74
End: 2024-03-27
Attending: INTERNAL MEDICINE
Payer: MEDICARE

## 2024-03-29 ENCOUNTER — APPOINTMENT (OUTPATIENT)
Dept: CARDIAC REHAB | Age: 74
End: 2024-03-29
Attending: INTERNAL MEDICINE
Payer: MEDICARE

## 2024-04-01 ENCOUNTER — APPOINTMENT (OUTPATIENT)
Dept: CARDIAC REHAB | Age: 74
End: 2024-04-01
Attending: INTERNAL MEDICINE
Payer: MEDICARE

## 2024-04-03 ENCOUNTER — APPOINTMENT (OUTPATIENT)
Dept: CARDIAC REHAB | Age: 74
End: 2024-04-03
Attending: INTERNAL MEDICINE
Payer: MEDICARE

## 2024-04-05 ENCOUNTER — APPOINTMENT (OUTPATIENT)
Dept: CARDIAC REHAB | Age: 74
End: 2024-04-05
Attending: INTERNAL MEDICINE
Payer: MEDICARE

## 2024-04-08 ENCOUNTER — APPOINTMENT (OUTPATIENT)
Dept: CARDIAC REHAB | Age: 74
End: 2024-04-08
Attending: INTERNAL MEDICINE
Payer: MEDICARE

## 2024-04-10 ENCOUNTER — APPOINTMENT (OUTPATIENT)
Dept: CARDIAC REHAB | Age: 74
End: 2024-04-10
Attending: INTERNAL MEDICINE
Payer: MEDICARE

## 2024-04-12 ENCOUNTER — APPOINTMENT (OUTPATIENT)
Dept: CARDIAC REHAB | Age: 74
End: 2024-04-12
Attending: INTERNAL MEDICINE
Payer: MEDICARE

## 2024-05-29 NOTE — PROGRESS NOTES
angina pectoris    5. S/P CABG x 4    6. SOB (shortness of breath)    7. Nonrheumatic aortic valve stenosis    8. Preop cardiovascular exam            Systolic CHF  Stable, compensated  Chronic SOB, improving  Begin Entresto 24-26mg bid  ECHO 10/2021> EF 40-45%    CAD / CABG x4 2006  Stable, no anginal symptoms   EKG today 2/6/23 (read & interpreted by me)> NSR 61 RBBB    Chronic stable angina  No complaints today  He was on Imdur but no longer takes it    Hypertension  Mild elevation  Taking amlodipine 2.5mg & Toprol 25mg qd  Blood pressure 104/64, pulse 76, height 1.715 m (5' 7.5\"), weight 110.2 kg (243 lb), SpO2 98 %.    Hyperlipidemia  Stable on Lipitor 20mg 2x week            Heart Failure Therapies:  The 4 Pillars of Heart Failure Therapies    ACE inhibitors, angiotensin receptor blockers, or ARNI (Entresto): entresto 49-51 half bid    2.   Beta blockers: toprol xl 25    3.   Aldosterone blockers:    4.   SGLT2 inhibitors:        PLAN:  Decrease metoprolol succinate (toprol xl) 12.5 mg daily given bradycardia and dizziness   EKG today for future cystoscopy .  He will likely need these frequently due to his bladder cancer  Labs: fasting lipids, cbc, cmp, bnp  Follow up in 6 months with echo      I appreciate the opportunity of cooperating in the care of this patient.    Marlene Rodriguez M.D., [facility]    This note was scribed in the presence of Marlene Rodriguez MD by Gina Paul RN    The scribe's documentation has been prepared under my direction and personally reviewed by me in its entirety.  I confirm that the note above accurately reflects all work, treatment, procedures, and medical decision making performed by me.        Time Based Itemization  A total of 40 minutes was spent on today's patient encounter.  If applicable, non-patient-facing activities:  ( x)Preparing to see the patient and reviewing records  ( ) Individual interpretation of results  ( ) Discussion or coordination of care with other health

## 2024-06-04 ENCOUNTER — OFFICE VISIT (OUTPATIENT)
Dept: CARDIOLOGY CLINIC | Age: 74
End: 2024-06-04

## 2024-06-04 VITALS
HEART RATE: 76 BPM | DIASTOLIC BLOOD PRESSURE: 64 MMHG | BODY MASS INDEX: 36.83 KG/M2 | SYSTOLIC BLOOD PRESSURE: 104 MMHG | WEIGHT: 243 LBS | OXYGEN SATURATION: 98 % | HEIGHT: 68 IN

## 2024-06-04 DIAGNOSIS — I50.22 SYSTOLIC CHF, CHRONIC (HCC): ICD-10-CM

## 2024-06-04 DIAGNOSIS — I50.22 SYSTOLIC CHF, CHRONIC (HCC): Primary | ICD-10-CM

## 2024-06-04 DIAGNOSIS — I25.10 CORONARY ARTERY DISEASE INVOLVING NATIVE CORONARY ARTERY OF NATIVE HEART WITHOUT ANGINA PECTORIS: ICD-10-CM

## 2024-06-04 DIAGNOSIS — E78.2 MIXED HYPERLIPIDEMIA: ICD-10-CM

## 2024-06-04 DIAGNOSIS — I10 ESSENTIAL HYPERTENSION: ICD-10-CM

## 2024-06-04 DIAGNOSIS — Z95.1 S/P CABG X 4: ICD-10-CM

## 2024-06-04 DIAGNOSIS — R06.02 SOB (SHORTNESS OF BREATH): ICD-10-CM

## 2024-06-04 DIAGNOSIS — I35.0 NONRHEUMATIC AORTIC VALVE STENOSIS: ICD-10-CM

## 2024-06-04 DIAGNOSIS — Z01.810 PREOP CARDIOVASCULAR EXAM: ICD-10-CM

## 2024-06-04 LAB
BASOPHILS # BLD: 0.1 K/UL (ref 0–0.2)
BASOPHILS NFR BLD: 0.7 %
DEPRECATED RDW RBC AUTO: 16.8 % (ref 12.4–15.4)
EOSINOPHIL # BLD: 0.1 K/UL (ref 0–0.6)
EOSINOPHIL NFR BLD: 1.8 %
HCT VFR BLD AUTO: 34.5 % (ref 40.5–52.5)
HGB BLD-MCNC: 11.3 G/DL (ref 13.5–17.5)
LYMPHOCYTES # BLD: 1.7 K/UL (ref 1–5.1)
LYMPHOCYTES NFR BLD: 21.1 %
MCH RBC QN AUTO: 32 PG (ref 26–34)
MCHC RBC AUTO-ENTMCNC: 32.7 G/DL (ref 31–36)
MONOCYTES # BLD: 1.2 K/UL (ref 0–1.3)
MONOCYTES NFR BLD: 14.6 %
NEUTROPHILS # BLD: 5 K/UL (ref 1.7–7.7)
NEUTROPHILS NFR BLD: 61.8 %
NT-PROBNP SERPL-MCNC: 218 PG/ML (ref 0–449)
PLATELET # BLD AUTO: 274 K/UL (ref 135–450)
PMV BLD AUTO: 8.3 FL (ref 5–10.5)
RBC # BLD AUTO: 3.54 M/UL (ref 4.2–5.9)
WBC # BLD AUTO: 8.2 K/UL (ref 4–11)

## 2024-06-04 RX ORDER — LORATADINE 10 MG/1
10 TABLET ORAL DAILY
COMMUNITY
Start: 2024-04-08

## 2024-06-04 NOTE — PATIENT INSTRUCTIONS
PLAN:  Decrease metoprolol succinate (toprol xl) 12.5 mg daily given bradycardia and dizziness   EKG today for future cystoscopy   Labs: fasting lipids, cbc, cmp, bnp  Follow up in 6 months with echo      Your provider has ordered testing for further evaluation.  An order/prescription has been included in your paper work.   To schedule outpatient testing, contact Central Scheduling by calling [email or web address] ([phone]).

## 2024-06-05 LAB
ALBUMIN SERPL-MCNC: 4.2 G/DL (ref 3.4–5)
ALBUMIN/GLOB SERPL: 1.4 {RATIO} (ref 1.1–2.2)
ALP SERPL-CCNC: 69 U/L (ref 40–129)
ALT SERPL-CCNC: 21 U/L (ref 10–40)
ANION GAP SERPL CALCULATED.3IONS-SCNC: 14 MMOL/L (ref 3–16)
AST SERPL-CCNC: 33 U/L (ref 15–37)
BILIRUB SERPL-MCNC: 0.5 MG/DL (ref 0–1)
BUN SERPL-MCNC: 23 MG/DL (ref 7–20)
CALCIUM SERPL-MCNC: 9.9 MG/DL (ref 8.3–10.6)
CHLORIDE SERPL-SCNC: 101 MMOL/L (ref 99–110)
CHOLEST SERPL-MCNC: 155 MG/DL (ref 0–199)
CO2 SERPL-SCNC: 21 MMOL/L (ref 21–32)
CREAT SERPL-MCNC: 1.8 MG/DL (ref 0.8–1.3)
GFR SERPLBLD CREATININE-BSD FMLA CKD-EPI: 39 ML/MIN/{1.73_M2}
GLUCOSE SERPL-MCNC: 79 MG/DL (ref 70–99)
HDLC SERPL-MCNC: 37 MG/DL (ref 40–60)
LDLC SERPL CALC-MCNC: 96 MG/DL
POTASSIUM SERPL-SCNC: 4.9 MMOL/L (ref 3.5–5.1)
PROT SERPL-MCNC: 7.2 G/DL (ref 6.4–8.2)
SODIUM SERPL-SCNC: 136 MMOL/L (ref 136–145)
TRIGL SERPL-MCNC: 109 MG/DL (ref 0–150)
VLDLC SERPL CALC-MCNC: 22 MG/DL

## 2024-06-07 ENCOUNTER — TELEPHONE (OUTPATIENT)
Dept: CARDIOLOGY CLINIC | Age: 74
End: 2024-06-07

## 2024-06-07 DIAGNOSIS — D50.9 IRON DEFICIENCY ANEMIA, UNSPECIFIED IRON DEFICIENCY ANEMIA TYPE: Primary | ICD-10-CM

## 2024-06-07 DIAGNOSIS — D50.9 IRON DEFICIENCY ANEMIA, UNSPECIFIED IRON DEFICIENCY ANEMIA TYPE: ICD-10-CM

## 2024-06-07 LAB
FERRITIN SERPL IA-MCNC: 107.1 NG/ML (ref 30–400)
IRON SATN MFR SERPL: 23 % (ref 20–50)
IRON SERPL-MCNC: 71 UG/DL (ref 59–158)
TIBC SERPL-MCNC: 311 UG/DL (ref 260–445)

## 2024-06-07 NOTE — TELEPHONE ENCOUNTER
----- Message from aMrlene Rodriguez MD sent at 6/6/2024  5:07 PM EDT -----  Please call the lab and see if we can add on iron and tibc, ferritin?  Labs drawn 6/4.  Let patient know that labs look okay except for slightly worsening anemia.  TINY

## 2024-06-10 ENCOUNTER — TELEPHONE (OUTPATIENT)
Dept: CARDIOLOGY CLINIC | Age: 74
End: 2024-06-10

## 2024-06-10 NOTE — TELEPHONE ENCOUNTER
----- Message from Marlene Rodriguez MD sent at 6/7/2024  5:49 PM EDT -----  Please call patient.  His iron levels are normal.  No changes.  TINY   on unit

## 2024-06-10 NOTE — TELEPHONE ENCOUNTER
Attempted to call pt regarding TINY message. NO answer Voicemail full.Office staff will call back at a later time

## 2024-06-25 ENCOUNTER — OFFICE VISIT (OUTPATIENT)
Dept: FAMILY MEDICINE CLINIC | Age: 74
End: 2024-06-25
Payer: MEDICARE

## 2024-06-25 VITALS
DIASTOLIC BLOOD PRESSURE: 70 MMHG | OXYGEN SATURATION: 94 % | BODY MASS INDEX: 36.22 KG/M2 | SYSTOLIC BLOOD PRESSURE: 110 MMHG | HEART RATE: 77 BPM | WEIGHT: 239 LBS | HEIGHT: 68 IN

## 2024-06-25 DIAGNOSIS — I20.89 CHRONIC STABLE ANGINA (HCC): ICD-10-CM

## 2024-06-25 DIAGNOSIS — C67.9 MALIGNANT NEOPLASM OF URINARY BLADDER, UNSPECIFIED SITE (HCC): Primary | ICD-10-CM

## 2024-06-25 PROCEDURE — 3074F SYST BP LT 130 MM HG: CPT | Performed by: FAMILY MEDICINE

## 2024-06-25 PROCEDURE — G8417 CALC BMI ABV UP PARAM F/U: HCPCS | Performed by: FAMILY MEDICINE

## 2024-06-25 PROCEDURE — 3078F DIAST BP <80 MM HG: CPT | Performed by: FAMILY MEDICINE

## 2024-06-25 PROCEDURE — 1123F ACP DISCUSS/DSCN MKR DOCD: CPT | Performed by: FAMILY MEDICINE

## 2024-06-25 PROCEDURE — 3017F COLORECTAL CA SCREEN DOC REV: CPT | Performed by: FAMILY MEDICINE

## 2024-06-25 PROCEDURE — 1036F TOBACCO NON-USER: CPT | Performed by: FAMILY MEDICINE

## 2024-06-25 PROCEDURE — G8427 DOCREV CUR MEDS BY ELIG CLIN: HCPCS | Performed by: FAMILY MEDICINE

## 2024-06-25 PROCEDURE — 99214 OFFICE O/P EST MOD 30 MIN: CPT | Performed by: FAMILY MEDICINE

## 2024-06-25 SDOH — ECONOMIC STABILITY: FOOD INSECURITY: WITHIN THE PAST 12 MONTHS, YOU WORRIED THAT YOUR FOOD WOULD RUN OUT BEFORE YOU GOT MONEY TO BUY MORE.: NEVER TRUE

## 2024-06-25 SDOH — ECONOMIC STABILITY: HOUSING INSECURITY
IN THE LAST 12 MONTHS, WAS THERE A TIME WHEN YOU DID NOT HAVE A STEADY PLACE TO SLEEP OR SLEPT IN A SHELTER (INCLUDING NOW)?: NO

## 2024-06-25 SDOH — ECONOMIC STABILITY: FOOD INSECURITY: WITHIN THE PAST 12 MONTHS, THE FOOD YOU BOUGHT JUST DIDN'T LAST AND YOU DIDN'T HAVE MONEY TO GET MORE.: NEVER TRUE

## 2024-06-25 SDOH — ECONOMIC STABILITY: INCOME INSECURITY: HOW HARD IS IT FOR YOU TO PAY FOR THE VERY BASICS LIKE FOOD, HOUSING, MEDICAL CARE, AND HEATING?: NOT HARD AT ALL

## 2024-06-25 NOTE — PROGRESS NOTES
NONFORMULARY cbd gummies      Multiple Vitamins-Minerals (MULTIVITAMIN PO) Take by mouth      aspirin 81 MG EC tablet Take 2 tablets by mouth daily      fluorouracil (EFUDEX) 5 % cream Apply 1 g topically 2 times daily (Patient not taking: Reported on 2024) 40 g 1    atorvastatin (LIPITOR) 20 MG tablet Take 1 tablet by mouth daily (Patient not taking: Reported on 2024) 90 tablet 3     No current facility-administered medications for this visit.         Allergies:  Simvastatin  History of allergic reaction to anesthesia:  No     Social History     Tobacco Use   Smoking Status Former    Current packs/day: 0.00    Types: Cigars, Cigarettes    Quit date: 2003    Years since quittin.9   Smokeless Tobacco Never   Tobacco Comments    cigar occasionally     The patient states he drinks zero per week.    Family History   Problem Relation Age of Onset    Diabetes Mother        REVIEW OF SYSTEMS:    CONSTITUTIONAL:  negative  EYES:  negative  HEENT:  negative  RESPIRATORY:  negative  CARDIOVASCULAR:  negative  GASTROINTESTINAL:  negative  GENITOURINARY:  negative  INTEGUMENT/BREAST:  negative  HEMATOLOGIC/LYMPHATIC:  negative  ALLERGIC/IMMUNOLOGIC:  negative  ENDOCRINE:  negative  MUSCULOSKELETAL:  negative  NEUROLOGICAL:  negative    PHYSICAL EXAM:      /70   Pulse 77   Ht 1.715 m (5' 7.5\")   Wt 108.4 kg (239 lb)   SpO2 94%   BMI 36.88 kg/m²     CONSTITUTIONAL:  awake, alert, cooperative, no apparent distress, and appears stated age    Eyes:  Lids and lashes normal, pupils equal, round and reactive to light, extra ocular muscles intact, sclera clear, conjunctiva normal    Head/ENT:  Normocephalic, without obvious abnormality, atramatic, sinuses nontender on palpation, external ears without lesions, oral pharynx with moist mucus membranes, tonsils without erythema or exudates, gums normal and good dentition.    Neck:  Supple, symmetrical, trachea midline, no adenopathy, thyroid symmetric, not

## 2024-06-27 ENCOUNTER — TELEPHONE (OUTPATIENT)
Dept: CARDIOLOGY CLINIC | Age: 74
End: 2024-06-27

## 2024-06-27 NOTE — TELEPHONE ENCOUNTER
Silvana from [facility] pre admission testing is requesting a signed EKG tracing to be faxed to them at [phone]. EKG was completed on 6/4/24.

## 2024-07-05 NOTE — TELEPHONE ENCOUNTER
[facility] day surgery called to see if we can fax signed EKG tracing. Surgery is 7/9/24 for cystoscopy transurethral resection of bladder tumor with Dr. Tapia. Fax to send EKG is [phone].

## 2024-07-07 DIAGNOSIS — J30.2 SEASONAL ALLERGIES: ICD-10-CM

## 2024-07-07 RX ORDER — AZELASTINE 1 MG/ML
SPRAY, METERED NASAL
Qty: 120 ML | Refills: 5 | Status: SHIPPED | OUTPATIENT
Start: 2024-07-07

## 2024-08-17 DIAGNOSIS — E03.9 HYPOTHYROIDISM, UNSPECIFIED TYPE: ICD-10-CM

## 2024-08-18 RX ORDER — LEVOTHYROXINE SODIUM 0.07 MG/1
TABLET ORAL
Qty: 90 TABLET | Refills: 3 | Status: SHIPPED | OUTPATIENT
Start: 2024-08-18

## 2024-09-15 RX ORDER — LORATADINE 10 MG/1
10 TABLET ORAL DAILY
Qty: 30 TABLET | Refills: 11 | Status: SHIPPED | OUTPATIENT
Start: 2024-09-15

## 2024-10-09 DIAGNOSIS — I51.9 LV DYSFUNCTION: ICD-10-CM

## 2024-10-09 DIAGNOSIS — I50.22 SYSTOLIC CHF, CHRONIC (HCC): Primary | ICD-10-CM

## 2024-10-09 RX ORDER — SACUBITRIL AND VALSARTAN 49; 51 MG/1; MG/1
1 TABLET, FILM COATED ORAL 2 TIMES DAILY
Qty: 180 TABLET | Refills: 3 | Status: SHIPPED | OUTPATIENT
Start: 2024-10-09

## 2024-10-11 ENCOUNTER — OFFICE VISIT (OUTPATIENT)
Dept: PRIMARY CARE CLINIC | Age: 74
End: 2024-10-11

## 2024-10-11 ENCOUNTER — TELEPHONE (OUTPATIENT)
Dept: CARDIOLOGY CLINIC | Age: 74
End: 2024-10-11

## 2024-10-11 VITALS
BODY MASS INDEX: 37.13 KG/M2 | DIASTOLIC BLOOD PRESSURE: 74 MMHG | SYSTOLIC BLOOD PRESSURE: 120 MMHG | WEIGHT: 245 LBS | OXYGEN SATURATION: 98 % | HEART RATE: 71 BPM | HEIGHT: 68 IN

## 2024-10-11 DIAGNOSIS — Z01.818 PREOP EXAMINATION: Primary | ICD-10-CM

## 2024-10-11 NOTE — TELEPHONE ENCOUNTER
CARDIAC CLEARANCE REQUEST    What type of procedure are you having:  Total right knee   Are you taking any blood thinners:  Asprin 81mg  Type on anesthesia:  general  When is your procedure scheduled for:  10/29/2024  What physician is performing your procedure:  Dr. Estrada   Phone Number:  [phone]  Fax number to send the letter:  [phone]    Pt is not sure if he needs an EKG prior. Please advise.    Last ov  06/04/2024 tegan  Next ov 12/17/2024 tegan

## 2024-10-11 NOTE — PROGRESS NOTES
statins:  patient currently not taking due to side effects    Deep vein thrombosis prophylaxis: regimen to be chosen by surgical team      Known risk factors for perioperative complications: Coronary artery disease, Congestive heart failure, Hypertension, Renal dysfunction      As outlined above, measures were taken to assess risk, and decrease risk when possible.  Risks of surgery were discussed with patient, and benefits believed to outweigh risks.  Patient is making an informed decision to proceed with surgery with an understanding of any risk,  Please follow all pre-op recommendations above.

## 2024-10-15 ENCOUNTER — TELEPHONE (OUTPATIENT)
Age: 74
End: 2024-10-15

## 2024-10-15 NOTE — TELEPHONE ENCOUNTER
Attempted to call pt. No answer. Voicemail full. Please schedule pt with TINY 10/22 @ 3pm ISABELLA Ramirez for cardiac clearance and EKG. Thanks

## 2024-10-22 ENCOUNTER — OFFICE VISIT (OUTPATIENT)
Dept: CARDIOLOGY CLINIC | Age: 74
End: 2024-10-22

## 2024-10-22 VITALS
WEIGHT: 246.5 LBS | DIASTOLIC BLOOD PRESSURE: 70 MMHG | HEART RATE: 74 BPM | SYSTOLIC BLOOD PRESSURE: 110 MMHG | BODY MASS INDEX: 37.36 KG/M2 | HEIGHT: 68 IN | OXYGEN SATURATION: 98 %

## 2024-10-22 DIAGNOSIS — Z01.818 PRE-OP EXAM: Primary | ICD-10-CM

## 2024-10-22 DIAGNOSIS — E78.2 MIXED HYPERLIPIDEMIA: ICD-10-CM

## 2024-10-22 DIAGNOSIS — I10 ESSENTIAL HYPERTENSION: ICD-10-CM

## 2024-10-22 DIAGNOSIS — Z95.1 S/P CABG X 4: ICD-10-CM

## 2024-10-22 DIAGNOSIS — R06.02 SOB (SHORTNESS OF BREATH): ICD-10-CM

## 2024-10-22 DIAGNOSIS — D50.9 IRON DEFICIENCY ANEMIA, UNSPECIFIED IRON DEFICIENCY ANEMIA TYPE: ICD-10-CM

## 2024-10-22 DIAGNOSIS — I50.22 SYSTOLIC CHF, CHRONIC (HCC): ICD-10-CM

## 2024-10-22 DIAGNOSIS — I25.10 CORONARY ARTERY DISEASE INVOLVING NATIVE CORONARY ARTERY OF NATIVE HEART WITHOUT ANGINA PECTORIS: ICD-10-CM

## 2024-10-22 RX ORDER — METOPROLOL SUCCINATE 25 MG/1
25 TABLET, EXTENDED RELEASE ORAL DAILY
Qty: 90 TABLET | Refills: 3 | Status: SHIPPED | OUTPATIENT
Start: 2024-10-22

## 2024-10-22 NOTE — PROGRESS NOTES
[facility]  Advanced CHF/Pulmonary Hypertension   Cardiac Evaluation      Alfredo Chong  YOB: 1950    Date of Visit:  10/22/24    Chief Complaint   Patient presents with    Follow-up    Congestive Heart Failure    Cardiac Clearance    Shortness of Breath      History of Present Illness:  Alfredo Chong is a 74 y.o. male who presents from referral from Dr. Reveles for consultation and management of heart failure in the presence of CKD.      Alfredo Chong is 74 y.o. has has a current medication history of CAD with CABG x4 in 2006 for multivessel CAD, HTN, HLD, obesity, CKD stage 3a.  He f/w Dr. Reveles for his heart disease. He underwent a repeat left heart cath in 7/2018 showing patent grafts x3 and medication management was recommended.   EF per Echo 10/8/2021 40-45%.     He retired years ago from [employer] service and then [employer] service all over [place]. It was extremely stressful.    OV 2/6/2023, he reports SOB that is not resolving but improving. He denies exertional chest pain, PND, palpitations, light-headedness, edema. He states he has lost 10# this past year. He has a chronic eye condition that causes cloudy vision for which he receives injections and is on prednisone (f/w CEI). 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    OV, 12/5/2023, He reports he has occasional episodes of dizziness. He decreased entresto 49-51 to half tablet bid. 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 He has recently returned from his [place] home.     On 3/12/24 he underwent cystoscopy for hematuria. 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 We will need to set him up for a cystoscopy in 3 months for bladder cancer surveillance     LOV, 6/4/2024, He was not taking toprol xl 12 in [place] due to running low on prescription. He reports feeling less dizzy on this dose. He reports he underwent knee replacement surgery. After his surgery he went to his [place] home. He

## 2024-10-30 DIAGNOSIS — R06.02 SOB (SHORTNESS OF BREATH): ICD-10-CM

## 2024-10-30 DIAGNOSIS — I25.10 CORONARY ARTERY DISEASE INVOLVING NATIVE CORONARY ARTERY OF NATIVE HEART WITHOUT ANGINA PECTORIS: ICD-10-CM

## 2024-10-30 DIAGNOSIS — I50.22 SYSTOLIC CHF, CHRONIC (HCC): ICD-10-CM

## 2024-10-31 ENCOUNTER — TELEPHONE (OUTPATIENT)
Dept: CARDIOLOGY CLINIC | Age: 74
End: 2024-10-31

## 2024-10-31 LAB
ALBUMIN SERPL-MCNC: 4.5 G/DL (ref 3.4–5)
ALBUMIN/GLOB SERPL: 1.6 {RATIO} (ref 1.1–2.2)
ALP SERPL-CCNC: 85 U/L (ref 40–129)
ALT SERPL-CCNC: 24 U/L (ref 10–40)
ANION GAP SERPL CALCULATED.3IONS-SCNC: 11 MMOL/L (ref 3–16)
AST SERPL-CCNC: 28 U/L (ref 15–37)
BASOPHILS # BLD: 0.1 K/UL (ref 0–0.2)
BASOPHILS NFR BLD: 0.6 %
BILIRUB SERPL-MCNC: 0.4 MG/DL (ref 0–1)
BUN SERPL-MCNC: 30 MG/DL (ref 7–20)
CALCIUM SERPL-MCNC: 9.9 MG/DL (ref 8.3–10.6)
CHLORIDE SERPL-SCNC: 101 MMOL/L (ref 99–110)
CO2 SERPL-SCNC: 23 MMOL/L (ref 21–32)
CREAT SERPL-MCNC: 1.6 MG/DL (ref 0.8–1.3)
DEPRECATED RDW RBC AUTO: 14.9 % (ref 12.4–15.4)
EOSINOPHIL # BLD: 0.1 K/UL (ref 0–0.6)
EOSINOPHIL NFR BLD: 0.9 %
GFR SERPLBLD CREATININE-BSD FMLA CKD-EPI: 45 ML/MIN/{1.73_M2}
GLUCOSE SERPL-MCNC: 102 MG/DL (ref 70–99)
HCT VFR BLD AUTO: 36.8 % (ref 40.5–52.5)
HGB BLD-MCNC: 12.6 G/DL (ref 13.5–17.5)
LYMPHOCYTES # BLD: 1.9 K/UL (ref 1–5.1)
LYMPHOCYTES NFR BLD: 17.9 %
MCH RBC QN AUTO: 33.5 PG (ref 26–34)
MCHC RBC AUTO-ENTMCNC: 34.2 G/DL (ref 31–36)
MCV RBC AUTO: 97.8 FL (ref 80–100)
MONOCYTES # BLD: 1.2 K/UL (ref 0–1.3)
MONOCYTES NFR BLD: 11.1 %
NEUTROPHILS # BLD: 7.4 K/UL (ref 1.7–7.7)
NEUTROPHILS NFR BLD: 69.5 %
NT-PROBNP SERPL-MCNC: 184 PG/ML (ref 0–449)
PLATELET # BLD AUTO: 264 K/UL (ref 135–450)
PMV BLD AUTO: 9 FL (ref 5–10.5)
POTASSIUM SERPL-SCNC: 4.7 MMOL/L (ref 3.5–5.1)
PROT SERPL-MCNC: 7.4 G/DL (ref 6.4–8.2)
RBC # BLD AUTO: 3.77 M/UL (ref 4.2–5.9)
SODIUM SERPL-SCNC: 135 MMOL/L (ref 136–145)
WBC # BLD AUTO: 10.6 K/UL (ref 4–11)

## 2024-10-31 NOTE — TELEPHONE ENCOUNTER
Pt returned call, message given, pt v/u     \"Attempted to call pt. No answer. Voicemail full. Reviewed labs in TINY's absence. Labs stable\"

## 2024-11-04 ENCOUNTER — TELEPHONE (OUTPATIENT)
Dept: CARDIOLOGY CLINIC | Age: 74
End: 2024-11-04

## 2024-11-04 NOTE — TELEPHONE ENCOUNTER
Silvana from [facility] dept called [facility], stating they need a signed EKG Tracing faxed to [phone].      Pls advise

## 2024-11-05 ENCOUNTER — TELEPHONE (OUTPATIENT)
Dept: CARDIOLOGY CLINIC | Age: 74
End: 2024-11-05

## 2024-11-05 ENCOUNTER — OFFICE VISIT (OUTPATIENT)
Dept: FAMILY MEDICINE CLINIC | Age: 74
End: 2024-11-05

## 2024-11-05 VITALS
BODY MASS INDEX: 35.02 KG/M2 | OXYGEN SATURATION: 97 % | HEART RATE: 81 BPM | DIASTOLIC BLOOD PRESSURE: 66 MMHG | HEIGHT: 70 IN | WEIGHT: 244.6 LBS | SYSTOLIC BLOOD PRESSURE: 120 MMHG

## 2024-11-05 DIAGNOSIS — Z01.818 PRE-OP EXAM: ICD-10-CM

## 2024-11-05 DIAGNOSIS — D49.4 BLADDER TUMOR: Primary | ICD-10-CM

## 2024-11-05 NOTE — PROGRESS NOTES
no adenopathy, thyroid symmetric, not enlarged and no tenderness, skin normal    Heart:  Normal apical impulse, regular rate and rhythm, normal S1 and S2, no S3 or S4, and no murmur noted    Lungs:  No increased work of breathing, good air exchange, clear to auscultation bilaterally, no crackles or wheezing    Abdomen:   normal bowel sounds, soft, non-distended, non-tender, no masses palpated, no hepatosplenomegally    Extremities:  No clubbing, cyanosis, or edema    NEUROLOGIC:  Awake, alert, oriented to name, place and time.         DATA:  EKG:  Date:  10/22/2024  I have reviewed EKG with the following interpretation:  Impression:  Sinus Rhythm -First degree A-V block   Estephania = 268  BORDERLINE RHYTHM (Done by cardiology)          ASSESSMENT AND PLAN:    1.  They will require cardiology evaluation prior to procedure. Saw cardiology on 10/22/2024.   2. Stop NSAIDS/ASA medications 7-10 days prior to procedure.  3.Patient is cleared for surgery with cardiology approval.     JOHNY Swanson - CNP    [address] [place] [address]  [phone]    Total time: 20 minutes.

## 2024-12-16 ENCOUNTER — TELEPHONE (OUTPATIENT)
Dept: FAMILY MEDICINE CLINIC | Age: 74
End: 2024-12-16

## 2024-12-16 DIAGNOSIS — R19.8 CHANGE IN BOWEL FUNCTION: Primary | ICD-10-CM

## 2024-12-16 NOTE — TELEPHONE ENCOUNTER
Pt called office to request a referral to gastro he is having so many issues with his stomach and is in a lot of discomfort please call pt to advise

## 2024-12-16 NOTE — PROGRESS NOTES
[facility]  Advanced CHF/Pulmonary Hypertension   Cardiac Evaluation      Alfredo Chong  YOB: 1950    Date of Visit:  12/17/24    Chief Complaint   Patient presents with    Follow-up    Congestive Heart Failure      History of Present Illness:  Alfredo Chong is a 74 y.o. male who presents from referral from Dr. Reveles for consultation and management of heart failure in the presence of CKD.      Alfredo Chong is 74 y.o. has has a current medication history of CAD with CABG x4 in 2006 for multivessel CAD, HTN, HLD, obesity, CKD stage 3a.  He f/w Dr. Reveles for his heart disease. He underwent a repeat left heart cath in 7/2018 showing patent grafts x3 and medication management was recommended.   EF per Echo 10/8/2021 40-45%.     He retired years ago from [employer] service and then [employer] service all over [place]. It was extremely stressful.    OV 2/6/2023, he reports SOB that is not resolving but improving. He denies exertional chest pain, PND, palpitations, light-headedness, edema. He states he has lost 10# this past year. He has a chronic eye condition that causes cloudy vision for which he receives injections and is on prednisone (f/w CEI). 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    OV, 12/5/2023, He reports he has occasional episodes of dizziness. He decreased entresto 49-51 to half tablet bid. 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 He has recently returned from his [place] home.     On 3/12/24 he underwent cystoscopy for hematuria. 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 We will need to set him up for a cystoscopy in 3 months for bladder cancer surveillance     LOV, 10/22/2024, He reports his shortness of breath is unchanged. He is taking entresto 49-51 half tablet twice daily. He reports he is following with Urology for reoccurring abnormal cystoscopies. He recently underwent a cystoscopy and tumor removal. On the cystoscopy this week,

## 2024-12-16 NOTE — TELEPHONE ENCOUNTER
Daughter Roseanna called requesting an order for a MRI of the back. She is going to see her surgeon and they want her to have a MRI prior. [facility]    OK.  I assume this is for Chronic LBP?  Let me know I have to put a diagnosis for any test.  SLW    Yes that's right.   Please advise

## 2024-12-17 ENCOUNTER — OFFICE VISIT (OUTPATIENT)
Dept: CARDIOLOGY CLINIC | Age: 74
End: 2024-12-17
Attending: INTERNAL MEDICINE
Payer: MEDICARE

## 2024-12-17 ENCOUNTER — HOSPITAL ENCOUNTER (OUTPATIENT)
Dept: CARDIOLOGY | Age: 74
Discharge: HOME | End: 2024-12-19
Attending: INTERNAL MEDICINE
Payer: MEDICARE

## 2024-12-17 VITALS
OXYGEN SATURATION: 100 % | SYSTOLIC BLOOD PRESSURE: 90 MMHG | HEIGHT: 70 IN | WEIGHT: 232 LBS | HEART RATE: 94 BPM | BODY MASS INDEX: 33.21 KG/M2 | DIASTOLIC BLOOD PRESSURE: 64 MMHG

## 2024-12-17 VITALS
DIASTOLIC BLOOD PRESSURE: 70 MMHG | SYSTOLIC BLOOD PRESSURE: 110 MMHG | BODY MASS INDEX: 35.22 KG/M2 | HEIGHT: 70 IN | WEIGHT: 246 LBS

## 2024-12-17 DIAGNOSIS — R06.02 SOB (SHORTNESS OF BREATH): ICD-10-CM

## 2024-12-17 DIAGNOSIS — Z01.810 PREOP CARDIOVASCULAR EXAM: ICD-10-CM

## 2024-12-17 DIAGNOSIS — E78.2 MIXED HYPERLIPIDEMIA: ICD-10-CM

## 2024-12-17 DIAGNOSIS — I35.0 NONRHEUMATIC AORTIC VALVE STENOSIS: ICD-10-CM

## 2024-12-17 DIAGNOSIS — R09.89 OTHER SPECIFIED SYMPTOMS AND SIGNS INVOLVING THE CIRCULATORY AND RESPIRATORY SYSTEMS: ICD-10-CM

## 2024-12-17 DIAGNOSIS — I25.10 CORONARY ARTERY DISEASE INVOLVING NATIVE CORONARY ARTERY OF NATIVE HEART WITHOUT ANGINA PECTORIS: ICD-10-CM

## 2024-12-17 DIAGNOSIS — I10 ESSENTIAL HYPERTENSION: ICD-10-CM

## 2024-12-17 DIAGNOSIS — I50.22 SYSTOLIC CHF, CHRONIC (HCC): Primary | ICD-10-CM

## 2024-12-17 LAB
ECHO AO ASC DIAM: 4.1 CM
ECHO AO ASCENDING AORTA INDEX: 1.85 CM/M2
ECHO AO ROOT DIAM: 3.5 CM
ECHO AO ROOT INDEX: 1.58 CM/M2
ECHO AV AREA PEAK VELOCITY: 1.8 CM2
ECHO AV AREA VTI: 1.2 CM2
ECHO AV AREA/BSA PEAK VELOCITY: 0.8 CM2/M2
ECHO AV AREA/BSA VTI: 0.5 CM2/M2
ECHO AV CUSP MM: 1.4 CM
ECHO AV MEAN GRADIENT: 9 MMHG
ECHO AV MEAN VELOCITY: 1.4 M/S
ECHO AV PEAK GRADIENT: 18 MMHG
ECHO AV PEAK VELOCITY: 2.1 M/S
ECHO AV VELOCITY RATIO: 0.43
ECHO AV VTI: 46.3 CM
ECHO BSA: 2.28 M2
ECHO EST RA PRESSURE: 3 MMHG
ECHO LA AREA 2C: 17.5 CM2
ECHO LA AREA 4C: 18.3 CM2
ECHO LA DIAMETER INDEX: 1.49 CM/M2
ECHO LA DIAMETER: 3.3 CM
ECHO LA MAJOR AXIS: 5.6 CM
ECHO LA MINOR AXIS: 5.5 CM
ECHO LA TO AORTIC ROOT RATIO: 0.94
ECHO LA VOL BP: 48 ML (ref 18–58)
ECHO LA VOL MOD A2C: 46 ML (ref 18–58)
ECHO LA VOL MOD A4C: 49 ML (ref 18–58)
ECHO LA VOL/BSA BIPLANE: 22 ML/M2 (ref 16–34)
ECHO LA VOLUME INDEX MOD A2C: 21 ML/M2 (ref 16–34)
ECHO LA VOLUME INDEX MOD A4C: 22 ML/M2 (ref 16–34)
ECHO LV E' LATERAL VELOCITY: 9.68 CM/S
ECHO LV E' SEPTAL VELOCITY: 9.9 CM/S
ECHO LV EDV A2C: 115 ML
ECHO LV EDV A4C: 110 ML
ECHO LV EDV INDEX A4C: 50 ML/M2
ECHO LV EDV NDEX A2C: 52 ML/M2
ECHO LV EJECTION FRACTION A2C: 54 %
ECHO LV EJECTION FRACTION A4C: 54 %
ECHO LV EJECTION FRACTION BIPLANE: 53 % (ref 55–100)
ECHO LV ESV A2C: 53 ML
ECHO LV ESV A4C: 51 ML
ECHO LV ESV INDEX A2C: 24 ML/M2
ECHO LV ESV INDEX A4C: 23 ML/M2
ECHO LV FRACTIONAL SHORTENING: 26 % (ref 28–44)
ECHO LV INTERNAL DIMENSION DIASTOLE INDEX: 2.12 CM/M2
ECHO LV INTERNAL DIMENSION DIASTOLIC: 4.7 CM (ref 4.2–5.9)
ECHO LV INTERNAL DIMENSION SYSTOLIC INDEX: 1.58 CM/M2
ECHO LV INTERNAL DIMENSION SYSTOLIC: 3.5 CM
ECHO LV ISOVOLUMETRIC RELAXATION TIME (IVRT): 74 MS
ECHO LV IVSD: 1.4 CM (ref 0.6–1)
ECHO LV MASS 2D: 224.8 G (ref 88–224)
ECHO LV MASS INDEX 2D: 101.2 G/M2 (ref 49–115)
ECHO LV POSTERIOR WALL DIASTOLIC: 1.1 CM (ref 0.6–1)
ECHO LV RELATIVE WALL THICKNESS RATIO: 0.47
ECHO LVOT AREA: 4.5 CM2
ECHO LVOT AV VTI INDEX: 0.27
ECHO LVOT DIAM: 2.4 CM
ECHO LVOT MEAN GRADIENT: 2 MMHG
ECHO LVOT PEAK GRADIENT: 3 MMHG
ECHO LVOT PEAK VELOCITY: 0.9 M/S
ECHO LVOT STROKE VOLUME INDEX: 25.7 ML/M2
ECHO LVOT SV: 57 ML
ECHO LVOT VTI: 12.6 CM
ECHO MV A VELOCITY: 0.89 M/S
ECHO MV E DECELERATION TIME (DT): 227 MS
ECHO MV E VELOCITY: 0.46 M/S
ECHO MV E/A RATIO: 0.52
ECHO MV E/E' LATERAL: 4.75
ECHO MV E/E' RATIO (AVERAGED): 4.7
ECHO MV E/E' SEPTAL: 4.65
ECHO RA AREA 4C: 13.7 CM2
ECHO RA END SYSTOLIC VOLUME APICAL 4 CHAMBER INDEX BSA: 16 ML/M2
ECHO RA VOLUME: 35 ML
ECHO RIGHT VENTRICULAR SYSTOLIC PRESSURE (RVSP): 23 MMHG
ECHO RV FREE WALL PEAK S': 9.3 CM/S
ECHO RV TAPSE: 1.3 CM (ref 1.7–?)
ECHO TV REGURGITANT MAX VELOCITY: 2.25 M/S
ECHO TV REGURGITANT PEAK GRADIENT: 20 MMHG

## 2024-12-17 PROCEDURE — 6360000004 HC RX CONTRAST MEDICATION: Performed by: INTERNAL MEDICINE

## 2024-12-17 PROCEDURE — 3074F SYST BP LT 130 MM HG: CPT | Performed by: INTERNAL MEDICINE

## 2024-12-17 PROCEDURE — 1036F TOBACCO NON-USER: CPT | Performed by: INTERNAL MEDICINE

## 2024-12-17 PROCEDURE — 99215 OFFICE O/P EST HI 40 MIN: CPT | Performed by: INTERNAL MEDICINE

## 2024-12-17 PROCEDURE — 1123F ACP DISCUSS/DSCN MKR DOCD: CPT | Performed by: INTERNAL MEDICINE

## 2024-12-17 PROCEDURE — G8417 CALC BMI ABV UP PARAM F/U: HCPCS | Performed by: INTERNAL MEDICINE

## 2024-12-17 PROCEDURE — 3017F COLORECTAL CA SCREEN DOC REV: CPT | Performed by: INTERNAL MEDICINE

## 2024-12-17 PROCEDURE — C8929 TTE W OR WO FOL WCON,DOPPLER: HCPCS

## 2024-12-17 PROCEDURE — G8427 DOCREV CUR MEDS BY ELIG CLIN: HCPCS | Performed by: INTERNAL MEDICINE

## 2024-12-17 PROCEDURE — 93306 TTE W/DOPPLER COMPLETE: CPT | Performed by: INTERNAL MEDICINE

## 2024-12-17 PROCEDURE — 3078F DIAST BP <80 MM HG: CPT | Performed by: INTERNAL MEDICINE

## 2024-12-17 PROCEDURE — G8484 FLU IMMUNIZE NO ADMIN: HCPCS | Performed by: INTERNAL MEDICINE

## 2024-12-17 RX ORDER — ROSUVASTATIN CALCIUM 5 MG/1
5 TABLET, COATED ORAL DAILY
Qty: 90 TABLET | Refills: 3 | Status: SHIPPED | OUTPATIENT
Start: 2024-12-17 | End: 2024-12-17

## 2024-12-17 RX ORDER — OXYBUTYNIN CHLORIDE 5 MG/1
5 TABLET ORAL 3 TIMES DAILY PRN
COMMUNITY
Start: 2024-11-12

## 2024-12-17 RX ORDER — TRIAMCINOLONE ACETONIDE 1 MG/G
OINTMENT TOPICAL
COMMUNITY
Start: 2024-11-13

## 2024-12-17 RX ORDER — ROSUVASTATIN CALCIUM 10 MG/1
10 TABLET, COATED ORAL DAILY
Qty: 90 TABLET | Refills: 3 | Status: SHIPPED | OUTPATIENT
Start: 2024-12-17

## 2024-12-17 RX ADMIN — PERFLUTREN 1.82 ML: 6.52 INJECTION, SUSPENSION INTRAVENOUS at 10:12

## 2024-12-17 NOTE — PATIENT INSTRUCTIONS
Your provider has ordered testing for further evaluation.  An order/prescription has been included in your paper work.   To schedule outpatient testing at [facility] contact Central Scheduling by calling [address] [place] [address] ([phone]).  To schedule outpatient testing at [facility] contact [phone].     PLAN:  Start rosuvastatin (crestor) 10 mg daily for cholesterol management  You can try COQ-10 200 mg daily and Vitamin D 3 2,000 units daily over the counter to help with muscle cramping   Discussed additional options for cholesterol management if you do not tolerate crestor   Leqvio twice a year injection at the infusion center  Labs: fasting lipids, cbc, cmp, bnp  VL DUP Carotid to assess for plaque  Follow up with the GI doctor  Follow up in 3 months

## 2024-12-18 ENCOUNTER — TELEPHONE (OUTPATIENT)
Dept: CARDIOLOGY CLINIC | Age: 74
End: 2024-12-18

## 2024-12-18 ENCOUNTER — OFFICE VISIT (OUTPATIENT)
Dept: FAMILY MEDICINE CLINIC | Age: 74
End: 2024-12-18
Payer: MEDICARE

## 2024-12-18 VITALS
WEIGHT: 232.2 LBS | BODY MASS INDEX: 33.24 KG/M2 | HEIGHT: 70 IN | OXYGEN SATURATION: 93 % | SYSTOLIC BLOOD PRESSURE: 134 MMHG | DIASTOLIC BLOOD PRESSURE: 70 MMHG | HEART RATE: 80 BPM

## 2024-12-18 DIAGNOSIS — R10.30 LOWER ABDOMINAL PAIN: Primary | ICD-10-CM

## 2024-12-18 DIAGNOSIS — R10.30 LOWER ABDOMINAL PAIN: ICD-10-CM

## 2024-12-18 PROCEDURE — G8484 FLU IMMUNIZE NO ADMIN: HCPCS | Performed by: STUDENT IN AN ORGANIZED HEALTH CARE EDUCATION/TRAINING PROGRAM

## 2024-12-18 PROCEDURE — G8427 DOCREV CUR MEDS BY ELIG CLIN: HCPCS | Performed by: STUDENT IN AN ORGANIZED HEALTH CARE EDUCATION/TRAINING PROGRAM

## 2024-12-18 PROCEDURE — 3075F SYST BP GE 130 - 139MM HG: CPT | Performed by: STUDENT IN AN ORGANIZED HEALTH CARE EDUCATION/TRAINING PROGRAM

## 2024-12-18 PROCEDURE — 1159F MED LIST DOCD IN RCRD: CPT | Performed by: STUDENT IN AN ORGANIZED HEALTH CARE EDUCATION/TRAINING PROGRAM

## 2024-12-18 PROCEDURE — 99213 OFFICE O/P EST LOW 20 MIN: CPT | Performed by: STUDENT IN AN ORGANIZED HEALTH CARE EDUCATION/TRAINING PROGRAM

## 2024-12-18 PROCEDURE — 3017F COLORECTAL CA SCREEN DOC REV: CPT | Performed by: STUDENT IN AN ORGANIZED HEALTH CARE EDUCATION/TRAINING PROGRAM

## 2024-12-18 PROCEDURE — 3078F DIAST BP <80 MM HG: CPT | Performed by: STUDENT IN AN ORGANIZED HEALTH CARE EDUCATION/TRAINING PROGRAM

## 2024-12-18 PROCEDURE — 1123F ACP DISCUSS/DSCN MKR DOCD: CPT | Performed by: STUDENT IN AN ORGANIZED HEALTH CARE EDUCATION/TRAINING PROGRAM

## 2024-12-18 PROCEDURE — 1036F TOBACCO NON-USER: CPT | Performed by: STUDENT IN AN ORGANIZED HEALTH CARE EDUCATION/TRAINING PROGRAM

## 2024-12-18 PROCEDURE — G8417 CALC BMI ABV UP PARAM F/U: HCPCS | Performed by: STUDENT IN AN ORGANIZED HEALTH CARE EDUCATION/TRAINING PROGRAM

## 2024-12-18 NOTE — PROGRESS NOTES
Alfredo Chong (:  1950) is a 74 y.o. male,Established patient, here for evaluation of the following chief complaint(s):  Other (to go over Diverticulitis, [facility] gave him a fref to Gi but they have nothing for over a month. pt is in alot of pain [facility] has no openings/)         Assessment & Plan  Lower abdominal pain    Acute.  Uncontrolled.  Unclear cause, although patient has recently had a bladder procedure.  Given recent sharp increase in pain, the patient should have lab work as below in addition to urinalysis and a stat CT at this time to evaluate for cause of patient's lower abdominal pain..    Orders:    CBC; Future    Comprehensive Metabolic Panel; Future    Lipase; Future    C-Reactive Protein; Future    Urinalysis with Reflex to Culture    CT ABDOMEN PELVIS WO CONTRAST Additional Contrast? None; Future      No follow-ups on file.       Subjective   HPI  Patient is a 74-year-old male, who presents to clinic to discuss lower abdominal pain.  Patient states that it has been progressively worsening over the past few days.  Patient does note that he recently had a bladder mass and then had a procedure within the past 30 days to have the mass removed.  Patient reports lack of appetite.  He denies any fever, nausea, vomiting, chest pain, shortness of breath, constipation, diarrhea, blood in stool, blood in urine.  He has still been able to pass urine after the procedure.  He states that he been able to pass urine enough to where he has not required a catheter.  Patient reports that the abdominal pain is significant enough that he was considering going to the emergency department.  Patient also states that he has diverticulosis, and is concerned that he may have a diverticulitis.    Review of Systems   All other systems reviewed and are negative.         Objective   Vitals:    24 1451   BP: 134/70   Pulse: 80   SpO2: 93%   Weight: 105.3 kg (232 lb 3.2 oz)   Height: 1.778 m (5' 10\")       Physical

## 2024-12-18 NOTE — TELEPHONE ENCOUNTER
----- Message from Dr. Marlene Rodriguez MD sent at 12/18/2024  2:12 PM EST -----  Please call patient and let him know that his echo shoes that he might have worsening of his aortic valve stenosis.  I don't know if this is causing any of his symptoms, but I still want to go ahead and investigate further.  He needs a dobutamine echo to evaluate for severity of aortic stenosis.  TINY

## 2024-12-19 ENCOUNTER — HOSPITAL ENCOUNTER (OUTPATIENT)
Dept: CT IMAGING | Age: 74
Discharge: HOME | End: 2024-12-19
Payer: MEDICARE

## 2024-12-19 DIAGNOSIS — R10.30 LOWER ABDOMINAL PAIN: ICD-10-CM

## 2024-12-19 LAB
ALBUMIN SERPL-MCNC: 4 G/DL (ref 3.4–5)
ALBUMIN/GLOB SERPL: 1.2 {RATIO} (ref 1.1–2.2)
ALP SERPL-CCNC: 76 U/L (ref 40–129)
ALT SERPL-CCNC: 22 U/L (ref 10–40)
ANION GAP SERPL CALCULATED.3IONS-SCNC: 10 MMOL/L (ref 3–16)
AST SERPL-CCNC: 26 U/L (ref 15–37)
BACTERIA URNS QL MICRO: ABNORMAL /HPF
BILIRUB SERPL-MCNC: 0.6 MG/DL (ref 0–1)
BILIRUB UR QL STRIP.AUTO: NEGATIVE
BUN SERPL-MCNC: 25 MG/DL (ref 7–20)
CALCIUM SERPL-MCNC: 9.7 MG/DL (ref 8.3–10.6)
CHLORIDE SERPL-SCNC: 99 MMOL/L (ref 99–110)
CLARITY UR: ABNORMAL
CO2 SERPL-SCNC: 25 MMOL/L (ref 21–32)
COLOR UR: YELLOW
CREAT SERPL-MCNC: 1.6 MG/DL (ref 0.8–1.3)
CRP SERPL-MCNC: 33.5 MG/L (ref 0–5.1)
DEPRECATED RDW RBC AUTO: 14.3 % (ref 12.4–15.4)
EPI CELLS #/AREA URNS AUTO: 0 /HPF (ref 0–5)
GFR SERPLBLD CREATININE-BSD FMLA CKD-EPI: 45 ML/MIN/{1.73_M2}
GLUCOSE SERPL-MCNC: 100 MG/DL (ref 70–99)
GLUCOSE UR STRIP.AUTO-MCNC: NEGATIVE MG/DL
HCT VFR BLD AUTO: 37.3 % (ref 40.5–52.5)
HGB BLD-MCNC: 12.8 G/DL (ref 13.5–17.5)
HGB UR QL STRIP.AUTO: ABNORMAL
HYALINE CASTS #/AREA URNS AUTO: 0 /LPF (ref 0–8)
KETONES UR STRIP.AUTO-MCNC: NEGATIVE MG/DL
LEUKOCYTE ESTERASE UR QL STRIP.AUTO: ABNORMAL
LIPASE SERPL-CCNC: 71 U/L (ref 13–60)
MCH RBC QN AUTO: 33.3 PG (ref 26–34)
MCHC RBC AUTO-ENTMCNC: 34.3 G/DL (ref 31–36)
MCV RBC AUTO: 97 FL (ref 80–100)
NITRITE UR QL STRIP.AUTO: POSITIVE
PH UR STRIP.AUTO: 6.5 [PH] (ref 5–8)
PLATELET # BLD AUTO: 321 K/UL (ref 135–450)
PMV BLD AUTO: 8.5 FL (ref 5–10.5)
POTASSIUM SERPL-SCNC: 4.6 MMOL/L (ref 3.5–5.1)
PROT SERPL-MCNC: 7.3 G/DL (ref 6.4–8.2)
PROT UR STRIP.AUTO-MCNC: 30 MG/DL
RBC # BLD AUTO: 3.85 M/UL (ref 4.2–5.9)
RBC CLUMPS #/AREA URNS AUTO: 0 /HPF (ref 0–4)
SODIUM SERPL-SCNC: 134 MMOL/L (ref 136–145)
SP GR UR STRIP.AUTO: 1.01 (ref 1–1.03)
UA COMPLETE W REFLEX CULTURE PNL UR: YES
UA DIPSTICK W REFLEX MICRO PNL UR: YES
URN SPEC COLLECT METH UR: ABNORMAL
UROBILINOGEN UR STRIP-ACNC: 0.2 E.U./DL
WBC # BLD AUTO: 12.3 K/UL (ref 4–11)
WBC #/AREA URNS AUTO: 355 /HPF (ref 0–5)

## 2024-12-19 PROCEDURE — 74176 CT ABD & PELVIS W/O CONTRAST: CPT

## 2024-12-22 LAB
BACTERIA UR CULT: ABNORMAL
ORGANISM: ABNORMAL

## 2024-12-23 ENCOUNTER — TELEPHONE (OUTPATIENT)
Dept: CARDIOLOGY CLINIC | Age: 74
End: 2024-12-23

## 2024-12-23 DIAGNOSIS — I35.0 AORTIC VALVE STENOSIS, ETIOLOGY OF CARDIAC VALVE DISEASE UNSPECIFIED: Primary | ICD-10-CM

## 2024-12-23 DIAGNOSIS — I50.22 SYSTOLIC CHF, CHRONIC (HCC): ICD-10-CM

## 2024-12-23 NOTE — TELEPHONE ENCOUNTER
Called pt and relayed message below. Pt states verbal understanding.        Please call patient and let him know that his echo shoes that he might have worsening of his aortic valve stenosis.  I don't know if this is causing any of his symptoms, but I still want to go ahead and investigate further.  He needs a dobutamine echo to evaluate for severity of aortic stenosis.  TINY

## 2025-01-31 PROBLEM — Z95.1 HISTORY OF CORONARY ARTERY BYPASS GRAFT: Status: ACTIVE | Noted: 2025-01-31

## 2025-02-27 ENCOUNTER — OFFICE VISIT (OUTPATIENT)
Dept: FAMILY MEDICINE CLINIC | Age: 75
End: 2025-02-27

## 2025-02-27 VITALS
BODY MASS INDEX: 32.13 KG/M2 | HEART RATE: 56 BPM | DIASTOLIC BLOOD PRESSURE: 60 MMHG | WEIGHT: 224.4 LBS | SYSTOLIC BLOOD PRESSURE: 118 MMHG | OXYGEN SATURATION: 98 % | HEIGHT: 70 IN

## 2025-02-27 DIAGNOSIS — R53.83 OTHER FATIGUE: ICD-10-CM

## 2025-02-27 DIAGNOSIS — E03.9 HYPOTHYROIDISM, UNSPECIFIED TYPE: Primary | ICD-10-CM

## 2025-02-27 DIAGNOSIS — Z12.11 SCREEN FOR COLON CANCER: ICD-10-CM

## 2025-02-27 SDOH — ECONOMIC STABILITY: FOOD INSECURITY: WITHIN THE PAST 12 MONTHS, THE FOOD YOU BOUGHT JUST DIDN'T LAST AND YOU DIDN'T HAVE MONEY TO GET MORE.: NEVER TRUE

## 2025-02-27 SDOH — ECONOMIC STABILITY: FOOD INSECURITY: WITHIN THE PAST 12 MONTHS, YOU WORRIED THAT YOUR FOOD WOULD RUN OUT BEFORE YOU GOT MONEY TO BUY MORE.: NEVER TRUE

## 2025-02-27 ASSESSMENT — ENCOUNTER SYMPTOMS
CONSTIPATION: 1
COUGH: 0
WHEEZING: 0
SHORTNESS OF BREATH: 0
NAUSEA: 0
DIARRHEA: 1
VOMITING: 0

## 2025-02-27 ASSESSMENT — PATIENT HEALTH QUESTIONNAIRE - PHQ9
SUM OF ALL RESPONSES TO PHQ QUESTIONS 1-9: 0
1. LITTLE INTEREST OR PLEASURE IN DOING THINGS: NOT AT ALL
SUM OF ALL RESPONSES TO PHQ QUESTIONS 1-9: 0
SUM OF ALL RESPONSES TO PHQ9 QUESTIONS 1 & 2: 0
2. FEELING DOWN, DEPRESSED OR HOPELESS: NOT AT ALL
SUM OF ALL RESPONSES TO PHQ QUESTIONS 1-9: 0
SUM OF ALL RESPONSES TO PHQ QUESTIONS 1-9: 0

## 2025-02-27 NOTE — ASSESSMENT & PLAN NOTE
Chronic.  Stable.  On levothyroxine.  Order TSH level as below.    Orders:    TSH reflex to FT4; Future

## 2025-02-27 NOTE — PROGRESS NOTES
Alfredo Chong (:  1950) is a 74 y.o. male,Established patient, here for evaluation of the following chief complaint(s):  Fatigue (follow up, weakness, fatigue, follow up after cancer treatments//)         Assessment & Plan  Hypothyroidism, unspecified type  Chronic.  Stable.  On levothyroxine.  Order TSH level as below.    Orders:    TSH reflex to FT4; Future    Other fatigue  Chronic.  Unclear cause.  Differential is wide including low vitamin D, low B12, untreated hypothyroidism, anemia, and more.  Will obtain lab work as below to help differentiate.  Patient will also follow-up with cardiologist for cardiac concerns.    Orders:    CBC; Future    Vitamin D 25 Hydroxy; Future    TSH reflex to FT4; Future    Comprehensive Metabolic Panel; Future    Vitamin B12 & Folate; Future    Body mass index (BMI) 32.0-32.9, adult   Orders:    Vitamin D 25 Hydroxy; Future    Screen for colon cancer   Orders:    [facility]      No follow-ups on file.       Subjective   Fatigue  Associated symptoms include fatigue. Pertinent negatives include no chest pain, coughing, diaphoresis, fever, nausea or vomiting.     Patient is a 74-year-old male, who reports fatigue and weakness.  Patient does state that he has an upcoming appointment with his cardiologist to have both an echo and a stress test.  In addition, patient reports that he is not having chest pain, shortness of breath at rest, nausea, vomiting, blood in stool, blood in bladder, fevers.  He is having some problems with constipation and diarrhea.  He feels like everything has started to slow down after his cancer diagnosis.  He has only somewhat of an appetite has lost a little bit of weight.    Patient also has hypothyroidism, is on levothyroxine.  Has not had his TSH measured in 2 years.    Patient also is due for colon cancer screening.    Review of Systems   Constitutional:  Positive for fatigue. Negative for

## 2025-02-28 LAB
25(OH)D3 SERPL-MCNC: 33.2 NG/ML
ALBUMIN SERPL-MCNC: 3.9 G/DL (ref 3.4–5)
ALBUMIN/GLOB SERPL: 1.5 {RATIO} (ref 1.1–2.2)
ALP SERPL-CCNC: 66 U/L (ref 40–129)
ALT SERPL-CCNC: 31 U/L (ref 10–40)
ANION GAP SERPL CALCULATED.3IONS-SCNC: 11 MMOL/L (ref 3–16)
AST SERPL-CCNC: 26 U/L (ref 15–37)
BILIRUB SERPL-MCNC: 0.5 MG/DL (ref 0–1)
BUN SERPL-MCNC: 28 MG/DL (ref 7–20)
CALCIUM SERPL-MCNC: 9.7 MG/DL (ref 8.3–10.6)
CHLORIDE SERPL-SCNC: 93 MMOL/L (ref 99–110)
CO2 SERPL-SCNC: 26 MMOL/L (ref 21–32)
CREAT SERPL-MCNC: 1.9 MG/DL (ref 0.8–1.3)
DEPRECATED RDW RBC AUTO: 13.9 % (ref 12.4–15.4)
FOLATE SERPL-MCNC: 11.9 NG/ML (ref 4.78–24.2)
GFR SERPLBLD CREATININE-BSD FMLA CKD-EPI: 36 ML/MIN/{1.73_M2}
GLUCOSE SERPL-MCNC: 97 MG/DL (ref 70–99)
HCT VFR BLD AUTO: 35.4 % (ref 40.5–52.5)
HGB BLD-MCNC: 12 G/DL (ref 13.5–17.5)
MCH RBC QN AUTO: 32.7 PG (ref 26–34)
MCHC RBC AUTO-ENTMCNC: 33.8 G/DL (ref 31–36)
MCV RBC AUTO: 96.5 FL (ref 80–100)
PLATELET # BLD AUTO: 302 K/UL (ref 135–450)
PMV BLD AUTO: 8.2 FL (ref 5–10.5)
POTASSIUM SERPL-SCNC: 4.7 MMOL/L (ref 3.5–5.1)
PROT SERPL-MCNC: 6.5 G/DL (ref 6.4–8.2)
RBC # BLD AUTO: 3.67 M/UL (ref 4.2–5.9)
SODIUM SERPL-SCNC: 130 MMOL/L (ref 136–145)
TSH SERPL DL<=0.005 MIU/L-ACNC: 1.5 UIU/ML (ref 0.27–4.2)
VIT B12 SERPL-MCNC: 709 PG/ML (ref 211–911)
WBC # BLD AUTO: 10.1 K/UL (ref 4–11)

## 2025-03-26 ENCOUNTER — OFFICE VISIT (OUTPATIENT)
Dept: FAMILY MEDICINE CLINIC | Age: 75
End: 2025-03-26

## 2025-03-26 VITALS
SYSTOLIC BLOOD PRESSURE: 100 MMHG | DIASTOLIC BLOOD PRESSURE: 60 MMHG | OXYGEN SATURATION: 99 % | BODY MASS INDEX: 31.32 KG/M2 | HEIGHT: 70 IN | HEART RATE: 86 BPM | WEIGHT: 218.8 LBS

## 2025-03-26 DIAGNOSIS — Z00.00 MEDICARE ANNUAL WELLNESS VISIT, SUBSEQUENT: Primary | ICD-10-CM

## 2025-03-26 DIAGNOSIS — F51.01 PRIMARY INSOMNIA: ICD-10-CM

## 2025-03-26 DIAGNOSIS — C67.9 MALIGNANT NEOPLASM OF URINARY BLADDER, UNSPECIFIED SITE (HCC): ICD-10-CM

## 2025-03-26 DIAGNOSIS — E87.1 HYPONATREMIA: ICD-10-CM

## 2025-03-26 DIAGNOSIS — I50.22 SYSTOLIC CHF, CHRONIC (HCC): ICD-10-CM

## 2025-03-26 RX ORDER — MIRTAZAPINE 7.5 MG/1
7.5 TABLET, FILM COATED ORAL NIGHTLY
Qty: 30 TABLET | Refills: 5 | Status: SHIPPED | OUTPATIENT
Start: 2025-03-26

## 2025-03-26 RX ORDER — TADALAFIL 5 MG/1
5 TABLET ORAL DAILY
COMMUNITY
Start: 2025-03-20

## 2025-03-26 RX ORDER — LEVOFLOXACIN 750 MG/1
750 TABLET, FILM COATED ORAL DAILY
COMMUNITY
Start: 2025-01-20

## 2025-03-26 ASSESSMENT — PATIENT HEALTH QUESTIONNAIRE - PHQ9: DEPRESSION UNABLE TO ASSESS: FUNCTIONAL CAPACITY MOTIVATION LIMITS ACCURACY

## 2025-03-26 NOTE — PROGRESS NOTES
Medicare Annual Wellness Visit    Alfredo Chong is here for Dizziness, Fatigue (Said he feels like he needs to be in the hospital), and Incontinence (1 month/)    Assessment & Plan   Medicare annual wellness visit, subsequent  Malignant neoplasm of urinary bladder, unspecified site (HCC)  Systolic CHF, chronic (HCC)  Hyponatremia  -     Basic Metabolic Panel; Future  Primary insomnia  -     mirtazapine (REMERON) 7.5 MG tablet; Take 1 tablet by mouth nightly, Disp-30 tablet, R-5Normal     No follow-ups on file.     Subjective   The following acute and/or chronic problems were also addressed today:  Feeling wiped out since bladder treatment.  Has had some issues with prostate issues, was placed on Flomax, but did not tolerate side effects. Switched to 5 mg of Cialis and has been tolerating.     No appetite, losing weight    Patient's complete Health Risk Assessment and screening values have been reviewed and are found in Flowsheets. The following problems were reviewed today and where indicated follow up appointments were made and/or referrals ordered.    Positive Risk Factor Screenings with Interventions:    Fall Risk:  Do you feel unsteady or are you worried about falling? : (!) yes  2 or more falls in past year?: no  Fall with injury in past year?: no     Interventions:    Reviewed medications, home hazards, visual acuity, and co-morbidities that can increase risk for falls     Depression:  Depression Unable to Assess: Functional capacity motivation limits accuracy                Abnormal BMI (obese):  Body mass index is 31.39 kg/m². (!) Abnormal  Interventions:  Patient declines any further evaluation or treatment               Lung Ca          Objective   Vitals:    03/26/25 1600   BP: 100/60   Pulse: 86   SpO2: 99%   Weight: 99.2 kg (218 lb 12.8 oz)   Height: 1.778 m (5' 10\")      Body mass index is 31.39 kg/m².        General Appearance: alert and oriented to person, place and time, well developed and well-

## 2025-03-26 NOTE — PATIENT INSTRUCTIONS
home?  Diet    Use less salt when you cook and eat. This helps lower your blood pressure. Taste food before salting. Add only a little salt when you think you need it. With time, your taste buds will adjust to less salt.     Eat fewer snack items, fast foods, canned soups, and other high-salt, high-fat, processed foods.     Read food labels and try to avoid saturated and trans fats. They increase your risk of heart disease by raising cholesterol levels.     Limit the amount of solid fat--butter, margarine, and shortening--you eat. Use olive, peanut, or canola oil when you cook. Bake, broil, and steam foods instead of frying them.     Eat a variety of fruit and vegetables every day. Dark green, deep orange, red, or yellow fruits and vegetables are especially good for you. Examples include spinach, carrots, peaches, and berries.     Foods high in fiber can reduce your cholesterol and provide important vitamins and minerals. High-fiber foods include whole-grain cereals and breads, oatmeal, beans, brown rice, citrus fruits, and apples.     Eat lean proteins. Heart-healthy proteins include seafood, lean meats and poultry, eggs, beans, peas, nuts, seeds, and soy products.     Limit drinks and foods with added sugar. These include candy, desserts, and soda pop.   Heart-healthy lifestyle    If your doctor recommends it, get more exercise. For many people, walking is a good choice. Or you may want to swim, bike, or do other activities. Bit by bit, increase the time you're active every day. Try for at least 30 minutes on most days of the week.     Try to quit or cut back on using tobacco and other nicotine products. This includes smoking and vaping. If you need help quitting, talk to your doctor about stop-smoking programs and medicines. These can increase your chances of quitting for good. Quitting is one of the most important things you can do to protect your heart. It is never too late to quit. Try to avoid secondhand

## 2025-03-31 ENCOUNTER — TELEPHONE (OUTPATIENT)
Dept: CARDIOLOGY CLINIC | Age: 75
End: 2025-03-31

## 2025-03-31 NOTE — TELEPHONE ENCOUNTER
Pt stated that he is scheduled for 4/8 for a stress echo. He would like to know what is involved in the test. Pt was told that it was a 4 hour test and is concerned about the length of the test. Pt also noted that he recently saw his pcp dr. Hernandez and he was advised that his sodium levels were low. He is concerned that will have an impact on the cardiac testing. Pt can be reached at [phone]. Please advise.

## 2025-04-01 NOTE — TELEPHONE ENCOUNTER
Spoke with pt. He reports he cancelled stress echo test due to feeling weak, fatigued and ongoing bladder issues. He reports he does not think he could sit through the test. He will discuss more with TINY at NOV 4/8

## 2025-04-03 DIAGNOSIS — E87.1 HYPONATREMIA: ICD-10-CM

## 2025-04-03 LAB
ANION GAP SERPL CALCULATED.3IONS-SCNC: 11 MMOL/L (ref 3–16)
BUN SERPL-MCNC: 24 MG/DL (ref 7–20)
CALCIUM SERPL-MCNC: 9.3 MG/DL (ref 8.3–10.6)
CHLORIDE SERPL-SCNC: 93 MMOL/L (ref 99–110)
CO2 SERPL-SCNC: 24 MMOL/L (ref 21–32)
CREAT SERPL-MCNC: 2 MG/DL (ref 0.8–1.3)
GFR SERPLBLD CREATININE-BSD FMLA CKD-EPI: 34 ML/MIN/{1.73_M2}
GLUCOSE SERPL-MCNC: 108 MG/DL (ref 70–99)
POTASSIUM SERPL-SCNC: 5 MMOL/L (ref 3.5–5.1)
SODIUM SERPL-SCNC: 128 MMOL/L (ref 136–145)

## 2025-04-03 NOTE — PROGRESS NOTES
[facility]  Advanced CHF/Pulmonary Hypertension   Cardiac Evaluation      Alfredo Chong  YOB: 1950    Date of Visit:  4/8/25    Chief Complaint   Patient presents with    Follow-up    Congestive Heart Failure      History of Present Illness:  Alfredo Chnog is a 74 y.o. male who presents from referral from Dr. Reveles for consultation and management of heart failure in the presence of CKD.      Alfredo Chong is 74 y.o. has has a current medication history of CAD with CABG x4 in 2006 for multivessel CAD, HTN, HLD, obesity, CKD stage 3a.  He f/w Dr. Reveles for his heart disease. He underwent a repeat left heart cath in 7/2018 showing patent grafts x3 and medication management was recommended.   EF per Echo 10/8/2021 40-45%.     He retired years ago from [employer] service and then [employer] service all over [place]. It was extremely stressful.    OV 2/6/2023, he reports SOB that is not resolving but improving. He denies exertional chest pain, PND, palpitations, light-headedness, edema. He states he has lost 10# this past year. He has a chronic eye condition that causes cloudy vision for which he receives injections and is on prednisone (f/w CEI). 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    OV, 12/5/2023, He reports he has occasional episodes of dizziness. He decreased entresto 49-51 to half tablet bid. 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 He has recently returned from his [place] home.     On 3/12/24 he underwent cystoscopy for hematuria. 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 We will need to set him up for a cystoscopy in 3 months for bladder cancer surveillance     OV, 10/22/2024, He reports his shortness of breath is unchanged. He is taking entresto 49-51 half tablet twice daily. He reports he is following with Urology for reoccurring abnormal cystoscopies. He recently underwent a cystoscopy and tumor removal. On the cystoscopy this week, they

## 2025-04-04 ENCOUNTER — RESULTS FOLLOW-UP (OUTPATIENT)
Dept: FAMILY MEDICINE CLINIC | Age: 75
End: 2025-04-04

## 2025-04-04 DIAGNOSIS — N18.9 CHRONIC RENAL FAILURE, UNSPECIFIED CKD STAGE: Primary | ICD-10-CM

## 2025-04-08 ENCOUNTER — TELEPHONE (OUTPATIENT)
Dept: CARDIOLOGY CLINIC | Age: 75
End: 2025-04-08

## 2025-04-08 ENCOUNTER — OFFICE VISIT (OUTPATIENT)
Dept: CARDIOLOGY CLINIC | Age: 75
End: 2025-04-08
Payer: MEDICARE

## 2025-04-08 VITALS
HEART RATE: 103 BPM | BODY MASS INDEX: 31.07 KG/M2 | SYSTOLIC BLOOD PRESSURE: 86 MMHG | HEIGHT: 70 IN | DIASTOLIC BLOOD PRESSURE: 70 MMHG | OXYGEN SATURATION: 100 % | WEIGHT: 217 LBS

## 2025-04-08 DIAGNOSIS — I50.22 SYSTOLIC CHF, CHRONIC (HCC): Primary | ICD-10-CM

## 2025-04-08 DIAGNOSIS — N18.31 STAGE 3A CHRONIC KIDNEY DISEASE (HCC): ICD-10-CM

## 2025-04-08 DIAGNOSIS — E78.2 MIXED HYPERLIPIDEMIA: ICD-10-CM

## 2025-04-08 DIAGNOSIS — I10 ESSENTIAL HYPERTENSION: ICD-10-CM

## 2025-04-08 DIAGNOSIS — Z79.899 MEDICATION MANAGEMENT: ICD-10-CM

## 2025-04-08 DIAGNOSIS — Z95.1 S/P CABG X 4: ICD-10-CM

## 2025-04-08 DIAGNOSIS — R79.9 ABNORMAL FINDING OF BLOOD CHEMISTRY, UNSPECIFIED: ICD-10-CM

## 2025-04-08 DIAGNOSIS — I25.10 CORONARY ARTERY DISEASE INVOLVING NATIVE CORONARY ARTERY OF NATIVE HEART WITHOUT ANGINA PECTORIS: ICD-10-CM

## 2025-04-08 DIAGNOSIS — I35.0 AORTIC VALVE STENOSIS, ETIOLOGY OF CARDIAC VALVE DISEASE UNSPECIFIED: ICD-10-CM

## 2025-04-08 PROCEDURE — G2211 COMPLEX E/M VISIT ADD ON: HCPCS | Performed by: INTERNAL MEDICINE

## 2025-04-08 PROCEDURE — 93000 ELECTROCARDIOGRAM COMPLETE: CPT | Performed by: INTERNAL MEDICINE

## 2025-04-08 PROCEDURE — G8427 DOCREV CUR MEDS BY ELIG CLIN: HCPCS | Performed by: INTERNAL MEDICINE

## 2025-04-08 PROCEDURE — 99215 OFFICE O/P EST HI 40 MIN: CPT | Performed by: INTERNAL MEDICINE

## 2025-04-08 PROCEDURE — 3017F COLORECTAL CA SCREEN DOC REV: CPT | Performed by: INTERNAL MEDICINE

## 2025-04-08 PROCEDURE — G8417 CALC BMI ABV UP PARAM F/U: HCPCS | Performed by: INTERNAL MEDICINE

## 2025-04-08 PROCEDURE — 3074F SYST BP LT 130 MM HG: CPT | Performed by: INTERNAL MEDICINE

## 2025-04-08 PROCEDURE — 1123F ACP DISCUSS/DSCN MKR DOCD: CPT | Performed by: INTERNAL MEDICINE

## 2025-04-08 PROCEDURE — 1036F TOBACCO NON-USER: CPT | Performed by: INTERNAL MEDICINE

## 2025-04-08 PROCEDURE — 3078F DIAST BP <80 MM HG: CPT | Performed by: INTERNAL MEDICINE

## 2025-04-08 NOTE — TELEPHONE ENCOUNTER
Pt called to return call from office. Front let pt know of appt date/time change for stress test as well as prep instructions for the procedure. Pt v/u.

## 2025-04-08 NOTE — TELEPHONE ENCOUNTER
LVM for Pat (ok per HIPAA) to return call. Pt is scheduled for dobutamine stress echo 4/15 at 9 am at [facility]. Due to the type of test that was ordered this date has changed from the original scheduled date 4/16    JULIET ECHO STRESS TEST PHARMA  * NO caffeine / decaffeinated products, including chocolate and medications containing caffeine i.e. Fioricet, Excedrin for 24 hrs prior     * Nothing by Mouth (NPO) 8 hours prior     *  Arrive one hour prior to scheduled time at the main entrance     *  Patient will be at the hospital for three hours

## 2025-04-08 NOTE — PATIENT INSTRUCTIONS
PLAN:  Schedule dobutamine stress echo test to assess aortic stenosis  Scheduled 4/16 at 12:00 pm at [facility]   Labs: cbc, bmp, bnp, iron & tibc, ferritin  EKG today  We will call the infusion center to schedule for IV fluids   Recommend holding off on scheduling colonoscopy at this time  Follow up with Dr. Rodriguez on 5/20 at 2:45 pm

## 2025-04-09 ENCOUNTER — RESULTS FOLLOW-UP (OUTPATIENT)
Dept: CARDIOLOGY CLINIC | Age: 75
End: 2025-04-09

## 2025-04-09 ENCOUNTER — HOSPITAL ENCOUNTER (OUTPATIENT)
Dept: NURSING | Age: 75
Setting detail: INFUSION SERIES
Discharge: HOME | End: 2025-04-09
Payer: MEDICARE

## 2025-04-09 VITALS
WEIGHT: 216.93 LBS | HEIGHT: 70 IN | TEMPERATURE: 98 F | BODY MASS INDEX: 31.06 KG/M2 | DIASTOLIC BLOOD PRESSURE: 66 MMHG | RESPIRATION RATE: 16 BRPM | OXYGEN SATURATION: 98 % | SYSTOLIC BLOOD PRESSURE: 108 MMHG | HEART RATE: 66 BPM

## 2025-04-09 DIAGNOSIS — K90.9 IMPAIRED INTESTINAL ABSORPTION: ICD-10-CM

## 2025-04-09 DIAGNOSIS — D50.8 ACQUIRED IRON DEFICIENCY ANEMIA DUE TO DECREASED ABSORPTION: Primary | ICD-10-CM

## 2025-04-09 DIAGNOSIS — I50.20 HFREF (HEART FAILURE WITH REDUCED EJECTION FRACTION) (HCC): ICD-10-CM

## 2025-04-09 LAB
ALBUMIN SERPL-MCNC: 3.4 G/DL (ref 3.4–5)
ALBUMIN/GLOB SERPL: 0.9 {RATIO} (ref 1.1–2.2)
ALP SERPL-CCNC: 79 U/L (ref 40–129)
ALT SERPL-CCNC: 15 U/L (ref 10–40)
ANION GAP SERPL CALCULATED.3IONS-SCNC: 13 MMOL/L (ref 3–16)
AST SERPL-CCNC: 18 U/L (ref 15–37)
BILIRUB SERPL-MCNC: 0.4 MG/DL (ref 0–1)
BUN SERPL-MCNC: 27 MG/DL (ref 7–20)
CALCIUM SERPL-MCNC: 9.7 MG/DL (ref 8.3–10.6)
CHLORIDE SERPL-SCNC: 98 MMOL/L (ref 99–110)
CO2 SERPL-SCNC: 21 MMOL/L (ref 21–32)
CREAT SERPL-MCNC: 1.9 MG/DL (ref 0.8–1.3)
DEPRECATED RDW RBC AUTO: 13.9 % (ref 12.4–15.4)
GFR SERPLBLD CREATININE-BSD FMLA CKD-EPI: 36 ML/MIN/{1.73_M2}
GLUCOSE SERPL-MCNC: 99 MG/DL (ref 70–99)
HCT VFR BLD AUTO: 32 % (ref 40.5–52.5)
HGB BLD-MCNC: 11.1 G/DL (ref 13.5–17.5)
MCH RBC QN AUTO: 32.9 PG (ref 26–34)
MCHC RBC AUTO-ENTMCNC: 34.8 G/DL (ref 31–36)
MCV RBC AUTO: 94.3 FL (ref 80–100)
NT-PROBNP SERPL-MCNC: 287 PG/ML (ref 0–449)
PLATELET # BLD AUTO: 342 K/UL (ref 135–450)
PMV BLD AUTO: 6.9 FL (ref 5–10.5)
POTASSIUM SERPL-SCNC: 4.6 MMOL/L (ref 3.5–5.1)
PROT SERPL-MCNC: 7.1 G/DL (ref 6.4–8.2)
RBC # BLD AUTO: 3.39 M/UL (ref 4.2–5.9)
SODIUM SERPL-SCNC: 132 MMOL/L (ref 136–145)
WBC # BLD AUTO: 7.8 K/UL (ref 4–11)

## 2025-04-09 PROCEDURE — 96361 HYDRATE IV INFUSION ADD-ON: CPT

## 2025-04-09 PROCEDURE — 83550 IRON BINDING TEST: CPT

## 2025-04-09 PROCEDURE — 96360 HYDRATION IV INFUSION INIT: CPT

## 2025-04-09 PROCEDURE — 83540 ASSAY OF IRON: CPT

## 2025-04-09 PROCEDURE — 99203 OFFICE O/P NEW LOW 30 MIN: CPT

## 2025-04-09 PROCEDURE — 83880 ASSAY OF NATRIURETIC PEPTIDE: CPT

## 2025-04-09 PROCEDURE — 82728 ASSAY OF FERRITIN: CPT

## 2025-04-09 PROCEDURE — 85027 COMPLETE CBC AUTOMATED: CPT

## 2025-04-09 PROCEDURE — 2580000003 HC RX 258: Performed by: INTERNAL MEDICINE

## 2025-04-09 PROCEDURE — 80053 COMPREHEN METABOLIC PANEL: CPT

## 2025-04-09 RX ORDER — SODIUM CHLORIDE 9 MG/ML
INJECTION, SOLUTION INTRAVENOUS CONTINUOUS
Status: DISCONTINUED | OUTPATIENT
Start: 2025-04-09 | End: 2025-04-10 | Stop reason: HOSPADM

## 2025-04-09 RX ADMIN — SODIUM CHLORIDE: 0.9 INJECTION, SOLUTION INTRAVENOUS at 08:57

## 2025-04-09 ASSESSMENT — PAIN SCALES - GENERAL
PAINLEVEL_OUTOF10: 0
PAINLEVEL_OUTOF10: 0

## 2025-04-09 NOTE — PROGRESS NOTES
Pt was up to the BR with a steady gait and consuelo well  No c/o's voiced  NS infusing well without any signs of infiltration  Will continue to monitor

## 2025-04-09 NOTE — PROGRESS NOTES
Hydration completed without any issues   Pt consuelo well   IV was removed without issues  Cath tip intact  Pressure then pressure dressing was applied and secured with a coban dressing   Site unremarkable  Pt consuelo well  Discharge instructions reviewed with pt and his daughter and copy was given  Understanding was verbalized then pt was discharged via w/c per staff in stable condition with family

## 2025-04-09 NOTE — PROGRESS NOTES
No changes noted  pt has been resting quietly with his eyes closed when not disturbed  Will continue to monitor

## 2025-04-10 NOTE — RESULT ENCOUNTER NOTE
Patient called back and noted that he feels a little better and stress echo is set up for next Tuesday

## 2025-04-11 LAB
FERRITIN SERPL IA-MCNC: 172 NG/ML (ref 30–400)
IRON SATN MFR SERPL: 16 % (ref 20–50)
IRON SERPL-MCNC: 36 UG/DL (ref 59–158)
TIBC SERPL-MCNC: 220 UG/DL (ref 260–445)

## 2025-04-14 PROBLEM — K90.9 IMPAIRED INTESTINAL ABSORPTION: Status: ACTIVE | Noted: 2025-04-14

## 2025-04-14 PROBLEM — D50.8 ACQUIRED IRON DEFICIENCY ANEMIA DUE TO DECREASED ABSORPTION: Status: ACTIVE | Noted: 2025-04-14

## 2025-04-14 PROBLEM — I50.20 HFREF (HEART FAILURE WITH REDUCED EJECTION FRACTION) (HCC): Status: ACTIVE | Noted: 2025-04-14

## 2025-04-15 ENCOUNTER — RESULTS FOLLOW-UP (OUTPATIENT)
Dept: CARDIOLOGY CLINIC | Age: 75
End: 2025-04-15

## 2025-04-15 ENCOUNTER — HOSPITAL ENCOUNTER (OUTPATIENT)
Age: 75
Discharge: HOME | End: 2025-04-17
Attending: INTERNAL MEDICINE
Payer: MEDICARE

## 2025-04-15 VITALS
BODY MASS INDEX: 30.92 KG/M2 | WEIGHT: 216 LBS | SYSTOLIC BLOOD PRESSURE: 130 MMHG | DIASTOLIC BLOOD PRESSURE: 81 MMHG | HEIGHT: 70 IN

## 2025-04-15 DIAGNOSIS — I50.22 SYSTOLIC CHF, CHRONIC (HCC): ICD-10-CM

## 2025-04-15 DIAGNOSIS — I35.0 AORTIC VALVE STENOSIS, ETIOLOGY OF CARDIAC VALVE DISEASE UNSPECIFIED: ICD-10-CM

## 2025-04-15 LAB
ECHO AO ROOT DIAM: 4.1 CM
ECHO AO ROOT INDEX: 1.9 CM/M2
ECHO AV AREA PEAK VELOCITY: 2.4 CM2
ECHO AV AREA VTI: 2.5 CM2
ECHO AV AREA/BSA PEAK VELOCITY: 1.1 CM2/M2
ECHO AV AREA/BSA VTI: 1.2 CM2/M2
ECHO AV MEAN GRADIENT: 5 MMHG
ECHO AV MEAN VELOCITY: 107 M/S
ECHO AV PEAK GRADIENT: 10 MMHG
ECHO AV PEAK VELOCITY: 1.6 M/S
ECHO AV VELOCITY RATIO: 0.75
ECHO AV VTI: 27.6 CM
ECHO BSA: 2.2 M2
ECHO LV CARDIAC INDEX: 4.1 L/MIN/M2
ECHO LV EF PHYSICIAN: 53 %
ECHO LVOT AREA: 4.5 CM2
ECHO LVOT AV VTI INDEX: 0.54
ECHO LVOT CARDIAC OUTPUT: 8.9 L/MIN
ECHO LVOT DIAM: 2.4 CM
ECHO LVOT MEAN GRADIENT: 4 MMHG
ECHO LVOT PEAK GRADIENT: 6 MMHG
ECHO LVOT PEAK VELOCITY: 1.2 M/S
ECHO LVOT STROKE VOLUME INDEX: 31.4 ML/M2
ECHO LVOT SV: 67.8 ML
ECHO LVOT VTI: 15 CM
Lab: 1.2 M/S
Lab: 1.3 M/S
Lab: 1.3 M/S
Lab: 1.5 M/S
Lab: 1.5 M/S
Lab: 1.9 M/S
Lab: 15 MMHG
Lab: 16 MMHG
Lab: 16 MMHG
Lab: 2 M/S
Lab: 2 M/S
Lab: 3.1 CM2
Lab: 3.6 CM2
Lab: 4 CM2
Lab: 4 MMHG
Lab: 4.1 CM2
Lab: 5 MMHG
Lab: 6 MMHG
Lab: 7 MMHG
Lab: 7 MMHG
Lab: 73 ML
Lab: 79 ML
Lab: 79 MMHG
Lab: 8 MMHG
Lab: 8 MMHG
Lab: 82 ML
Lab: 9 MMHG
Lab: 92 ML
STRESS BASELINE DIAS BP: 69 MMHG
STRESS BASELINE HR: 85 BPM
STRESS BASELINE SYS BP: 108 MMHG
STRESS ESTIMATED WORKLOAD: 1 METS
STRESS PEAK DIAS BP: 69 MMHG
STRESS PEAK SYS BP: 108 MMHG
STRESS PERCENT HR ACHIEVED: 94 %
STRESS POST PEAK HR: 137 BPM
STRESS RATE PRESSURE PRODUCT: ABNORMAL BPM*MMHG
STRESS TARGET HR: 145 BPM

## 2025-04-15 PROCEDURE — 6360000002 HC RX W HCPCS: Performed by: INTERNAL MEDICINE

## 2025-04-15 PROCEDURE — 93350 STRESS TTE ONLY: CPT

## 2025-04-15 RX ORDER — DOBUTAMINE HYDROCHLORIDE 100 MG/100ML
2.5-5 INJECTION INTRAVENOUS ONCE
Status: COMPLETED | OUTPATIENT
Start: 2025-04-15 | End: 2025-04-15

## 2025-04-15 RX ADMIN — DOBUTAMINE HYDROCHLORIDE 10 MCG/KG/MIN: 100 INJECTION INTRAVENOUS at 10:26

## 2025-04-15 NOTE — NURSING NOTE
Dobutamine stress echocardiogram complete. Discharge instructions given to pt. Pt verbalizes understanding to discharge instructions.

## 2025-04-16 ENCOUNTER — HOSPITAL ENCOUNTER (OUTPATIENT)
Dept: NURSING | Age: 75
Setting detail: INFUSION SERIES
Discharge: HOME | End: 2025-04-16
Payer: MEDICARE

## 2025-04-16 VITALS
OXYGEN SATURATION: 100 % | BODY MASS INDEX: 30.92 KG/M2 | HEIGHT: 70 IN | WEIGHT: 216 LBS | DIASTOLIC BLOOD PRESSURE: 70 MMHG | SYSTOLIC BLOOD PRESSURE: 107 MMHG | HEART RATE: 78 BPM | TEMPERATURE: 97.3 F | RESPIRATION RATE: 16 BRPM

## 2025-04-16 DIAGNOSIS — D50.8 ACQUIRED IRON DEFICIENCY ANEMIA DUE TO DECREASED ABSORPTION: Primary | ICD-10-CM

## 2025-04-16 DIAGNOSIS — K90.9 IMPAIRED INTESTINAL ABSORPTION: ICD-10-CM

## 2025-04-16 PROCEDURE — 96365 THER/PROPH/DIAG IV INF INIT: CPT

## 2025-04-16 PROCEDURE — 6360000002 HC RX W HCPCS: Performed by: INTERNAL MEDICINE

## 2025-04-16 PROCEDURE — 2580000003 HC RX 258: Performed by: INTERNAL MEDICINE

## 2025-04-16 RX ADMIN — IRON SUCROSE 200 MG: 20 INJECTION, SOLUTION INTRAVENOUS at 08:31

## 2025-04-16 ASSESSMENT — PAIN - FUNCTIONAL ASSESSMENT: PAIN_FUNCTIONAL_ASSESSMENT: NONE - DENIES PAIN

## 2025-04-16 NOTE — PROGRESS NOTES
Patient tolerates venofer well. No s/s of reaction. Venofer discharge instructions given and reviewed. Verbalizes understanding.follow up appointment made for next week. Discharged in stable condition

## 2025-04-19 ENCOUNTER — HOSPITAL ENCOUNTER (INPATIENT)
Age: 75
LOS: 10 days | Discharge: HOME | DRG: 657 | End: 2025-04-29
Attending: EMERGENCY MEDICINE
Payer: MEDICARE

## 2025-04-19 ENCOUNTER — APPOINTMENT (OUTPATIENT)
Dept: CT IMAGING | Age: 75
DRG: 657 | End: 2025-04-19
Payer: MEDICARE

## 2025-04-19 DIAGNOSIS — R31.9 HEMATURIA, UNSPECIFIED TYPE: Primary | ICD-10-CM

## 2025-04-19 DIAGNOSIS — R33.8 CLOT RETENTION OF URINE: ICD-10-CM

## 2025-04-19 DIAGNOSIS — D64.9 ANEMIA, UNSPECIFIED TYPE: ICD-10-CM

## 2025-04-19 LAB
ABO/RH: NORMAL
ALBUMIN SERPL-MCNC: 3.4 G/DL (ref 3.4–5)
ALBUMIN/GLOB SERPL: 1.2 {RATIO} (ref 1.1–2.2)
ALP SERPL-CCNC: 76 U/L (ref 40–129)
ALT SERPL-CCNC: 20 U/L (ref 10–40)
ANION GAP SERPL CALCULATED.3IONS-SCNC: 11 MMOL/L (ref 3–16)
ANTIBODY SCREEN: NORMAL
AST SERPL-CCNC: 23 U/L (ref 15–37)
BACTERIA URNS QL MICRO: ABNORMAL /HPF
BASOPHILS # BLD: 0.1 K/UL (ref 0–0.2)
BASOPHILS NFR BLD: 0.7 %
BILIRUB SERPL-MCNC: 0.3 MG/DL (ref 0–1)
BILIRUB UR QL STRIP.AUTO: NEGATIVE
BUN SERPL-MCNC: 28 MG/DL (ref 7–20)
CALCIUM SERPL-MCNC: 8.9 MG/DL (ref 8.3–10.6)
CHLORIDE SERPL-SCNC: 99 MMOL/L (ref 99–110)
CLARITY UR: ABNORMAL
CO2 SERPL-SCNC: 22 MMOL/L (ref 21–32)
COLOR UR: ABNORMAL
CREAT SERPL-MCNC: 2.2 MG/DL (ref 0.8–1.3)
DEPRECATED RDW RBC AUTO: 14.7 % (ref 12.4–15.4)
EOSINOPHIL # BLD: 0.1 K/UL (ref 0–0.6)
EOSINOPHIL NFR BLD: 0.7 %
EPI CELLS #/AREA URNS HPF: ABNORMAL /HPF (ref 0–5)
GFR SERPLBLD CREATININE-BSD FMLA CKD-EPI: 30 ML/MIN/{1.73_M2}
GLUCOSE SERPL-MCNC: 111 MG/DL (ref 70–99)
GLUCOSE UR STRIP.AUTO-MCNC: NEGATIVE MG/DL
HCT VFR BLD AUTO: 20.7 % (ref 40.5–52.5)
HCT VFR BLD AUTO: 25.5 % (ref 40.5–52.5)
HGB BLD-MCNC: 7.2 G/DL (ref 13.5–17.5)
HGB BLD-MCNC: 8.3 G/DL (ref 13.5–17.5)
HGB UR QL STRIP.AUTO: ABNORMAL
KETONES UR STRIP.AUTO-MCNC: NEGATIVE MG/DL
LEUKOCYTE ESTERASE UR QL STRIP.AUTO: ABNORMAL
LYMPHOCYTES # BLD: 1.6 K/UL (ref 1–5.1)
LYMPHOCYTES NFR BLD: 16.3 %
MCH RBC QN AUTO: 32.9 PG (ref 26–34)
MCHC RBC AUTO-ENTMCNC: 32.5 G/DL (ref 31–36)
MCV RBC AUTO: 101.3 FL (ref 80–100)
MONOCYTES # BLD: 1 K/UL (ref 0–1.3)
MONOCYTES NFR BLD: 9.5 %
NEUTROPHILS # BLD: 7.4 K/UL (ref 1.7–7.7)
NEUTROPHILS NFR BLD: 72.8 %
NITRITE UR QL STRIP.AUTO: NEGATIVE
PH UR STRIP.AUTO: 7 [PH] (ref 5–8)
PLATELET # BLD AUTO: 338 K/UL (ref 135–450)
PMV BLD AUTO: 6.7 FL (ref 5–10.5)
POTASSIUM SERPL-SCNC: 4.4 MMOL/L (ref 3.5–5.1)
PROT SERPL-MCNC: 6.3 G/DL (ref 6.4–8.2)
PROT UR STRIP.AUTO-MCNC: >=300 MG/DL
RBC # BLD AUTO: 2.52 M/UL (ref 4.2–5.9)
RBC #/AREA URNS HPF: >100 /HPF (ref 0–4)
SODIUM SERPL-SCNC: 132 MMOL/L (ref 136–145)
SP GR UR STRIP.AUTO: 1.02 (ref 1–1.03)
UA COMPLETE W REFLEX CULTURE PNL UR: ABNORMAL
UA DIPSTICK W REFLEX MICRO PNL UR: YES
URN SPEC COLLECT METH UR: ABNORMAL
UROBILINOGEN UR STRIP-ACNC: 0.2 E.U./DL
WBC # BLD AUTO: 10.1 K/UL (ref 4–11)
WBC #/AREA URNS HPF: ABNORMAL /HPF (ref 0–5)

## 2025-04-19 PROCEDURE — 6370000000 HC RX 637 (ALT 250 FOR IP): Performed by: NURSE PRACTITIONER

## 2025-04-19 PROCEDURE — 36415 COLL VENOUS BLD VENIPUNCTURE: CPT

## 2025-04-19 PROCEDURE — 6360000002 HC RX W HCPCS: Performed by: EMERGENCY MEDICINE

## 2025-04-19 PROCEDURE — 96374 THER/PROPH/DIAG INJ IV PUSH: CPT

## 2025-04-19 PROCEDURE — P9016 RBC LEUKOCYTES REDUCED: HCPCS

## 2025-04-19 PROCEDURE — 80053 COMPREHEN METABOLIC PANEL: CPT

## 2025-04-19 PROCEDURE — 99285 EMERGENCY DEPT VISIT HI MDM: CPT

## 2025-04-19 PROCEDURE — C2617 STENT, NON-COR, TEM W/O DEL: HCPCS

## 2025-04-19 PROCEDURE — 85014 HEMATOCRIT: CPT

## 2025-04-19 PROCEDURE — 51702 INSERT TEMP BLADDER CATH: CPT

## 2025-04-19 PROCEDURE — 81001 URINALYSIS AUTO W/SCOPE: CPT

## 2025-04-19 PROCEDURE — 86923 COMPATIBILITY TEST ELECTRIC: CPT

## 2025-04-19 PROCEDURE — 85018 HEMOGLOBIN: CPT

## 2025-04-19 PROCEDURE — 74176 CT ABD & PELVIS W/O CONTRAST: CPT

## 2025-04-19 PROCEDURE — 2500000003 HC RX 250 WO HCPCS

## 2025-04-19 PROCEDURE — 86900 BLOOD TYPING SEROLOGIC ABO: CPT

## 2025-04-19 PROCEDURE — 85025 COMPLETE CBC W/AUTO DIFF WBC: CPT

## 2025-04-19 PROCEDURE — 2500000003 HC RX 250 WO HCPCS: Performed by: EMERGENCY MEDICINE

## 2025-04-19 PROCEDURE — 86850 RBC ANTIBODY SCREEN: CPT

## 2025-04-19 PROCEDURE — 87086 URINE CULTURE/COLONY COUNT: CPT

## 2025-04-19 PROCEDURE — 1200000000 HC SEMI PRIVATE

## 2025-04-19 PROCEDURE — 86901 BLOOD TYPING SEROLOGIC RH(D): CPT

## 2025-04-19 RX ORDER — ONDANSETRON 2 MG/ML
4 INJECTION INTRAMUSCULAR; INTRAVENOUS EVERY 6 HOURS PRN
Status: DISCONTINUED | OUTPATIENT
Start: 2025-04-19 | End: 2025-04-29 | Stop reason: HOSPADM

## 2025-04-19 RX ORDER — POLYETHYLENE GLYCOL 3350 17 G/17G
17 POWDER, FOR SOLUTION ORAL DAILY PRN
Status: DISCONTINUED | OUTPATIENT
Start: 2025-04-19 | End: 2025-04-29 | Stop reason: HOSPADM

## 2025-04-19 RX ORDER — SODIUM CHLORIDE 9 MG/ML
INJECTION, SOLUTION INTRAVENOUS PRN
Status: DISCONTINUED | OUTPATIENT
Start: 2025-04-19 | End: 2025-04-29 | Stop reason: HOSPADM

## 2025-04-19 RX ORDER — ONDANSETRON 4 MG/1
4 TABLET, ORALLY DISINTEGRATING ORAL EVERY 8 HOURS PRN
Status: DISCONTINUED | OUTPATIENT
Start: 2025-04-19 | End: 2025-04-29 | Stop reason: HOSPADM

## 2025-04-19 RX ORDER — ACETAMINOPHEN 650 MG/1
650 SUPPOSITORY RECTAL EVERY 6 HOURS PRN
Status: DISCONTINUED | OUTPATIENT
Start: 2025-04-19 | End: 2025-04-29 | Stop reason: HOSPADM

## 2025-04-19 RX ORDER — POTASSIUM CHLORIDE 7.45 MG/ML
10 INJECTION INTRAVENOUS PRN
Status: DISCONTINUED | OUTPATIENT
Start: 2025-04-19 | End: 2025-04-20

## 2025-04-19 RX ORDER — MAGNESIUM SULFATE IN WATER 40 MG/ML
2000 INJECTION, SOLUTION INTRAVENOUS PRN
Status: DISCONTINUED | OUTPATIENT
Start: 2025-04-19 | End: 2025-04-28

## 2025-04-19 RX ORDER — POTASSIUM CHLORIDE 1500 MG/1
40 TABLET, EXTENDED RELEASE ORAL PRN
Status: DISCONTINUED | OUTPATIENT
Start: 2025-04-19 | End: 2025-04-20

## 2025-04-19 RX ORDER — LIDOCAINE HYDROCHLORIDE 20 MG/ML
JELLY TOPICAL PRN
Status: DISCONTINUED | OUTPATIENT
Start: 2025-04-19 | End: 2025-04-29 | Stop reason: HOSPADM

## 2025-04-19 RX ORDER — TADALAFIL 5 MG/1
5 TABLET ORAL DAILY
Status: DISCONTINUED | OUTPATIENT
Start: 2025-04-20 | End: 2025-04-20

## 2025-04-19 RX ORDER — SODIUM CHLORIDE 0.9 % (FLUSH) 0.9 %
5-40 SYRINGE (ML) INJECTION EVERY 12 HOURS SCHEDULED
Status: DISCONTINUED | OUTPATIENT
Start: 2025-04-19 | End: 2025-04-29 | Stop reason: HOSPADM

## 2025-04-19 RX ORDER — ACETAMINOPHEN 325 MG/1
650 TABLET ORAL EVERY 6 HOURS PRN
Status: DISCONTINUED | OUTPATIENT
Start: 2025-04-19 | End: 2025-04-29 | Stop reason: HOSPADM

## 2025-04-19 RX ORDER — METOPROLOL SUCCINATE 25 MG/1
25 TABLET, EXTENDED RELEASE ORAL DAILY
Status: DISCONTINUED | OUTPATIENT
Start: 2025-04-20 | End: 2025-04-24

## 2025-04-19 RX ORDER — SODIUM CHLORIDE 0.9 % (FLUSH) 0.9 %
5-40 SYRINGE (ML) INJECTION PRN
Status: DISCONTINUED | OUTPATIENT
Start: 2025-04-19 | End: 2025-04-29 | Stop reason: HOSPADM

## 2025-04-19 RX ORDER — ASPIRIN 81 MG/1
162 TABLET ORAL DAILY
Status: DISCONTINUED | OUTPATIENT
Start: 2025-04-20 | End: 2025-04-25

## 2025-04-19 RX ADMIN — WATER 1000 MG: 1 INJECTION INTRAMUSCULAR; INTRAVENOUS; SUBCUTANEOUS at 17:44

## 2025-04-19 RX ADMIN — LIDOCAINE HYDROCHLORIDE: 20 JELLY TOPICAL at 16:04

## 2025-04-19 RX ADMIN — SODIUM CHLORIDE, PRESERVATIVE FREE 10 ML: 5 INJECTION INTRAVENOUS at 20:26

## 2025-04-19 ASSESSMENT — PAIN SCALES - GENERAL: PAINLEVEL_OUTOF10: 0

## 2025-04-19 ASSESSMENT — PAIN - FUNCTIONAL ASSESSMENT: PAIN_FUNCTIONAL_ASSESSMENT: 0-10

## 2025-04-19 NOTE — H&P
[facility] Medicine History & Physical    V 1.6    Date of Admission: 4/19/2025    Date of Service:  Pt seen/examined on 4/19/2025    [x]Admitted to Inpatient with expected LOS greater than two midnights due to medical therapy.  []Placed in Observation status.    Chief Admission Complaint: Hematuria    Presenting Admission History:      74 y.o. male who presented to [facility] with hematuria.  PMHx significant for bladder cancer followed by urology, CAD status post CABG, hyperlipidemia, hypertension, hypothyroidism.    Patient states he has a known history of a bladder cancer has been receiving treatment on and off for the past several months.  Was supposed to have a repeat cystoscopy and possibly TURBT soon with urology, but is pending cardiology clearance recently had a stress test performed.  States that on Wednesday he had IV iron infusion as ordered by his primary cardiologist, afterwards he began to have gross hematuria in his urine.  States that he would initially start with blood but by the end of his stream would begin to be clear.  This persisted over the next several days until yesterday evening, states he was consistently having hematuria without clearing.  He did call his urologist, who told him that this is to be expected but if it worsens that he should present to the ED for evaluation.  States that his symptoms persisted today, he became concerned and came to the ED.  In the emergency room, patient had a hemoglobin of 8 when his baseline is usually around 11.  Gordon catheter was placed with drainage of gross blood.    Assessment/Plan:      Current Principal Problem:  Hematuria    1.  Hematuria  - Hemoglobin dropped to 8.3, usual baseline is around 11-12  - Gordon catheter placed with large amount of blood products drained almost 1 L  - CT abdomen pelvis shows large amount of intraluminal hemorrhage within the urinary bladder with moderate hydroureteronephrosis  - Will consult urology  hydroureteronephrosis to the level the urinary bladder. URINARY BLADDER: Heterogeneous soft tissue density is present in the urinary bladder, presumably reflecting hemorrhage. In retrospect, upon review of the comparison CT, in retrospect there is an area of focal wall thickening along the left lateral urinary bladder, not as well seen on the prior study due to intraluminal hemorrhage. REPRODUCTIVE ORGANS: No mass BOWEL: Normal caliber.  Normal appendix. LYMPH NODES: No abnormally enlarged nodes. PERITONEUM/RETROPERITONEUM: No ascites or free air. VESSELS: Aorta normal caliber. ABDOMINAL WALL: Normal. BONES: No destructive process.     1. Large amount of intraluminal hemorrhage within the urinary bladder, which obstructs the left UVJ resulting in moderate hydroureteronephrosis. There is a probable mucosal abnormality along the left lateral wall of the bladder. Recommend cystoscopy to exclude in the callosal lesion such as malignancy. Electronically signed by Jonas Ramos,     Stress Echocardiogram (TTE) dobutamine (PRN contrast/bubble/strain/3D) order panel  Result Date: 4/15/2025    Post-stress Echo: Compared to baseline, the post-stress left ventricle systolic function is improved. Left ventricle systolic function is hyperdynamic post-stress. The EF is greater than 65%. The post-stress echo showed no new wall motion abnormalities. AV velocity increased mildly from 1.5 to maximum 2.20m/s.   ECG: The stress ECG was negative for ischemia.   Stress Test: A pharmacological stress test was performed using dobutamine. The patient reported no chest pain during the stress test. Onset of symptoms occurred at stage 2 of the protocol. Pt's BP decreased with Dobutamine. Dobutamine was held per VO Dr. Obregon. Pt denied symptoms however reported BP has been running low lately.   Left Ventricle: Low normal left ventricular systolic function with a visually estimated EF of 50 - 55%.   Aortic Valve: Mildly thickened cusps. Mildly

## 2025-04-19 NOTE — ED PROVIDER NOTES
I independently examined and evaluated Alfredo Chong.    In brief, patient is a 74yoM who presents to the ED for evaluation of hematuria. He was borderline hypotensive on my exam. Says he feels like he could urinate. Abdomen benign. Non tender throughout.  Hemoglobin is 8.3 down from 11. Will obtain CT abdomen pelvis without to assess for any obvious renal mass/lesions to explain the hematuria and blood loss. A three way catheter placed. Not currently on continuous irrigation. But can be used for continuous irrigation if needed. Admit to Hospitalist.     All diagnostic, treatment, and disposition decisions were made by myself in conjunction with the advanced practice provider/resident physician.     I personally saw the patient and performed a substantive portion of the visit including aspects of the medical decision making. I approved management plan and take responsibility for the patient management.     I personally saw the patient and independently provided 0 minutes of non-concurrent critical care out of the total shared critical care time provided.    Comment: Please note this report has been produced using speech recognition software and may contain errors related to that system including errors in grammar, punctuation, and spelling, as well as words and phrases that may be inappropriate. If there are any questions or concerns please feel free to contact the dictating provider for clarification.    For all further details of the patient's emergency department visit, please see the advanced practice provider's documentation.       Vale Villeda MD  04/19/25 [id number]

## 2025-04-19 NOTE — ED NOTES
Alfredo SWEENEY Arlette is a 74 y.o. male admitted for  Principal Problem:    Hematuria  Resolved Problems:    * No resolved hospital problems. *  .   Patient Home via family with   Chief Complaint   Patient presents with    Hematuria     Patient has had hematuria since Wednesday. He received infusions for low sodium and iron earlier in the week.    .  Patient is alert and Person, Place, Time, and Situation  Patient's baseline mobility: Baseline Mobility: SB w/ x1  Code Status: Full Code   Cardiac Rhythm:       Is patient on baseline Oxygen: no how many Liters:   Abnormal Assessment Findings: Gross hematuria for 3 days w/ hx of iron r/t anemia. Has had a bladder emptying study before, and hx TURP and recent stress test.     Isolation: None      NIH Score:    C-SSRS: Risk of Suicide: No Risk  Bedside swallow:        Active LDA's:   Peripheral IV 04/19/25 Left Antecubital (Active)   Site Assessment Clean, dry & intact 04/19/25 1355   Line Status Normal saline locked 04/19/25 1355   Phlebitis Assessment No symptoms 04/19/25 1355   Infiltration Assessment 0 04/19/25 1355   Alcohol Cap Used Yes 04/19/25 1355   Dressing Status Clean, dry & intact 04/19/25 1355   Dressing Type Transparent 04/19/25 1355     Patient admitted with a baeza: yes,  If the baeza is chronic was it exchanged:NA  Reason for baeza: Urinary obstruction, Strict I&O, and Retention (st.cath protocol followed first)    Patient admitted with Central Line:  NA . PICC line placement confirmed: YES OR NO:[id number]}   Reason for Central line:   Was central line Inserted from an outside facility:        Family/Caregiver Present yes Any Concerns: no   Restraints no  Sitter no         Vitals:      Vitals:    04/19/25 1557 04/19/25 1627 04/19/25 1657 04/19/25 1803   BP: 92/60 117/79 106/77 107/70   Pulse: 78 85 70 75   Resp: 21      Temp:       TempSrc:       SpO2: 100% 98% 100% 100%   Weight:       Height:           Last documented pain score (0-10 scale) Pain Level:  0  Pain medication administered No.    Pertinent or High Risk Medications/Drips: No.    Pending Blood Product Administration: no    Abnormal labs:   Abnormal Labs Reviewed   CBC WITH AUTO DIFFERENTIAL - Abnormal; Notable for the following components:       Result Value    RBC 2.52 (*)     Hemoglobin 8.3 (*)     Hematocrit 25.5 (*)     .3 (*)     All other components within normal limits   COMPREHENSIVE METABOLIC PANEL W/ REFLEX TO MG FOR LOW K - Abnormal; Notable for the following components:    Sodium 132 (*)     Glucose 111 (*)     BUN 28 (*)     Creatinine 2.2 (*)     Est, Glom Filt Rate 30 (*)     Total Protein 6.3 (*)     All other components within normal limits   URINALYSIS WITH REFLEX TO CULTURE - Abnormal; Notable for the following components:    Color, UA RED (*)     Clarity, UA TURBID (*)     Blood, Urine LARGE (*)     Protein, UA >=300 (*)     Leukocyte Esterase, Urine TRACE (*)     All other components within normal limits   MICROSCOPIC URINALYSIS - Abnormal; Notable for the following components:    RBC, UA >100 (*)     Bacteria, UA 1+ (*)     All other components within normal limits     Critical values: no  Intervention for critical value(s):     Abnormal Imaging: CT pending             You may also review the ED PT Care Timeline found under the Summary Tab, ED Encounter Summary, Timeline Reports, ED Patient Care Timeline.     Recommendation    Pending orders/Uncompleted orders to hand off:      Additional Comments:   If any further questions, please call S[place] RN at [phone]

## 2025-04-19 NOTE — ED PROVIDER NOTES
[facility] EMERGENCY DEPARTMENT  EMERGENCY DEPARTMENT ENCOUNTER      Pt Name: Alfredo Chong  MRN: [id number]  Birthdate 1950  Date of evaluation: 4/19/2025  Provider: JOHNY Laguna CNP  PCP: Heath Hernandez MD  Note Started: 1:59 PM EDT 4/19/25     I have seen and evaluated this patient with my supervising physician Vale Villeda MD.    CHIEF COMPLAINT       Chief Complaint   Patient presents with    Hematuria     Patient has had hematuria since Wednesday. He received infusions for low sodium and iron earlier in the week.        HISTORY OF PRESENT ILLNESS: 1 or more Elements     History From: Patient  Limitations to history : None    Alfredo Chong is a 74 y.o. male who presents to ER with hematuria. Passing blood last couple of days. Saw Dr. Wynne and they ran a bunch of tests. He sent him to Dr. Rodriguez, she sent for infusion of saline due to low sodium. Iron levels way down. Wednesday morning had an infusion of iron. Wednesday night stated urinating blood and its gotten worse. He is now weak as can be. Supposed to get more iron infusions. He called them and was told to drink more water which he has been doing. No pain with urinating or abdominal pain. Having trouble urinating when he is sitting down. He felt like he was able to empty his bladder well when standing. Today feels urine is majority red. Has been wearing depends and must be leaking because he is filling those up every 2-3 hours. Was told when he talked to them if his symptoms get worse go to the ER. No blood thinners. Has not taken his babay ASA in the last few days.     Nursing Notes were all reviewed and agreed with or any disagreements were addressed in the HPI.    REVIEW OF SYSTEMS :      Review of Systems    Positives and Pertinent negatives as per HPI.     MEDICAL HISTORY     Past Medical History:   Diagnosis Date    Actinic keratosis     Allergic rhinitis     CAD (coronary artery disease)     [facility] cardiology.   Anemia, unspecified type          DISPOSITION/PLAN     DISPOSITION  Pending, will be admitted.    (Please note that portions of this note were completed with a voice recognition program.  Efforts were made to edit the dictations but occasionally words are mis-transcribed.)    JONHY Laguna - CNP (electronically signed)        Mare Fox APRN - CNP  04/19/25 7817

## 2025-04-20 ENCOUNTER — ANESTHESIA EVENT (OUTPATIENT)
Dept: OPERATING ROOM | Age: 75
End: 2025-04-20
Payer: MEDICARE

## 2025-04-20 ENCOUNTER — APPOINTMENT (OUTPATIENT)
Dept: GENERAL RADIOLOGY | Age: 75
DRG: 657 | End: 2025-04-20
Payer: MEDICARE

## 2025-04-20 ENCOUNTER — ANESTHESIA (OUTPATIENT)
Dept: OPERATING ROOM | Age: 75
End: 2025-04-20
Payer: MEDICARE

## 2025-04-20 LAB
ANION GAP SERPL CALCULATED.3IONS-SCNC: 8 MMOL/L (ref 3–16)
BACTERIA UR CULT: NORMAL
BASOPHILS # BLD: 0.1 K/UL (ref 0–0.2)
BASOPHILS NFR BLD: 0.5 %
BUN SERPL-MCNC: 26 MG/DL (ref 7–20)
CALCIUM SERPL-MCNC: 9 MG/DL (ref 8.3–10.6)
CHLORIDE SERPL-SCNC: 100 MMOL/L (ref 99–110)
CO2 SERPL-SCNC: 24 MMOL/L (ref 21–32)
CREAT SERPL-MCNC: 2 MG/DL (ref 0.8–1.3)
DEPRECATED RDW RBC AUTO: 14.4 % (ref 12.4–15.4)
EOSINOPHIL # BLD: 0.1 K/UL (ref 0–0.6)
EOSINOPHIL NFR BLD: 0.7 %
GFR SERPLBLD CREATININE-BSD FMLA CKD-EPI: 34 ML/MIN/{1.73_M2}
GLUCOSE SERPL-MCNC: 112 MG/DL (ref 70–99)
HCT VFR BLD AUTO: 20.9 % (ref 40.5–52.5)
HCT VFR BLD AUTO: 21.1 % (ref 40.5–52.5)
HCT VFR BLD AUTO: 22.1 % (ref 40.5–52.5)
HGB BLD-MCNC: 7.1 G/DL (ref 13.5–17.5)
HGB BLD-MCNC: 7.2 G/DL (ref 13.5–17.5)
HGB BLD-MCNC: 7.7 G/DL (ref 13.5–17.5)
LYMPHOCYTES # BLD: 1.5 K/UL (ref 1–5.1)
LYMPHOCYTES NFR BLD: 12.7 %
MCH RBC QN AUTO: 33.1 PG (ref 26–34)
MCHC RBC AUTO-ENTMCNC: 34.8 G/DL (ref 31–36)
MCV RBC AUTO: 95.4 FL (ref 80–100)
MONOCYTES # BLD: 1.2 K/UL (ref 0–1.3)
MONOCYTES NFR BLD: 10.4 %
NEUTROPHILS # BLD: 8.7 K/UL (ref 1.7–7.7)
NEUTROPHILS NFR BLD: 75.7 %
PLATELET # BLD AUTO: 320 K/UL (ref 135–450)
PMV BLD AUTO: 7 FL (ref 5–10.5)
POTASSIUM SERPL-SCNC: 4.4 MMOL/L (ref 3.5–5.1)
RBC # BLD AUTO: 2.32 M/UL (ref 4.2–5.9)
SODIUM SERPL-SCNC: 132 MMOL/L (ref 136–145)
WBC # BLD AUTO: 11.5 K/UL (ref 4–11)

## 2025-04-20 PROCEDURE — 3700000001 HC ADD 15 MINUTES (ANESTHESIA): Performed by: UROLOGY

## 2025-04-20 PROCEDURE — 88307 TISSUE EXAM BY PATHOLOGIST: CPT

## 2025-04-20 PROCEDURE — 1200000000 HC SEMI PRIVATE

## 2025-04-20 PROCEDURE — 2500000003 HC RX 250 WO HCPCS: Performed by: STUDENT IN AN ORGANIZED HEALTH CARE EDUCATION/TRAINING PROGRAM

## 2025-04-20 PROCEDURE — 0TBB8ZZ EXCISION OF BLADDER, VIA NATURAL OR ARTIFICIAL OPENING ENDOSCOPIC: ICD-10-PCS | Performed by: UROLOGY

## 2025-04-20 PROCEDURE — 85025 COMPLETE CBC W/AUTO DIFF WBC: CPT

## 2025-04-20 PROCEDURE — 7100000000 HC PACU RECOVERY - FIRST 15 MIN: Performed by: UROLOGY

## 2025-04-20 PROCEDURE — 85018 HEMOGLOBIN: CPT

## 2025-04-20 PROCEDURE — 74018 RADEX ABDOMEN 1 VIEW: CPT

## 2025-04-20 PROCEDURE — 2580000003 HC RX 258: Performed by: INTERNAL MEDICINE

## 2025-04-20 PROCEDURE — 2709999900 HC NON-CHARGEABLE SUPPLY: Performed by: UROLOGY

## 2025-04-20 PROCEDURE — 3700000000 HC ANESTHESIA ATTENDED CARE: Performed by: UROLOGY

## 2025-04-20 PROCEDURE — 6360000002 HC RX W HCPCS: Performed by: STUDENT IN AN ORGANIZED HEALTH CARE EDUCATION/TRAINING PROGRAM

## 2025-04-20 PROCEDURE — 2500000003 HC RX 250 WO HCPCS

## 2025-04-20 PROCEDURE — 2720000010 HC SURG SUPPLY STERILE: Performed by: UROLOGY

## 2025-04-20 PROCEDURE — 7100000001 HC PACU RECOVERY - ADDTL 15 MIN: Performed by: UROLOGY

## 2025-04-20 PROCEDURE — 0TCB8ZZ EXTIRPATION OF MATTER FROM BLADDER, VIA NATURAL OR ARTIFICIAL OPENING ENDOSCOPIC: ICD-10-PCS | Performed by: UROLOGY

## 2025-04-20 PROCEDURE — 2580000003 HC RX 258: Performed by: STUDENT IN AN ORGANIZED HEALTH CARE EDUCATION/TRAINING PROGRAM

## 2025-04-20 PROCEDURE — C1769 GUIDE WIRE: HCPCS | Performed by: UROLOGY

## 2025-04-20 PROCEDURE — C2617 STENT, NON-COR, TEM W/O DEL: HCPCS | Performed by: UROLOGY

## 2025-04-20 PROCEDURE — 85014 HEMATOCRIT: CPT

## 2025-04-20 PROCEDURE — BT1D1ZZ FLUOROSCOPY OF RIGHT KIDNEY, URETER AND BLADDER USING LOW OSMOLAR CONTRAST: ICD-10-PCS | Performed by: UROLOGY

## 2025-04-20 PROCEDURE — 2500000003 HC RX 250 WO HCPCS: Performed by: UROLOGY

## 2025-04-20 PROCEDURE — 2709999900 HC NON-CHARGEABLE SUPPLY

## 2025-04-20 PROCEDURE — 80048 BASIC METABOLIC PNL TOTAL CA: CPT

## 2025-04-20 PROCEDURE — 36415 COLL VENOUS BLD VENIPUNCTURE: CPT

## 2025-04-20 PROCEDURE — 3600000004 HC SURGERY LEVEL 4 BASE: Performed by: UROLOGY

## 2025-04-20 PROCEDURE — 6360000002 HC RX W HCPCS: Performed by: INTERNAL MEDICINE

## 2025-04-20 PROCEDURE — 3600000014 HC SURGERY LEVEL 4 ADDTL 15MIN: Performed by: UROLOGY

## 2025-04-20 PROCEDURE — C1758 CATHETER, URETERAL: HCPCS | Performed by: UROLOGY

## 2025-04-20 PROCEDURE — 0T768DZ DILATION OF RIGHT URETER WITH INTRALUMINAL DEVICE, VIA NATURAL OR ARTIFICIAL OPENING ENDOSCOPIC: ICD-10-PCS | Performed by: UROLOGY

## 2025-04-20 PROCEDURE — 6360000004 HC RX CONTRAST MEDICATION: Performed by: UROLOGY

## 2025-04-20 DEVICE — URETERAL STENT
Type: IMPLANTABLE DEVICE | Site: URETER | Status: FUNCTIONAL
Brand: CONTOUR™

## 2025-04-20 RX ORDER — SODIUM CHLORIDE 0.9 % (FLUSH) 0.9 %
5-40 SYRINGE (ML) INJECTION EVERY 12 HOURS SCHEDULED
Status: DISCONTINUED | OUTPATIENT
Start: 2025-04-20 | End: 2025-04-20 | Stop reason: HOSPADM

## 2025-04-20 RX ORDER — ONDANSETRON 2 MG/ML
INJECTION INTRAMUSCULAR; INTRAVENOUS
Status: DISCONTINUED | OUTPATIENT
Start: 2025-04-20 | End: 2025-04-20 | Stop reason: SDUPTHER

## 2025-04-20 RX ORDER — ROCURONIUM BROMIDE 10 MG/ML
INJECTION, SOLUTION INTRAVENOUS
Status: DISCONTINUED | OUTPATIENT
Start: 2025-04-20 | End: 2025-04-20 | Stop reason: SDUPTHER

## 2025-04-20 RX ORDER — OXYCODONE HYDROCHLORIDE 5 MG/1
10 TABLET ORAL PRN
Status: DISCONTINUED | OUTPATIENT
Start: 2025-04-20 | End: 2025-04-20 | Stop reason: HOSPADM

## 2025-04-20 RX ORDER — SODIUM CHLORIDE 9 MG/ML
INJECTION, SOLUTION INTRAVENOUS PRN
Status: CANCELLED | OUTPATIENT
Start: 2025-04-20

## 2025-04-20 RX ORDER — SODIUM CHLORIDE 0.9 % (FLUSH) 0.9 %
5-40 SYRINGE (ML) INJECTION PRN
Status: CANCELLED | OUTPATIENT
Start: 2025-04-20

## 2025-04-20 RX ORDER — SODIUM CHLORIDE 0.9 % (FLUSH) 0.9 %
5-40 SYRINGE (ML) INJECTION EVERY 12 HOURS SCHEDULED
Status: CANCELLED | OUTPATIENT
Start: 2025-04-20

## 2025-04-20 RX ORDER — NALOXONE HYDROCHLORIDE 0.4 MG/ML
INJECTION, SOLUTION INTRAMUSCULAR; INTRAVENOUS; SUBCUTANEOUS PRN
Status: DISCONTINUED | OUTPATIENT
Start: 2025-04-20 | End: 2025-04-20 | Stop reason: HOSPADM

## 2025-04-20 RX ORDER — SODIUM CHLORIDE 9 MG/ML
INJECTION, SOLUTION INTRAVENOUS
Status: DISCONTINUED | OUTPATIENT
Start: 2025-04-20 | End: 2025-04-20 | Stop reason: SDUPTHER

## 2025-04-20 RX ORDER — LIDOCAINE HYDROCHLORIDE 20 MG/ML
INJECTION, SOLUTION EPIDURAL; INFILTRATION; INTRACAUDAL; PERINEURAL
Status: DISCONTINUED | OUTPATIENT
Start: 2025-04-20 | End: 2025-04-20 | Stop reason: SDUPTHER

## 2025-04-20 RX ORDER — LABETALOL HYDROCHLORIDE 5 MG/ML
10 INJECTION, SOLUTION INTRAVENOUS
Status: DISCONTINUED | OUTPATIENT
Start: 2025-04-20 | End: 2025-04-20 | Stop reason: HOSPADM

## 2025-04-20 RX ORDER — CEFAZOLIN SODIUM 1 G/3ML
INJECTION, POWDER, FOR SOLUTION INTRAMUSCULAR; INTRAVENOUS
Status: DISCONTINUED | OUTPATIENT
Start: 2025-04-20 | End: 2025-04-20 | Stop reason: SDUPTHER

## 2025-04-20 RX ORDER — SODIUM CHLORIDE, SODIUM LACTATE, POTASSIUM CHLORIDE, CALCIUM CHLORIDE 600; 310; 30; 20 MG/100ML; MG/100ML; MG/100ML; MG/100ML
INJECTION, SOLUTION INTRAVENOUS CONTINUOUS
Status: CANCELLED | OUTPATIENT
Start: 2025-04-20

## 2025-04-20 RX ORDER — OXYCODONE HYDROCHLORIDE 5 MG/1
5 TABLET ORAL PRN
Status: DISCONTINUED | OUTPATIENT
Start: 2025-04-20 | End: 2025-04-20 | Stop reason: HOSPADM

## 2025-04-20 RX ORDER — MAGNESIUM HYDROXIDE 1200 MG/15ML
LIQUID ORAL CONTINUOUS PRN
Status: COMPLETED | OUTPATIENT
Start: 2025-04-20 | End: 2025-04-20

## 2025-04-20 RX ORDER — SUCCINYLCHOLINE CHLORIDE 20 MG/ML
INJECTION INTRAMUSCULAR; INTRAVENOUS
Status: DISCONTINUED | OUTPATIENT
Start: 2025-04-20 | End: 2025-04-20 | Stop reason: SDUPTHER

## 2025-04-20 RX ORDER — DROPERIDOL 2.5 MG/ML
0.62 INJECTION, SOLUTION INTRAMUSCULAR; INTRAVENOUS
Status: DISCONTINUED | OUTPATIENT
Start: 2025-04-20 | End: 2025-04-20 | Stop reason: HOSPADM

## 2025-04-20 RX ORDER — MIDAZOLAM HYDROCHLORIDE 5 MG/ML
2 INJECTION, SOLUTION INTRAMUSCULAR; INTRAVENOUS
Status: CANCELLED | OUTPATIENT
Start: 2025-04-20

## 2025-04-20 RX ORDER — SODIUM CHLORIDE 9 MG/ML
INJECTION, SOLUTION INTRAVENOUS PRN
Status: DISCONTINUED | OUTPATIENT
Start: 2025-04-20 | End: 2025-04-20 | Stop reason: HOSPADM

## 2025-04-20 RX ORDER — DEXAMETHASONE SODIUM PHOSPHATE 4 MG/ML
INJECTION, SOLUTION INTRA-ARTICULAR; INTRALESIONAL; INTRAMUSCULAR; INTRAVENOUS; SOFT TISSUE
Status: DISCONTINUED | OUTPATIENT
Start: 2025-04-20 | End: 2025-04-20 | Stop reason: SDUPTHER

## 2025-04-20 RX ORDER — MIRTAZAPINE 15 MG/1
7.5 TABLET, FILM COATED ORAL NIGHTLY
Status: DISCONTINUED | OUTPATIENT
Start: 2025-04-20 | End: 2025-04-29 | Stop reason: HOSPADM

## 2025-04-20 RX ORDER — SODIUM CHLORIDE 0.9 % (FLUSH) 0.9 %
5-40 SYRINGE (ML) INJECTION PRN
Status: DISCONTINUED | OUTPATIENT
Start: 2025-04-20 | End: 2025-04-20 | Stop reason: HOSPADM

## 2025-04-20 RX ORDER — IOPAMIDOL 612 MG/ML
INJECTION, SOLUTION INTRAVASCULAR PRN
Status: DISCONTINUED | OUTPATIENT
Start: 2025-04-20 | End: 2025-04-20 | Stop reason: ALTCHOICE

## 2025-04-20 RX ORDER — DIPHENHYDRAMINE HYDROCHLORIDE 50 MG/ML
12.5 INJECTION, SOLUTION INTRAMUSCULAR; INTRAVENOUS
Status: DISCONTINUED | OUTPATIENT
Start: 2025-04-20 | End: 2025-04-20 | Stop reason: HOSPADM

## 2025-04-20 RX ORDER — FENTANYL CITRATE 50 UG/ML
INJECTION, SOLUTION INTRAMUSCULAR; INTRAVENOUS
Status: DISCONTINUED | OUTPATIENT
Start: 2025-04-20 | End: 2025-04-20 | Stop reason: SDUPTHER

## 2025-04-20 RX ORDER — LIDOCAINE HYDROCHLORIDE 10 MG/ML
1 INJECTION, SOLUTION INFILTRATION; PERINEURAL
Status: CANCELLED | OUTPATIENT
Start: 2025-04-20

## 2025-04-20 RX ORDER — PROPOFOL 10 MG/ML
INJECTION, EMULSION INTRAVENOUS
Status: DISCONTINUED | OUTPATIENT
Start: 2025-04-20 | End: 2025-04-20 | Stop reason: SDUPTHER

## 2025-04-20 RX ORDER — PHENYLEPHRINE HCL IN 0.9% NACL 1 MG/10 ML
SYRINGE (ML) INTRAVENOUS
Status: DISCONTINUED | OUTPATIENT
Start: 2025-04-20 | End: 2025-04-20 | Stop reason: SDUPTHER

## 2025-04-20 RX ADMIN — SODIUM CHLORIDE: 9 INJECTION, SOLUTION INTRAVENOUS at 15:35

## 2025-04-20 RX ADMIN — PROPOFOL 100 MG: 10 INJECTION, EMULSION INTRAVENOUS at 15:44

## 2025-04-20 RX ADMIN — FENTANYL CITRATE 25 MCG: 50 INJECTION, SOLUTION INTRAMUSCULAR; INTRAVENOUS at 15:55

## 2025-04-20 RX ADMIN — SUCCINYLCHOLINE CHLORIDE 120 MG: 20 INJECTION, SOLUTION INTRAMUSCULAR; INTRAVENOUS at 15:44

## 2025-04-20 RX ADMIN — ROCURONIUM BROMIDE 5 MG: 50 INJECTION, SOLUTION INTRAVENOUS at 16:23

## 2025-04-20 RX ADMIN — DEXAMETHASONE SODIUM PHOSPHATE 4 MG: 4 INJECTION, SOLUTION INTRAMUSCULAR; INTRAVENOUS at 15:51

## 2025-04-20 RX ADMIN — SUGAMMADEX 200 MG: 100 INJECTION, SOLUTION INTRAVENOUS at 17:14

## 2025-04-20 RX ADMIN — Medication 100 MCG: at 16:50

## 2025-04-20 RX ADMIN — Medication 100 MCG: at 16:43

## 2025-04-20 RX ADMIN — Medication 200 MCG: at 15:47

## 2025-04-20 RX ADMIN — IRON SUCROSE 200 MG: 20 INJECTION, SOLUTION INTRAVENOUS at 20:19

## 2025-04-20 RX ADMIN — Medication 100 MCG: at 15:58

## 2025-04-20 RX ADMIN — ROCURONIUM BROMIDE 5 MG: 50 INJECTION, SOLUTION INTRAVENOUS at 16:18

## 2025-04-20 RX ADMIN — ONDANSETRON 4 MG: 2 INJECTION INTRAMUSCULAR; INTRAVENOUS at 15:51

## 2025-04-20 RX ADMIN — Medication 200 MCG: at 16:25

## 2025-04-20 RX ADMIN — FENTANYL CITRATE 25 MCG: 50 INJECTION, SOLUTION INTRAMUSCULAR; INTRAVENOUS at 15:57

## 2025-04-20 RX ADMIN — SODIUM CHLORIDE, PRESERVATIVE FREE 10 ML: 5 INJECTION INTRAVENOUS at 08:10

## 2025-04-20 RX ADMIN — SODIUM CHLORIDE, PRESERVATIVE FREE 10 ML: 5 INJECTION INTRAVENOUS at 19:30

## 2025-04-20 RX ADMIN — Medication 300 MCG: at 15:53

## 2025-04-20 RX ADMIN — ROCURONIUM BROMIDE 5 MG: 50 INJECTION, SOLUTION INTRAVENOUS at 16:35

## 2025-04-20 RX ADMIN — Medication 200 MCG: at 16:04

## 2025-04-20 RX ADMIN — Medication 200 MCG: at 16:34

## 2025-04-20 RX ADMIN — Medication 100 MCG: at 16:13

## 2025-04-20 RX ADMIN — LIDOCAINE HYDROCHLORIDE 100 MG: 20 INJECTION, SOLUTION EPIDURAL; INFILTRATION; INTRACAUDAL at 15:44

## 2025-04-20 RX ADMIN — ROCURONIUM BROMIDE 10 MG: 50 INJECTION, SOLUTION INTRAVENOUS at 16:07

## 2025-04-20 RX ADMIN — ROCURONIUM BROMIDE 20 MG: 50 INJECTION, SOLUTION INTRAVENOUS at 15:48

## 2025-04-20 RX ADMIN — HYDROMORPHONE HYDROCHLORIDE 0.5 MG: 1 INJECTION, SOLUTION INTRAMUSCULAR; INTRAVENOUS; SUBCUTANEOUS at 17:44

## 2025-04-20 RX ADMIN — CEFAZOLIN 2 G: 1 INJECTION, POWDER, FOR SOLUTION INTRAMUSCULAR; INTRAVENOUS at 15:52

## 2025-04-20 ASSESSMENT — PAIN SCALES - GENERAL
PAINLEVEL_OUTOF10: 6
PAINLEVEL_OUTOF10: 2
PAINLEVEL_OUTOF10: 2

## 2025-04-20 ASSESSMENT — PAIN DESCRIPTION - PAIN TYPE: TYPE: ACUTE PAIN

## 2025-04-20 ASSESSMENT — PAIN DESCRIPTION - LOCATION
LOCATION: ABDOMEN
LOCATION: KNEE

## 2025-04-20 ASSESSMENT — PAIN DESCRIPTION - DESCRIPTORS: DESCRIPTORS: ACHING

## 2025-04-20 ASSESSMENT — PAIN - FUNCTIONAL ASSESSMENT
PAIN_FUNCTIONAL_ASSESSMENT: NONE - DENIES PAIN
PAIN_FUNCTIONAL_ASSESSMENT: ACTIVITIES ARE NOT PREVENTED

## 2025-04-20 ASSESSMENT — PAIN DESCRIPTION - ORIENTATION: ORIENTATION: RIGHT

## 2025-04-20 NOTE — PROGRESS NOTES
Shift assessment completed.  Pt A&O x4, VSS. CBI baeza running open, draining bloody urine. Pt NPO, awaiting urology rounding. Pt denies any pain, only discomfort when baeza bag fills up. Non skid socks on. Bed alarm active for safety. Bed locked and in lowest position. Call light and bedside table within reach. Will continue to monitor.

## 2025-04-20 NOTE — PROGRESS NOTES
[facility] Medicine Progress Note  V 1.6      Date of Admission: 4/19/2025    Hospital Day: 2      Chief Admission Complaint: Blood in urine    Subjective: He is sitting up in bed, states he is feeling a little better.  He is in no acute distress.  Denies chest pain or shortness of  breath.    Presenting Admission History:       74 y.o. male who presented to [facility] with hematuria.  PMHx significant for bladder cancer followed by [facility] urology, CAD status post CABG, CHFrEF, hyperlipidemia, hypertension, hypothyroidism.     Patient states he has a known history of a bladder cancer has been receiving treatment on and off for the past several months.  Was supposed to have a repeat cystoscopy and possibly TURBT soon with urology, but is pending cardiology clearance recently had a stress test performed.    States that on Wednesday he had IV iron infusion as ordered by his primary cardiologist, afterwards he began to have gross hematuria in his urine.  States that he would initially start with blood but by the end of his stream would begin to be clear.  This persisted over the next several days until yesterday evening, states he was consistently having hematuria without clearing.  He did call his urologist, who told him that this is to be expected but if it worsens that he should present to the ED for evaluation.  States that his symptoms persisted today, he became concerned and came to the ED.  In the emergency room, patient had a hemoglobin of 8 when his baseline is usually around 11.  Gordon catheter was placed with drainage of gross blood.       Assessment/Plan:      Current Principal Problem:  Hematuria    Hematuria :  Noted this was present at the time of admission ongoing for several days days prior  Hemoglobin dropped to 8.3, usual baseline is around 11-12  Gordon catheter placed with large amount of bloody urine.  CT abdomen pelvis shows large amount of intraluminal hemorrhage within the

## 2025-04-20 NOTE — OP NOTE
Date: 04/20/25    Patient: Alfredo Chong    Surgeon: PABLITO CORDOVA MD    Procedure: cystoscopy with clot evacuation, TURBT (large, mutlifocal), right stent insertion, right retrograde pyelogram    Drains: 22 Hungarian 3-way catheter, 6 x 24 JJ stent on the right    IVF: KARSTEN    EBL: less than 50 cc    COMPLICATIONS: None identified intraop    Indications: This is a 74 y.o. male presenting with hematuria.  He is seen at [facility] by Dr. Tapia for surveillance of known bladder cancer.  In 3/2024, he was diagnosed with HgTa urothelial CA with recurrence noted in 11/2024 with LG Ta urothelial CA.  He has completed induction BCG.  On 4/1, he underwent local cystoscpoy and was noted to have two tumors over the right UO and another at the anterior bladder/prostate base.     Urine continues to be bloody despite CBI.  CT shows clot in the bladder and likely tumor. Proceudre was well discussed with the patient prior to the operation. All r/b/a of the operation were discussed including but not limited to recurrence of bleeding, bladder perforation, dysuria, need for a catheter, and overnight stay.    Details of the procedure: Patient was brought to the operating room. Appropriate time out was performed.  Patient was placed in the dorsal lithotomy position. Patient was prepped and draped in the usual sterile fashion.    Procedure was begun with a 22 Hungarian rigid cystoscope via urethra into the bladder. The urethra was without abnormality. The prostate was mildly friable but without discrete bleeding. The bladder was noted to have a large amount of clot. No mucosa could be identified initially because the bladder contained a considerable amount of clot. A urovac evacuator was utilized to clear the bladder of clot. After multiple rounds, the bladder was cleared of clot using this method. At this time, I was able to survey the bladder with a 30 and 70 degree lens.  There were tumors lateral to the left UO (> 5 cm in size),

## 2025-04-20 NOTE — PROGRESS NOTES
Mick sent to Dr. Curtis Tapia:  \"Hi Doctor, Pt's baeza catheter seems clogged from clots. Did a bladder scan which showed 504 ml\"

## 2025-04-20 NOTE — PROGRESS NOTES
Admitted patient from ED, Alert and oriented x 4. IV on left AC. 3 way baeza catheter in place, draining dark bloody urine. Oriented to room. Side rails x 2. Bed alarm on. Will continue to monitor.

## 2025-04-20 NOTE — PROGRESS NOTES
[employer] serve sent to Joseluis Alegria NP:    \"Adrian Huggins, paged urologist 30 minutes regarding clogged catheter due to clots. Did a bladder scan showing 504 ml, pt reports abdominal discomfort. 3 way baeza catheter was placed at the ED. Saw the notes at the ED that it can be used for continuous for bladder irrigation.\"    Addendum:  \"Do you want me to do manual irrigation or  continuous irrigation?\"    Replied: \"Sure that's fine\"

## 2025-04-20 NOTE — PROGRESS NOTES
Pt attempted to get up to go to the bathroom but unable to stand due to dizziness. Pt laid back down in bed. VSS. Will continue to monitor.

## 2025-04-20 NOTE — PLAN OF CARE
Problem: Safety - Adult  Goal: Free from fall injury  Flowsheets (Taken 4/20/2025 0209)  Free From Fall Injury:   Instruct family/caregiver on patient safety   Based on caregiver fall risk screen, instruct family/caregiver to ask for assistance with transferring infant if caregiver noted to have fall risk factors     Problem: Chronic Conditions and Co-morbidities  Goal: Patient's chronic conditions and co-morbidity symptoms are monitored and maintained or improved  Flowsheets (Taken 4/20/2025 0209)  Care Plan - Patient's Chronic Conditions and Co-Morbidity Symptoms are Monitored and Maintained or Improved:   Monitor and assess patient's chronic conditions and comorbid symptoms for stability, deterioration, or improvement   Collaborate with multidisciplinary team to address chronic and comorbid conditions and prevent exacerbation or deterioration   Update acute care plan with appropriate goals if chronic or comorbid symptoms are exacerbated and prevent overall improvement and discharge

## 2025-04-20 NOTE — CONSENT
Informed Consent for Blood Component Transfusion Note    I have discussed with the patient the rationale for blood component transfusion; its benefits in treating or preventing fatigue, organ damage, or death; and its risk which includes mild transfusion reactions, rare risk of blood borne infection, or more serious but rare reactions. I have discussed the alternatives to transfusion, including the risk and consequences of not receiving transfusion. The patient had an opportunity to ask questions and had agreed to proceed with transfusion of blood components.    Electronically signed by IVAN LEIGH MD on 4/20/25 at 10:13 AM EDT

## 2025-04-20 NOTE — PROGRESS NOTES
4 Eyes Skin Assessment and Patient belongings     The patient is being assess for  Admission    I agree that 2 Nurses have performed a thorough Head to Toe Skin Assessment on the patient. ALL assessment sites listed below have been assessed.       Areas assessed by both nurses: Sydnee Mitchell RN / Franko Cooper RN    [x]   Head, Face, and Ears   [x]   Shoulders, Back, and Chest  [x]   Arms, Elbows, and Hands   [x]   Coccyx, Sacrum, and IschIum  [x]   Legs, Feet, and Heels        Does the Patient have Skin Breakdown?  No         Mateus Prevention initiated:  Yes   Wound Care Orders initiated:  NA      [facility] nurse consulted for Pressure Injury (Stage 3,4, Unstageable, DTI, NWPT, and Complex wounds), New and Established Ostomies:  NA      I agree that 2 Nurses have reviewed patient belongings with the patient/family and documented in the flowsheet upon admission or transfer to the unit.     Belongings  Dental Appliances: None  Vision - Corrective Lenses: Other (Comment) (reading glasses)  Hearing Aid: At home (left hearing aid not brought)  Clothing: Pants, Sweater, Other (Comment) (shoes)  Jewelry: None  Electronic Devices: Cell Phone  Weapons (Notify Protective Services/Security): None  Home Medications: None  Valuables Given To: Family (Comment) (Jennie (Wife))  Provide Name(s) of Who Valuable(s) Were Given To: Jennie (Wife)       Nurse 1 eSignature: Electronically signed by Lamar Mitchell RN on 4/20/25 at 4:48 AM EDT    **SHARE this note so that the co-signing nurse is able to place an eSignature**    Nurse 2 eSignature: Electronically signed by Franko Cooper RN on 4/20/25 at 6:06 AM EDT

## 2025-04-20 NOTE — PLAN OF CARE
Problem: Safety - Adult  Goal: Free from fall injury  4/20/2025 0827 by Graciela Berry RN  Outcome: Progressing  Flowsheets (Taken 4/20/2025 0827)  Free From Fall Injury:   Instruct family/caregiver on patient safety   Based on caregiver fall risk screen, instruct family/caregiver to ask for assistance with transferring infant if caregiver noted to have fall risk factors  Problem: Pain  Goal: Verbalizes/displays adequate comfort level or baseline comfort level  Outcome: Progressing  Flowsheets (Taken 4/20/2025 0827)  Verbalizes/displays adequate comfort level or baseline comfort level:   Encourage patient to monitor pain and request assistance   Administer analgesics based on type and severity of pain and evaluate response   Assess pain using appropriate pain scale   Implement non-pharmacological measures as appropriate and evaluate response

## 2025-04-20 NOTE — PROGRESS NOTES
Manually irrigated catheter and instilled 700 ml of saline water, output was still bloody, total of 700 ml with clots. Did a bladder scan which showed 723 ml. Will start continuous bladder irrigation, notified Joseluis Alegria NP.
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
## 2025-04-20 NOTE — CONSULTS
Thank you to requesting provider: Dr. Bird , for asking us to see Alfredo ZAHRA Juddr  Reason for consultation:  CKD  Chief Complaint:  Hematuria     History of Presenting Illness      73 y/o male with history of hypertension, CAD s/p CABG ~ 20 years ago, and bladder cancer admitted with hematuria.  We have been asked to see him for CKD evaluation.  He has been referred to see Dr. Glynn on May 20th.  His Scr was up to 2.2.  Found to have left hydronephrosis.  Going to OR today.      Past Medical/Surgical History      Active Ambulatory Problems     Diagnosis Date Noted    Essential hypertension 10/26/2015    Hypothyroidism 10/26/2015    Vitamin D deficiency 10/26/2015    Hyperlipidemia 10/26/2015    Coronary artery disease involving native coronary artery of native heart without angina pectoris 10/26/2015    Obesity (BMI 30-39.9) 06/24/2016    SOB (shortness of breath) 07/23/2018    Class 1 obesity due to excess calories with serious comorbidity in adult 07/23/2018    Unstable angina (HCC) 07/23/2018    LV dysfunction 09/24/2018    Chronic stable angina 10/01/2020    Morbidly obese 10/01/2020    Acute kidney injury 09/07/2021    Systolic CHF, chronic (HCC) 02/06/2023    Nonrheumatic aortic valve stenosis 12/17/2024    History of coronary artery bypass graft 01/31/2025    Malignant neoplasm of urinary bladder, unspecified site (HCC) 03/26/2025    Acquired iron deficiency anemia due to decreased absorption 04/14/2025    HFrEF (heart failure with reduced ejection fraction) (HCC) 04/14/2025    Impaired intestinal absorption 04/14/2025     Resolved Ambulatory Problems     Diagnosis Date Noted    Stage 3a chronic kidney disease (HCC) 10/18/2022    Stage 3b chronic kidney disease (HCC) 01/23/2024     Past Medical History:   Diagnosis Date    Actinic keratosis     Allergic rhinitis     CAD (coronary artery disease)     Chronic uveitis, bilateral     [place] (hard of hearing)     Hypertension     MI (myocardial infarction)  (HCC)     Osteoarthritis     Uveitis of both eyes          Review of Systems     Constitutional:  No weight loss, no fever/chills  Eyes:  No eye pain, no eye redness  Cardiovascular:  No chest pain, no worsening of edema  Respiratory:  No hemoptysis, no stridor  Gastrointestinal:  No blood in stool, no n/v, no diarrhea  Genitoruinary:  + hematuria, + difficulty with urination  Musculoskeletal:  No joint swelling, no redness  Integumentary:  No Rash, no itching  Neurological:  No focal weakness, No new sensory deficit  Psychiatric:  No depression, no confusion  Endocrine:  No polyuria, no polydipsia       Medications      Reviewed in EMR     Allergies     Simvastatin      Family History       Negative for Kidney Disease    Social History      Social History     Socioeconomic History    Marital status: Single   Tobacco Use    Smoking status: Former     Current packs/day: 0.00     Average packs/day: 1 pack/day for 49.0 years (49.0 ttl pk-yrs)     Types: Cigars, Cigarettes     Start date: 1964     Quit date: 2013     Years since quittin.8    Smokeless tobacco: Never    Tobacco comments:     cigar occasionally   Vaping Use    Vaping status: Never Used   Substance and Sexual Activity    Alcohol use: Yes     Alcohol/week: 0.0 standard drinks of alcohol     Comment: occ    Drug use: No    Sexual activity: Yes     Partners: Female     Social Drivers of Health     Financial Resource Strain: Low Risk  (2024)    Overall Financial Resource Strain (CARDIA)     Difficulty of Paying Living Expenses: Not hard at all   Food Insecurity: No Food Insecurity (2025)    Hunger Vital Sign     Worried About Running Out of Food in the Last Year: Never true     Ran Out of Food in the Last Year: Never true   Transportation Needs: No Transportation Needs (2025)    PRAPARE - Transportation     Lack of Transportation (Medical): No     Lack of Transportation (Non-Medical): No   Physical Activity: Sufficiently Active

## 2025-04-21 ENCOUNTER — CARE COORDINATION (OUTPATIENT)
Dept: CASE MANAGEMENT | Age: 75
End: 2025-04-21

## 2025-04-21 LAB
ALBUMIN SERPL-MCNC: 3.6 G/DL (ref 3.4–5)
ANION GAP SERPL CALCULATED.3IONS-SCNC: 9 MMOL/L (ref 3–16)
BUN SERPL-MCNC: 28 MG/DL (ref 7–20)
CALCIUM SERPL-MCNC: 8.8 MG/DL (ref 8.3–10.6)
CHLORIDE SERPL-SCNC: 100 MMOL/L (ref 99–110)
CO2 SERPL-SCNC: 22 MMOL/L (ref 21–32)
CREAT SERPL-MCNC: 2.1 MG/DL (ref 0.8–1.3)
DEPRECATED RDW RBC AUTO: 14.1 % (ref 12.4–15.4)
GFR SERPLBLD CREATININE-BSD FMLA CKD-EPI: 32 ML/MIN/{1.73_M2}
GLUCOSE SERPL-MCNC: 115 MG/DL (ref 70–99)
HCT VFR BLD AUTO: 18.6 % (ref 40.5–52.5)
HCT VFR BLD AUTO: 20.5 % (ref 40.5–52.5)
HGB BLD-MCNC: 6.4 G/DL (ref 13.5–17.5)
HGB BLD-MCNC: 7 G/DL (ref 13.5–17.5)
MCH RBC QN AUTO: 32.7 PG (ref 26–34)
MCHC RBC AUTO-ENTMCNC: 34.2 G/DL (ref 31–36)
MCV RBC AUTO: 95.4 FL (ref 80–100)
PHOSPHATE SERPL-MCNC: 4.1 MG/DL (ref 2.5–4.9)
PLATELET # BLD AUTO: 310 K/UL (ref 135–450)
PMV BLD AUTO: 6.9 FL (ref 5–10.5)
POTASSIUM SERPL-SCNC: 5 MMOL/L (ref 3.5–5.1)
RBC # BLD AUTO: 1.95 M/UL (ref 4.2–5.9)
SODIUM SERPL-SCNC: 131 MMOL/L (ref 136–145)
WBC # BLD AUTO: 12.9 K/UL (ref 4–11)

## 2025-04-21 PROCEDURE — 2580000003 HC RX 258

## 2025-04-21 PROCEDURE — 85014 HEMATOCRIT: CPT

## 2025-04-21 PROCEDURE — 36430 TRANSFUSION BLD/BLD COMPNT: CPT

## 2025-04-21 PROCEDURE — 1200000000 HC SEMI PRIVATE

## 2025-04-21 PROCEDURE — 6370000000 HC RX 637 (ALT 250 FOR IP)

## 2025-04-21 PROCEDURE — 85027 COMPLETE CBC AUTOMATED: CPT

## 2025-04-21 PROCEDURE — 2580000003 HC RX 258: Performed by: INTERNAL MEDICINE

## 2025-04-21 PROCEDURE — 36415 COLL VENOUS BLD VENIPUNCTURE: CPT

## 2025-04-21 PROCEDURE — 6370000000 HC RX 637 (ALT 250 FOR IP): Performed by: INTERNAL MEDICINE

## 2025-04-21 PROCEDURE — 2500000003 HC RX 250 WO HCPCS

## 2025-04-21 PROCEDURE — 85018 HEMOGLOBIN: CPT

## 2025-04-21 PROCEDURE — 6370000000 HC RX 637 (ALT 250 FOR IP): Performed by: NURSE PRACTITIONER

## 2025-04-21 PROCEDURE — 6360000002 HC RX W HCPCS

## 2025-04-21 PROCEDURE — 6360000002 HC RX W HCPCS: Performed by: INTERNAL MEDICINE

## 2025-04-21 PROCEDURE — 80069 RENAL FUNCTION PANEL: CPT

## 2025-04-21 RX ORDER — SODIUM CHLORIDE 9 MG/ML
INJECTION, SOLUTION INTRAVENOUS PRN
Status: DISCONTINUED | OUTPATIENT
Start: 2025-04-21 | End: 2025-04-29 | Stop reason: HOSPADM

## 2025-04-21 RX ADMIN — IRON SUCROSE 200 MG: 20 INJECTION, SOLUTION INTRAVENOUS at 20:23

## 2025-04-21 RX ADMIN — METOPROLOL SUCCINATE 25 MG: 25 TABLET, EXTENDED RELEASE ORAL at 08:45

## 2025-04-21 RX ADMIN — ONDANSETRON 4 MG: 2 INJECTION, SOLUTION INTRAMUSCULAR; INTRAVENOUS at 04:14

## 2025-04-21 RX ADMIN — ACETAMINOPHEN 650 MG: 325 TABLET ORAL at 02:24

## 2025-04-21 RX ADMIN — SODIUM CHLORIDE, PRESERVATIVE FREE 10 ML: 5 INJECTION INTRAVENOUS at 20:17

## 2025-04-21 RX ADMIN — Medication 1 LOZENGE: at 20:12

## 2025-04-21 RX ADMIN — ACETAMINOPHEN 650 MG: 325 TABLET ORAL at 08:50

## 2025-04-21 RX ADMIN — SODIUM CHLORIDE: 0.9 INJECTION, SOLUTION INTRAVENOUS at 20:20

## 2025-04-21 RX ADMIN — LEVOTHYROXINE SODIUM 75 MCG: 0.05 TABLET ORAL at 07:30

## 2025-04-21 RX ADMIN — SACUBITRIL AND VALSARTAN 0.5 TABLET: 49; 51 TABLET, FILM COATED ORAL at 08:45

## 2025-04-21 ASSESSMENT — PAIN SCALES - GENERAL
PAINLEVEL_OUTOF10: 3
PAINLEVEL_OUTOF10: 4

## 2025-04-21 ASSESSMENT — PAIN - FUNCTIONAL ASSESSMENT: PAIN_FUNCTIONAL_ASSESSMENT: ACTIVITIES ARE NOT PREVENTED

## 2025-04-21 ASSESSMENT — PAIN DESCRIPTION - DESCRIPTORS
DESCRIPTORS: DISCOMFORT;PRESSURE
DESCRIPTORS: ACHING

## 2025-04-21 ASSESSMENT — PAIN DESCRIPTION - ORIENTATION
ORIENTATION: LOWER
ORIENTATION: ANTERIOR;LOWER

## 2025-04-21 ASSESSMENT — PAIN DESCRIPTION - LOCATION
LOCATION: ABDOMEN
LOCATION: ABDOMEN

## 2025-04-21 NOTE — PROGRESS NOTES
Pt assessment completed and charted. VSS. Pt a/ox4. Pt repots some discomfort in bladder, prn tylenol given per mar. Pt resting this afternoon. Pt receiving blood and tolerating at this time. Pt able to eat some but reports less of an appetite and some minimal nausea. F/c in place w/ CBI and patent.   Pt denies any other needs at this time.  Pt calls out appropriately.       Pt is a fall risk;  -Bed in lowest position and wheels locked.  -Call light within reach.   -Bedside table within reach.   -Non-skid footwear in place.  -bed check in place.

## 2025-04-21 NOTE — CARE COORDINATION
CTN contacted [facility] inpatient  on C3 and LVM at [phone] regarding patient eligibility for RPM.     Shari Quinonez RN

## 2025-04-21 NOTE — PROGRESS NOTES
Hospital Medicine Progress Note  V 1.6      Date of Admission: 4/19/2025    Hospital Day: 3      Chief Admission Complaint: Blood in urine     Subjective: He is sitting up in bed, feels little weak and tired today.  In no acute distress.  Denies chest pain or shortness of breath    Presenting Admission History:       74 y.o. male who presented to [facility] with hematuria.  PMHx significant for bladder cancer followed by [facility] urology, CAD status post CABG, CHFrEF, hyperlipidemia, hypertension, hypothyroidism.     Patient states he has a known history of a bladder cancer has been receiving treatment on and off for the past several months.  Was supposed to have a repeat cystoscopy and possibly TURBT soon with urology, but is pending cardiology clearance and  recently had a stress test performed.    States that on Wednesday he had IV iron infusion as ordered by his primary cardiologist, afterwards he began to have gross hematuria in his urine.  States that he would initially start with blood but by the end of his stream would begin to be clear.  This persisted over the next several days until yesterday evening, states he was consistently having hematuria without clearing.  He did call his urologist, who told him that this is to be expected but if it worsens that he should present to the ED for evaluation.  States that his symptoms persisted today, he became concerned and came to the ED.  In the emergency room, patient had a hemoglobin of 8 when his baseline is usually around 11.  Gordon catheter was placed with drainage of gross blood.       Assessment/Plan:      Current Principal Problem:  Hematuria    Hematuria :  Noted this was present at the time of admission ongoing for several days days prior  Hemoglobin dropped to 8.3, usual baseline is around 11-12  Gordon catheter placed with large amount of bloody urine.  CT abdomen pelvis shows large amount of intraluminal hemorrhage within the urinary  Medications (IV Amiodarone/Diltiazem, Tikosyn, etc)    [] Hemodialysis    [x] Other -need for continuous bladder irrigation  Need for transfusion with PRBC    [] Change in code status    [] Decision to escalate care    [x] Major surgery/procedure with associated risk factors he did go to the OR with urology this  --------------------------------------------------  C. Data (any 2)    [x] Data Review (any 3)    [x] Consultant notes from yesterday/today    [x] All available current labs reviewed interpreted for clinical significance    [x] Appropriate follow-up labs were ordered  [] Collateral history obtained     [] Independent Interpretation of tests (any 1)    [] Telemetry (Rhythm Strip) personally reviewed and interpreted        [] Imaging personally reviewed and interpreted     [x] Discussion (any 1)  [x] Multi-Disciplinary Rounds with Case Management  [] Discussed management of the case with           Labs:  Personally reviewed on 4/21/2025 and interpreted for clinical significance as documented above.     Recent Labs     04/19/25  1354 04/19/25  2236 04/20/25  0416 04/20/25  1007 04/20/25  2200 04/21/25  0535   WBC 10.1  --  11.5*  --   --  12.9*   HGB 8.3*   < > 7.7* 7.1* 7.2* 6.4*   HCT 25.5*   < > 22.1* 20.9* 21.1* 18.6*     --  320  --   --  310    < > = values in this interval not displayed.     Recent Labs     04/19/25  1354 04/20/25  0416 04/21/25  0535   * 132* 131*   K 4.4 4.4 5.0   CL 99 100 100   CO2 22 24 22   BUN 28* 26* 28*   CREATININE 2.2* 2.0* 2.1*   CALCIUM 8.9 9.0 8.8   PHOS  --   --  4.1     No results for input(s): \"PROBNP\", \"TROPHS\" in the last 72 hours.  No results for input(s): \"LABA1C\" in the last 72 hours.  Recent Labs     04/19/25  1354   AST 23   ALT 20   BILITOT 0.3   ALKPHOS 76     No results for input(s): \"INR\", \"LACTA\", \"TSH\" in the last 72 hours.    Urine Cultures:   Lab Results   Component Value Date/Time    LABURIN  04/19/2025 03:12 PM     <50,000 CFU/ml mixed

## 2025-04-21 NOTE — PROGRESS NOTES
Comprehensive Nutrition Assessment    Type and Reason for Visit:  Initial, Positive nutrition screen    Nutrition Recommendations/Plan:   Continue regular diet.  Encourage PO intakes as tolerated.  Ensure ONS once/day.   Monitor pertinent labs, bowel habits, weight, N/V, supplement acceptance, clinical progression.       Malnutrition Assessment:  Malnutrition Status:  At risk for malnutrition (04/21/25 1322)    Context:  Acute Illness     Findings of the 6 clinical characteristics of malnutrition:  Energy Intake:  75% or less of estimated energy requirements for 7 or more days  Weight Loss:  Mild weight loss     Body Fat Loss:  No body fat loss     Muscle Mass Loss:  No muscle mass loss    Fluid Accumulation:  No fluid accumulation     Strength:  Not Performed    Nutrition Assessment:    Patient seen for positive nutrition screen (weight loss and decreased appetite). Admitted for hematuria. PMHx significant for bladder cancer, CAD status post CABG, CHFrEF, hyperlipidemia, hypertension, hypothyroidism. Patient seen resting in bed. Reports decreased appetite x6 weeks due to \"not feeling good.\" States he tried a few bites of eggs/sausage this AM and instantly felt nauseous. Pt was eating chicken noodle soup during visit. States he is tolerating this well so far. Patient reports he has been drinking x1 Boost ONS daily the past few weeks. Agreeable to Ensure ONS once/day to better meet nutrient needs. No issues chewing or swallowing. Weight loss of -3.7% x2 months noted per EMR. Will continue to monitor.    Nutrition Related Findings:    Glucose 115. Wound Type: None       Current Nutrition Intake & Therapies:    Average Meal Intake: 51-75%  Average Supplements Intake: None Ordered  ADULT DIET; Regular    Anthropometric Measures:  Height: 177.8 cm (5' 10\")  Ideal Body Weight (IBW): 166 lbs (75 kg)       Current Body Weight: 98 kg (216 lb 0.8 oz) (4/29/25), 130.2 % IBW. Weight Source: Bed scale  Current BMI (kg/m2):

## 2025-04-21 NOTE — PROGRESS NOTES
Nephrology Progress Note   [facility]      This patient is a 74 year old male whom we are following for CKD.      Subjective:  The patient was seen and examined. Gordon catheter is in, on CBI. Still with bloody urine. Hgb down to 6.4g/dL. BP on the low side but stable.    Family History: No family at bedside  ROS: No SOB or chest pain      Vitals:  BP 94/65   Pulse 83   Temp 98.1 °F (36.7 °C) (Oral)   Resp 18   Ht 1.778 m (5' 10\")   Wt 98 kg (216 lb)   SpO2 94%   BMI 30.99 kg/m²   I/O last 3 completed shifts:  In: 2270 [I.V.:1210; Other:1060]  Out: 5790 [Urine:5790]  I/O this shift:  In: 150 [P.O.:150]  Out: -       Physical Exam  Vitals reviewed.   Constitutional:       General: He is not in acute distress.     Appearance: Normal appearance.   HENT:      Head: Normocephalic and atraumatic.   Eyes:      General: No scleral icterus.     Conjunctiva/sclera: Conjunctivae normal.   Cardiovascular:      Rate and Rhythm: Normal rate and regular rhythm.      Heart sounds:      No friction rub.   Pulmonary:      Effort: Pulmonary effort is normal. No respiratory distress.      Breath sounds: No wheezing or rales.   Abdominal:      General: Bowel sounds are normal. There is no distension.      Tenderness: There is no abdominal tenderness.   Musculoskeletal:      Right lower leg: No edema.      Left lower leg: No edema.   Neurological:      Mental Status: He is alert.           Medications:   sacubitril-valsartan  0.5 tablet Oral BID    [Held by provider] mirtazapine  7.5 mg Oral Nightly    iron sucrose (VENOFER) 200 mg in sodium chloride 0.9 % 100 mL IVPB  200 mg IntraVENous Q24H    sodium chloride flush  5-40 mL IntraVENous 2 times per day    [Held by provider] aspirin  162 mg Oral Daily    levothyroxine  75 mcg Oral Daily    metoprolol succinate  25 mg Oral Daily         Labs:  Recent Labs     04/19/25  1354 04/19/25  2236 04/20/25  0416 04/20/25  1007 04/20/25  2200 04/21/25  0535   WBC 10.1  --  11.5*  --

## 2025-04-21 NOTE — PLAN OF CARE
Problem: Chronic Conditions and Co-morbidities  Goal: Patient's chronic conditions and co-morbidity symptoms are monitored and maintained or improved  Flowsheets (Taken 4/20/2025 2212)  Care Plan - Patient's Chronic Conditions and Co-Morbidity Symptoms are Monitored and Maintained or Improved:   Monitor and assess patient's chronic conditions and comorbid symptoms for stability, deterioration, or improvement   Collaborate with multidisciplinary team to address chronic and comorbid conditions and prevent exacerbation or deterioration   Update acute care plan with appropriate goals if chronic or comorbid symptoms are exacerbated and prevent overall improvement and discharge

## 2025-04-21 NOTE — PROGRESS NOTES
Urology Progress Note      Subjective: Alfredo SWEENEY Arlette denies  complaints     Vitals:  /75   Pulse 85   Temp 97.2 °F (36.2 °C) (Oral)   Resp 16   Ht 1.778 m (5' 10\")   Wt 98 kg (216 lb)   SpO2 98%   BMI 30.99 kg/m²   Temp  Av.8 °F (36.6 °C)  Min: 97 °F (36.1 °C)  Max: 98.4 °F (36.9 °C)    Intake/Output Summary (Last 24 hours) at 2025 1103  Last data filed at 2025 0452  Gross per 24 hour   Intake 1360 ml   Output 2625 ml   Net -1265 ml       Exam:   Physical:   Well developed, well nourished in no acute distress  Mood indicates no abnormalities. Pt doesn’t appear depressed  Orientated to time and place     Male :  Gordon draining light pink urine off CBI    Labs:  WBC:    Lab Results   Component Value Date/Time    WBC 12.9 2025 05:35 AM     Hemoglobin/Hematocrit:    Lab Results   Component Value Date/Time    HGB 6.4 2025 05:35 AM    HCT 18.6 2025 05:35 AM     BMP:    Lab Results   Component Value Date/Time     2025 05:35 AM    K 5.0 2025 05:35 AM    K 4.4 2025 04:16 AM     2025 05:35 AM    CO2 22 2025 05:35 AM    BUN 28 2025 05:35 AM    CREATININE 2.1 2025 05:35 AM    CALCIUM 8.8 2025 05:35 AM    GFRAA >60 2021 03:50 PM    GFRAA >60 2013 08:24 AM    LABGLOM 32 2025 05:35 AM    LABGLOM 36 2024 12:21 PM     PT/INR:    Lab Results   Component Value Date/Time    PROTIME 13.7 2024 12:21 PM    INR 1.05 2024 12:21 PM     PTT:    Lab Results   Component Value Date/Time    APTT 30.6 2024 12:21 PM   [APTT    Urinalysis: >100 RBC    Urine Culture:      <50,000 CFU/ml mixed skin/urogenital horacio. No further workup         Imaging:     XR ABDOMEN (KUB) (SINGLE AP VIEW)   Final Result      Ureteral stent in place as described.      Please see procedural note in the medical record.      Electronically signed by Luma Burnett MD      [facility]   Final Result

## 2025-04-21 NOTE — PLAN OF CARE
Problem: Safety - Adult  Goal: Free from fall injury  Outcome: Progressing  Flowsheets (Taken 4/21/2025 1210)  Free From Fall Injury: Instruct family/caregiver on patient safety     Problem: Chronic Conditions and Co-morbidities  Goal: Patient's chronic conditions and co-morbidity symptoms are monitored and maintained or improved  4/21/2025 1210 by Billie Conley RN  Outcome: Progressing  Flowsheets (Taken 4/21/2025 1210)  Care Plan - Patient's Chronic Conditions and Co-Morbidity Symptoms are Monitored and Maintained or Improved: Monitor and assess patient's chronic conditions and comorbid symptoms for stability, deterioration, or improvement     Problem: Pain  Goal: Verbalizes/displays adequate comfort level or baseline comfort level  Outcome: Progressing  Flowsheets (Taken 4/21/2025 1210)  Verbalizes/displays adequate comfort level or baseline comfort level:   Encourage patient to monitor pain and request assistance   Assess pain using appropriate pain scale   Administer analgesics based on type and severity of pain and evaluate response   Implement non-pharmacological measures as appropriate and evaluate response     Problem: ABCDS Injury Assessment  Goal: Absence of physical injury  Outcome: Progressing  Flowsheets (Taken 4/21/2025 1210)  Absence of Physical Injury: Implement safety measures based on patient assessment     Problem: Discharge Planning  Goal: Discharge to home or other facility with appropriate resources  Outcome: Progressing  Flowsheets (Taken 4/21/2025 1210)  Discharge to home or other facility with appropriate resources: Identify barriers to discharge with patient and caregiver

## 2025-04-21 NOTE — PROGRESS NOTES
Awake on bed, alert and oriented x 4. IV on left AC. Ongoing continuous bladder irrigation, pink clear in color. Denies pain. Family at bedside. Side rails x 2. Call light within reach. Bed alarm on.

## 2025-04-22 LAB
ALBUMIN SERPL-MCNC: 3.2 G/DL (ref 3.4–5)
ANION GAP SERPL CALCULATED.3IONS-SCNC: 9 MMOL/L (ref 3–16)
BLOOD BANK DISPENSE STATUS: NORMAL
BLOOD BANK DISPENSE STATUS: NORMAL
BLOOD BANK PRODUCT CODE: NORMAL
BLOOD BANK PRODUCT CODE: NORMAL
BPU ID: NORMAL
BPU ID: NORMAL
BUN SERPL-MCNC: 32 MG/DL (ref 7–20)
CALCIUM SERPL-MCNC: 8.8 MG/DL (ref 8.3–10.6)
CHLORIDE SERPL-SCNC: 99 MMOL/L (ref 99–110)
CO2 SERPL-SCNC: 23 MMOL/L (ref 21–32)
CREAT SERPL-MCNC: 2.3 MG/DL (ref 0.8–1.3)
DEPRECATED RDW RBC AUTO: 15.4 % (ref 12.4–15.4)
DESCRIPTION BLOOD BANK: NORMAL
DESCRIPTION BLOOD BANK: NORMAL
GFR SERPLBLD CREATININE-BSD FMLA CKD-EPI: 29 ML/MIN/{1.73_M2}
GLUCOSE SERPL-MCNC: 100 MG/DL (ref 70–99)
HCT VFR BLD AUTO: 19.8 % (ref 40.5–52.5)
HCT VFR BLD AUTO: 20.1 % (ref 40.5–52.5)
HCT VFR BLD AUTO: 21.8 % (ref 40.5–52.5)
HGB BLD-MCNC: 6.7 G/DL (ref 13.5–17.5)
HGB BLD-MCNC: 7 G/DL (ref 13.5–17.5)
HGB BLD-MCNC: 7.4 G/DL (ref 13.5–17.5)
MCH RBC QN AUTO: 32.5 PG (ref 26–34)
MCHC RBC AUTO-ENTMCNC: 35 G/DL (ref 31–36)
MCV RBC AUTO: 92.8 FL (ref 80–100)
PHOSPHATE SERPL-MCNC: 2.8 MG/DL (ref 2.5–4.9)
PLATELET # BLD AUTO: 307 K/UL (ref 135–450)
PMV BLD AUTO: 6.8 FL (ref 5–10.5)
POTASSIUM SERPL-SCNC: 4.6 MMOL/L (ref 3.5–5.1)
RBC # BLD AUTO: 2.17 M/UL (ref 4.2–5.9)
SODIUM SERPL-SCNC: 131 MMOL/L (ref 136–145)
WBC # BLD AUTO: 12.4 K/UL (ref 4–11)

## 2025-04-22 PROCEDURE — 97162 PT EVAL MOD COMPLEX 30 MIN: CPT

## 2025-04-22 PROCEDURE — 80069 RENAL FUNCTION PANEL: CPT

## 2025-04-22 PROCEDURE — 2580000003 HC RX 258: Performed by: INTERNAL MEDICINE

## 2025-04-22 PROCEDURE — 85014 HEMATOCRIT: CPT

## 2025-04-22 PROCEDURE — 6360000002 HC RX W HCPCS: Performed by: INTERNAL MEDICINE

## 2025-04-22 PROCEDURE — 1200000000 HC SEMI PRIVATE

## 2025-04-22 PROCEDURE — 97530 THERAPEUTIC ACTIVITIES: CPT

## 2025-04-22 PROCEDURE — 36415 COLL VENOUS BLD VENIPUNCTURE: CPT

## 2025-04-22 PROCEDURE — 85027 COMPLETE CBC AUTOMATED: CPT

## 2025-04-22 PROCEDURE — 97110 THERAPEUTIC EXERCISES: CPT

## 2025-04-22 PROCEDURE — 36430 TRANSFUSION BLD/BLD COMPNT: CPT

## 2025-04-22 PROCEDURE — 30233N1 TRANSFUSION OF NONAUTOLOGOUS RED BLOOD CELLS INTO PERIPHERAL VEIN, PERCUTANEOUS APPROACH: ICD-10-PCS | Performed by: INTERNAL MEDICINE

## 2025-04-22 PROCEDURE — 6370000000 HC RX 637 (ALT 250 FOR IP)

## 2025-04-22 PROCEDURE — 85018 HEMOGLOBIN: CPT

## 2025-04-22 PROCEDURE — 2500000003 HC RX 250 WO HCPCS

## 2025-04-22 PROCEDURE — 30233N1 TRANSFUSION OF NONAUTOLOGOUS RED BLOOD CELLS INTO PERIPHERAL VEIN, PERCUTANEOUS APPROACH: ICD-10-PCS

## 2025-04-22 PROCEDURE — 97166 OT EVAL MOD COMPLEX 45 MIN: CPT

## 2025-04-22 RX ORDER — SODIUM CHLORIDE 9 MG/ML
INJECTION, SOLUTION INTRAVENOUS CONTINUOUS
Status: ACTIVE | OUTPATIENT
Start: 2025-04-22 | End: 2025-04-22

## 2025-04-22 RX ORDER — SODIUM CHLORIDE 9 MG/ML
INJECTION, SOLUTION INTRAVENOUS PRN
Status: DISCONTINUED | OUTPATIENT
Start: 2025-04-22 | End: 2025-04-29 | Stop reason: HOSPADM

## 2025-04-22 RX ADMIN — SODIUM CHLORIDE: 0.9 INJECTION, SOLUTION INTRAVENOUS at 09:13

## 2025-04-22 RX ADMIN — IRON SUCROSE 200 MG: 20 INJECTION, SOLUTION INTRAVENOUS at 21:05

## 2025-04-22 RX ADMIN — ACETAMINOPHEN 650 MG: 325 TABLET ORAL at 04:24

## 2025-04-22 RX ADMIN — SODIUM CHLORIDE, PRESERVATIVE FREE 10 ML: 5 INJECTION INTRAVENOUS at 21:05

## 2025-04-22 RX ADMIN — LEVOTHYROXINE SODIUM 75 MCG: 0.05 TABLET ORAL at 07:32

## 2025-04-22 RX ADMIN — ACETAMINOPHEN 650 MG: 325 TABLET ORAL at 13:24

## 2025-04-22 ASSESSMENT — PAIN DESCRIPTION - DESCRIPTORS: DESCRIPTORS: ACHING

## 2025-04-22 ASSESSMENT — PAIN SCALES - GENERAL
PAINLEVEL_OUTOF10: 0
PAINLEVEL_OUTOF10: 5
PAINLEVEL_OUTOF10: 3
PAINLEVEL_OUTOF10: 3

## 2025-04-22 ASSESSMENT — PAIN DESCRIPTION - LOCATION: LOCATION: ABDOMEN

## 2025-04-22 NOTE — CARE COORDINATION
Case Management Assessment  Initial Evaluation    Date/Time of Evaluation: 4/22/2025 8:49 AM  Assessment Completed by: GALE Hatch    If patient is discharged prior to next notation, then this note serves as note for discharge by case management.    Patient Name: Alfredo Chong                   YOB: 1950  Diagnosis: Hematuria [R31.9]  Anemia, unspecified type [D64.9]  Hematuria, unspecified type [R31.9]                   Date / Time: 4/19/2025  1:39 PM    Patient Admission Status: Inpatient   Readmission Risk (Low < 19, Mod (19-27), High > 27): Readmission Risk Score: 18.3    Current PCP: Heath Hernandez MD  PCP verified by CM? Yes    Chart Reviewed: Yes      History Provided by: Patient  Patient Orientation: Alert and Oriented, Person, Place, Situation, Self    Patient Cognition: Alert    Hospitalization in the last 30 days (Readmission):  No      Advance Directives:      Code Status: Full Code   Patient's Primary Decision Maker is: Patient Declined (Legal Next of Kin Remains as Decision Maker)    Primary Decision Maker: Madelyn Brasher Other - [phone]    Discharge Planning:    Patient lives with: Spouse/Significant Other Type of Home: House  Primary Care Giver: Self  Patient Support Systems include: Spouse/Significant Other, Children, Family Members, Friends/Neighbors   Current Financial resources: Medicare  Current community resources: None  Current services prior to admission: Durable Medical Equipment            Current DME: [employer] (Does not use DME)            Type of Home Care services:  None    ADLS  Prior functional level: Independent in ADLs/IADLs  Current functional level: Independent in ADLs/IADLs    PT AM-PAC:   /24  OT AM-PAC:   /24    Family can provide assistance at DC: Yes  Would you like Case Management to discuss the discharge plan with any other family members/significant others, and if so, who? No  Plans to Return to Present Housing: Yes  Potential Assistance

## 2025-04-22 NOTE — PROGRESS NOTES
[facility] Note   [facility]      This patient is a 74 year old male whom we are following for CKD.      Subjective:  The patient was seen and examined. Urine is clearing up. Feels weak.    Family History: No family at bedside  ROS: No SOB or chest pain      Vitals:  /71   Pulse 82   Temp 98.1 °F (36.7 °C) (Oral)   Resp 18   Ht 1.778 m (5' 10\")   Wt 98 kg (216 lb)   SpO2 96%   BMI 30.99 kg/m²   I/O last 3 completed shifts:  In: 959.7 [P.O.:390; I.V.:15.7; Blood:329.3; Other:40; IV Piggyback:184.7]  Out: 7800 [Urine:7800]  I/O this shift:  In: -   Out: 400 [Urine:400]      Physical Exam  Vitals reviewed.   Constitutional:       General: He is not in acute distress.     Appearance: Normal appearance.   HENT:      Head: Normocephalic and atraumatic.   Eyes:      General: No scleral icterus.     Comments: Pale palpebral conjunctivae   Cardiovascular:      Rate and Rhythm: Normal rate and regular rhythm.      Heart sounds:      No friction rub.   Pulmonary:      Effort: Pulmonary effort is normal. No respiratory distress.      Breath sounds: No wheezing or rales.   Abdominal:      General: Bowel sounds are normal. There is no distension.      Tenderness: There is no abdominal tenderness.   Musculoskeletal:      Right lower leg: No edema.      Left lower leg: No edema.   Neurological:      Mental Status: He is alert.           Medications:   [Held by provider] sacubitril-valsartan  0.5 tablet Oral BID    [Held by provider] mirtazapine  7.5 mg Oral Nightly    iron sucrose (VENOFER) 200 mg in sodium chloride 0.9 % 100 mL IVPB  200 mg IntraVENous Q24H    sodium chloride flush  5-40 mL IntraVENous 2 times per day    [Held by provider] aspirin  162 mg Oral Daily    levothyroxine  75 mcg Oral Daily    [Held by provider] metoprolol succinate  25 mg Oral Daily         Labs:  Recent Labs     04/20/25  0416 04/20/25  1007 04/21/25  0535 04/21/25  1809 04/22/25  0523   WBC 11.5*  --  12.9*  --  12.4*

## 2025-04-22 NOTE — PLAN OF CARE
Problem: Safety - Adult  Goal: Free from fall injury  4/21/2025 2242 by Sol Rivera RN  Outcome: Progressing  Flowsheets (Taken 4/21/2025 2242)  Free From Fall Injury: Instruct family/caregiver on patient safety  4/21/2025 1210 by Billie Conley RN  Outcome: Progressing  Flowsheets (Taken 4/21/2025 1210)  Free From Fall Injury: Instruct family/caregiver on patient safety     Problem: Pain  Goal: Verbalizes/displays adequate comfort level or baseline comfort level  4/21/2025 2242 by Sol Rivera RN  Outcome: Progressing  Flowsheets (Taken 4/21/2025 1210 by Billie Conley RN)  Verbalizes/displays adequate comfort level or baseline comfort level:   Encourage patient to monitor pain and request assistance   Assess pain using appropriate pain scale   Administer analgesics based on type and severity of pain and evaluate response   Implement non-pharmacological measures as appropriate and evaluate response  4/21/2025 1210 by Billie Conley RN  Outcome: Progressing  Flowsheets (Taken 4/21/2025 1210)  Verbalizes/displays adequate comfort level or baseline comfort level:   Encourage patient to monitor pain and request assistance   Assess pain using appropriate pain scale   Administer analgesics based on type and severity of pain and evaluate response   Implement non-pharmacological measures as appropriate and evaluate response

## 2025-04-22 NOTE — PROGRESS NOTES
Occupational Therapy  Facility/Department: [facility] C3 TELE/MED SURG/ONC  Occupational Therapy Initial Assessment/Treatment    Name: Alfredo Chong  : 1950  MRN: [id number]  Date of Service: 2025    Discharge Recommendations:  Continue to assess pending progress          Patient Diagnosis(es): The primary encounter diagnosis was Hematuria, unspecified type. Diagnoses of Anemia, unspecified type and Clot retention of urine were also pertinent to this visit.  Past Medical History:  has a past medical history of Actinic keratosis, Allergic rhinitis, CAD (coronary artery disease), Chronic uveitis, bilateral, [place] (hard of hearing), Hyperlipidemia, Hypertension, MI (myocardial infarction) (HCC), Osteoarthritis, and Uveitis of both eyes.  Past Surgical History:  has a past surgical history that includes Diagnostic Cardiac Cath Lab Procedure; Appendectomy; fracture surgery; Coronary artery bypass graft (2006); Colonoscopy (); Tonsillectomy; Coronary angioplasty (2018); joint replacement (Left, 2024); Cystoscopy (2024); Cystoscopy (N/A, 2025); Cystoscopy (N/A, 2025); and TURP (N/A, 2025).       Therapy discharge recommendations are subject to collaboration from the patient’s interdisciplinary healthcare team, including MD and case management recommendations.    Barriers to Home Discharge:   [x] Steps to access home entry or bed/bath: 2 flights into condo   [x] Unable to transfer, ambulate, or propel wheelchair household distances without assist   [x] Limited available assist at home upon discharge    [] Patient or family requests d/c to post-acute facility    [] Poor cognition/safety awareness for d/c to home alone    [] Unable to maintain ordered weight bearing status    [] Patient with salient signs of long-standing immobility   [x] Decreased independence with ADLs   [x] Increased risk for falls   [] Other:    If pt is unable to be seen after this session, please let this  Training: Yes  Overall Level of Assistance: Contact guard assistance;2 Person assistance (to RW. unable to take steps 2/2 orthostatic BP)  Sit to Stand: Contact guard assistance;2 Person assistance  Stand to Sit: Contact guard assistance;2 Person assistance  Bed to Chair: Unable to assess     AROM: Generally decreased, functional  Strength: Generally decreased, functional  Coordination: Generally decreased, functional  Tone: Normal  Sensation: Intact  ADL  Toileting: Dependent/Total  Toileting Skilled Clinical Factors: baeza     Activity Tolerance  Activity Tolerance: Patient tolerated evaluation without incident;Patient tolerated treatment well;Treatment limited secondary to medical complications  Activity Tolerance Comments: limited by orthostatic BP        Vision  Vision: Impaired  Vision Exceptions: Wears glasses for reading  Hearing  Hearing: Exceptions to WFL  Hearing Exceptions: Hard of hearing/hearing concerns;Bilateral hearing aid  Orientation  Overall Orientation Status: Within Normal Limits  Orientation Level: Oriented to place;Oriented to time;Oriented to situation;Oriented to person                  Education Given To: Patient  Education Provided: Role of Therapy;Transfer Training;Plan of Care;Equipment;Precautions;Orientation;Mobility Training  Education Provided Comments: Pt educated on importance of OOB mobility, prevention of complications of bedrest, and general safety during hospitalization  Education Method: Demonstration;Verbal  Barriers to Learning: None  Education Outcome: Verbalized understanding;Continued education needed        AM-PAC - ADL  AM-PAC Daily Activity - Inpatient   How much help is needed for putting on and taking off regular lower body clothing?: A Lot  How much help is needed for bathing (which includes washing, rinsing, drying)?: A Lot  How much help is needed for toileting (which includes using toilet, bedpan, or urinal)?: Total  How much help is needed for putting on and

## 2025-04-22 NOTE — PLAN OF CARE
Problem: Safety - Adult  Goal: Free from fall injury  4/22/2025 1018 by Caridad Meléndez RN  Outcome: Progressing  Flowsheets (Taken 4/22/2025 1018)  Free From Fall Injury:   Instruct family/caregiver on patient safety   Based on caregiver fall risk screen, instruct family/caregiver to ask for assistance with transferring infant if caregiver noted to have fall risk factors     Problem: Chronic Conditions and Co-morbidities  Goal: Patient's chronic conditions and co-morbidity symptoms are monitored and maintained or improved  Outcome: Progressing  Flowsheets (Taken 4/22/2025 1018)  Care Plan - Patient's Chronic Conditions and Co-Morbidity Symptoms are Monitored and Maintained or Improved: Monitor and assess patient's chronic conditions and comorbid symptoms for stability, deterioration, or improvement     Problem: Pain  Goal: Verbalizes/displays adequate comfort level or baseline comfort level  4/22/2025 1018 by Caridad Meléndez RN  Outcome: Progressing  Flowsheets (Taken 4/22/2025 1018)  Verbalizes/displays adequate comfort level or baseline comfort level:   Encourage patient to monitor pain and request assistance   Assess pain using appropriate pain scale     Problem: ABCDS Injury Assessment  Goal: Absence of physical injury  Outcome: Progressing  Flowsheets (Taken 4/22/2025 1018)  Absence of Physical Injury: Implement safety measures based on patient assessment     Problem: Discharge Planning  Goal: Discharge to home or other facility with appropriate resources  Outcome: Progressing  Flowsheets (Taken 4/22/2025 1018)  Discharge to home or other facility with appropriate resources: Identify barriers to discharge with patient and caregiver     Problem: Nutrition Deficit:  Goal: Optimize nutritional status  Outcome: Progressing  Flowsheets (Taken 4/22/2025 1018)  Nutrient intake appropriate for improving, restoring, or maintaining nutritional needs:   Assess nutritional status and recommend course of

## 2025-04-22 NOTE — PROGRESS NOTES
Urology Progress Note      Subjective: Alfredo SWEENEY Arlette denies  complaints    Received 1 U PRBC and IV iron on .  Hb stable at 7.0  Creat slightly trending up to 2.3 today from 2.0 on      Vitals:  /71   Pulse 82   Temp 98.1 °F (36.7 °C) (Oral)   Resp 18   Ht 1.778 m (5' 10\")   Wt 98 kg (216 lb)   SpO2 96%   BMI 30.99 kg/m²   Temp  Av.6 °F (36.4 °C)  Min: 97.2 °F (36.2 °C)  Max: 98.1 °F (36.7 °C)    Intake/Output Summary (Last 24 hours) at 2025 0806  Last data filed at 2025 0730  Gross per 24 hour   Intake 919.73 ml   Output 6100 ml   Net -5180.27 ml       Exam:   Physical:   Well developed, well nourished in no acute distress  Mood indicates no abnormalities. Pt doesn’t appear depressed  Orientated to time and place     Male :  Gordon draining clear urine off CBI    Labs:  WBC:    Lab Results   Component Value Date/Time    WBC 12.4 2025 05:23 AM     Hemoglobin/Hematocrit:    Lab Results   Component Value Date/Time    HGB 7.0 2025 05:23 AM    HCT 20.1 2025 05:23 AM     BMP:    Lab Results   Component Value Date/Time     2025 05:23 AM    K 4.6 2025 05:23 AM    K 4.4 2025 04:16 AM    CL 99 2025 05:23 AM    CO2 23 2025 05:23 AM    BUN 32 2025 05:23 AM    CREATININE 2.3 2025 05:23 AM    CALCIUM 8.8 2025 05:23 AM    GFRAA >60 2021 03:50 PM    GFRAA >60 2013 08:24 AM    LABGLOM 29 2025 05:23 AM    LABGLOM 36 2024 12:21 PM     PT/INR:    Lab Results   Component Value Date/Time    PROTIME 13.7 2024 12:21 PM    INR 1.05 2024 12:21 PM     PTT:    Lab Results   Component Value Date/Time    APTT 30.6 2024 12:21 PM   [APTT    Urinalysis: >100 RBC    Urine Culture:      <50,000 CFU/ml mixed skin/urogenital horacio. No further workup         Imaging:     XR ABDOMEN (KUB) (SINGLE AP VIEW)   Final Result      Ureteral stent in place as described.      Please see procedural note in

## 2025-04-22 NOTE — PROGRESS NOTES
Hospital Medicine Progress Note  V 1.6      Date of Admission: 4/19/2025    Hospital Day: 4      Chief Admission Complaint: Blood in urine    Subjective: He is sitting up, states he is feeling a little better but still feels quite weak  Denies chest pain or shortness of breath.    Presenting Admission History:       74 y.o. male who presented to [facility] with hematuria.  PMHx significant for bladder cancer followed by [facility] urology, CAD status post CABG, CHFrEF, hyperlipidemia, hypertension, hypothyroidism.     Patient states he has a known history of a bladder cancer has been receiving treatment on and off for the past several months.  Was supposed to have a repeat cystoscopy and possibly TURBT soon with urology, but is pending cardiology clearance and  recently had a stress test performed.    States that on Wednesday he had IV iron infusion as ordered by his primary cardiologist, afterwards he began to have gross hematuria in his urine.  States that he would initially start with blood but by the end of his stream would begin to be clear.  This persisted over the next several days until yesterday evening, states he was consistently having hematuria without clearing.  He did call his urologist, who told him that this is to be expected but if it worsens that he should present to the ED for evaluation.  States that his symptoms persisted today, he became concerned and came to the ED.  In the emergency room, patient had a hemoglobin of 8 when his baseline is usually around 11.  Gordon catheter was placed with drainage of gross blood.       Assessment/Plan:      Current Principal Problem:  Hematuria    Hematuria :  Noted this was present at the time of admission ongoing for several days days prior  Hemoglobin dropped to 8.3, usual baseline is around 11-12  Gordon catheter placed with large amount of bloody urine.  CT abdomen pelvis shows large amount of intraluminal hemorrhage within the urinary

## 2025-04-22 NOTE — PROGRESS NOTES
Physical Therapy  Facility/Department: [facility] C3 TELE/MED SURG/ONC  Physical Therapy Initial Assessment/Treatment     Name: Alfredo Chong  : 1950  MRN: [id number]  Date of Service: 2025    Discharge Recommendations:  Continue to assess pending progress   PT Equipment Recommendations  Equipment Needed: No      Patient Diagnosis(es): The primary encounter diagnosis was Hematuria, unspecified type. Diagnoses of Anemia, unspecified type and Clot retention of urine were also pertinent to this visit.  Past Medical History:  has a past medical history of Actinic keratosis, Allergic rhinitis, CAD (coronary artery disease), Chronic uveitis, bilateral, [place] (hard of hearing), Hyperlipidemia, Hypertension, MI (myocardial infarction) ([facility]), Osteoarthritis, and Uveitis of both eyes.  Past Surgical History:  has a past surgical history that includes Diagnostic Cardiac Cath Lab Procedure; Appendectomy; fracture surgery; Coronary artery bypass graft (2006); Colonoscopy (); Tonsillectomy; Coronary angioplasty (2018); joint replacement (Left, 2024); Cystoscopy (2024); Cystoscopy (N/A, 2025); Cystoscopy (N/A, 2025); and TURP (N/A, 2025).    Assessment  Body Structures, Functions, Activity Limitations Requiring Skilled Therapeutic Intervention: Decreased functional mobility ;Decreased posture;Decreased strength;Decreased endurance;Decreased balance  Assessment: Pt to [facility] with diagnosis of hematuria. PTA Pt lives alone in a 2nd floor condo with 2 flight of stairs to enter. Pt was independent with transfers and ambulation using a cane PRN. Pt currently presents below baseline function and is significantly limited by low BP this date (see vitals). Pt completes bed mobility with SBA and 2 sit<>stand transfers with CGA, not safe to ambulate due to BP. Pt will continue to benefit from skilled PT services to address current deficits. CTA DC recs pending progress and BP. Co-tx collaboration  Hydroxyzine Pregnancy And Lactation Text: This medication is not safe during pregnancy and should not be taken. It is also excreted in breast milk and breast feeding isn't recommended.

## 2025-04-22 NOTE — PROGRESS NOTES
Pt assessment completed and charted. VSS, pt on RA. Patient is a/ox4. IV site patent/flushed, saline locked with intermittent infusions. Gordon cath in place w/CBI and patent. Medication given per MAR.     Safety Measures in place:   Bed in lowest position and wheels locked.   Pt request 3 side rails up.  Call light within reach.   Bedside table within reach.   Non-skid socks in place.   Pt denies any other needs at this time.    Pt calls out appropriately.  Patient in stable condition when RN left room.

## 2025-04-23 ENCOUNTER — HOSPITAL ENCOUNTER (OUTPATIENT)
Dept: NURSING | Age: 75
Setting detail: INFUSION SERIES
Discharge: HOME | End: 2025-04-23

## 2025-04-23 ENCOUNTER — APPOINTMENT (OUTPATIENT)
Dept: INTERVENTIONAL RADIOLOGY/VASCULAR | Age: 75
DRG: 657 | End: 2025-04-23
Payer: MEDICARE

## 2025-04-23 LAB
ALBUMIN SERPL-MCNC: 3.4 G/DL (ref 3.4–5)
ANION GAP SERPL CALCULATED.3IONS-SCNC: 8 MMOL/L (ref 3–16)
BUN SERPL-MCNC: 31 MG/DL (ref 7–20)
CALCIUM SERPL-MCNC: 9 MG/DL (ref 8.3–10.6)
CHLORIDE SERPL-SCNC: 100 MMOL/L (ref 99–110)
CO2 SERPL-SCNC: 24 MMOL/L (ref 21–32)
CREAT SERPL-MCNC: 2.1 MG/DL (ref 0.8–1.3)
DEPRECATED RDW RBC AUTO: 15.9 % (ref 12.4–15.4)
GFR SERPLBLD CREATININE-BSD FMLA CKD-EPI: 32 ML/MIN/{1.73_M2}
GLUCOSE SERPL-MCNC: 94 MG/DL (ref 70–99)
HCT VFR BLD AUTO: 23.6 % (ref 40.5–52.5)
HGB BLD-MCNC: 8.1 G/DL (ref 13.5–17.5)
INR PPP: 1.07 (ref 0.85–1.15)
MCH RBC QN AUTO: 31.9 PG (ref 26–34)
MCHC RBC AUTO-ENTMCNC: 34.4 G/DL (ref 31–36)
MCV RBC AUTO: 92.8 FL (ref 80–100)
PHOSPHATE SERPL-MCNC: 3.3 MG/DL (ref 2.5–4.9)
PLATELET # BLD AUTO: 328 K/UL (ref 135–450)
PMV BLD AUTO: 6.8 FL (ref 5–10.5)
POTASSIUM SERPL-SCNC: 4.7 MMOL/L (ref 3.5–5.1)
PROTHROMBIN TIME: 14.1 SEC (ref 11.9–14.9)
RBC # BLD AUTO: 2.54 M/UL (ref 4.2–5.9)
SODIUM SERPL-SCNC: 132 MMOL/L (ref 136–145)
WBC # BLD AUTO: 12.2 K/UL (ref 4–11)

## 2025-04-23 PROCEDURE — 2500000003 HC RX 250 WO HCPCS

## 2025-04-23 PROCEDURE — C1894 INTRO/SHEATH, NON-LASER: HCPCS

## 2025-04-23 PROCEDURE — 6360000002 HC RX W HCPCS: Performed by: RADIOLOGY

## 2025-04-23 PROCEDURE — 1200000000 HC SEMI PRIVATE

## 2025-04-23 PROCEDURE — 2700000000 HC OXYGEN THERAPY PER DAY

## 2025-04-23 PROCEDURE — 36415 COLL VENOUS BLD VENIPUNCTURE: CPT

## 2025-04-23 PROCEDURE — 6370000000 HC RX 637 (ALT 250 FOR IP)

## 2025-04-23 PROCEDURE — 85027 COMPLETE CBC AUTOMATED: CPT

## 2025-04-23 PROCEDURE — 80069 RENAL FUNCTION PANEL: CPT

## 2025-04-23 PROCEDURE — 0T9430Z DRAINAGE OF LEFT KIDNEY PELVIS WITH DRAINAGE DEVICE, PERCUTANEOUS APPROACH: ICD-10-PCS | Performed by: RADIOLOGY

## 2025-04-23 PROCEDURE — 6360000004 HC RX CONTRAST MEDICATION: Performed by: FAMILY MEDICINE

## 2025-04-23 PROCEDURE — 50695 PLMT URETERAL STENT PRQ: CPT

## 2025-04-23 PROCEDURE — C1769 GUIDE WIRE: HCPCS

## 2025-04-23 PROCEDURE — 0T773DZ DILATION OF LEFT URETER WITH INTRALUMINAL DEVICE, PERCUTANEOUS APPROACH: ICD-10-PCS | Performed by: RADIOLOGY

## 2025-04-23 PROCEDURE — 99152 MOD SED SAME PHYS/QHP 5/>YRS: CPT

## 2025-04-23 PROCEDURE — 94761 N-INVAS EAR/PLS OXIMETRY MLT: CPT

## 2025-04-23 PROCEDURE — 2500000003 HC RX 250 WO HCPCS: Performed by: RADIOLOGY

## 2025-04-23 PROCEDURE — 85610 PROTHROMBIN TIME: CPT

## 2025-04-23 PROCEDURE — 99153 MOD SED SAME PHYS/QHP EA: CPT

## 2025-04-23 RX ORDER — MIDAZOLAM HYDROCHLORIDE 5 MG/ML
INJECTION, SOLUTION INTRAMUSCULAR; INTRAVENOUS PRN
Status: COMPLETED | OUTPATIENT
Start: 2025-04-23 | End: 2025-04-23

## 2025-04-23 RX ORDER — FENTANYL CITRATE 50 UG/ML
INJECTION, SOLUTION INTRAMUSCULAR; INTRAVENOUS PRN
Status: COMPLETED | OUTPATIENT
Start: 2025-04-23 | End: 2025-04-23

## 2025-04-23 RX ADMIN — CEFAZOLIN 2000 MG: 1 INJECTION, POWDER, FOR SOLUTION INTRAMUSCULAR; INTRAVENOUS at 10:55

## 2025-04-23 RX ADMIN — SODIUM CHLORIDE, PRESERVATIVE FREE 10 ML: 5 INJECTION INTRAVENOUS at 20:57

## 2025-04-23 RX ADMIN — ACETAMINOPHEN 650 MG: 325 TABLET ORAL at 13:31

## 2025-04-23 RX ADMIN — FENTANYL CITRATE 50 MCG: 50 INJECTION, SOLUTION INTRAMUSCULAR; INTRAVENOUS at 10:59

## 2025-04-23 RX ADMIN — FENTANYL CITRATE 25 MCG: 50 INJECTION, SOLUTION INTRAMUSCULAR; INTRAVENOUS at 11:36

## 2025-04-23 RX ADMIN — MIDAZOLAM HYDROCHLORIDE 1 MG: 5 INJECTION, SOLUTION INTRAMUSCULAR; INTRAVENOUS at 10:59

## 2025-04-23 RX ADMIN — MIDAZOLAM HYDROCHLORIDE 0.5 MG: 5 INJECTION, SOLUTION INTRAMUSCULAR; INTRAVENOUS at 11:36

## 2025-04-23 RX ADMIN — LEVOTHYROXINE SODIUM 75 MCG: 0.05 TABLET ORAL at 06:53

## 2025-04-23 RX ADMIN — FENTANYL CITRATE 25 MCG: 50 INJECTION, SOLUTION INTRAMUSCULAR; INTRAVENOUS at 11:14

## 2025-04-23 RX ADMIN — SODIUM CHLORIDE, PRESERVATIVE FREE 10 ML: 5 INJECTION INTRAVENOUS at 08:06

## 2025-04-23 RX ADMIN — MIDAZOLAM HYDROCHLORIDE 0.5 MG: 5 INJECTION, SOLUTION INTRAMUSCULAR; INTRAVENOUS at 11:14

## 2025-04-23 RX ADMIN — IOVERSOL 12 ML: 741 INJECTION INTRA-ARTERIAL; INTRAVENOUS at 11:53

## 2025-04-23 ASSESSMENT — PAIN DESCRIPTION - ORIENTATION: ORIENTATION: RIGHT;LEFT

## 2025-04-23 ASSESSMENT — PAIN DESCRIPTION - LOCATION: LOCATION: BACK;SHOULDER;ABDOMEN

## 2025-04-23 ASSESSMENT — PAIN SCALES - GENERAL
PAINLEVEL_OUTOF10: 6
PAINLEVEL_OUTOF10: 4

## 2025-04-23 ASSESSMENT — PAIN DESCRIPTION - DESCRIPTORS: DESCRIPTORS: ACHING

## 2025-04-23 ASSESSMENT — PAIN - FUNCTIONAL ASSESSMENT: PAIN_FUNCTIONAL_ASSESSMENT: ACTIVITIES ARE NOT PREVENTED

## 2025-04-23 NOTE — BRIEF OP NOTE
Brief Postoperative Note    Alfredo Chong  YOB: 1950  [id number]    Pre-operative Diagnosis: left obstructive uropathy    Post-operative Diagnosis: Same    Procedure: left antegrade ureteral stent and left percutaneous nephrostomy    Anesthesia: IV sedation, local    Surgeons/Assistants: Pollo Patino M.D.    Estimated Blood Loss: Minimal    Complications: none    Specimens: were not obtained    8Fr x 26 cm ureteral stent placed  8Fr nephrostomy placed    Pollo Patino MD MD  4/23/2025
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
## 2025-04-23 NOTE — PLAN OF CARE
Problem: Safety - Adult  Goal: Free from fall injury  4/23/2025 0959 by Mary Brown RN  Outcome: Progressing  4/22/2025 2207 by Samaria Chaves RN  Outcome: Progressing     Problem: Chronic Conditions and Co-morbidities  Goal: Patient's chronic conditions and co-morbidity symptoms are monitored and maintained or improved  4/23/2025 0959 by Mary Brown RN  Outcome: Progressing  4/22/2025 2207 by Samaria Chaves RN  Outcome: Progressing     Problem: Pain  Goal: Verbalizes/displays adequate comfort level or baseline comfort level  4/23/2025 0959 by Mary Brown RN  Outcome: Progressing  4/22/2025 2207 by Samaria Chaves RN  Outcome: Progressing     Problem: ABCDS Injury Assessment  Goal: Absence of physical injury  4/23/2025 0959 by Mary Brown RN  Outcome: Progressing  4/22/2025 2207 by Samaria Chaves RN  Outcome: Progressing     Problem: Discharge Planning  Goal: Discharge to home or other facility with appropriate resources  4/23/2025 0959 by Mary Brown RN  Outcome: Progressing  4/22/2025 2207 by Samaria Chaves RN  Outcome: Progressing     Problem: Nutrition Deficit:  Goal: Optimize nutritional status  4/23/2025 0959 by Mary Brown RN  Outcome: Progressing  4/22/2025 2207 by Samaria Chaves RN  Outcome: Progressing

## 2025-04-23 NOTE — PLAN OF CARE
Problem: Safety - Adult  Goal: Free from fall injury  4/22/2025 2207 by Samaria Chaves RN  Outcome: Progressing  4/22/2025 1018 by Caridad Meléndez RN  Outcome: Progressing  Flowsheets (Taken 4/22/2025 1018)  Free From Fall Injury:   Instruct family/caregiver on patient safety   Based on caregiver fall risk screen, instruct family/caregiver to ask for assistance with transferring infant if caregiver noted to have fall risk factors     Problem: Chronic Conditions and Co-morbidities  Goal: Patient's chronic conditions and co-morbidity symptoms are monitored and maintained or improved  4/22/2025 2207 by Samaria Chaves RN  Outcome: Progressing  4/22/2025 1018 by Caridad Meléndez RN  Outcome: Progressing  Flowsheets (Taken 4/22/2025 1018)  Care Plan - Patient's Chronic Conditions and Co-Morbidity Symptoms are Monitored and Maintained or Improved: Monitor and assess patient's chronic conditions and comorbid symptoms for stability, deterioration, or improvement     Problem: Pain  Goal: Verbalizes/displays adequate comfort level or baseline comfort level  4/22/2025 2207 by Samaria Chaves RN  Outcome: Progressing  4/22/2025 1018 by Caridad Meléndez RN  Outcome: Progressing  Flowsheets (Taken 4/22/2025 1018)  Verbalizes/displays adequate comfort level or baseline comfort level:   Encourage patient to monitor pain and request assistance   Assess pain using appropriate pain scale     Problem: ABCDS Injury Assessment  Goal: Absence of physical injury  4/22/2025 2207 by Samaria Chaves RN  Outcome: Progressing  4/22/2025 1018 by Caridad Meléndez RN  Outcome: Progressing  Flowsheets (Taken 4/22/2025 1018)  Absence of Physical Injury: Implement safety measures based on patient assessment     Problem: Discharge Planning  Goal: Discharge to home or other facility with appropriate resources  4/22/2025 2207 by Samaria Chaves RN  Outcome: Progressing  4/22/2025 1018 by Caridad Meléndez

## 2025-04-23 NOTE — OR NURSING
Image guided nephrostomy Tube insertion and stent placement left completed. Dr. Patino 8 [place] 25 cm [employer] Nephrostomy tube LOT # [id number] EXP 1/20/26 in the left. Drain/tube secured with suture. [employer] contour ureteral stent LOT [id number] EXP 5/26  Drain/tube dressing clean, dry, and intact. Pt tolerated procedure without any signs or symptoms of distress. Vital signs stable. Report called to floor RN. Pt transported back to [facility] in stable condition via bed by transport.     Medications  Versed: 2 mg  Fentanyl: 100 mcg  Benadryl: 0 mg    Vital Signs  Vitals:    04/23/25 1145   BP: 100/66   Pulse: 99   Resp: 18   Temp:    SpO2: 97%    (vital signs in table format)    Post Emily  2 - Able to move 4 extremities voluntarily on command  2 - BP+/- 20mmHg of normal  2 - Fully awake  2 - Able to maintain oxygen saturation >92% on room air  2 - Able to breathe deeply and cough freely

## 2025-04-23 NOTE — PROGRESS NOTES
[facility] Medicine Progress Note  V 1.6      Date of Admission: 4/19/2025    Hospital Day: 5      Chief Admission Complaint:  Blood in urine     Subjective: He is up in bed, in no acute distress states he feels little better.  He denies chest pain or shortness of breath    Presenting Admission History:       74 y.o. male who presented to [facility] with hematuria.  PMHx significant for bladder cancer followed by [facility] urology, CAD status post CABG, CHFrEF, hyperlipidemia, hypertension, hypothyroidism.     Patient states he has a known history of a bladder cancer has been receiving treatment on and off for the past several months.  Was supposed to have a repeat cystoscopy and possibly TURBT soon with urology, but is pending cardiology clearance and  recently had a stress test performed.    States that on Wednesday he had IV iron infusion as ordered by his primary cardiologist, afterwards he began to have gross hematuria in his urine.  States that he would initially start with blood but by the end of his stream would begin to be clear.  This persisted over the next several days until yesterday evening, states he was consistently having hematuria without clearing.  He did call his urologist, who told him that this is to be expected but if it worsens that he should present to the ED for evaluation.  States that his symptoms persisted today, he became concerned and came to the ED.  In the emergency room, patient had a hemoglobin of 8 when his baseline is usually around 11.  Gordon catheter was placed with drainage of gross blood.       Assessment/Plan:      Current Principal Problem:  Hematuria    Hematuria :  Noted this was present at the time of admission ongoing for several days days prior to this admission.  Hemoglobin dropped to 8.3, usual baseline is around 11-12  Gordon catheter placed with large amount of bloody urine.  CT abdomen pelvis shows large amount of intraluminal hemorrhage within the  he has had elevated creatinine for several years.  Baseline creatinine approximate 1.6 -1.8.  Will follow urine output, avoid nephrotoxins.  Will follow renal functions.  Suspect neurology may be nephrosclerosis  Nephrology following in consultation appreciate input     HypoThyroid : clinically euthyroid on oral replacement therapy. Continue, w/ outpatient monitoring as previously arranged.           Ongoing threat to life and/or bodily function without ongoing treatment due to:    Need for continuous bladder irrigation  Follow H/H closely   Hematuria      Consults:      IP CONSULT TO HOSPITALIST  IP CONSULT TO UROLOGY  IP CONSULT TO NEPHROLOGY      Nephrology consulted and available notes from yesterday and today were reviewed on 4/23/2025, w/ plan for continued inpatient w/up and management - as above      Urology consulted and available notes from yesterday and today were reviewed on 4/23/2025, w/ plan for continued inpatient w/up and management - as above   --------------------------------------------------      Medications:        Infusion Medications    sodium chloride      sodium chloride      sodium chloride 20 mL/hr at 04/21/25 2150     Scheduled Medications    [Held by provider] sacubitril-valsartan  0.5 tablet Oral BID    [Held by provider] mirtazapine  7.5 mg Oral Nightly    sodium chloride flush  5-40 mL IntraVENous 2 times per day    [Held by provider] aspirin  162 mg Oral Daily    levothyroxine  75 mcg Oral Daily    [Held by provider] metoprolol succinate  25 mg Oral Daily     PRN Meds: sodium chloride, sodium chloride, benzocaine-menthol, lidocaine, sodium chloride flush, sodium chloride, magnesium sulfate, ondansetron **OR** ondansetron, polyethylene glycol, acetaminophen **OR** acetaminophen      Physical Exam Performed:      General appearance: He is lying in bed, looks little weak and tired.  He is in no acute distress he is alert and cooperative   Respiratory: Bilateral breath sounds, decreased

## 2025-04-23 NOTE — OR NURSING
Pt arrived for image guided nephrostomy Tube and possible stent insertion left . Procedure explained including the risk and benefits of the procedure. All questions answered. Pt verbalizes understanding of the procedure and states no more questions. Consent confirmed. Vital signs stable. Labs, allergies, medications, and code status reviewed. No contraindications noted.     Vital Signs  Vitals:    04/23/25 0808   BP: 105/80   Pulse: 89   Resp: 16   Temp: 98.1 °F (36.7 °C)   SpO2: 97%    (vital signs in table format)    Pre Emily Score  2 - Able to move 4 extremities voluntarily on command  2 - BP+/- 20mmHg of normal  2 - Fully awake  2 - Able to maintain oxygen saturation >92% on room air  2 - Able to breathe deeply and cough freely    Allergies  Simvastatin (allergies)    Labs  Lab Results   Component Value Date    INR 1.07 04/23/2025    PROTIME 14.1 04/23/2025     Lab Results   Component Value Date    CREATININE 2.1 (H) 04/23/2025    BUN 31 (H) 04/23/2025     (L) 04/23/2025    K 4.7 04/23/2025     04/23/2025    CO2 24 04/23/2025     Lab Results   Component Value Date    WBC 12.2 (H) 04/23/2025    HGB 8.1 (L) 04/23/2025    HCT 23.6 (L) 04/23/2025    MCV 92.8 04/23/2025     04/23/2025

## 2025-04-23 NOTE — PROGRESS NOTES
Nephrology Progress Note   [facility].Yingying Licai      This patient is a 74 year old male whom we are following for AMARILYS on CKD.      Subjective:  The patient was seen and examined. Off CBI. Received pRBC blood transfusion yesterday. Still complains of generalized weakness.    Family History: No family at bedside  ROS: No SOB or chest pain      Vitals:  /80   Pulse 89   Temp 98.1 °F (36.7 °C) (Oral)   Resp 16   Ht 1.778 m (5' 10\")   Wt 98 kg (216 lb)   SpO2 97%   BMI 30.99 kg/m²   I/O last 3 completed shifts:  In: 1160.4 [P.O.:660; I.V.:15.7; Blood:300; IV Piggyback:184.7]  Out: 7300 [Urine:7300]  No intake/output data recorded.      Physical Exam  Vitals reviewed.   Constitutional:       General: He is not in acute distress.     Appearance: Normal appearance.   HENT:      Head: Normocephalic and atraumatic.   Eyes:      General: No scleral icterus.     Comments: Pale palpebral conjunctivae   Cardiovascular:      Rate and Rhythm: Normal rate and regular rhythm.      Heart sounds:      No friction rub.   Pulmonary:      Effort: Pulmonary effort is normal. No respiratory distress.      Breath sounds: No wheezing or rales.   Abdominal:      General: Bowel sounds are normal. There is no distension.      Tenderness: There is no abdominal tenderness.   Musculoskeletal:      Right lower leg: No edema.      Left lower leg: No edema.   Neurological:      Mental Status: He is alert.           Medications:   [Held by provider] sacubitril-valsartan  0.5 tablet Oral BID    [Held by provider] mirtazapine  7.5 mg Oral Nightly    sodium chloride flush  5-40 mL IntraVENous 2 times per day    [Held by provider] aspirin  162 mg Oral Daily    levothyroxine  75 mcg Oral Daily    [Held by provider] metoprolol succinate  25 mg Oral Daily         Labs:  Recent Labs     04/21/25  0535 04/21/25  1809 04/22/25  0523 04/22/25  1550 04/22/25  2125 04/23/25  0458   WBC 12.9*  --  12.4*  --   --  12.2*   HGB 6.4*   < > 7.0* 6.7* 7.4* 8.1*

## 2025-04-23 NOTE — PROGRESS NOTES
Urology Progress Note      Subjective: Alfredo SWEENEY Arlette denies  complaints    Received 1 U PRBC and IV iron on .  Hb stable at 7.0  Creat slightly trending up to 2.3 today from 2.0 on      Vitals:  /73   Pulse 78   Temp 97.5 °F (36.4 °C) (Oral)   Resp 18   Ht 1.778 m (5' 10\")   Wt 98 kg (216 lb)   SpO2 95%   BMI 30.99 kg/m²   Temp  Av °F (36.7 °C)  Min: 97.5 °F (36.4 °C)  Max: 98.2 °F (36.8 °C)    Intake/Output Summary (Last 24 hours) at 2025 1518  Last data filed at 2025 1357  Gross per 24 hour   Intake 300 ml   Output 3050 ml   Net -2750 ml       Exam:   Physical:   Well developed, well nourished in no acute distress  Mood indicates no abnormalities. Pt doesn’t appear depressed  Orientated to time and place     Male :  Gordon draining clear urine off CBI    Labs:  WBC:    Lab Results   Component Value Date/Time    WBC 12.2 2025 04:58 AM     Hemoglobin/Hematocrit:    Lab Results   Component Value Date/Time    HGB 8.1 2025 04:58 AM    HCT 23.6 2025 04:58 AM     BMP:    Lab Results   Component Value Date/Time     2025 04:58 AM    K 4.7 2025 04:58 AM    K 4.4 2025 04:16 AM     2025 04:58 AM    CO2 24 2025 04:58 AM    BUN 31 2025 04:58 AM    CREATININE 2.1 2025 04:58 AM    CALCIUM 9.0 2025 04:58 AM    GFRAA >60 2021 03:50 PM    GFRAA >60 2013 08:24 AM    LABGLOM 32 2025 04:58 AM    LABGLOM 36 2024 12:21 PM     PT/INR:    Lab Results   Component Value Date/Time    PROTIME 14.1 2025 08:12 AM    INR 1.07 2025 08:12 AM     PTT:    Lab Results   Component Value Date/Time    APTT 30.6 2024 12:21 PM   [APTT    Urinalysis: >100 RBC    Urine Culture:      <50,000 CFU/ml mixed skin/urogenital horacio. No further workup         Imaging:     XR ABDOMEN (KUB) (SINGLE AP VIEW)   Final Result      Ureteral stent in place as described.      Please see procedural note in the  medical record.      Electronically signed by Luma Burnett MD      FLUORO FOR SURGICAL PROCEDURES   Final Result      Ureteral stent in place as described.      Please see procedural note in the medical record.      Electronically signed by Luma Burnett MD      CT ABDOMEN PELVIS WO CONTRAST Additional Contrast? None   Final Result      1. Large amount of intraluminal hemorrhage within the urinary bladder, which obstructs the left UVJ resulting in moderate hydroureteronephrosis. There is a probable mucosal abnormality along the left lateral wall of the bladder. Recommend cystoscopy to    exclude in the callosal lesion such as malignancy.      Electronically signed by Jonas Ramos DO      IR GUIDED NEPHROSTOMY CATH PLACEMENT LEFT    (Results Pending)       IMAGING:  Ct a/p (4/19/2025):    1. Large amount of intraluminal hemorrhage within the urinary bladder, which obstructs the left UVJ resulting in moderate hydroureteronephrosis. There is a probable mucosal abnormality along the left lateral wall of the bladder. Recommend cystoscopy to   exclude in the callosal lesion such as malignancy.        IMPRESSION:  74 y.o. male with hx of HG urothelial CA in 3/2024, recurrence of LG urothelial CA in 11/2024.  Completed induction BCG.  Known bladder tumor at the right UO and anterior bladder neck.  Under the care of Dr. Tapia from [facility].  CT on admission revealed bladder tumor as well as left hydronephrosis      PLAN:  - POD3 cysto with clot evac, TURBT of large multifocal tumors, insertion of RIGHT ureteral stent .  LEFT ureteral stent could not be placed retrograde.    - Will ask IR to place antegrade left ureteral stent today    - CBI has been weaned to off and urine is clear, cont to monitor off CBI for now, can resume if urine becomes bloodier  - Gordon to remain in place for now, can likely remove tomorrow   - Right ureteral stent to remain in place x 3 weeks   - Await final pathology for determination of

## 2025-04-23 NOTE — PROGRESS NOTES
Physical Therapy  Attempted follow up at bedside but RN advised to hold as he is leaving for procedure in 15 minutes.  Will try back later this afternoon as time allows.  Brianna Chilel, PTA#[id number]

## 2025-04-23 NOTE — CARE COORDINATION
Hospital day 4: Patient on C3 re Hematuria care managed by IM, Nephrology, and Urology. Patient from home IPTA. Therapy Recs CTA, will follow. IR placed Nep Tube this date. .GALE Hatch

## 2025-04-24 ENCOUNTER — TELEPHONE (OUTPATIENT)
Dept: CARDIOLOGY CLINIC | Age: 75
End: 2025-04-24

## 2025-04-24 LAB
ALBUMIN SERPL-MCNC: 3.4 G/DL (ref 3.4–5)
ANION GAP SERPL CALCULATED.3IONS-SCNC: 7 MMOL/L (ref 3–16)
BUN SERPL-MCNC: 29 MG/DL (ref 7–20)
CALCIUM SERPL-MCNC: 8.9 MG/DL (ref 8.3–10.6)
CHLORIDE SERPL-SCNC: 99 MMOL/L (ref 99–110)
CO2 SERPL-SCNC: 23 MMOL/L (ref 21–32)
CREAT SERPL-MCNC: 1.9 MG/DL (ref 0.8–1.3)
DEPRECATED RDW RBC AUTO: 15.8 % (ref 12.4–15.4)
GFR SERPLBLD CREATININE-BSD FMLA CKD-EPI: 36 ML/MIN/{1.73_M2}
GLUCOSE SERPL-MCNC: 104 MG/DL (ref 70–99)
HCT VFR BLD AUTO: 22.8 % (ref 40.5–52.5)
HGB BLD-MCNC: 8 G/DL (ref 13.5–17.5)
MCH RBC QN AUTO: 32.9 PG (ref 26–34)
MCHC RBC AUTO-ENTMCNC: 35.2 G/DL (ref 31–36)
MCV RBC AUTO: 93.5 FL (ref 80–100)
PHOSPHATE SERPL-MCNC: 3.6 MG/DL (ref 2.5–4.9)
PLATELET # BLD AUTO: 344 K/UL (ref 135–450)
PMV BLD AUTO: 6.8 FL (ref 5–10.5)
POTASSIUM SERPL-SCNC: 4.4 MMOL/L (ref 3.5–5.1)
RBC # BLD AUTO: 2.44 M/UL (ref 4.2–5.9)
SODIUM SERPL-SCNC: 129 MMOL/L (ref 136–145)
WBC # BLD AUTO: 11.3 K/UL (ref 4–11)

## 2025-04-24 PROCEDURE — 6370000000 HC RX 637 (ALT 250 FOR IP): Performed by: NURSE PRACTITIONER

## 2025-04-24 PROCEDURE — 85027 COMPLETE CBC AUTOMATED: CPT

## 2025-04-24 PROCEDURE — 2500000003 HC RX 250 WO HCPCS

## 2025-04-24 PROCEDURE — 1200000000 HC SEMI PRIVATE

## 2025-04-24 PROCEDURE — 97530 THERAPEUTIC ACTIVITIES: CPT

## 2025-04-24 PROCEDURE — 36415 COLL VENOUS BLD VENIPUNCTURE: CPT

## 2025-04-24 PROCEDURE — 97110 THERAPEUTIC EXERCISES: CPT

## 2025-04-24 PROCEDURE — 80069 RENAL FUNCTION PANEL: CPT

## 2025-04-24 PROCEDURE — 2580000003 HC RX 258: Performed by: INTERNAL MEDICINE

## 2025-04-24 PROCEDURE — 97535 SELF CARE MNGMENT TRAINING: CPT

## 2025-04-24 PROCEDURE — 6370000000 HC RX 637 (ALT 250 FOR IP)

## 2025-04-24 PROCEDURE — 6370000000 HC RX 637 (ALT 250 FOR IP): Performed by: INTERNAL MEDICINE

## 2025-04-24 PROCEDURE — 51798 US URINE CAPACITY MEASURE: CPT

## 2025-04-24 PROCEDURE — 97116 GAIT TRAINING THERAPY: CPT

## 2025-04-24 RX ORDER — BISACODYL 5 MG/1
10 TABLET, DELAYED RELEASE ORAL ONCE
Status: COMPLETED | OUTPATIENT
Start: 2025-04-24 | End: 2025-04-24

## 2025-04-24 RX ORDER — POLYETHYLENE GLYCOL 3350 17 G/17G
17 POWDER, FOR SOLUTION ORAL DAILY
Status: DISCONTINUED | OUTPATIENT
Start: 2025-04-24 | End: 2025-04-26

## 2025-04-24 RX ORDER — 0.9 % SODIUM CHLORIDE 0.9 %
500 INTRAVENOUS SOLUTION INTRAVENOUS ONCE
Status: COMPLETED | OUTPATIENT
Start: 2025-04-24 | End: 2025-04-24

## 2025-04-24 RX ORDER — DOCUSATE SODIUM 100 MG/1
100 CAPSULE, LIQUID FILLED ORAL 2 TIMES DAILY
Status: DISCONTINUED | OUTPATIENT
Start: 2025-04-24 | End: 2025-04-26

## 2025-04-24 RX ORDER — METOPROLOL SUCCINATE 25 MG/1
12.5 TABLET, EXTENDED RELEASE ORAL DAILY
Status: DISCONTINUED | OUTPATIENT
Start: 2025-04-25 | End: 2025-04-29 | Stop reason: HOSPADM

## 2025-04-24 RX ADMIN — BISACODYL 10 MG: 5 TABLET, COATED ORAL at 12:38

## 2025-04-24 RX ADMIN — DOCUSATE SODIUM 100 MG: 100 CAPSULE, LIQUID FILLED ORAL at 21:06

## 2025-04-24 RX ADMIN — POLYETHYLENE GLYCOL 3350 17 G: 17 POWDER, FOR SOLUTION ORAL at 12:38

## 2025-04-24 RX ADMIN — SODIUM CHLORIDE, PRESERVATIVE FREE 10 ML: 5 INJECTION INTRAVENOUS at 09:47

## 2025-04-24 RX ADMIN — ACETAMINOPHEN 650 MG: 325 TABLET ORAL at 00:24

## 2025-04-24 RX ADMIN — LEVOTHYROXINE SODIUM 75 MCG: 0.05 TABLET ORAL at 06:20

## 2025-04-24 RX ADMIN — SODIUM CHLORIDE 500 ML: 0.9 INJECTION, SOLUTION INTRAVENOUS at 18:39

## 2025-04-24 ASSESSMENT — PAIN DESCRIPTION - PAIN TYPE: TYPE: ACUTE PAIN

## 2025-04-24 ASSESSMENT — PAIN SCALES - GENERAL: PAINLEVEL_OUTOF10: 3

## 2025-04-24 ASSESSMENT — PAIN DESCRIPTION - LOCATION: LOCATION: ABDOMEN

## 2025-04-24 ASSESSMENT — PAIN DESCRIPTION - ORIENTATION: ORIENTATION: LEFT;LOWER

## 2025-04-24 ASSESSMENT — PAIN DESCRIPTION - DESCRIPTORS: DESCRIPTORS: ACHING

## 2025-04-24 ASSESSMENT — PAIN - FUNCTIONAL ASSESSMENT: PAIN_FUNCTIONAL_ASSESSMENT: ACTIVITIES ARE NOT PREVENTED

## 2025-04-24 NOTE — PROGRESS NOTES
Physical Therapy  Facility/Department: [facility] C3 TELE/MED SURG/ONC  Daily Treatment Note  NAME: Alfredo Chong  : 1950  MRN: [id number]    Date of Service: 2025    Discharge Recommendations:  Continue to assess pending progress   PT Equipment Recommendations  Equipment Needed: No    Patient Diagnosis(es): The primary encounter diagnosis was Hematuria, unspecified type. Diagnoses of Anemia, unspecified type and Clot retention of urine were also pertinent to this visit.    Assessment  Assessment: Pt progressing OOB and ambulating with RW but remains orthostatic when up, however appears asymptomatic if he is reporting correctly.  Pt perfomred BLE exs seated in chair.  He is recommended for con't skilled PT and home 24 hr supervision at D/C.  Activity Tolerance: Patient tolerated evaluation without incident;Patient tolerated treatment well;Treatment limited secondary to medical complications  Equipment Needed: No    Plan  Physical Therapy Plan  General Plan: 3-5 times per week  Current Treatment Recommendations: Strengthening;ROM;Balance training;Functional mobility training;Transfer training;Endurance training;Gait training;Pain management;Home exercise program;Safety education & training;Patient/Caregiver education & training;Therapeutic activities;Stair training    Restrictions  Restrictions/Precautions  Restrictions/Precautions: General Precautions, Fall Risk  Activity Level: Up as Tolerated  Required Braces or Orthoses?: No  Position Activity Restriction  Other Position/Activity Restrictions: IV, baeza     Subjective   Agreeable, tired of being in bed       Objective  Vitals  Vitals  Pulse: 85  SpO2: 100 %  O2 Device: None (Room air)  BP: 111/79  MAP (Calculated): 90  BP Location: Left upper arm  BP Method: Automatic  Patient Position: Semi fowlers  Comment: Seated EOB /68 , Standing BP 93/53 , Seated in chair after rest break /73 HR 82, seated in chair after toileting and  Outcome: Verbalized understanding;Demonstrated understanding                Therapy Time   Individual Concurrent Group Co-treatment   Time In       1047   Time Out       1140   Minutes       53           Brianna Chilel, PTA#[id number]

## 2025-04-24 NOTE — PROGRESS NOTES
[facility] Medicine Progress Note  V 1.6      Date of Admission: 4/19/2025    Hospital Day: 6      Chief Admission Complaint: Blood in urine    Subjective: He is sitting up in a chair eating a meal.  He looks better today and he states he is feeling better.  Denies chest pain or shortness of breath.    Presenting Admission History:       74 y.o. male who presented to [facility] with hematuria.  PMHx significant for bladder cancer followed by [facility] urology, CAD status post CABG, CHFrEF, hyperlipidemia, hypertension, hypothyroidism.     Patient states he has a known history of a bladder cancer has been receiving treatment on and off for the past several months.  Was supposed to have a repeat cystoscopy and possibly TURBT soon with urology, but is pending cardiology clearance and  recently had a stress test performed.    States that on Wednesday he had IV iron infusion as ordered by his primary cardiologist, afterwards he began to have gross hematuria in his urine.    States that he would initially start with blood but by the end of his stream would begin to be clear.  This persisted over the next several days until yesterday evening, states he was consistently having hematuria without clearing.    He did call his urologist, who told him that this is to be expected but if it worsens that he should present to the ED for evaluation.  States that his symptoms persisted today, he became concerned and came to the ED.  In the emergency room, patient had a hemoglobin of 8 when his baseline is usually around 11.  Godron catheter was placed with drainage of gross blood.       Assessment/Plan:      Current Principal Problem:  Hematuria    Hematuria :  Noted this was present at the time of admission ongoing for several days days prior to this admission.  Hemoglobin dropped to 8.3, usual baseline is around 11-12  Gordon catheter placed with large amount of bloody urine.  CT abdomen pelvis shows large amount of

## 2025-04-24 NOTE — PROGRESS NOTES
Nephrology Progress Note   [facility]      Sub/interval history  Resting  C/o dizziness when standing and constipation    Last 24 h uop 2.5 l    ROS: No chest pain/shortness of breath/fever/nausea/vomiting  PSFH: No visitor    Scheduled Meds:   polyethylene glycol  17 g Oral Daily    [START ON 4/25/2025] metoprolol succinate  12.5 mg Oral Daily    [Held by provider] sacubitril-valsartan  0.5 tablet Oral BID    [Held by provider] mirtazapine  7.5 mg Oral Nightly    sodium chloride flush  5-40 mL IntraVENous 2 times per day    [Held by provider] aspirin  162 mg Oral Daily    levothyroxine  75 mcg Oral Daily     Continuous Infusions:   sodium chloride      sodium chloride      sodium chloride 20 mL/hr at 04/21/25 2150     PRN Meds:.sodium chloride, sodium chloride, benzocaine-menthol, lidocaine, sodium chloride flush, sodium chloride, magnesium sulfate, ondansetron **OR** ondansetron, polyethylene glycol, acetaminophen **OR** acetaminophen    Objective/     Vitals:    04/24/25 0618 04/24/25 0945 04/24/25 1053 04/24/25 1243   BP: 101/73 111/77 111/79 99/72   Pulse: 85 90 85 95   Resp: 18 18  18   Temp: 98.2 °F (36.8 °C) 97.5 °F (36.4 °C)     TempSrc: Oral Oral     SpO2: 95% 98% 100% 100%   Weight:       Height:         24HR INTAKE/OUTPUT:    Intake/Output Summary (Last 24 hours) at 4/24/2025 1425  Last data filed at 4/24/2025 1246  Gross per 24 hour   Intake 620 ml   Output 1805 ml   Net -1185 ml     Gen: alert, awake  Neck: No JVD  Skin: Unremarkable  Cardiovascular:  S1, S2 without m/r/g   Respiratory: CTA B without w/r/r; respiratory effort normal  Abdomen:  soft, nt, nd,   Extremities: trace lower extremity edema  Neuro/Psy: AAoriented times 3 ; moves all 4 ext    Data/  Recent Labs     04/22/25  0523 04/22/25  1550 04/22/25  2125 04/23/25  0458 04/24/25  0454   WBC 12.4*  --   --  12.2* 11.3*   HGB 7.0*   < > 7.4* 8.1* 8.0*   HCT 20.1*   < > 21.8* 23.6* 22.8*   MCV 92.8  --   --  92.8 93.5     --    --  328 344    < > = values in this interval not displayed.     Recent Labs     04/22/25  0523 04/23/25  0458 04/24/25  0454   * 132* 129*   K 4.6 4.7 4.4   CL 99 100 99   CO2 23 24 23   GLUCOSE 100* 94 104*   PHOS 2.8 3.3 3.6   BUN 32* 31* 29*   CREATININE 2.3* 2.1* 1.9*   LABGLOM 29* 32* 36*       Assessment/Plan   AMARILYS on CKD Stage 3b:  Baseline 1.6 - 2.0 since 2023  Etiology of AMARILYS is multifactorial: from renal hypoperfusion in the setting of hypotension, anemia and obstructive uropathy. Back to baseline 4/24  Etiology of CKD: Clinical diagnosis would be nephrosclerosis  Negative albuminuria when screened back in 2021.  Slow progression since 2018 when Scr was ~ 1.4  CT with left hydronephrosis which appears to be new from prior scan  UA with hematuria but he does have bladder cancer.   Encouraged to increase PO fluid intake. Entresto and Metoprolol on hold with borderline low BP.  IV  ml bolus over 2 h for orthostasis on 4/24  Bladder Cancer:  Now with gross hematuria and left hydronephrosis   Urology following  On CBI S/P cystoscopy with clot evacuation, TURBT, R stent insertion, R retrograde pyelogram.  Tumors resected and pathology show pT2 urothelial CA with clinical signs of T3 disease (hydro). Per urology, needs to see oncology and then will need to follow up with Dr. Moctezuma for consideration of cystectomy following anticipated neoadjuvant therapy.   S/p Left antegrade stent n PCNT per IR 4/23/25; urology planning to get it removed on 4/25. Gordon removed 4/24  Anemia:  Acute blood loss anemia. Prn blood transfusion per primary service.  Completed IV Fe infusion.  Holding off on LILLY with bladder malignancy.  Hyponatremia:  Mild and stable.   monitor    Eri Glynn MD  Office: [phone]  Fax:    [phone]  [employer]

## 2025-04-24 NOTE — PROGRESS NOTES
Awake on bed, alert and oriented  x 4. IV on left hand. Gordon catheter draining to urine bag. Left nephrostomy tube, draining bloody drainage. Call light within reach. Side rails x 2. Bed alarm on. Will continue to monitor.

## 2025-04-24 NOTE — PROGRESS NOTES
Mick sent to Dr. Restrepo:    \"HI, Pt is retaining urine. Pt had his catheter removed at 11AM, just urinated now with 125 ml, PVR is 327 ml. Would you like to do a straight catheter?\"    Spoke to Dr. Restrepo, ordered not to do straigh catheter for now due to trauma and to and just encourage patient to ambulate. Patient to be assessed tomorrow am.      Will continue to monitor.

## 2025-04-24 NOTE — CONSULTS
Consult Placed   Consult sent via perfect serve  Who: Dr. Garcia  Date: 4/24/2025  Time: 10:06 AM       Electronically signed by Oralia Awad on 4/24/2025 at 10:06 AM

## 2025-04-24 NOTE — TELEPHONE ENCOUNTER
Pt contacted office to let TINY be made aware that he has been hospitalized for the past few days for a Urology issue.  Wanted to let TINY know so she could review notes.

## 2025-04-24 NOTE — PLAN OF CARE
Problem: Chronic Conditions and Co-morbidities  Goal: Patient's chronic conditions and co-morbidity symptoms are monitored and maintained or improved  4/23/2025 2059 by Lamar Mitchell RN  Flowsheets (Taken 4/23/2025 2059)  Care Plan - Patient's Chronic Conditions and Co-Morbidity Symptoms are Monitored and Maintained or Improved:   Monitor and assess patient's chronic conditions and comorbid symptoms for stability, deterioration, or improvement   Collaborate with multidisciplinary team to address chronic and comorbid conditions and prevent exacerbation or deterioration   Update acute care plan with appropriate goals if chronic or comorbid symptoms are exacerbated and prevent overall improvement and discharge  4/23/2025 0959 by Mary Brown RN  Outcome: Progressing

## 2025-04-24 NOTE — PROGRESS NOTES
Urology Progress Note      Subjective: Alfredo SWEENEY Arlette denies  complaints    IR placed antegrade left ureteral stent and left PCNT on , nurse has capped the PCNT this morning  Path showing high grade muscle invasive bladder cancer       Vitals:  /79   Pulse 85   Temp 97.5 °F (36.4 °C) (Oral)   Resp 18   Ht 1.778 m (5' 10\")   Wt 98 kg (216 lb)   SpO2 100%   BMI 30.99 kg/m²   Temp  Av.9 °F (36.6 °C)  Min: 97.3 °F (36.3 °C)  Max: 98.4 °F (36.9 °C)    Intake/Output Summary (Last 24 hours) at 2025 1207  Last data filed at 2025 0623  Gross per 24 hour   Intake 320 ml   Output 2455 ml   Net -2135 ml       Exam:   Physical:   Well developed, well nourished in no acute distress  Mood indicates no abnormalities. Pt doesn’t appear depressed  Orientated to time and place     Male :  Gordon draining clear urine off CBI    Labs:  WBC:    Lab Results   Component Value Date/Time    WBC 11.3 2025 04:54 AM     Hemoglobin/Hematocrit:    Lab Results   Component Value Date/Time    HGB 8.0 2025 04:54 AM    HCT 22.8 2025 04:54 AM     BMP:    Lab Results   Component Value Date/Time     2025 04:54 AM    K 4.4 2025 04:54 AM    K 4.4 2025 04:16 AM    CL 99 2025 04:54 AM    CO2 23 2025 04:54 AM    BUN 29 2025 04:54 AM    CREATININE 1.9 2025 04:54 AM    CALCIUM 8.9 2025 04:54 AM    GFRAA >60 2021 03:50 PM    GFRAA >60 2013 08:24 AM    LABGLOM 36 2025 04:54 AM    LABGLOM 36 2024 12:21 PM     PT/INR:    Lab Results   Component Value Date/Time    PROTIME 14.1 2025 08:12 AM    INR 1.07 2025 08:12 AM     PTT:    Lab Results   Component Value Date/Time    APTT 30.6 2024 12:21 PM   [APTT    Urinalysis: >100 RBC    Urine Culture:      <50,000 CFU/ml mixed skin/urogenital horacio. No further workup         Imaging:     IR GUIDED NEPHROSTOMY CATH PLACEMENT LEFT   Final Result      1. Findings of moderate

## 2025-04-24 NOTE — PROGRESS NOTES
Occupational Therapy  Facility/Department: [facility] C3 TELE/MED SURG/ONC  Daily Treatment Note  NAME: Alfredo Chong  : 1950  MRN: [id number]    Date of Service: 2025    Discharge Recommendations:  24 hour supervision or assist, Home with Home health OT  OT Equipment Recommendations  Equipment Needed: No    If pt is unable to be seen after this session, please let this note serve as discharge summary.  Please see case management note for discharge disposition.  Thank you.    Patient Diagnosis(es): The primary encounter diagnosis was Hematuria, unspecified type. Diagnoses of Anemia, unspecified type and Clot retention of urine were also pertinent to this visit.     Assessment   Assessment: Pt supine in bed at start of session. Pt tolerates OT session well and is motivated to complete OOB activity. Pt completes bed mobility with SBA and functional transfers and mobility with RW and CGA. Pt with fluctuation BP throughout session (see vitals) but pt asymptomatic and recovers with seated rest breaks/ exercises/ Pt able to complete bathroom mobility this date and toileting needs with supervision. Pt is limited by activity tolerance - OT recommends home with 24 assist/supervision and HHOT to increase pt's independence with ADLs/ mobility. Pt will continue to benefit from continued skilled OT services at this time. Co-tx collaboration this date to safely meet goals and will have better occupational performance outcomes with in a co-treatment than 1:1 session.  Activity Tolerance: Patient tolerated treatment well;Treatment limited secondary to medical complications  Discharge Recommendations: 24 hour supervision or assist;Home with Home health OT  Equipment Needed: No     Plan  Occupational Therapy Plan  Times Per Week: 2-5x/week  Current Treatment Recommendations: Strengthening;ROM;Functional mobility training;Endurance training;Safety education & training;Patient/Caregiver education & training;Equipment  Minutes: 53 Minutes       Marcela Mccord, OT

## 2025-04-25 ENCOUNTER — APPOINTMENT (OUTPATIENT)
Dept: INTERVENTIONAL RADIOLOGY/VASCULAR | Age: 75
DRG: 657 | End: 2025-04-25
Payer: MEDICARE

## 2025-04-25 LAB
ANION GAP SERPL CALCULATED.3IONS-SCNC: 8 MMOL/L (ref 3–16)
BUN SERPL-MCNC: 30 MG/DL (ref 7–20)
CALCIUM SERPL-MCNC: 9.1 MG/DL (ref 8.3–10.6)
CHLORIDE SERPL-SCNC: 95 MMOL/L (ref 99–110)
CO2 SERPL-SCNC: 23 MMOL/L (ref 21–32)
CREAT SERPL-MCNC: 1.8 MG/DL (ref 0.8–1.3)
DEPRECATED RDW RBC AUTO: 15.9 % (ref 12.4–15.4)
GFR SERPLBLD CREATININE-BSD FMLA CKD-EPI: 39 ML/MIN/{1.73_M2}
GLUCOSE SERPL-MCNC: 102 MG/DL (ref 70–99)
HCT VFR BLD AUTO: 23.8 % (ref 40.5–52.5)
HGB BLD-MCNC: 8.2 G/DL (ref 13.5–17.5)
MCH RBC QN AUTO: 32.3 PG (ref 26–34)
MCHC RBC AUTO-ENTMCNC: 34.3 G/DL (ref 31–36)
MCV RBC AUTO: 94.1 FL (ref 80–100)
PLATELET # BLD AUTO: 396 K/UL (ref 135–450)
PMV BLD AUTO: 6.9 FL (ref 5–10.5)
POTASSIUM SERPL-SCNC: 4.6 MMOL/L (ref 3.5–5.1)
RBC # BLD AUTO: 2.53 M/UL (ref 4.2–5.9)
SODIUM SERPL-SCNC: 126 MMOL/L (ref 136–145)
SODIUM UR-SCNC: 49 MMOL/L
WBC # BLD AUTO: 13.9 K/UL (ref 4–11)

## 2025-04-25 PROCEDURE — 6370000000 HC RX 637 (ALT 250 FOR IP): Performed by: INTERNAL MEDICINE

## 2025-04-25 PROCEDURE — 1200000000 HC SEMI PRIVATE

## 2025-04-25 PROCEDURE — 84300 ASSAY OF URINE SODIUM: CPT

## 2025-04-25 PROCEDURE — 85027 COMPLETE CBC AUTOMATED: CPT

## 2025-04-25 PROCEDURE — 6360000002 HC RX W HCPCS: Performed by: RADIOLOGY

## 2025-04-25 PROCEDURE — 6370000000 HC RX 637 (ALT 250 FOR IP): Performed by: NURSE PRACTITIONER

## 2025-04-25 PROCEDURE — 80048 BASIC METABOLIC PNL TOTAL CA: CPT

## 2025-04-25 PROCEDURE — 83935 ASSAY OF URINE OSMOLALITY: CPT

## 2025-04-25 PROCEDURE — 6370000000 HC RX 637 (ALT 250 FOR IP)

## 2025-04-25 PROCEDURE — 51798 US URINE CAPACITY MEASURE: CPT

## 2025-04-25 PROCEDURE — 0TP5X0Z REMOVAL OF DRAINAGE DEVICE FROM KIDNEY, EXTERNAL APPROACH: ICD-10-PCS | Performed by: RADIOLOGY

## 2025-04-25 PROCEDURE — 2500000003 HC RX 250 WO HCPCS

## 2025-04-25 PROCEDURE — 2580000003 HC RX 258: Performed by: INTERNAL MEDICINE

## 2025-04-25 PROCEDURE — 36415 COLL VENOUS BLD VENIPUNCTURE: CPT

## 2025-04-25 PROCEDURE — 6360000004 HC RX CONTRAST MEDICATION: Performed by: PHYSICIAN ASSISTANT

## 2025-04-25 PROCEDURE — 6370000000 HC RX 637 (ALT 250 FOR IP): Performed by: HOSPITALIST

## 2025-04-25 PROCEDURE — C1769 GUIDE WIRE: HCPCS

## 2025-04-25 PROCEDURE — 50389 REMOVE RENAL TUBE W/FLUORO: CPT

## 2025-04-25 PROCEDURE — 6360000002 HC RX W HCPCS: Performed by: INTERNAL MEDICINE

## 2025-04-25 RX ORDER — LIDOCAINE HYDROCHLORIDE 20 MG/ML
INJECTION, SOLUTION EPIDURAL; INFILTRATION; INTRACAUDAL; PERINEURAL PRN
Status: COMPLETED | OUTPATIENT
Start: 2025-04-25 | End: 2025-04-25

## 2025-04-25 RX ORDER — HEPARIN SODIUM 5000 [USP'U]/ML
5000 INJECTION, SOLUTION INTRAVENOUS; SUBCUTANEOUS EVERY 8 HOURS SCHEDULED
Status: DISCONTINUED | OUTPATIENT
Start: 2025-04-26 | End: 2025-04-29 | Stop reason: HOSPADM

## 2025-04-25 RX ORDER — ASPIRIN 81 MG/1
81 TABLET ORAL DAILY
Status: DISCONTINUED | OUTPATIENT
Start: 2025-04-26 | End: 2025-04-29 | Stop reason: HOSPADM

## 2025-04-25 RX ORDER — BISACODYL 10 MG
10 SUPPOSITORY, RECTAL RECTAL DAILY PRN
Status: DISCONTINUED | OUTPATIENT
Start: 2025-04-25 | End: 2025-04-29 | Stop reason: HOSPADM

## 2025-04-25 RX ORDER — MAGNESIUM CARB/ALUMINUM HYDROX 105-160MG
30 TABLET,CHEWABLE ORAL ONCE
Status: COMPLETED | OUTPATIENT
Start: 2025-04-25 | End: 2025-04-26

## 2025-04-25 RX ADMIN — DOCUSATE SODIUM 100 MG: 100 CAPSULE, LIQUID FILLED ORAL at 08:28

## 2025-04-25 RX ADMIN — IRON SUCROSE 200 MG: 20 INJECTION, SOLUTION INTRAVENOUS at 12:01

## 2025-04-25 RX ADMIN — IOVERSOL 2 ML: 678 INJECTION INTRA-ARTERIAL; INTRAVENOUS at 11:12

## 2025-04-25 RX ADMIN — Medication 15 G: at 11:56

## 2025-04-25 RX ADMIN — SODIUM CHLORIDE, PRESERVATIVE FREE 10 ML: 5 INJECTION INTRAVENOUS at 20:55

## 2025-04-25 RX ADMIN — METOPROLOL SUCCINATE 12.5 MG: 25 TABLET, EXTENDED RELEASE ORAL at 08:28

## 2025-04-25 RX ADMIN — ACETAMINOPHEN 650 MG: 325 TABLET ORAL at 21:07

## 2025-04-25 RX ADMIN — BISACODYL 10 MG: 10 SUPPOSITORY RECTAL at 06:20

## 2025-04-25 RX ADMIN — DOCUSATE SODIUM 100 MG: 100 CAPSULE, LIQUID FILLED ORAL at 20:54

## 2025-04-25 RX ADMIN — Medication 15 G: at 20:54

## 2025-04-25 RX ADMIN — LEVOTHYROXINE SODIUM 75 MCG: 0.05 TABLET ORAL at 06:20

## 2025-04-25 RX ADMIN — LIDOCAINE HYDROCHLORIDE 8 ML: 20 INJECTION, SOLUTION EPIDURAL; INFILTRATION; INTRACAUDAL; PERINEURAL at 11:03

## 2025-04-25 RX ADMIN — SODIUM CHLORIDE, PRESERVATIVE FREE 10 ML: 5 INJECTION INTRAVENOUS at 08:28

## 2025-04-25 RX ADMIN — POLYETHYLENE GLYCOL 3350 17 G: 17 POWDER, FOR SOLUTION ORAL at 08:28

## 2025-04-25 ASSESSMENT — PAIN DESCRIPTION - ORIENTATION: ORIENTATION: RIGHT

## 2025-04-25 ASSESSMENT — PAIN DESCRIPTION - LOCATION: LOCATION: ABDOMEN

## 2025-04-25 ASSESSMENT — PAIN DESCRIPTION - DESCRIPTORS: DESCRIPTORS: ACHING

## 2025-04-25 ASSESSMENT — PAIN SCALES - GENERAL: PAINLEVEL_OUTOF10: 4

## 2025-04-25 NOTE — PROGRESS NOTES
Urology Progress Note      Subjective: Alfredo SWEENEY Arlette denies  complaints , feeling a bit stronger       Vitals:  /76   Pulse 94   Temp 97.5 °F (36.4 °C) (Oral)   Resp 18   Ht 1.778 m (5' 10\")   Wt 98 kg (216 lb)   SpO2 100%   BMI 30.99 kg/m²   Temp  Av.7 °F (36.5 °C)  Min: 97.3 °F (36.3 °C)  Max: 98.2 °F (36.8 °C)    Intake/Output Summary (Last 24 hours) at 2025 1008  Last data filed at 2025 0013  Gross per 24 hour   Intake 300 ml   Output 475 ml   Net -175 ml       Exam:   Physical:   Well developed, well nourished in no acute distress  Mood indicates no abnormalities. Pt doesn’t appear depressed  Orientated to time and place    Labs:  WBC:    Lab Results   Component Value Date/Time    WBC 13.9 2025 05:01 AM     Hemoglobin/Hematocrit:    Lab Results   Component Value Date/Time    HGB 8.2 2025 05:01 AM    HCT 23.8 2025 05:01 AM     BMP:    Lab Results   Component Value Date/Time     2025 05:01 AM    K 4.6 2025 05:01 AM    K 4.4 2025 04:16 AM    CL 95 2025 05:01 AM    CO2 23 2025 05:01 AM    BUN 30 2025 05:01 AM    CREATININE 1.8 2025 05:01 AM    CALCIUM 9.1 2025 05:01 AM    GFRAA >60 2021 03:50 PM    GFRAA >60 2013 08:24 AM    LABGLOM 39 2025 05:01 AM    LABGLOM 36 2024 12:21 PM     PT/INR:    Lab Results   Component Value Date/Time    PROTIME 14.1 2025 08:12 AM    INR 1.07 2025 08:12 AM     PTT:    Lab Results   Component Value Date/Time    APTT 30.6 2024 12:21 PM   [APTT    Urinalysis: >100 RBC    Urine Culture:      <50,000 CFU/ml mixed skin/urogenital horacio. No further workup         Imaging:     IR GUIDED NEPHROSTOMY CATH PLACEMENT LEFT   Final Result      1. Findings of moderate left hydronephrosis and hydroureter.   2. Successful placement of 8 [place] by 26 cm antegrade left ureteral stent.   3. A left nephrostomy was placed as a safety drainage device. This

## 2025-04-25 NOTE — PLAN OF CARE
Problem: Chronic Conditions and Co-morbidities  Goal: Patient's chronic conditions and co-morbidity symptoms are monitored and maintained or improved  Flowsheets (Taken 4/24/2025 2212)  Care Plan - Patient's Chronic Conditions and Co-Morbidity Symptoms are Monitored and Maintained or Improved:   Monitor and assess patient's chronic conditions and comorbid symptoms for stability, deterioration, or improvement   Collaborate with multidisciplinary team to address chronic and comorbid conditions and prevent exacerbation or deterioration   Update acute care plan with appropriate goals if chronic or comorbid symptoms are exacerbated and prevent overall improvement and discharge

## 2025-04-25 NOTE — CONSULTS
Consult Call Back    Who:Romulo Garcia MD   Date:4/25/2025,  Time:11:23 AM    Electronically signed by Oralia Awad on 4/25/25 at 11:23 AM EDT
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
## 2025-04-25 NOTE — PLAN OF CARE
Problem: Safety - Adult  Goal: Free from fall injury  Outcome: Progressing     Problem: Chronic Conditions and Co-morbidities  Goal: Patient's chronic conditions and co-morbidity symptoms are monitored and maintained or improved  4/25/2025 0900 by Mary Brown RN  Outcome: Progressing  4/24/2025 2212 by Lamar Mitchell RN  Flowsheets (Taken 4/24/2025 2212)  Care Plan - Patient's Chronic Conditions and Co-Morbidity Symptoms are Monitored and Maintained or Improved:   Monitor and assess patient's chronic conditions and comorbid symptoms for stability, deterioration, or improvement   Collaborate with multidisciplinary team to address chronic and comorbid conditions and prevent exacerbation or deterioration   Update acute care plan with appropriate goals if chronic or comorbid symptoms are exacerbated and prevent overall improvement and discharge     Problem: Pain  Goal: Verbalizes/displays adequate comfort level or baseline comfort level  Outcome: Progressing     Problem: ABCDS Injury Assessment  Goal: Absence of physical injury  Outcome: Progressing     Problem: Discharge Planning  Goal: Discharge to home or other facility with appropriate resources  Outcome: Progressing     Problem: Nutrition Deficit:  Goal: Optimize nutritional status  Outcome: Progressing

## 2025-04-25 NOTE — PROGRESS NOTES
[facility] Medicine Progress Note  V 1.6      Date of Admission: 4/19/2025    Hospital Day: 7      Chief Admission Complaint:   blood in urine    Subjective: He is sitting up in bed, states he feels little better.  Denies chest pain or shortness of breath.    He did have nephrostomy tube removed today and he tolerated it well.    Presenting Admission History:       74 y.o. male who presented to [facility] with hematuria.  PMHx significant for bladder cancer followed by [facility] urology, CAD status post CABG, CHFrEF, hyperlipidemia, hypertension, hypothyroidism.     Patient states he has a known history of a bladder cancer has been receiving treatment on and off for the past several months.  Was supposed to have a repeat cystoscopy and possibly TURBT soon with urology, but is pending cardiology clearance and  recently had a stress test performed.    States that on Wednesday he had IV iron infusion as ordered by his primary cardiologist, afterwards he began to have gross hematuria in his urine.    States that he would initially start with blood but by the end of his stream would begin to be clear.  This persisted over the next several days until yesterday evening, states he was consistently having hematuria without clearing.    He did call his urologist, who told him that this is to be expected but if it worsens that he should present to the ED for evaluation.  States that his symptoms persisted today, he became concerned and came to the ED.  In the emergency room, patient had a hemoglobin of 8 when his baseline is usually around 11.  Gordon catheter was placed with drainage of gross blood.       Assessment/Plan:      Current Principal Problem:  Hematuria    Hematuria :  Noted this was present at the time of admission ongoing for several days days prior to this admission.  Hemoglobin dropped to 8.3, usual baseline is around 11-12  Gordon catheter placed with large amount of bloody urine.  CT abdomen

## 2025-04-25 NOTE — PROGRESS NOTES
Nephrology Progress Note   [facility]      Sub/interval history  Resting  Patient had his PCN tube removed   mL    ROS: No chest pain/shortness of breath/fever/nausea/vomiting  PSFH: No visitor    Scheduled Meds:   iron sucrose (VENOFER) 200 mg in sodium chloride 0.9 % 100 mL IVPB  200 mg IntraVENous Q24H    urea  15 g Oral BID    polyethylene glycol  17 g Oral Daily    metoprolol succinate  12.5 mg Oral Daily    docusate sodium  100 mg Oral BID    [Held by provider] sacubitril-valsartan  0.5 tablet Oral BID    [Held by provider] mirtazapine  7.5 mg Oral Nightly    sodium chloride flush  5-40 mL IntraVENous 2 times per day    [Held by provider] aspirin  162 mg Oral Daily    levothyroxine  75 mcg Oral Daily     Continuous Infusions:   sodium chloride      sodium chloride      sodium chloride 20 mL/hr at 04/21/25 2150     PRN Meds:.bisacodyl, sodium chloride, sodium chloride, benzocaine-menthol, lidocaine, sodium chloride flush, sodium chloride, magnesium sulfate, ondansetron **OR** ondansetron, polyethylene glycol, acetaminophen **OR** acetaminophen    Objective/     Vitals:    04/24/25 1555 04/24/25 2336 04/25/25 0835 04/25/25 1200   BP: 96/73 116/78 100/76 105/75   Pulse: 92 87 94 83   Resp: 19 18 18 18   Temp: 97.3 °F (36.3 °C) 98.2 °F (36.8 °C) 97.5 °F (36.4 °C) 97.5 °F (36.4 °C)   TempSrc: Oral Oral Oral Oral   SpO2: 100% 96% 100% 100%   Weight:       Height:         24HR INTAKE/OUTPUT:    Intake/Output Summary (Last 24 hours) at 4/25/2025 1340  Last data filed at 4/25/2025 0013  Gross per 24 hour   Intake --   Output 475 ml   Net -475 ml     Gen: alert, awake  Neck: No JVD  Skin: Unremarkable  Cardiovascular:  S1, S2 without m/r/g   Respiratory: CTA B without w/r/r; respiratory effort normal  Abdomen:  soft, nt, nd,   Extremities: trace lower extremity edema  Neuro/Psy: AAoriented times 3 ; moves all 4 ext    Data/  Recent Labs     04/23/25  0458 04/24/25  0454 04/25/25  0501   WBC 12.2* 11.3*

## 2025-04-25 NOTE — CARE COORDINATION
Hospital day 6: Patient on C3 re hematuria care managed by IM, Urology, Nephrology, and Hem/Onc. Patient from home with sig other, IPTA. Therapy recs home with [facility]. Patient is open to re discussion closer to dc. Neph tube out this date. SW following for DCP needs.GALE Hatch

## 2025-04-25 NOTE — PROGRESS NOTES
Awake on bed alert and oriented x 4, IV on wrist. Left nephrostomy tube in place, clamped. Side rails x 2. Call light within reach. Will continue to monitor.

## 2025-04-25 NOTE — OR NURSING
Pt arrived for image guided nephrostomy Tube removal left . Procedure explained including the risk and benefits of the procedure. All questions answered. Pt verbalizes understanding of the procedure and states no more questions. Consent confirmed. Vital signs stable. Labs, allergies, medications, and code status reviewed. No contraindications noted.     Vital Signs  Vitals:    04/25/25 0835   BP: 100/76   Pulse: 94   Resp: 18   Temp: 97.5 °F (36.4 °C)   SpO2: 100%    (vital signs in table format)      Allergies  Simvastatin (allergies)    Labs  Lab Results   Component Value Date    INR 1.07 04/23/2025    PROTIME 14.1 04/23/2025     Lab Results   Component Value Date    CREATININE 1.8 (H) 04/25/2025    BUN 30 (H) 04/25/2025     (L) 04/25/2025    K 4.6 04/25/2025    CL 95 (L) 04/25/2025    CO2 23 04/25/2025     Lab Results   Component Value Date    WBC 13.9 (H) 04/25/2025    HGB 8.2 (L) 04/25/2025    HCT 23.8 (L) 04/25/2025    MCV 94.1 04/25/2025     04/25/2025

## 2025-04-25 NOTE — OR NURSING
Image guided left nephrostomy tube removal completed.  Pt tolerated procedure without any signs or symptoms of distress. Patient has a bandage to left flank that is clean, dry and intact. Report called to RN . Pt transported to 326 in stable condition via bed and transport.

## 2025-04-25 NOTE — PROGRESS NOTES
Pt bladder scanned for 453. Some distension noted.  Left message with Zehra NP with  urology group.

## 2025-04-26 LAB
ALBUMIN SERPL-MCNC: 3 G/DL (ref 3.4–5)
ANION GAP SERPL CALCULATED.3IONS-SCNC: 10 MMOL/L (ref 3–16)
BUN SERPL-MCNC: 53 MG/DL (ref 7–20)
CALCIUM SERPL-MCNC: 8.8 MG/DL (ref 8.3–10.6)
CHLORIDE SERPL-SCNC: 96 MMOL/L (ref 99–110)
CO2 SERPL-SCNC: 22 MMOL/L (ref 21–32)
CREAT SERPL-MCNC: 1.8 MG/DL (ref 0.8–1.3)
DEPRECATED RDW RBC AUTO: 15.5 % (ref 12.4–15.4)
GFR SERPLBLD CREATININE-BSD FMLA CKD-EPI: 39 ML/MIN/{1.73_M2}
GLUCOSE SERPL-MCNC: 95 MG/DL (ref 70–99)
HCT VFR BLD AUTO: 22.2 % (ref 40.5–52.5)
HGB BLD-MCNC: 7.7 G/DL (ref 13.5–17.5)
MCH RBC QN AUTO: 32.5 PG (ref 26–34)
MCHC RBC AUTO-ENTMCNC: 34.5 G/DL (ref 31–36)
MCV RBC AUTO: 94.1 FL (ref 80–100)
OSMOLALITY UR: 307 MOSM/KG (ref 390–1070)
PHOSPHATE SERPL-MCNC: 3.3 MG/DL (ref 2.5–4.9)
PLATELET # BLD AUTO: 392 K/UL (ref 135–450)
PMV BLD AUTO: 7 FL (ref 5–10.5)
POTASSIUM SERPL-SCNC: 4.3 MMOL/L (ref 3.5–5.1)
RBC # BLD AUTO: 2.35 M/UL (ref 4.2–5.9)
SODIUM SERPL-SCNC: 128 MMOL/L (ref 136–145)
WBC # BLD AUTO: 11.9 K/UL (ref 4–11)

## 2025-04-26 PROCEDURE — 6360000002 HC RX W HCPCS: Performed by: INTERNAL MEDICINE

## 2025-04-26 PROCEDURE — 2580000003 HC RX 258: Performed by: INTERNAL MEDICINE

## 2025-04-26 PROCEDURE — 85027 COMPLETE CBC AUTOMATED: CPT

## 2025-04-26 PROCEDURE — 6370000000 HC RX 637 (ALT 250 FOR IP): Performed by: NURSE PRACTITIONER

## 2025-04-26 PROCEDURE — 1200000000 HC SEMI PRIVATE

## 2025-04-26 PROCEDURE — 2500000003 HC RX 250 WO HCPCS

## 2025-04-26 PROCEDURE — 6370000000 HC RX 637 (ALT 250 FOR IP)

## 2025-04-26 PROCEDURE — 80069 RENAL FUNCTION PANEL: CPT

## 2025-04-26 PROCEDURE — 6370000000 HC RX 637 (ALT 250 FOR IP): Performed by: INTERNAL MEDICINE

## 2025-04-26 PROCEDURE — 36415 COLL VENOUS BLD VENIPUNCTURE: CPT

## 2025-04-26 RX ORDER — POLYETHYLENE GLYCOL 3350 17 G/17G
17 POWDER, FOR SOLUTION ORAL 2 TIMES DAILY
Status: DISCONTINUED | OUTPATIENT
Start: 2025-04-26 | End: 2025-04-29 | Stop reason: HOSPADM

## 2025-04-26 RX ORDER — SENNA AND DOCUSATE SODIUM 50; 8.6 MG/1; MG/1
2 TABLET, FILM COATED ORAL DAILY
Status: DISCONTINUED | OUTPATIENT
Start: 2025-04-26 | End: 2025-04-29 | Stop reason: HOSPADM

## 2025-04-26 RX ADMIN — DOCUSATE SODIUM 100 MG: 100 CAPSULE, LIQUID FILLED ORAL at 08:29

## 2025-04-26 RX ADMIN — POLYETHYLENE GLYCOL 3350 17 G: 17 POWDER, FOR SOLUTION ORAL at 14:04

## 2025-04-26 RX ADMIN — MINERAL OIL 30 ML: 1000 SOLUTION ORAL at 14:10

## 2025-04-26 RX ADMIN — LEVOTHYROXINE SODIUM 75 MCG: 0.05 TABLET ORAL at 05:34

## 2025-04-26 RX ADMIN — Medication 15 G: at 08:29

## 2025-04-26 RX ADMIN — SODIUM CHLORIDE, PRESERVATIVE FREE 10 ML: 5 INJECTION INTRAVENOUS at 08:33

## 2025-04-26 RX ADMIN — ASPIRIN 81 MG: 81 TABLET, COATED ORAL at 08:29

## 2025-04-26 RX ADMIN — SODIUM CHLORIDE, PRESERVATIVE FREE 10 ML: 5 INJECTION INTRAVENOUS at 21:13

## 2025-04-26 RX ADMIN — HEPARIN SODIUM 5000 UNITS: 5000 INJECTION INTRAVENOUS; SUBCUTANEOUS at 21:13

## 2025-04-26 RX ADMIN — POLYETHYLENE GLYCOL 3350 17 G: 17 POWDER, FOR SOLUTION ORAL at 08:29

## 2025-04-26 RX ADMIN — SENNOSIDES AND DOCUSATE SODIUM 2 TABLET: 50; 8.6 TABLET ORAL at 14:01

## 2025-04-26 RX ADMIN — Medication 15 G: at 21:10

## 2025-04-26 RX ADMIN — HEPARIN SODIUM 5000 UNITS: 5000 INJECTION INTRAVENOUS; SUBCUTANEOUS at 14:01

## 2025-04-26 RX ADMIN — HEPARIN SODIUM 5000 UNITS: 5000 INJECTION INTRAVENOUS; SUBCUTANEOUS at 05:33

## 2025-04-26 NOTE — PROGRESS NOTES
Nephrology Progress Note   [facility].[employer]      Sub/interval history  Renal function stable  Patient had his PCN tube removed  Urinating well 2.1 liters over last 24 hours  Sodium trending better with urea from 4/25  Denies any shortness of breath, on RA    ROS:   +weak, intake adequate.    Scheduled Meds:   polyethylene glycol  17 g Oral BID    sennosides-docusate sodium  2 tablet Oral Daily    iron sucrose (VENOFER) 200 mg in sodium chloride 0.9 % 100 mL IVPB  200 mg IntraVENous Q24H    urea  15 g Oral BID    aspirin  81 mg Oral Daily    heparin (porcine)  5,000 Units SubCUTAneous 3 times per day    metoprolol succinate  12.5 mg Oral Daily    [Held by provider] sacubitril-valsartan  0.5 tablet Oral BID    [Held by provider] mirtazapine  7.5 mg Oral Nightly    sodium chloride flush  5-40 mL IntraVENous 2 times per day    levothyroxine  75 mcg Oral Daily     Continuous Infusions:   sodium chloride      sodium chloride      sodium chloride 20 mL/hr at 04/21/25 2150     PRN Meds:.bisacodyl, sodium chloride, sodium chloride, benzocaine-menthol, lidocaine, sodium chloride flush, sodium chloride, magnesium sulfate, ondansetron **OR** ondansetron, polyethylene glycol, acetaminophen **OR** acetaminophen    Objective/     Vitals:    04/25/25 2045 04/25/25 2315 04/26/25 0744 04/26/25 1300   BP: (!) 96/59 91/67 (!) 88/67 100/64   Pulse: 95 94 90 85   Resp: 18 16 18    Temp: 97.5 °F (36.4 °C) 98.4 °F (36.9 °C) 98.1 °F (36.7 °C)    TempSrc: Oral Oral Oral    SpO2: 100% 94% 96% 96%   Weight:       Height:         24HR INTAKE/OUTPUT:    Intake/Output Summary (Last 24 hours) at 4/26/2025 1426  Last data filed at 4/26/2025 0837  Gross per 24 hour   Intake 762 ml   Output 2050 ml   Net -1288 ml     Gen: alert, awake  Neck: No JVD  Skin: Unremarkable  Cardiovascular:  S1, S2 without m/r/g   Respiratory: CTA B without w/r/r; respiratory effort normal  Abdomen:  soft, nt, nd,   Extremities: trace lower extremity edema  Neuro/Psy:

## 2025-04-26 NOTE — PROGRESS NOTES
[facility] Medicine Progress Note  V 1.6      Date of Admission: 4/19/2025    Hospital Day: 8      Chief Admission Complaint:   blood in urine    Subjective: He is sitting up in bed, states he feels little better.  Denies chest pain or shortness of breath.    He did have nephrostomy tube removed today and he tolerated it well.    Presenting Admission History:       74 y.o. male who presented to [facility] with hematuria.  PMHx significant for bladder cancer followed by [facility] urology, CAD status post CABG, CHFrEF, hyperlipidemia, hypertension, hypothyroidism.     Patient states he has a known history of a bladder cancer has been receiving treatment on and off for the past several months.  Was supposed to have a repeat cystoscopy and possibly TURBT soon with urology, but is pending cardiology clearance and  recently had a stress test performed.    States that on Wednesday he had IV iron infusion as ordered by his primary cardiologist, afterwards he began to have gross hematuria in his urine.    States that he would initially start with blood but by the end of his stream would begin to be clear.  This persisted over the next several days until yesterday evening, states he was consistently having hematuria without clearing.    He did call his urologist, who told him that this is to be expected but if it worsens that he should present to the ED for evaluation.  States that his symptoms persisted today, he became concerned and came to the ED.  In the emergency room, patient had a hemoglobin of 8 when his baseline is usually around 11.  Gordon catheter was placed with drainage of gross blood.       Assessment/Plan:      Current Principal Problem:  Hematuria    Hematuria :  Noted this was present at the time of admission ongoing for several days days prior to this admission.  Hemoglobin dropped to 8.3, usual baseline is around 11-12  Gordon catheter placed with large amount of bloody urine.  CT abdomen  98.1 °F (36.7 °C) (Oral)   Resp 18   Ht 1.778 m (5' 10\")   Wt 98 kg (216 lb)   SpO2 96%   BMI 30.99 kg/m²       Diet: ADULT ORAL NUTRITION SUPPLEMENT; Lunch, Dinner; Renal Oral Supplement  ADULT DIET; Regular; Low Potassium (Less than 3000 mg/day); 1500 ml    DVT Prophylaxis: []PPx LMWH  [x]SQ Heparin  []IPC/SCDs  []Eliquis  []Xarelto  []Coumadin  [] Heparin Drip  []Other -      Code status: Full Code    PT/OT Eval Status:   []NOT yet ordered  []Ordered and Pending   [x]Seen with Recommendations for:   []Home independently  [x]Home w/ assist  [x]HHC  []SNF  []Acute Rehab    Multi-Disciplinary Rounds with Case Management completed on 4/26/2025 with the following recommendations:  Anticipated Discharge Location: [x]Home w/ [x]HHC vs []SNF  []Acute Rehab  []LTAC  []Hospice  []Other -    Anticipated Discharge Day/Date:  2-3 days   Barriers to Discharge:     Following H/H  Need a voiding trial  Need more clinical improvement  --------------------------------------------------    MDM (any 2 required for High level billing)    A. Problems (any 1)  [x] Acute/Chronic Illness/injury posing ongoing threat to life and/or bodily function without ongoing treatment    [] Severe exacerbation of chronic illness    --------------------------------------------------  B. Risk of Treatment (any 1)    [] Drugs/treatments that require intensive monitoring for toxicity    [] IV ABX (Vancomycin, Aminoglycosides, etc)     [] Post-Cath/Contrast study requiring serial monitoring    [] IV Narcotic analgesia    [] Aggressive IV diuresis    [] Hypertonic Saline    [] Critical electrolyte abnormalities requiring IV replacement    [] Insulin - Scheduled/SSI or Insulin gtt    [] Anticoagulation (Heparin gtt or Coumadin - other anticoagulants in special circumstances)    [] Cardiac Medications (IV Amiodarone/Diltiazem, Tikosyn, etc)    [] Hemodialysis    [x] Other -he was transfused with PRBCs  [] Change in code status    [] Decision to escalate

## 2025-04-26 NOTE — PROGRESS NOTES
Urology addendum    I stop by this evening and had conversation with patient.  I have reviewed his chart.  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  Clinically, he is really T3 given bilateral hydronephrosis.  Patient now has bilateral stents in place.  Agree with plan to get PET scan by Dr. Garcia.  Discussed gold standard therapy would be neoadjuvant chemotherapy followed by cystectomy and conduit.  I think this is still a bit of a shock to the patient as he is just starting to get used to the diagnosis and treatment options.  However, given bilateral hydronephrosis and stents, I do not think radiation would be a good option for this patient.

## 2025-04-26 NOTE — PROGRESS NOTES
Urology Progress Note      Subjective: Alfredo SWEENEY Arlette denies  complaints. L PCN removed yesterday with IR. He also had baeza placed for retention.       Vitals:  /64   Pulse 85   Temp 98.1 °F (36.7 °C) (Oral)   Resp 18   Ht 1.778 m (5' 10\")   Wt 98 kg (216 lb)   SpO2 96%   BMI 30.99 kg/m²   Temp  Av °F (36.7 °C)  Min: 97.5 °F (36.4 °C)  Max: 98.4 °F (36.9 °C)    Intake/Output Summary (Last 24 hours) at 2025 1507  Last data filed at 2025 0837  Gross per 24 hour   Intake 762 ml   Output 2050 ml   Net -1288 ml       Exam:   Physical:   Well developed, well nourished in no acute distress  Mood indicates no abnormalities. Pt doesn’t appear depressed  Orientated to time and place  Urine clear in baeza    Labs:  WBC:    Lab Results   Component Value Date/Time    WBC 11.9 2025 04:29 AM     Hemoglobin/Hematocrit:    Lab Results   Component Value Date/Time    HGB 7.7 2025 04:29 AM    HCT 22.2 2025 04:29 AM     BMP:    Lab Results   Component Value Date/Time     2025 04:29 AM    K 4.3 2025 04:29 AM    K 4.4 2025 04:16 AM    CL 96 2025 04:29 AM    CO2 22 2025 04:29 AM    BUN 53 2025 04:29 AM    CREATININE 1.8 2025 04:29 AM    CALCIUM 8.8 2025 04:29 AM    GFRAA >60 2021 03:50 PM    GFRAA >60 2013 08:24 AM    LABGLOM 39 2025 04:29 AM    LABGLOM 36 2024 12:21 PM     PT/INR:    Lab Results   Component Value Date/Time    PROTIME 14.1 2025 08:12 AM    INR 1.07 2025 08:12 AM     PTT:    Lab Results   Component Value Date/Time    APTT 30.6 2024 12:21 PM   [APTT    Urinalysis: >100 RBC    Urine Culture:      <50,000 CFU/ml mixed skin/urogenital horacio. No further workup         Imaging:     IR REMOVAL NEPHROSTOMY TUBE W FLUORO   Final Result      1.  Nephrostogram demonstrates function of left ureteral stent.   2.  Successful image guided removal of left nephrostomy.      Electronically

## 2025-04-26 NOTE — PLAN OF CARE
Problem: Safety - Adult  Goal: Free from fall injury  Outcome: Progressing  Flowsheets (Taken 4/22/2025 1018 by Caridad Meléndez, RN)  Free From Fall Injury:   Instruct family/caregiver on patient safety   Based on caregiver fall risk screen, instruct family/caregiver to ask for assistance with transferring infant if caregiver noted to have fall risk factors     Problem: Chronic Conditions and Co-morbidities  Goal: Patient's chronic conditions and co-morbidity symptoms are monitored and maintained or improved  Outcome: Progressing  Flowsheets (Taken 4/24/2025 2212 by Lamar Mitchell, RN)  Care Plan - Patient's Chronic Conditions and Co-Morbidity Symptoms are Monitored and Maintained or Improved:   Monitor and assess patient's chronic conditions and comorbid symptoms for stability, deterioration, or improvement   Collaborate with multidisciplinary team to address chronic and comorbid conditions and prevent exacerbation or deterioration   Update acute care plan with appropriate goals if chronic or comorbid symptoms are exacerbated and prevent overall improvement and discharge     Problem: Pain  Goal: Verbalizes/displays adequate comfort level or baseline comfort level  Outcome: Progressing     Problem: ABCDS Injury Assessment  Goal: Absence of physical injury  Outcome: Progressing     Problem: Discharge Planning  Goal: Discharge to home or other facility with appropriate resources  Outcome: Progressing  Flowsheets (Taken 4/22/2025 1018 by Caridad Meléndez, RN)  Discharge to home or other facility with appropriate resources: Identify barriers to discharge with patient and caregiver

## 2025-04-27 LAB
ALBUMIN SERPL-MCNC: 3.6 G/DL (ref 3.4–5)
ANION GAP SERPL CALCULATED.3IONS-SCNC: 10 MMOL/L (ref 3–16)
BUN SERPL-MCNC: 65 MG/DL (ref 7–20)
CALCIUM SERPL-MCNC: 9.3 MG/DL (ref 8.3–10.6)
CHLORIDE SERPL-SCNC: 97 MMOL/L (ref 99–110)
CO2 SERPL-SCNC: 23 MMOL/L (ref 21–32)
CREAT SERPL-MCNC: 1.9 MG/DL (ref 0.8–1.3)
DEPRECATED RDW RBC AUTO: 16.2 % (ref 12.4–15.4)
GFR SERPLBLD CREATININE-BSD FMLA CKD-EPI: 36 ML/MIN/{1.73_M2}
GLUCOSE SERPL-MCNC: 100 MG/DL (ref 70–99)
HCT VFR BLD AUTO: 24.3 % (ref 40.5–52.5)
HGB BLD-MCNC: 8.2 G/DL (ref 13.5–17.5)
MCH RBC QN AUTO: 32.3 PG (ref 26–34)
MCHC RBC AUTO-ENTMCNC: 33.8 G/DL (ref 31–36)
MCV RBC AUTO: 95.6 FL (ref 80–100)
PHOSPHATE SERPL-MCNC: 3.2 MG/DL (ref 2.5–4.9)
PLATELET # BLD AUTO: 416 K/UL (ref 135–450)
PMV BLD AUTO: 7.4 FL (ref 5–10.5)
POTASSIUM SERPL-SCNC: 4.1 MMOL/L (ref 3.5–5.1)
RBC # BLD AUTO: 2.54 M/UL (ref 4.2–5.9)
SODIUM SERPL-SCNC: 130 MMOL/L (ref 136–145)
WBC # BLD AUTO: 9.3 K/UL (ref 4–11)

## 2025-04-27 PROCEDURE — 6370000000 HC RX 637 (ALT 250 FOR IP): Performed by: INTERNAL MEDICINE

## 2025-04-27 PROCEDURE — 6360000002 HC RX W HCPCS: Performed by: INTERNAL MEDICINE

## 2025-04-27 PROCEDURE — 36415 COLL VENOUS BLD VENIPUNCTURE: CPT

## 2025-04-27 PROCEDURE — 85027 COMPLETE CBC AUTOMATED: CPT

## 2025-04-27 PROCEDURE — 2580000003 HC RX 258: Performed by: INTERNAL MEDICINE

## 2025-04-27 PROCEDURE — 2500000003 HC RX 250 WO HCPCS

## 2025-04-27 PROCEDURE — 80069 RENAL FUNCTION PANEL: CPT

## 2025-04-27 PROCEDURE — 1200000000 HC SEMI PRIVATE

## 2025-04-27 PROCEDURE — 6370000000 HC RX 637 (ALT 250 FOR IP)

## 2025-04-27 RX ORDER — MIDODRINE HYDROCHLORIDE 5 MG/1
5 TABLET ORAL
Status: DISCONTINUED | OUTPATIENT
Start: 2025-04-27 | End: 2025-04-29 | Stop reason: HOSPADM

## 2025-04-27 RX ADMIN — LEVOTHYROXINE SODIUM 75 MCG: 0.05 TABLET ORAL at 05:07

## 2025-04-27 RX ADMIN — IRON SUCROSE 200 MG: 20 INJECTION, SOLUTION INTRAVENOUS at 14:27

## 2025-04-27 RX ADMIN — SENNOSIDES AND DOCUSATE SODIUM 2 TABLET: 50; 8.6 TABLET ORAL at 10:22

## 2025-04-27 RX ADMIN — HEPARIN SODIUM 5000 UNITS: 5000 INJECTION INTRAVENOUS; SUBCUTANEOUS at 15:01

## 2025-04-27 RX ADMIN — MIDODRINE HYDROCHLORIDE 5 MG: 5 TABLET ORAL at 17:56

## 2025-04-27 RX ADMIN — SODIUM CHLORIDE, PRESERVATIVE FREE 10 ML: 5 INJECTION INTRAVENOUS at 20:33

## 2025-04-27 RX ADMIN — HEPARIN SODIUM 5000 UNITS: 5000 INJECTION INTRAVENOUS; SUBCUTANEOUS at 20:29

## 2025-04-27 RX ADMIN — Medication 15 G: at 10:22

## 2025-04-27 RX ADMIN — ASPIRIN 81 MG: 81 TABLET, COATED ORAL at 10:22

## 2025-04-27 RX ADMIN — POLYETHYLENE GLYCOL 3350 17 G: 17 POWDER, FOR SOLUTION ORAL at 10:35

## 2025-04-27 RX ADMIN — HEPARIN SODIUM 5000 UNITS: 5000 INJECTION INTRAVENOUS; SUBCUTANEOUS at 05:07

## 2025-04-27 RX ADMIN — Medication 15 G: at 20:30

## 2025-04-27 RX ADMIN — MIDODRINE HYDROCHLORIDE 5 MG: 5 TABLET ORAL at 13:18

## 2025-04-27 RX ADMIN — SODIUM CHLORIDE, PRESERVATIVE FREE 10 ML: 5 INJECTION INTRAVENOUS at 10:29

## 2025-04-27 NOTE — PROGRESS NOTES
Nephrology Progress Note   [facility] [facility] [place]      Sub/interval history  Renal function stable  Urinating well 2.1 liters over last 24 hours  Patient had his PCN tube removed  Sodium trending better with urea from 4/25 with urea  Denies any shortness of breath, on RA    ROS:   +weak, intake adequate.    Scheduled Meds:   midodrine  5 mg Oral TID WC    polyethylene glycol  17 g Oral BID    sennosides-docusate sodium  2 tablet Oral Daily    iron sucrose (VENOFER) 200 mg in sodium chloride 0.9 % 100 mL IVPB  200 mg IntraVENous Q24H    urea  15 g Oral BID    aspirin  81 mg Oral Daily    heparin (porcine)  5,000 Units SubCUTAneous 3 times per day    metoprolol succinate  12.5 mg Oral Daily    [Held by provider] sacubitril-valsartan  0.5 tablet Oral BID    [Held by provider] mirtazapine  7.5 mg Oral Nightly    sodium chloride flush  5-40 mL IntraVENous 2 times per day    levothyroxine  75 mcg Oral Daily     Continuous Infusions:   sodium chloride      sodium chloride      sodium chloride 20 mL/hr at 04/21/25 2150     PRN Meds:.bisacodyl, sodium chloride, sodium chloride, benzocaine-menthol, lidocaine, sodium chloride flush, sodium chloride, magnesium sulfate, ondansetron **OR** ondansetron, polyethylene glycol, acetaminophen **OR** acetaminophen    Objective/     Vitals:    04/26/25 1619 04/26/25 2100 04/27/25 0130 04/27/25 1020   BP: (!) 87/58 95/63 94/64 (!) 82/59   Pulse: 87 87 85 84   Resp:  18 16 18   Temp: 98.2 °F (36.8 °C) 97.7 °F (36.5 °C) 98.1 °F (36.7 °C) 97.9 °F (36.6 °C)   TempSrc: Oral Oral Oral Oral   SpO2: 99% 94% 99% 100%   Weight:       Height:         24HR INTAKE/OUTPUT:    Intake/Output Summary (Last 24 hours) at 4/27/2025 1122  Last data filed at 4/27/2025 0557  Gross per 24 hour   Intake 300 ml   Output 2050 ml   Net -1750 ml     Gen: alert, awake  Neck: No JVD  Skin: Unremarkable  Cardiovascular:  S1, S2 without m/r/g   Respiratory: CTA B without w/r/r; respiratory effort normal  Abdomen:  soft,

## 2025-04-27 NOTE — PROGRESS NOTES
Pt assessment completed and charted. VSS, pt on RA. Patient is a/o. IV site patent/flushed, saline locked. Today pt started midodrine 3x daily per MD orders.        Safety Measures in place:   Bed in lowest position and wheels locked.   Call light within reach.   Bedside table within reach.   Non-skid socks in place.   Pt denies any other needs at this time.    Pt calls out appropriately.  Patient in stable condition, up to chair for dinner, when RN left room.

## 2025-04-27 NOTE — PROGRESS NOTES
Urology Progress Note      Subjective: Resting on rounds      Vitals:  BP (!) 82/59   Pulse 84   Temp 97.9 °F (36.6 °C)   Resp 18   Ht 1.778 m (5' 10\")   Wt 98 kg (216 lb)   SpO2 100%   BMI 30.99 kg/m²   Temp  Av °F (36.7 °C)  Min: 97.7 °F (36.5 °C)  Max: 98.2 °F (36.8 °C)    Intake/Output Summary (Last 24 hours) at 2025 1034  Last data filed at 2025 0557  Gross per 24 hour   Intake 300 ml   Output 2050 ml   Net -1750 ml       Exam:   Physical:   Urine clear yellow in baeza     Labs:  WBC:    Lab Results   Component Value Date/Time    WBC 9.3 2025 06:03 AM     Hemoglobin/Hematocrit:    Lab Results   Component Value Date/Time    HGB 8.2 2025 06:03 AM    HCT 24.3 2025 06:03 AM     BMP:    Lab Results   Component Value Date/Time     2025 06:03 AM    K 4.1 2025 06:03 AM    K 4.4 2025 04:16 AM    CL 97 2025 06:03 AM    CO2 23 2025 06:03 AM    BUN 65 2025 06:03 AM    CREATININE 1.9 2025 06:03 AM    CALCIUM 9.3 2025 06:03 AM    GFRAA >60 2021 03:50 PM    GFRAA >60 2013 08:24 AM    LABGLOM 36 2025 06:03 AM    LABGLOM 36 2024 12:21 PM     PT/INR:    Lab Results   Component Value Date/Time    PROTIME 14.1 2025 08:12 AM    INR 1.07 2025 08:12 AM     PTT:    Lab Results   Component Value Date/Time    APTT 30.6 2024 12:21 PM   [APTT    Urinalysis: >100 RBC    Urine Culture:      <50,000 CFU/ml mixed skin/urogenital horacio. No further workup         Imaging:     IR REMOVAL NEPHROSTOMY TUBE W FLUORO   Final Result      1.  Nephrostogram demonstrates function of left ureteral stent.   2.  Successful image guided removal of left nephrostomy.      Electronically signed by MD Pollo Patino      IR GUIDED NEPHROSTOMY CATH PLACEMENT LEFT   Final Result      1. Findings of moderate left hydronephrosis and hydroureter.   2. Successful placement of 8 Greek by 26 cm antegrade left ureteral stent.   3. A  antegrade stent and left PCNT.    - 4/24: path shows high grade muscle invasive cancer, will ask medical oncology to consult while he is inpatient - note pending   - 4/25/25 L PCN removed     -Needs PET scan which was discussed with Dr Moctezuma and patient. Again treatment was also talked about last night   -Continue baeza for now. No evidence of bleeding after aspirin has been restarted  -Will continue to follow. Possible VT in the next day or so

## 2025-04-27 NOTE — PROGRESS NOTES
[facility] Medicine Progress Note  V 1.6      Date of Admission: 4/19/2025    Hospital Day: 9      Chief Admission Complaint:   blood in urine    Subjective: He is sitting up in bed, states he feels little better.  Denies chest pain or shortness of breath.    He did have nephrostomy tube removed today and he tolerated it well.    Presenting Admission History:       74 y.o. male who presented to [facility] with hematuria.  PMHx significant for bladder cancer followed by [facility] urology, CAD status post CABG, CHFrEF, hyperlipidemia, hypertension, hypothyroidism.     Patient states he has a known history of a bladder cancer has been receiving treatment on and off for the past several months.  Was supposed to have a repeat cystoscopy and possibly TURBT soon with urology, but is pending cardiology clearance and  recently had a stress test performed.    States that on Wednesday he had IV iron infusion as ordered by his primary cardiologist, afterwards he began to have gross hematuria in his urine.    States that he would initially start with blood but by the end of his stream would begin to be clear.  This persisted over the next several days until yesterday evening, states he was consistently having hematuria without clearing.    He did call his urologist, who told him that this is to be expected but if it worsens that he should present to the ED for evaluation.  States that his symptoms persisted today, he became concerned and came to the ED.  In the emergency room, patient had a hemoglobin of 8 when his baseline is usually around 11.  Gordon catheter was placed with drainage of gross blood.       Assessment/Plan:      Current Principal Problem:  Hematuria    Hematuria :  Noted this was present at the time of admission ongoing for several days days prior to this admission.  Hemoglobin dropped to 8.3, usual baseline is around 11-12  Gordon catheter placed with large amount of bloody urine.  CT abdomen  Scheduled/SSI or Insulin gtt    [] Anticoagulation (Heparin gtt or Coumadin - other anticoagulants in special circumstances)    [] Cardiac Medications (IV Amiodarone/Diltiazem, Tikosyn, etc)    [] Hemodialysis    [x] Other -he was transfused with PRBCs  [] Change in code status    [] Decision to escalate care    [x] Major surgery/procedure with associated risk factors  he did go to the OR with urology   He did have nephrostomy tube placed this admission  --------------------------------------------------  C. Data (any 2)    [x] Data Review (any 3)    [x] Consultant notes from yesterday/today    [x] All available current labs reviewed interpreted for clinical significance    [x] Appropriate follow-up labs were ordered  [] Collateral history obtained     [] Independent Interpretation of tests (any 1)    [] Telemetry (Rhythm Strip) personally reviewed and interpreted        [] Imaging personally reviewed and interpreted     [x] Discussion (any 1)  [x] Multi-Disciplinary Rounds with Case Management  [] Discussed management of the case with           Labs:  Personally reviewed on 4/27/2025 and interpreted for clinical significance as documented above.     Recent Labs     04/25/25  0501 04/26/25  0429 04/27/25  0603   WBC 13.9* 11.9* 9.3   HGB 8.2* 7.7* 8.2*   HCT 23.8* 22.2* 24.3*    392 416     Recent Labs     04/25/25  0501 04/26/25  0429 04/27/25  0603   * 128* 130*   K 4.6 4.3 4.1   CL 95* 96* 97*   CO2 23 22 23   BUN 30* 53* 65*   CREATININE 1.8* 1.8* 1.9*   CALCIUM 9.1 8.8 9.3   PHOS  --  3.3 3.2     No results for input(s): \"PROBNP\", \"TROPHS\" in the last 72 hours.  No results for input(s): \"LABA1C\" in the last 72 hours.  No results for input(s): \"AST\", \"ALT\", \"BILIDIR\", \"BILITOT\", \"ALKPHOS\" in the last 72 hours.  No results for input(s): \"INR\", \"LACTA\", \"TSH\" in the last 72 hours.      Urine Cultures:   Lab Results   Component Value Date/Time    LABURIN  04/19/2025 03:12 PM     <50,000 CFU/ml mixed  skin/urogenital horacio. No further workup     Blood Cultures: No results found for: \"BC\"  No results found for: \"BLOODCULT2\"  Organism:   Lab Results   Component Value Date/Time    ORG Staphylococcus lugdunensis 12/18/2024 03:55 PM         Kenji Mckinnon MD

## 2025-04-27 NOTE — PLAN OF CARE
Problem: Safety - Adult  Goal: Free from fall injury  Outcome: Progressing  Flowsheets (Taken 4/22/2025 1018 by Caridad Meléndez RN)  Free From Fall Injury:   Instruct family/caregiver on patient safety   Based on caregiver fall risk screen, instruct family/caregiver to ask for assistance with transferring infant if caregiver noted to have fall risk factors     Problem: Chronic Conditions and Co-morbidities  Goal: Patient's chronic conditions and co-morbidity symptoms are monitored and maintained or improved  Outcome: Progressing  Flowsheets (Taken 4/24/2025 2212 by Lamar Mitchell, RN)  Care Plan - Patient's Chronic Conditions and Co-Morbidity Symptoms are Monitored and Maintained or Improved:   Monitor and assess patient's chronic conditions and comorbid symptoms for stability, deterioration, or improvement   Collaborate with multidisciplinary team to address chronic and comorbid conditions and prevent exacerbation or deterioration   Update acute care plan with appropriate goals if chronic or comorbid symptoms are exacerbated and prevent overall improvement and discharge     Problem: Pain  Goal: Verbalizes/displays adequate comfort level or baseline comfort level  Outcome: Progressing  Flowsheets (Taken 4/22/2025 1018 by Caridad Meléndez RN)  Verbalizes/displays adequate comfort level or baseline comfort level:   Encourage patient to monitor pain and request assistance   Assess pain using appropriate pain scale     Problem: ABCDS Injury Assessment  Goal: Absence of physical injury  Outcome: Progressing  Flowsheets (Taken 4/22/2025 1018 by Caridad Meléndez RN)  Absence of Physical Injury: Implement safety measures based on patient assessment     Problem: Discharge Planning  Goal: Discharge to home or other facility with appropriate resources  Outcome: Progressing  Flowsheets (Taken 4/22/2025 1018 by Caridad Meléndez RN)  Discharge to home or other facility with appropriate

## 2025-04-28 LAB
ALBUMIN SERPL-MCNC: 3.6 G/DL (ref 3.4–5)
ANION GAP SERPL CALCULATED.3IONS-SCNC: 7 MMOL/L (ref 3–16)
BUN SERPL-MCNC: 72 MG/DL (ref 7–20)
CALCIUM SERPL-MCNC: 9.4 MG/DL (ref 8.3–10.6)
CHLORIDE SERPL-SCNC: 100 MMOL/L (ref 99–110)
CO2 SERPL-SCNC: 25 MMOL/L (ref 21–32)
CREAT SERPL-MCNC: 2 MG/DL (ref 0.8–1.3)
GFR SERPLBLD CREATININE-BSD FMLA CKD-EPI: 34 ML/MIN/{1.73_M2}
GLUCOSE SERPL-MCNC: 90 MG/DL (ref 70–99)
PHOSPHATE SERPL-MCNC: 3.6 MG/DL (ref 2.5–4.9)
POTASSIUM SERPL-SCNC: 4.3 MMOL/L (ref 3.5–5.1)
SODIUM SERPL-SCNC: 132 MMOL/L (ref 136–145)

## 2025-04-28 PROCEDURE — 6370000000 HC RX 637 (ALT 250 FOR IP): Performed by: INTERNAL MEDICINE

## 2025-04-28 PROCEDURE — 97110 THERAPEUTIC EXERCISES: CPT

## 2025-04-28 PROCEDURE — 36415 COLL VENOUS BLD VENIPUNCTURE: CPT

## 2025-04-28 PROCEDURE — 97530 THERAPEUTIC ACTIVITIES: CPT

## 2025-04-28 PROCEDURE — 80069 RENAL FUNCTION PANEL: CPT

## 2025-04-28 PROCEDURE — 97116 GAIT TRAINING THERAPY: CPT

## 2025-04-28 PROCEDURE — 2500000003 HC RX 250 WO HCPCS

## 2025-04-28 PROCEDURE — 51798 US URINE CAPACITY MEASURE: CPT

## 2025-04-28 PROCEDURE — 1200000000 HC SEMI PRIVATE

## 2025-04-28 PROCEDURE — 6370000000 HC RX 637 (ALT 250 FOR IP)

## 2025-04-28 PROCEDURE — 2580000003 HC RX 258: Performed by: INTERNAL MEDICINE

## 2025-04-28 PROCEDURE — 6360000002 HC RX W HCPCS: Performed by: INTERNAL MEDICINE

## 2025-04-28 RX ORDER — 0.9 % SODIUM CHLORIDE 0.9 %
500 INTRAVENOUS SOLUTION INTRAVENOUS ONCE
Status: COMPLETED | OUTPATIENT
Start: 2025-04-28 | End: 2025-04-28

## 2025-04-28 RX ADMIN — MIDODRINE HYDROCHLORIDE 5 MG: 5 TABLET ORAL at 18:36

## 2025-04-28 RX ADMIN — HEPARIN SODIUM 5000 UNITS: 5000 INJECTION INTRAVENOUS; SUBCUTANEOUS at 21:26

## 2025-04-28 RX ADMIN — LEVOTHYROXINE SODIUM 75 MCG: 0.05 TABLET ORAL at 06:41

## 2025-04-28 RX ADMIN — SODIUM CHLORIDE, PRESERVATIVE FREE 10 ML: 5 INJECTION INTRAVENOUS at 21:06

## 2025-04-28 RX ADMIN — MIDODRINE HYDROCHLORIDE 5 MG: 5 TABLET ORAL at 13:14

## 2025-04-28 RX ADMIN — SODIUM CHLORIDE 500 ML: 0.9 INJECTION, SOLUTION INTRAVENOUS at 09:35

## 2025-04-28 RX ADMIN — Medication 15 G: at 21:26

## 2025-04-28 RX ADMIN — ASPIRIN 81 MG: 81 TABLET, COATED ORAL at 07:54

## 2025-04-28 RX ADMIN — HEPARIN SODIUM 5000 UNITS: 5000 INJECTION INTRAVENOUS; SUBCUTANEOUS at 06:41

## 2025-04-28 RX ADMIN — MIDODRINE HYDROCHLORIDE 5 MG: 5 TABLET ORAL at 07:54

## 2025-04-28 RX ADMIN — Medication 15 G: at 07:54

## 2025-04-28 NOTE — CARE COORDINATION
Hospital day 9: Patient on C3 re Hematuria care managed by IM, Urology, Nephrology, and Hem/Onc. Therapy recs home with 24 hr and [facility]HC. Patient reports thinks he used [facility] in the past referral sent and accepted. Plans for voiding trial this date. Following.GALE Hatch

## 2025-04-28 NOTE — PLAN OF CARE
Problem: Safety - Adult  Goal: Free from fall injury  Outcome: Progressing  Flowsheets (Taken 4/22/2025 1018 by Caridad Meléndez, RN)  Free From Fall Injury:   Instruct family/caregiver on patient safety   Based on caregiver fall risk screen, instruct family/caregiver to ask for assistance with transferring infant if caregiver noted to have fall risk factors     Problem: Chronic Conditions and Co-morbidities  Goal: Patient's chronic conditions and co-morbidity symptoms are monitored and maintained or improved  Outcome: Progressing  Flowsheets (Taken 4/24/2025 2212 by Lamar Mitchell, RN)  Care Plan - Patient's Chronic Conditions and Co-Morbidity Symptoms are Monitored and Maintained or Improved:   Monitor and assess patient's chronic conditions and comorbid symptoms for stability, deterioration, or improvement   Collaborate with multidisciplinary team to address chronic and comorbid conditions and prevent exacerbation or deterioration   Update acute care plan with appropriate goals if chronic or comorbid symptoms are exacerbated and prevent overall improvement and discharge     Problem: Pain  Goal: Verbalizes/displays adequate comfort level or baseline comfort level  Outcome: Progressing  Flowsheets (Taken 4/22/2025 1018 by Caridad Meléndez, RN)  Verbalizes/displays adequate comfort level or baseline comfort level:   Encourage patient to monitor pain and request assistance   Assess pain using appropriate pain scale     Problem: ABCDS Injury Assessment  Goal: Absence of physical injury  Outcome: Progressing  Flowsheets (Taken 4/22/2025 1018 by Caridad Meléndez, RN)  Absence of Physical Injury: Implement safety measures based on patient assessment

## 2025-04-28 NOTE — PROGRESS NOTES
Nephrology Progress Note   [facility] [employer]      Sub/interval history  Resting  Cr trending up  Urinating well 3.7 liters over last 24 hours      ROS:   +weak, intake adequate.Denies any shortness of breath, on RA    Scheduled Meds:   midodrine  5 mg Oral TID WC    polyethylene glycol  17 g Oral BID    sennosides-docusate sodium  2 tablet Oral Daily    iron sucrose (VENOFER) 200 mg in sodium chloride 0.9 % 100 mL IVPB  200 mg IntraVENous Q24H    urea  15 g Oral BID    aspirin  81 mg Oral Daily    heparin (porcine)  5,000 Units SubCUTAneous 3 times per day    metoprolol succinate  12.5 mg Oral Daily    [Held by provider] sacubitril-valsartan  0.5 tablet Oral BID    [Held by provider] mirtazapine  7.5 mg Oral Nightly    sodium chloride flush  5-40 mL IntraVENous 2 times per day    levothyroxine  75 mcg Oral Daily     Continuous Infusions:   sodium chloride      sodium chloride      sodium chloride 20 mL/hr at 04/21/25 2150     PRN Meds:.bisacodyl, sodium chloride, sodium chloride, benzocaine-menthol, lidocaine, sodium chloride flush, sodium chloride, ondansetron **OR** ondansetron, polyethylene glycol, acetaminophen **OR** acetaminophen    Objective/     Vitals:    04/27/25 1611 04/27/25 2015 04/28/25 0000 04/28/25 0746   BP: 106/62 114/79 114/74 106/82   Pulse: 67 80 74 80   Resp: 16 16 16 16   Temp: 97.3 °F (36.3 °C) 97.9 °F (36.6 °C) 98.4 °F (36.9 °C) 97.3 °F (36.3 °C)   TempSrc: Oral Oral Oral Oral   SpO2: 100% 97% 100% 100%   Weight:       Height:         24HR INTAKE/OUTPUT:    Intake/Output Summary (Last 24 hours) at 4/28/2025 0921  Last data filed at 4/28/2025 0644  Gross per 24 hour   Intake 380 ml   Output 4050 ml   Net -3670 ml     Gen: alert, awake  Neck: No JVD  Skin: Unremarkable  Cardiovascular:  S1, S2 without m/r/g   Respiratory: CTA B without w/r/r; respiratory effort normal  Abdomen:  soft, nt, nd,   Extremities: trace lower extremity edema  Neuro/Psy: AAoriented times 3 ; moves all 4

## 2025-04-28 NOTE — PROGRESS NOTES
Physical Therapy  Facility/Department: [facility] C3 TELE/MED SURG/ONC  Daily Treatment Note  NAME: Alfredo Chong  : 1950  MRN: [id number]    Date of Service: 2025    Discharge Recommendations:  Home with Home health PT, 24 hour supervision or assist   PT Equipment Recommendations  Equipment Needed: No    Patient Diagnosis(es): The primary encounter diagnosis was Hematuria, unspecified type. Diagnoses of Anemia, unspecified type and Clot retention of urine were also pertinent to this visit.    Assessment  Assessment: Pt progressing OOB and ambulating with RW but remains orthostatic when up, however appears asymptomatic if he is reporting correctly.  Pt perfomred BLE exs seated in chair.  He is recommended for con't skilled PT and home 24 hr supervision at D/C.  Activity Tolerance: Patient tolerated treatment well;Treatment limited secondary to medical complications  Equipment Needed: No    Plan  Physical Therapy Plan  General Plan: 3-5 times per week  Current Treatment Recommendations: Strengthening;ROM;Balance training;Functional mobility training;Transfer training;Endurance training;Gait training;Pain management;Home exercise program;Safety education & training;Patient/Caregiver education & training;Therapeutic activities;Stair training    Restrictions  Restrictions/Precautions  Restrictions/Precautions: General Precautions, Fall Risk  Activity Level: Up as Tolerated  Required Braces or Orthoses?: No  Position Activity Restriction  Other Position/Activity Restrictions: IV     Subjective   Subjective  Subjective: Pt agrees to PT session  Pain: no    Objective  Vitals  Pulse: 83  BP: 103/71  BP Location: Left upper arm  MAP (Calculated): 82  SpO2: 96 %  O2 Device: None (Room air)  Bed Mobility Training  Bed Mobility Training: Yes  Supine to Sit: Stand by assistance (extra time, HOB elev)  Sit to Supine: Stand by assistance  Scooting: Stand by assistance  Balance  Sitting: Intact  Standing: Impaired

## 2025-04-28 NOTE — PROGRESS NOTES
Urology Progress Note      Subjective: Alfredo SWEENEY Arlette denies  complaints.  Constipation better       Vitals:  /71   Pulse 83   Temp 97.3 °F (36.3 °C) (Oral)   Resp 16   Ht 1.778 m (5' 10\")   Wt 98 kg (216 lb)   SpO2 96%   BMI 30.99 kg/m²   Temp  Av.7 °F (36.5 °C)  Min: 97.3 °F (36.3 °C)  Max: 98.4 °F (36.9 °C)    Intake/Output Summary (Last 24 hours) at 2025 1038  Last data filed at 2025 0941  Gross per 24 hour   Intake 380 ml   Output 4450 ml   Net -4070 ml       Exam:   Physical:   Urine clear yellow in baeza     Labs:  WBC:    Lab Results   Component Value Date/Time    WBC 9.3 2025 06:03 AM     Hemoglobin/Hematocrit:    Lab Results   Component Value Date/Time    HGB 8.2 2025 06:03 AM    HCT 24.3 2025 06:03 AM     BMP:    Lab Results   Component Value Date/Time     2025 05:07 AM    K 4.3 2025 05:07 AM    K 4.4 2025 04:16 AM     2025 05:07 AM    CO2 25 2025 05:07 AM    BUN 72 2025 05:07 AM    CREATININE 2.0 2025 05:07 AM    CALCIUM 9.4 2025 05:07 AM    GFRAA >60 2021 03:50 PM    GFRAA >60 2013 08:24 AM    LABGLOM 34 2025 05:07 AM    LABGLOM 36 2024 12:21 PM     PT/INR:    Lab Results   Component Value Date/Time    PROTIME 14.1 2025 08:12 AM    INR 1.07 2025 08:12 AM     PTT:    Lab Results   Component Value Date/Time    APTT 30.6 2024 12:21 PM   [APTT    Urinalysis: >100 RBC    Urine Culture:      <50,000 CFU/ml mixed skin/urogenital horacio. No further workup         Imaging:     IR REMOVAL NEPHROSTOMY TUBE W FLUORO   Final Result      1.  Nephrostogram demonstrates function of left ureteral stent.   2.  Successful image guided removal of left nephrostomy.      Electronically signed by MD Pollo Patino      IR GUIDED NEPHROSTOMY CATH PLACEMENT LEFT   Final Result      1. Findings of moderate left hydronephrosis and hydroureter.   2. Successful placement of 8  urology to plan next steps in his care  - baeza removed today, monitor for voiding and check PVR in 6 hours  - OK for dc from uro perspective after voiding trial

## 2025-04-28 NOTE — PROGRESS NOTES
[facility] Medicine Progress Note  V 1.6      Date of Admission: 4/19/2025    Hospital Day: 10      Chief Admission Complaint:  blood in urine     Subjective:  no new c/o    Presenting Admission History:       74 y.o. male who presented to [facility] with hematuria.  PMHx significant for bladder cancer followed by [facility], CAD status post CABG, CHFrEF, hyperlipidemia, hypertension, hypothyroidism.     Patient states he has a known history of a bladder cancer has been receiving treatment on and off for the past several months.  Was supposed to have a repeat cystoscopy and possibly TURBT soon with urology, but is pending cardiology clearance and  recently had a stress test performed.    States that on Wednesday he had IV iron infusion as ordered by his primary cardiologist, afterwards he began to have gross hematuria in his urine.    States that he would initially start with blood but by the end of his stream would begin to be clear.  This persisted over the next several days until yesterday evening, states he was consistently having hematuria without clearing.    He did call his urologist, who told him that this is to be expected but if it worsens that he should present to the ED for evaluation.  States that his symptoms persisted today, he became concerned and came to the ED.  In the emergency room, patient had a hemoglobin of 8 when his baseline is usually around 11.  Gordon catheter was placed with drainage of gross blood    Assessment/Plan:      Current Principal Problem:  Hematuria      Hematuria :  Noted this was present at the time of admission ongoing for several days days prior to this admission.  Hemoglobin dropped to 8.3, usual baseline is around 11-12  Gordon catheter placed with large amount of bloody urine.  CT abdomen pelvis shows large amount of intraluminal hemorrhage within the urinary bladder with moderate hydroureteronephrosis  Consulted urology for further evaluation
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
## 2025-04-28 NOTE — PROGRESS NOTES
ONCOLOGY HEMATOLOGY CARE PROGRESS NOTE      SUBJECTIVE:      ROS:     Constitutional:  No weight loss, No fever, No chills, No night sweats.  Energy level good.  Eyes:  No impairment or change in vision  ENT / Mouth:  No pain, abnormal ulceration, bleeding, nasal drip or change in voice or hearing  Cardiovascular:  No chest pain, palpitations, new edema, or calf discomfort  Respiratory:  No pain, hemoptysis, change to breathing  Breast:  No pain, discharge, change in appearance or texture  Gastrointestinal:  No pain, cramping, jaundice, change to eating and bowel habits  Urinary:  No pain, bleeding or change in continence  Genitalia: No pain, bleeding or discharge  Musculoskeletal:  No redness, pain, edema or weakness  Skin:  No pruritus, rash, change to nodules or lesions  Neurologic:  No discomfort, change in mental status, speech, sensory or motor activity  Psychiatric:  No change in concentration or change to affect or mood  Endocrine:  No hot flashes, increased thirst, or change to urine production  Hematologic: No petechiae, ecchymosis or bleeding  Lymphatic:  No lymphadenopathy or lymphedema  Allergy / Immunologic:  No eczema, hives, frequent or recurrent infections    OBJECTIVE        Physical    VITALS:  Patient Vitals for the past 24 hrs:   BP Temp Temp src Pulse Resp SpO2   04/28/25 1033 103/71 -- -- 83 -- 96 %   04/28/25 0746 106/82 97.3 °F (36.3 °C) Oral 80 16 100 %   04/28/25 0000 114/74 98.4 °F (36.9 °C) Oral 74 16 100 %   04/27/25 2015 114/79 97.9 °F (36.6 °C) Oral 80 16 97 %   04/27/25 1611 106/62 97.3 °F (36.3 °C) Oral 67 16 100 %   04/27/25 1420 116/70 -- -- 82 -- --       24HR INTAKE/OUTPUT:    Intake/Output Summary (Last 24 hours) at 4/28/2025 1159  Last data filed at 4/28/2025 0941  Gross per 24 hour   Intake 380 ml   Output 3950 ml   Net -3570 ml       CONSTITUTIONAL: awake, alert, cooperative, no apparent distress   EYES: pupils equal, round and reactive

## 2025-04-28 NOTE — PLAN OF CARE
Problem: Safety - Adult  Goal: Free from fall injury  4/28/2025 0812 by Mary Brown RN  Outcome: Progressing  4/28/2025 0105 by Isabelle Toth RN  Outcome: Progressing  Flowsheets (Taken 4/22/2025 1018 by Caridad Meléndez RN)  Free From Fall Injury:   Instruct family/caregiver on patient safety   Based on caregiver fall risk screen, instruct family/caregiver to ask for assistance with transferring infant if caregiver noted to have fall risk factors     Problem: Chronic Conditions and Co-morbidities  Goal: Patient's chronic conditions and co-morbidity symptoms are monitored and maintained or improved  4/28/2025 0812 by Mary Brown RN  Outcome: Progressing  4/28/2025 0105 by Isabelle Toth RN  Outcome: Progressing  Flowsheets (Taken 4/24/2025 2212 by Lamar Mitchell RN)  Care Plan - Patient's Chronic Conditions and Co-Morbidity Symptoms are Monitored and Maintained or Improved:   Monitor and assess patient's chronic conditions and comorbid symptoms for stability, deterioration, or improvement   Collaborate with multidisciplinary team to address chronic and comorbid conditions and prevent exacerbation or deterioration   Update acute care plan with appropriate goals if chronic or comorbid symptoms are exacerbated and prevent overall improvement and discharge     Problem: Pain  Goal: Verbalizes/displays adequate comfort level or baseline comfort level  4/28/2025 0812 by Mary Brown RN  Outcome: Progressing  4/28/2025 0105 by Isabelle Toth RN  Outcome: Progressing  Flowsheets (Taken 4/22/2025 1018 by Caridad Meléndez RN)  Verbalizes/displays adequate comfort level or baseline comfort level:   Encourage patient to monitor pain and request assistance   Assess pain using appropriate pain scale     Problem: ABCDS Injury Assessment  Goal: Absence of physical injury  4/28/2025 0812 by Mary Brown RN  Outcome: Progressing  4/28/2025 0105 by Isabelle Toth RN  Outcome:

## 2025-04-28 NOTE — PROGRESS NOTES
Comprehensive Nutrition Assessment    Type and Reason for Visit:  Reassess    Nutrition Recommendations/Plan:   Continue low potassium diet.  Encourage PO intakes.  Nepro BID.  Monitor pertinent labs, bowel habits, weight, N/V, supplement acceptance, clinical progression.       Malnutrition Assessment:  Malnutrition Status:  At risk for malnutrition (04/21/25 1322)    Context:  Acute Illness     Findings of the 6 clinical characteristics of malnutrition:  Energy Intake:  75% or less of estimated energy requirements for 7 or more days  Weight Loss:  Mild weight loss     Body Fat Loss:  No body fat loss     Muscle Mass Loss:  No muscle mass loss    Fluid Accumulation:  No fluid accumulation     Strength:  Not Performed    Nutrition Assessment:    Followup: Patient seen resting in bed. Currently on low potassium diet due to AMARILYS on CKD. Pt reports improved appetite. Consuming % of meals (mostly 51-75%) per flowsheets. Enjoying Nepro protein shakes BID. Will continue to monitor.    Nutrition Related Findings:    Na 132. LBM 4/27. Wound Type: None       Current Nutrition Intake & Therapies:    Average Meal Intake: 26-50%, 51-75%, %  Average Supplements Intake: Unable to assess  ADULT ORAL NUTRITION SUPPLEMENT; Lunch, Dinner; Renal Oral Supplement  ADULT DIET; Regular; Low Potassium (Less than 3000 mg/day); 1500 ml    Anthropometric Measures:  Height: 177.8 cm (5' 10\")  Ideal Body Weight (IBW): 166 lbs (75 kg)       Current Body Weight: 98 kg (216 lb 0.8 oz) (4/29/25), 130.2 % IBW. Weight Source: Bed scale  Current BMI (kg/m2): 31  Usual Body Weight: 101.8 kg (224 lb 6.9 oz) (2/27/25)     % Weight Change (Calculated): -3.7  Weight Adjustment For: No Adjustment        BMI Categories: Obese Class 1 (BMI 30.0-34.9)    Estimated Daily Nutrient Needs:  Energy Requirements Based On: Kcal/kg  Weight Used for Energy Requirements: Current  Energy (kcal/day): 1960 - 2156 (20-22kcal/kg)  Weight Used for Protein

## 2025-04-29 VITALS
SYSTOLIC BLOOD PRESSURE: 108 MMHG | WEIGHT: 216 LBS | BODY MASS INDEX: 30.92 KG/M2 | RESPIRATION RATE: 16 BRPM | HEART RATE: 78 BPM | HEIGHT: 70 IN | TEMPERATURE: 97.3 F | DIASTOLIC BLOOD PRESSURE: 70 MMHG | OXYGEN SATURATION: 99 %

## 2025-04-29 LAB
ALBUMIN SERPL-MCNC: 3.2 G/DL (ref 3.4–5)
ANION GAP SERPL CALCULATED.3IONS-SCNC: 10 MMOL/L (ref 3–16)
BUN SERPL-MCNC: 71 MG/DL (ref 7–20)
CALCIUM SERPL-MCNC: 9.1 MG/DL (ref 8.3–10.6)
CHLORIDE SERPL-SCNC: 99 MMOL/L (ref 99–110)
CO2 SERPL-SCNC: 23 MMOL/L (ref 21–32)
CREAT SERPL-MCNC: 2 MG/DL (ref 0.8–1.3)
DEPRECATED RDW RBC AUTO: 16.6 % (ref 12.4–15.4)
GFR SERPLBLD CREATININE-BSD FMLA CKD-EPI: 34 ML/MIN/{1.73_M2}
GLUCOSE SERPL-MCNC: 88 MG/DL (ref 70–99)
HCT VFR BLD AUTO: 23 % (ref 40.5–52.5)
HGB BLD-MCNC: 8 G/DL (ref 13.5–17.5)
MCH RBC QN AUTO: 32.9 PG (ref 26–34)
MCHC RBC AUTO-ENTMCNC: 34.7 G/DL (ref 31–36)
MCV RBC AUTO: 95 FL (ref 80–100)
PHOSPHATE SERPL-MCNC: 2.8 MG/DL (ref 2.5–4.9)
PLATELET # BLD AUTO: 430 K/UL (ref 135–450)
PMV BLD AUTO: 6.9 FL (ref 5–10.5)
POTASSIUM SERPL-SCNC: 4.4 MMOL/L (ref 3.5–5.1)
RBC # BLD AUTO: 2.42 M/UL (ref 4.2–5.9)
SODIUM SERPL-SCNC: 132 MMOL/L (ref 136–145)
WBC # BLD AUTO: 10.2 K/UL (ref 4–11)

## 2025-04-29 PROCEDURE — 85027 COMPLETE CBC AUTOMATED: CPT

## 2025-04-29 PROCEDURE — 6370000000 HC RX 637 (ALT 250 FOR IP)

## 2025-04-29 PROCEDURE — 6370000000 HC RX 637 (ALT 250 FOR IP): Performed by: INTERNAL MEDICINE

## 2025-04-29 PROCEDURE — 51798 US URINE CAPACITY MEASURE: CPT

## 2025-04-29 PROCEDURE — 80069 RENAL FUNCTION PANEL: CPT

## 2025-04-29 PROCEDURE — 6360000002 HC RX W HCPCS: Performed by: INTERNAL MEDICINE

## 2025-04-29 PROCEDURE — 36415 COLL VENOUS BLD VENIPUNCTURE: CPT

## 2025-04-29 RX ORDER — ASPIRIN 81 MG/1
81 TABLET ORAL DAILY
Qty: 30 TABLET | Refills: 3
Start: 2025-04-29 | End: 2025-05-29

## 2025-04-29 RX ORDER — METOPROLOL SUCCINATE 25 MG/1
12.5 TABLET, EXTENDED RELEASE ORAL DAILY
Qty: 15 TABLET | Refills: 0 | Status: SHIPPED | OUTPATIENT
Start: 2025-04-30 | End: 2025-05-30

## 2025-04-29 RX ORDER — MIDODRINE HYDROCHLORIDE 5 MG/1
5 TABLET ORAL 3 TIMES DAILY
Qty: 90 TABLET | Refills: 0 | Status: SHIPPED | OUTPATIENT
Start: 2025-04-29 | End: 2025-05-29

## 2025-04-29 RX ADMIN — HEPARIN SODIUM 5000 UNITS: 5000 INJECTION INTRAVENOUS; SUBCUTANEOUS at 06:06

## 2025-04-29 RX ADMIN — ASPIRIN 81 MG: 81 TABLET, COATED ORAL at 08:21

## 2025-04-29 RX ADMIN — MIDODRINE HYDROCHLORIDE 5 MG: 5 TABLET ORAL at 13:31

## 2025-04-29 RX ADMIN — LEVOTHYROXINE SODIUM 75 MCG: 0.05 TABLET ORAL at 06:05

## 2025-04-29 RX ADMIN — Medication 15 G: at 08:21

## 2025-04-29 RX ADMIN — MIDODRINE HYDROCHLORIDE 5 MG: 5 TABLET ORAL at 08:21

## 2025-04-29 RX ADMIN — SENNOSIDES AND DOCUSATE SODIUM 2 TABLET: 50; 8.6 TABLET ORAL at 08:24

## 2025-04-29 NOTE — PLAN OF CARE
Problem: Safety - Adult  Goal: Free from fall injury  4/29/2025 0924 by Mary Brown RN  Outcome: Progressing  4/29/2025 0229 by Isabelle Toth RN  Outcome: Progressing  Flowsheets (Taken 4/22/2025 1018 by Caridad Meléndez RN)  Free From Fall Injury:   Instruct family/caregiver on patient safety   Based on caregiver fall risk screen, instruct family/caregiver to ask for assistance with transferring infant if caregiver noted to have fall risk factors     Problem: Chronic Conditions and Co-morbidities  Goal: Patient's chronic conditions and co-morbidity symptoms are monitored and maintained or improved  4/29/2025 0924 by Mary Brown RN  Outcome: Progressing  4/29/2025 0229 by Isabelle Toth RN  Outcome: Progressing  Flowsheets (Taken 4/24/2025 2212 by Lamar Mitchell RN)  Care Plan - Patient's Chronic Conditions and Co-Morbidity Symptoms are Monitored and Maintained or Improved:   Monitor and assess patient's chronic conditions and comorbid symptoms for stability, deterioration, or improvement   Collaborate with multidisciplinary team to address chronic and comorbid conditions and prevent exacerbation or deterioration   Update acute care plan with appropriate goals if chronic or comorbid symptoms are exacerbated and prevent overall improvement and discharge     Problem: Pain  Goal: Verbalizes/displays adequate comfort level or baseline comfort level  4/29/2025 0924 by Mary Brown RN  Outcome: Progressing  4/29/2025 0229 by Isabelle Toth RN  Outcome: Progressing  Flowsheets (Taken 4/22/2025 1018 by Caridad Meléndez RN)  Verbalizes/displays adequate comfort level or baseline comfort level:   Encourage patient to monitor pain and request assistance   Assess pain using appropriate pain scale     Problem: ABCDS Injury Assessment  Goal: Absence of physical injury  4/29/2025 0924 by Mary Brown RN  Outcome: Progressing  4/29/2025 0229 by Isabelle Toth RN  Outcome:

## 2025-04-29 NOTE — PROGRESS NOTES
Discharge instructions reviewed with patient. Reviewed all meds and follow up appts. Scripts given to patient. IV removed per protocol, pt consuelo well. All questions answered.   The patient is a 36y Female complaining of headache.

## 2025-04-29 NOTE — PLAN OF CARE
Problem: Safety - Adult  Goal: Free from fall injury  4/29/2025 1312 by Mary Brown RN  Outcome: Adequate for Discharge  4/29/2025 0924 by Mary Brown RN  Outcome: Progressing  4/29/2025 0229 by Isabelle Toth RN  Outcome: Progressing  Flowsheets (Taken 4/22/2025 1018 by Caridad Meléndez RN)  Free From Fall Injury:   Instruct family/caregiver on patient safety   Based on caregiver fall risk screen, instruct family/caregiver to ask for assistance with transferring infant if caregiver noted to have fall risk factors     Problem: Chronic Conditions and Co-morbidities  Goal: Patient's chronic conditions and co-morbidity symptoms are monitored and maintained or improved  4/29/2025 1312 by Mary Brown RN  Outcome: Adequate for Discharge  4/29/2025 0924 by Mary Brown RN  Outcome: Progressing  4/29/2025 0229 by Isabelle Toth RN  Outcome: Progressing  Flowsheets (Taken 4/24/2025 2212 by Lamar Mitchell RN)  Care Plan - Patient's Chronic Conditions and Co-Morbidity Symptoms are Monitored and Maintained or Improved:   Monitor and assess patient's chronic conditions and comorbid symptoms for stability, deterioration, or improvement   Collaborate with multidisciplinary team to address chronic and comorbid conditions and prevent exacerbation or deterioration   Update acute care plan with appropriate goals if chronic or comorbid symptoms are exacerbated and prevent overall improvement and discharge     Problem: Pain  Goal: Verbalizes/displays adequate comfort level or baseline comfort level  4/29/2025 1312 by Mary Brown RN  Outcome: Adequate for Discharge  4/29/2025 0924 by Mary Brown RN  Outcome: Progressing  4/29/2025 0229 by Isabelle Toth RN  Outcome: Progressing  Flowsheets (Taken 4/22/2025 1018 by Caridad Meléndez RN)  Verbalizes/displays adequate comfort level or baseline comfort level:   Encourage patient to monitor pain and request assistance   Assess pain using

## 2025-04-29 NOTE — PROGRESS NOTES
[facility] Medicine Progress Note  V 1.6      Date of Admission: 4/19/2025    Hospital Day: 11      Chief Admission Complaint:  blood in urine     Subjective:  no new c/o    Presenting Admission History:       74 y.o. male who presented to [facility] with hematuria.  PMHx significant for bladder cancer followed by [facility], CAD status post CABG, CHFrEF, hyperlipidemia, hypertension, hypothyroidism.     Patient states he has a known history of a bladder cancer has been receiving treatment on and off for the past several months.  Was supposed to have a repeat cystoscopy and possibly TURBT soon with urology, but is pending cardiology clearance and  recently had a stress test performed.    States that on Wednesday he had IV iron infusion as ordered by his primary cardiologist, afterwards he began to have gross hematuria in his urine.    States that he would initially start with blood but by the end of his stream would begin to be clear.  This persisted over the next several days until yesterday evening, states he was consistently having hematuria without clearing.    He did call his urologist, who told him that this is to be expected but if it worsens that he should present to the ED for evaluation.  States that his symptoms persisted today, he became concerned and came to the ED.  In the emergency room, patient had a hemoglobin of 8 when his baseline is usually around 11.  Gordon catheter was placed with drainage of gross blood    Assessment/Plan:      Current Principal Problem:  Hematuria      Hematuria :  Noted this was present at the time of admission ongoing for several days days prior to this admission.  Hemoglobin dropped to 8.3, usual baseline is around 11-12  Gordon catheter placed with large amount of bloody urine.  CT abdomen pelvis shows large amount of intraluminal hemorrhage within the urinary bladder with moderate hydroureteronephrosis  Consulted urology for further evaluation

## 2025-04-29 NOTE — PROGRESS NOTES
Nephrology Progress Note   [facility] [facility]      Sub/interval history  He has been voiding on his own, denies any issues and has been emptying his bladder  Urinating well 1.3 liters over last 24 hours    ROS:   +weak, intake adequate.Denies any shortness of breath, on RA    Scheduled Meds:   midodrine  5 mg Oral TID WC    polyethylene glycol  17 g Oral BID    sennosides-docusate sodium  2 tablet Oral Daily    urea  15 g Oral BID    aspirin  81 mg Oral Daily    heparin (porcine)  5,000 Units SubCUTAneous 3 times per day    metoprolol succinate  12.5 mg Oral Daily    [Held by provider] sacubitril-valsartan  0.5 tablet Oral BID    [Held by provider] mirtazapine  7.5 mg Oral Nightly    sodium chloride flush  5-40 mL IntraVENous 2 times per day    levothyroxine  75 mcg Oral Daily     Continuous Infusions:   sodium chloride      sodium chloride      sodium chloride 20 mL/hr at 04/21/25 2150     PRN Meds:.bisacodyl, sodium chloride, sodium chloride, benzocaine-menthol, lidocaine, sodium chloride flush, sodium chloride, ondansetron **OR** ondansetron, polyethylene glycol, acetaminophen **OR** acetaminophen    Objective/     Vitals:    04/28/25 1434 04/28/25 2000 04/29/25 0115 04/29/25 0816   BP: 107/76 94/60 110/72 100/72   Pulse: 79 76 84 81   Resp: 18 16 16 16   Temp: 97.5 °F (36.4 °C) 97.9 °F (36.6 °C) 98.1 °F (36.7 °C) 97.3 °F (36.3 °C)   TempSrc: Oral Axillary Oral Axillary   SpO2: 100% 100% 100% 99%   Weight:       Height:         24HR INTAKE/OUTPUT:    Intake/Output Summary (Last 24 hours) at 4/29/2025 0957  Last data filed at 4/29/2025 0911  Gross per 24 hour   Intake 600 ml   Output 1125 ml   Net -525 ml     Gen: alert, awake  Neck: No JVD  Skin: Unremarkable  Cardiovascular:  S1, S2 without m/r/g   Respiratory: CTA B without w/r/r; respiratory effort normal  Abdomen:  soft, nt, nd,   Extremities: trace lower extremity edema  Neuro/Psy: AAoriented times 3 ; moves all 4 ext    Data/  Recent Labs

## 2025-04-29 NOTE — CARE COORDINATION
Case Management Discharge Summary         DISCHARGE Disposition: Home with Home Health Care    Home Care:  Home Care ordered at discharge: Yes  Skilled home care services to be provded:  RN, PT, and OT  Home Care Agency: [facility]  Phone: [phone]  Fax: [phone]  Orders faxed: No- Nydia to pull    IMM Completed: Yes  04/28/2025    Transportation:  Transportation PLAN for discharge: family   Mode of Transport: Private Car    Additional CM Notes: Patient [address] [place] [address] address, differing from facesheet. Voiding per urology marley baeza.      The Patient and/or patient representative Alfredo and his family were provided with a choice of provider and agrees with the discharge plan Yes  Freedom of choice list was provided with basic dialogue that supports the patient's individualized plan of care/goals and shares the quality data associated with the providers. Yes    GALE Hatch  [facility]   Case Management Department  Ph: [phone]  Fax: [phone]

## 2025-04-29 NOTE — PROGRESS NOTES
Urology Progress Note      Subjective: Alfredo SWEENYE Arlette denies  complaints.  Gordon removed yesterday morning, straight cath x 2, but voiding on his own this AM      Vitals:  /72   Pulse 81   Temp 97.3 °F (36.3 °C) (Axillary)   Resp 16   Ht 1.778 m (5' 10\")   Wt 98 kg (216 lb)   SpO2 99%   BMI 30.99 kg/m²   Temp  Av.7 °F (36.5 °C)  Min: 97.3 °F (36.3 °C)  Max: 98.1 °F (36.7 °C)    Intake/Output Summary (Last 24 hours) at 2025 1016  Last data filed at 2025 0911  Gross per 24 hour   Intake 600 ml   Output 1125 ml   Net -525 ml       Exam:   Physical:   Awake and alert, in no distress     Labs:  WBC:    Lab Results   Component Value Date/Time    WBC 10.2 2025 04:50 AM     Hemoglobin/Hematocrit:    Lab Results   Component Value Date/Time    HGB 8.0 2025 04:50 AM    HCT 23.0 2025 04:50 AM     BMP:    Lab Results   Component Value Date/Time     2025 04:50 AM    K 4.4 2025 04:50 AM    K 4.4 2025 04:16 AM    CL 99 2025 04:50 AM    CO2 23 2025 04:50 AM    BUN 71 2025 04:50 AM    CREATININE 2.0 2025 04:50 AM    CALCIUM 9.1 2025 04:50 AM    GFRAA >60 2021 03:50 PM    GFRAA >60 2013 08:24 AM    LABGLOM 34 2025 04:50 AM    LABGLOM 36 2024 12:21 PM     PT/INR:    Lab Results   Component Value Date/Time    PROTIME 14.1 2025 08:12 AM    INR 1.07 2025 08:12 AM     PTT:    Lab Results   Component Value Date/Time    APTT 30.6 2024 12:21 PM   [APTT    Urinalysis: >100 RBC    Urine Culture:      <50,000 CFU/ml mixed skin/urogenital horacio. No further workup         Imaging:     IR REMOVAL NEPHROSTOMY TUBE W FLUORO   Final Result      1.  Nephrostogram demonstrates function of left ureteral stent.   2.  Successful image guided removal of left nephrostomy.      Electronically signed by MD Pollo Patino      IR GUIDED NEPHROSTOMY CATH PLACEMENT LEFT   Final Result      1. Findings of moderate left

## 2025-04-30 ENCOUNTER — CARE COORDINATION (OUTPATIENT)
Dept: CASE MANAGEMENT | Age: 75
End: 2025-04-30

## 2025-04-30 ENCOUNTER — HOSPITAL ENCOUNTER (OUTPATIENT)
Dept: NURSING | Age: 75
Setting detail: INFUSION SERIES
Discharge: HOME | End: 2025-04-30

## 2025-04-30 DIAGNOSIS — Z96.0 STATUS POST CYSTOSCOPY WITH URETERAL STENT PLACEMENT: Primary | ICD-10-CM

## 2025-04-30 PROCEDURE — 1111F DSCHRG MED/CURRENT MED MERGE: CPT | Performed by: FAMILY MEDICINE

## 2025-04-30 NOTE — DISCHARGE SUMMARY
Hospital Medicine Discharge Summary    Patient: Alfredo Chong   : 1950     Hospital:  [facility]  Admit Date: 2025   Discharge Date: 2025    Disposition:  Home w/ HHC   Code status:  Full  Condition at Discharge: Stable  Primary Care Provider: Heath Hernandez MD    Admitting Provider: Bill Dodd DO  Discharge Provider: Romulo Reaves MD     Discharge Diagnoses:      Active Hospital Problems    Diagnosis     Hematuria [R31.9]        Presenting Admission History:        74 y.o. male who presented to [facility] with hematuria.  PMHx significant for bladder cancer followed by [facility]rology, CAD status post CABG, CHFrEF, hyperlipidemia, hypertension, hypothyroidism.     Patient states he has a known history of a bladder cancer has been receiving treatment on and off for the past several months.  Was supposed to have a repeat cystoscopy and possibly TURBT soon with urology, but is pending cardiology clearance and  recently had a stress test performed.    States that on Wednesday he had IV iron infusion as ordered by his primary cardiologist, afterwards he began to have gross hematuria in his urine.    States that he would initially start with blood but by the end of his stream would begin to be clear.  This persisted over the next several days until yesterday evening, states he was consistently having hematuria without clearing.    He did call his urologist, who told him that this is to be expected but if it worsens that he should present to the ED for evaluation.  States that his symptoms persisted today, he became concerned and came to the ED.  In the emergency room, patient had a hemoglobin of 8 when his baseline is usually around 11.  Gordon catheter was placed with drainage of gross blood     Assessment/Plan:          Hematuria :  Noted this was present at the time of admission ongoing for several days days prior to this admission.  Gordon catheter placed  Findings of moderate left hydronephrosis and hydroureter. 2. Successful placement of 8 Citizen of the [place] by 26 cm antegrade left ureteral stent. 3. A left nephrostomy was placed as a safety drainage device. This tube should be clamped on the morning of 4/24/2025. This device should then be removed 24 hours subsequently using fluoroscopic guidance. Electronically signed by Pollo Patino    XR ABDOMEN (KUB) (SINGLE AP VIEW)  Result Date: 4/20/2025  AP ABDOMEN: HISTORY: Intraoperative imaging. TECHNIQUE: Single C-arm spot film was obtained in the AP projection. Fluoroscopy time: 0.3 minutes. Radiation dose: 1 mGy. FINDINGS: Single image demonstrates a ureteral stent in place. Its proximal tip appears curled in the region of the renal pelvis, its distal tip not visualized in the low pelvis. Bowel gas pattern is normal.     Ureteral stent in place as described. Please see procedural note in the medical record. Electronically signed by Luma uBrnett MD    FLUORO FOR SURGICAL PROCEDURES  Result Date: 4/20/2025  AP ABDOMEN: HISTORY: Intraoperative imaging. TECHNIQUE: Single C-arm spot film was obtained in the AP projection. Fluoroscopy time: 0.3 minutes. Radiation dose: 1 mGy. FINDINGS: Single image demonstrates a ureteral stent in place. Its proximal tip appears curled in the region of the renal pelvis, its distal tip not visualized in the low pelvis. Bowel gas pattern is normal.     Ureteral stent in place as described. Please see procedural note in the medical record. Electronically signed by Luma Burnett MD    CT ABDOMEN PELVIS WO CONTRAST Additional Contrast? None  Result Date: 4/19/2025  EXAM: CT ABDOMEN AND PELVIS WITHOUT CONTRAST INDICATION: hematuria, COMPARISON: December 15, 2024 TECHNIQUE: Axial CT imaging obtained from lung bases through pelvis. Axial images and multiplanar reformatted images are provided for review.   Individualized dose optimization technique was used in order to meet ALARA standards for radiation dose

## 2025-04-30 NOTE — CARE COORDINATION
Care Transitions Note    Initial Call - Call within 2 business days of discharge: Yes    Patient Current Location:  Home: [address]  [address] [id number]    Care Transition Nurse contacted the patient by telephone to perform post hospital discharge assessment, verified name and  as identifiers.  Provided introduction to self, and explanation of the Care Transition Nurse role.    Patient: Alfredo Chong    Patient : 1950   MRN: [id number]     Reason for Admission: Hematuria, S/P Cystoscopy, Retrograde, Pyelogram, Stent insertion, Right  Discharge Date: 25    RURS: Readmission Risk Score: 19.7      Last Discharge Facility       Date Complaint Diagnosis Description Type Department Provider    25 Hematuria Hematuria, unspecified type ... ED to Hosp-Admission (Discharged) (ADMITTED) Romulo Louis MD; Erica Villeda...            Was this an external facility discharge? No    Additional needs identified to be addressed with provider   No needs identified             Method of communication with provider: none.    Patients top risk factors for readmission: medical condition-Hematuria, S/P Cystoscopy, Retrograde, Pyelogram, Stent insertion, Right, multiple health system providers, and polypharmacy    Interventions to address risk factors:   Education: patient instructed to continue to monitor urine output, any returning blood in urine, any fevers or chills, any worsening weakness or fatigue, reporting to MD immediately.  Review of patient management of conditions/medications: make sure to rest as needed, taking all medications as prescribed, follow up with all MD's as instructed, continue to monitor urine output.  Home Health: CTN confirmed with Nydia with [facility], referral for [facility] services was received.     Care Summary Note: CTN spoke with patient this am for initial 24 hour discharge follow up CTN call.  Patient states he is doing okay, just really weak and fatigued.

## 2025-05-01 ENCOUNTER — TELEPHONE (OUTPATIENT)
Dept: FAMILY MEDICINE CLINIC | Age: 75
End: 2025-05-01

## 2025-05-01 NOTE — TELEPHONE ENCOUNTER
Dulcolax, probiotic daily, Curamin- anti oxidant are all meds he is taking as well    Patient is struggling with discomfort, he is wondering what he can take in place of Tylenol Arthritis. Patient is rating his pain as a 6 out of 10.    They also wanted to inform you that his bladder CA has returned.    Will need orders signed for PT/OT/skilled nursing care    [phone]   no events overnight, pt c/o mild incisional pain at site.   PAST MEDICAL & SURGICAL HISTORY:  Glaucoma  Tuberculosis  Dementia  Arthritis  History of BPH  Malignant neoplasm of unspecified part of left bronchus or lung  Hyperlipidemia  Hypertension  S/P lobectomy of lung  History of lung biopsy  Pacemaker  [employer]  model [id number]  serial [id number]  date 7/1/2014  S/P total knee replacement, left    ICU Vital Signs Last 24 Hrs  T(C): 36.7 (06 Mar 2021 00:00), Max: 37.1 (05 Mar 2021 19:44)  T(F): 98 (06 Mar 2021 00:00), Max: 98.8 (05 Mar 2021 19:44)  HR: 65 (06 Mar 2021 00:00) (60 - 85)  BP: 141/81 (06 Mar 2021 00:00) (102/53 - 148/79)  BP(mean): 89 (05 Mar 2021 16:00) (68 - 104)  ABP(mean): --  RR: 18 (06 Mar 2021 00:00) (17 - 23)  SpO2: 100% (06 Mar 2021 00:00) (94% - 100%)    MEDICATIONS  (STANDING):  aspirin  chewable 81 milliGRAM(s) Oral daily  ATENolol  Tablet 50 milliGRAM(s) Oral every 12 hours  atorvastatin 10 milliGRAM(s) Oral at bedtime  heparin   Injectable 5000 Unit(s) SubCutaneous every 8 hours  lidocaine   Patch 1 Patch Transdermal every 24 hours  pantoprazole    Tablet 40 milliGRAM(s) Oral before breakfast  tamsulosin 0.4 milliGRAM(s) Oral at bedtime    MEDICATIONS  (PRN):  HYDROmorphone  Injectable 0.3 milliGRAM(s) IV Push every 3 hours PRN Severe breakthrough Pain (7 - 10)  naloxone Injectable 0.1 milliGRAM(s) IV Push every 3 minutes PRN For ANY of the following changes in patient status:  A. RR LESS THAN 10 breaths per minute, B. Oxygen saturation LESS THAN 90%, C. Sedation score of 6  ondansetron Injectable 4 milliGRAM(s) IV Push every 6 hours PRN Nausea  oxyCODONE    IR 5 milliGRAM(s) Oral every 3 hours PRN Moderate to Severe Pain (4 - 10)    I&O's Detail    05 Mar 2021 07:01  -  06 Mar 2021 07:00  --------------------------------------------------------  IN:    Lactated Ringers: 150 mL    Oral Fluid: 480 mL  Total IN: 630 mL    OUT:    Chest Tube (mL): 10 mL    Chest Tube (mL): 190 mL    Voided (mL): 1150 mL  Total OUT: 1350 mL    Total NET: -720 mL                          12.1   5.00  )-----------( 160      ( 05 Mar 2021 04:31 )             37.1   03-05    137  |  101  |  14  ----------------------------<  106<H>  3.7   |  26  |  0.65    Ca    8.5      05 Mar 2021 04:31  Phos  3.4     03-05  Mg     2.0     03-05    TPro  6.7  /  Alb  3.2<L>  /  TBili  1.1  /  DBili  x   /  AST  24  /  ALT  25  /  AlkPhos  78  03-05  < from: Xray Chest 1 View- PORTABLE-Urgent (Xray Chest 1 View- PORTABLE-Urgent .) (03.05.21 @ 13:45) >  2 right-sided chest tubes unchanged. Subcutaneous emphysema are in the right side of the chest and neck is stable. Left pleural effusion again seen likely with underlying atelectasis. No interval change in suspected apical pneumothorax.      < end of copied text >    General: WN/WD NAD  Neurology: A&Ox3, nonfocal, IRVIN x 4  Eyes: PERRLA/ EOMI, Gross vision intact  ENT/Neck: Neck supple, trachea midline, No JVD, Gross hearing intact  Respiratory: CTA B/L, No wheezing, rales, rhonchi  CV: RRR, S1S2, no murmurs, rubs or gallops  Abdominal: Soft, NT, ND +BS,   Extremities: No edema, + peripheral pulses  Skin: No Rashes, Hematoma, Ecchymosis  Incisions: clean and dry  Tubes: both chest tube and pleurx to pleurovac on suction, no airleak. SQ emphysema stable no events overnight, pt c/o mild incisional pain at site.   PAST MEDICAL & SURGICAL HISTORY:  Glaucoma  Tuberculosis  Dementia  Arthritis  History of BPH  Malignant neoplasm of unspecified part of left bronchus or lung  Hyperlipidemia  Hypertension  S/P lobectomy of lung  History of lung biopsy  Pacemaker  [employer]  model [id number]  serial [id number]  date 7/1/2014  S/P total knee replacement, left    ICU Vital Signs Last 24 Hrs  T(C): 36.7 (06 Mar 2021 00:00), Max: 37.1 (05 Mar 2021 19:44)  T(F): 98 (06 Mar 2021 00:00), Max: 98.8 (05 Mar 2021 19:44)  HR: 65 (06 Mar 2021 00:00) (60 - 85)  BP: 141/81 (06 Mar 2021 00:00) (102/53 - 148/79)  BP(mean): 89 (05 Mar 2021 16:00) (68 - 104)  ABP(mean): --  RR: 18 (06 Mar 2021 00:00) (17 - 23)  SpO2: 100% (06 Mar 2021 00:00) (94% - 100%)    MEDICATIONS  (STANDING):  aspirin  chewable 81 milliGRAM(s) Oral daily  ATENolol  Tablet 50 milliGRAM(s) Oral every 12 hours  atorvastatin 10 milliGRAM(s) Oral at bedtime  heparin   Injectable 5000 Unit(s) SubCutaneous every 8 hours  lidocaine   Patch 1 Patch Transdermal every 24 hours  pantoprazole    Tablet 40 milliGRAM(s) Oral before breakfast  tamsulosin 0.4 milliGRAM(s) Oral at bedtime    MEDICATIONS  (PRN):  HYDROmorphone  Injectable 0.3 milliGRAM(s) IV Push every 3 hours PRN Severe breakthrough Pain (7 - 10)  naloxone Injectable 0.1 milliGRAM(s) IV Push every 3 minutes PRN For ANY of the following changes in patient status:  A. RR LESS THAN 10 breaths per minute, B. Oxygen saturation LESS THAN 90%, C. Sedation score of 6  ondansetron Injectable 4 milliGRAM(s) IV Push every 6 hours PRN Nausea  oxyCODONE    IR 5 milliGRAM(s) Oral every 3 hours PRN Moderate to Severe Pain (4 - 10)    I&O's Detail    05 Mar 2021 07:01  -  06 Mar 2021 07:00  --------------------------------------------------------  IN:    Lactated Ringers: 150 mL    Oral Fluid: 480 mL  Total IN: 630 mL    OUT:    Chest Tube (mL): 10 mL    Chest Tube (mL): 190 mL    Voided (mL): 1150 mL  Total OUT: 1350 mL    Total NET: -720 mL                          12.1   5.00  )-----------( 160      ( 05 Mar 2021 04:31 )             37.1   03-05    137  |  101  |  14  ----------------------------<  106<H>  3.7   |  26  |  0.65    Ca    8.5      05 Mar 2021 04:31  Phos  3.4     03-05  Mg     2.0     03-05    TPro  6.7  /  Alb  3.2<L>  /  TBili  1.1  /  DBili  x   /  AST  24  /  ALT  25  /  AlkPhos  78  03-05  < from: Xray Chest 1 View- PORTABLE-Urgent (Xray Chest 1 View- PORTABLE-Urgent .) (03.05.21 @ 13:45) >  2 right-sided chest tubes unchanged. Subcutaneous emphysema are in the right side of the chest and neck is stable. Left pleural effusion again seen likely with underlying atelectasis. No interval change in suspected apical pneumothorax.      < end of copied text >    General: WN/WD NAD  Neurology: A&Ox3, nonfocal, IRVIN x 4  Eyes: PERRLA/ EOMI, Gross vision intact  ENT/Neck: Neck supple, trachea midline, No JVD, Gross hearing intact  Respiratory: CTA B/L, No wheezing, rales, rhonchi  CV: RRR, S1S2, no murmurs, rubs or gallops  Abdominal: Soft, NT, ND +BS,   Extremities: No edema, + peripheral pulses  Skin: No Rashes, Hematoma, Ecchymosis  Incisions: clean and dry, however there is moderate amount of serous drainage around chest tube site. Pleurx site is dry  Tubes: both chest tube and pleurx to pleurovac on suction, no airleak. SQ emphysema stable

## 2025-05-05 ENCOUNTER — TELEPHONE (OUTPATIENT)
Dept: FAMILY MEDICINE CLINIC | Age: 75
End: 2025-05-05

## 2025-05-06 DIAGNOSIS — J30.2 SEASONAL ALLERGIES: ICD-10-CM

## 2025-05-06 RX ORDER — LORATADINE 10 MG/1
10 TABLET ORAL DAILY
Qty: 30 TABLET | Refills: 3 | Status: SHIPPED | OUTPATIENT
Start: 2025-05-06

## 2025-05-06 NOTE — TELEPHONE ENCOUNTER
Last Office Visit  -  3/26/25  Next Office Visit  -  5/12/25    Last Filled  -    Last UDS -    Contract -

## 2025-05-07 ENCOUNTER — CARE COORDINATION (OUTPATIENT)
Dept: CASE MANAGEMENT | Age: 75
End: 2025-05-07

## 2025-05-07 NOTE — CARE COORDINATION
Care Transitions Note    Follow Up Call     Patient Current Location:  Home: [address]  [address] [facility] [address]    Care Transition Nurse contacted the patient by telephone. Verified name and  as identifiers.    Additional needs identified to be addressed with provider   High priority- CTN spoke with patient this afternoon for follow up CTN call.   Patient states he is doing well, still slightly weak and fatigued, but states he does feel it is getting better.  Urinating without any difficulty, no blood in urine.  Patient states BP's have been running on the lower side, 100's/60's.  Today it was 94/68 with HR 74. Patient states he is taking his metoprolol 1/2 tab and Entresto as instructed.  Wanted you to be aware, if any interventions or advise please contact patient at [phone].                 Method of communication with provider: chart routing.    Care Summary Note: CTN spoke with patient this afternoon for follow up CTN call.  Patient states he is still slightly weak and fatigued, he is not having any fever, chills, nausea, vomiting, chest pain, SOB or cough.   No blood in urine, some slight discomfort, related to the ureteral stents, states he is drinking plenty of fluids, and not requiring anything for pain.  BP this morning was 94-68 HR 74, CTN will forward a message to Cardiologist just to .  Patient with no congestion, LE edema, feeling lightheaded, dizziness, and heart palpitations.  No other issues or concerns, no new or changed medications at this time.  He has HFU with PCP scheduled for 2025.    Plan of care updates since last contact:  Education: patient instructed to continue to monitor for any worsening discomfort due to stent, any fevers or chills, blood in urine, reporting to MD immediately.  Review of patient management of conditions/medications: make sure to rest as needed, taking all medications as prescribed, continue to monitor urine output closely, VSS

## 2025-05-12 ENCOUNTER — OFFICE VISIT (OUTPATIENT)
Dept: FAMILY MEDICINE CLINIC | Age: 75
End: 2025-05-12

## 2025-05-12 VITALS
OXYGEN SATURATION: 92 % | SYSTOLIC BLOOD PRESSURE: 102 MMHG | HEIGHT: 70 IN | HEART RATE: 81 BPM | WEIGHT: 211 LBS | BODY MASS INDEX: 30.21 KG/M2 | DIASTOLIC BLOOD PRESSURE: 64 MMHG

## 2025-05-12 DIAGNOSIS — Z09 HOSPITAL DISCHARGE FOLLOW-UP: Primary | ICD-10-CM

## 2025-05-12 DIAGNOSIS — D49.4 BLADDER TUMOR: ICD-10-CM

## 2025-05-12 DIAGNOSIS — C67.9 MALIGNANT NEOPLASM OF URINARY BLADDER, UNSPECIFIED SITE (HCC): ICD-10-CM

## 2025-05-12 DIAGNOSIS — N18.9 CHRONIC RENAL FAILURE, UNSPECIFIED CKD STAGE: ICD-10-CM

## 2025-05-12 DIAGNOSIS — E03.9 HYPOTHYROIDISM, UNSPECIFIED TYPE: ICD-10-CM

## 2025-05-12 NOTE — PROGRESS NOTES
stress test, which yielded normal results. He was previously on diuretics but has not taken them for a long time. He was referred to Dr. Spencer by Dr. James, who took him off a couple of medications and put him on Entresto. The hospital advised him to reduce his medication because his blood pressure is still barely reaching 100.    He has been experiencing shortness of breath since returning home and resuming walking, but does not believe it is due to low blood pressure.    He has a history of chronic kidney disease with hyponatremia. His kidney function has been declining over time, as indicated by his lab results.    He received a gel injection in his knee, which provided some relief.    PAST SURGICAL HISTORY:  - Open heart surgery in 2006  - Right stent insertion  - Left percutaneous nephrostomy tube  - Left antegrade stent on 04/23/2025      Inpatient course: Discharge summary reviewed- see chart.    Interval history/Current status: see above    Patient Active Problem List   Diagnosis    Essential hypertension    Hypothyroidism    Vitamin D deficiency    Hyperlipidemia    Coronary artery disease involving native coronary artery of native heart without angina pectoris    Obesity (BMI 30-39.9)    SOB (shortness of breath)    Class 1 obesity due to excess calories with serious comorbidity in adult    Unstable angina (HCC)    LV dysfunction    Chronic stable angina    Morbidly obese (HCC)    Acute kidney injury    Systolic CHF, chronic (HCC)    Nonrheumatic aortic valve stenosis    History of coronary artery bypass graft    Malignant neoplasm of urinary bladder, unspecified site (HCC)    Acquired iron deficiency anemia due to decreased absorption    HFrEF (heart failure with reduced ejection fraction) (HCC)    Impaired intestinal absorption    Hematuria       Medications listed as ordered at the time of discharge from hospital     Medication List            Accurate as of May 12, 2025  2:53 PM. If you have any  SCM

## 2025-05-13 ENCOUNTER — PREP FOR PROCEDURE (OUTPATIENT)
Dept: SURGERY | Age: 75
End: 2025-05-13

## 2025-05-13 ENCOUNTER — TELEPHONE (OUTPATIENT)
Dept: SURGERY | Age: 75
End: 2025-05-13

## 2025-05-13 DIAGNOSIS — C67.0 MALIGNANT NEOPLASM OF TRIGONE OF URINARY BLADDER (HCC): ICD-10-CM

## 2025-05-13 NOTE — TELEPHONE ENCOUNTER
Called and spoke w/ pt-Scheduled Port Placement (MAC anesthesia) with Dr Valverde on 5/21/25 @ 1000/0800 arrival @ [facility]-NPO albert before surgery-Pt will need a  day of surgery-[facility] completed pre-op physical-Pt understood and agreed w/ above noted. Surgery instructions emailed to pt @ [email or web address]

## 2025-05-14 ENCOUNTER — CARE COORDINATION (OUTPATIENT)
Dept: CASE MANAGEMENT | Age: 75
End: 2025-05-14

## 2025-05-14 RX ORDER — L.ACID,FERM,PLA,RHA/B.BIF,LONG 126 MG
TABLET, DELAYED AND EXTENDED RELEASE ORAL
COMMUNITY

## 2025-05-14 NOTE — CARE COORDINATION
Care Transitions Note    Follow Up Call     Patient Current Location:  Home: [address]  [address] [facility] [address]    Care Transition Nurse contacted the patient by telephone. Verified name and  as identifiers.    Additional needs identified to be addressed with provider   High priority: SHANE CTCHAYO spoke with patient this afternoon for follow up CTN call.  Patient states he is doing okay, having some SOB with exertion, able to recover pretty quickly, no dizziness or lightheadedness, no chest pain.  Does report BP's running 84/55 after taking Metoprolol, Entresto both 1/2 tab, and baby ASA.  Took Midodrine and BP came up to 97/69, HR 90's.  Feels he is getting stronger every day, feeling better.  If needed, please advise patient at [phone].                  Method of communication with provider: chart routing.    Care Summary Note: CTN spoke with patient this afternoon for follow up CTN call.  Patient states he is doing okay, having some slight SOBE. Is able to recover himself once sitting or lying.  No reports of any fever chills, nausea, vomiting, chest pain, SOB at rest  or cough.   Patient with no congestion, pain, difficulty emptying bladder, LE edema, feeling lightheaded, dizziness, and heart palpitations.   He is urinating well, no constipation, eating well.  Patient taking all medications as prescribed, VSS per the above yellow box note, will forward to Cardiologist.  He states he had injection to right knee on Monday, getting around better, will have a total of 3, 2nd one next Monday.  Port to be placed on 2025.      Plan of care updates since last contact:  Education: patient instructed to continue to monitor for any worsening SOBE, any SOB with rest, increased recovery times, any returning BLE Edema, chest pain, reporting to MD immediately.  Review of patient management of conditions/medications: make sure to rest as needed, taking all medications as prescribed, monitor VSS

## 2025-05-20 ENCOUNTER — OFFICE VISIT (OUTPATIENT)
Dept: CARDIOLOGY CLINIC | Age: 75
End: 2025-05-20
Payer: MEDICARE

## 2025-05-20 ENCOUNTER — ANESTHESIA EVENT (OUTPATIENT)
Dept: OPERATING ROOM | Age: 75
End: 2025-05-20
Payer: MEDICARE

## 2025-05-20 VITALS
SYSTOLIC BLOOD PRESSURE: 86 MMHG | OXYGEN SATURATION: 98 % | HEART RATE: 88 BPM | DIASTOLIC BLOOD PRESSURE: 50 MMHG | HEIGHT: 70 IN | BODY MASS INDEX: 30.78 KG/M2 | WEIGHT: 215 LBS

## 2025-05-20 DIAGNOSIS — E78.2 MIXED HYPERLIPIDEMIA: ICD-10-CM

## 2025-05-20 DIAGNOSIS — I50.22 SYSTOLIC CHF, CHRONIC (HCC): Primary | ICD-10-CM

## 2025-05-20 DIAGNOSIS — Z95.1 S/P CABG X 4: ICD-10-CM

## 2025-05-20 DIAGNOSIS — D50.9 IRON DEFICIENCY ANEMIA, UNSPECIFIED IRON DEFICIENCY ANEMIA TYPE: ICD-10-CM

## 2025-05-20 DIAGNOSIS — C67.9 MALIGNANT NEOPLASM OF URINARY BLADDER, UNSPECIFIED SITE (HCC): ICD-10-CM

## 2025-05-20 DIAGNOSIS — I25.10 CORONARY ARTERY DISEASE INVOLVING NATIVE CORONARY ARTERY OF NATIVE HEART WITHOUT ANGINA PECTORIS: ICD-10-CM

## 2025-05-20 DIAGNOSIS — I35.0 AORTIC VALVE STENOSIS, ETIOLOGY OF CARDIAC VALVE DISEASE UNSPECIFIED: ICD-10-CM

## 2025-05-20 DIAGNOSIS — I10 ESSENTIAL HYPERTENSION: ICD-10-CM

## 2025-05-20 PROCEDURE — 3017F COLORECTAL CA SCREEN DOC REV: CPT | Performed by: INTERNAL MEDICINE

## 2025-05-20 PROCEDURE — 1124F ACP DISCUSS-NO DSCNMKR DOCD: CPT | Performed by: INTERNAL MEDICINE

## 2025-05-20 PROCEDURE — G2211 COMPLEX E/M VISIT ADD ON: HCPCS | Performed by: INTERNAL MEDICINE

## 2025-05-20 PROCEDURE — 3078F DIAST BP <80 MM HG: CPT | Performed by: INTERNAL MEDICINE

## 2025-05-20 PROCEDURE — G8427 DOCREV CUR MEDS BY ELIG CLIN: HCPCS | Performed by: INTERNAL MEDICINE

## 2025-05-20 PROCEDURE — 3074F SYST BP LT 130 MM HG: CPT | Performed by: INTERNAL MEDICINE

## 2025-05-20 PROCEDURE — G8417 CALC BMI ABV UP PARAM F/U: HCPCS | Performed by: INTERNAL MEDICINE

## 2025-05-20 PROCEDURE — 1111F DSCHRG MED/CURRENT MED MERGE: CPT | Performed by: INTERNAL MEDICINE

## 2025-05-20 PROCEDURE — 1036F TOBACCO NON-USER: CPT | Performed by: INTERNAL MEDICINE

## 2025-05-20 PROCEDURE — 99215 OFFICE O/P EST HI 40 MIN: CPT | Performed by: INTERNAL MEDICINE

## 2025-05-20 RX ORDER — AZELASTINE 1 MG/ML
SPRAY, METERED NASAL
COMMUNITY

## 2025-05-20 NOTE — PATIENT INSTRUCTIONS
PLAN:  Decrease entresto 24-26 half tablet twice daily given low blood pressure  Sample provided today. We will send in new prescription  Follow up in 3 months

## 2025-05-20 NOTE — PROGRESS NOTES
[facility]  Advanced CHF/Pulmonary Hypertension   Cardiac Evaluation      Alfredo Chong  YOB: 1950    Date of Visit:  5/20/25    Chief Complaint   Patient presents with    Follow-up    Congestive Heart Failure      History of Present Illness:  Alfredo Chong is a 74 y.o. male who presents from referral from Dr. Reveles for consultation and management of heart failure in the presence of CKD.      Alfredo Chong is 74 y.o. has has a current medication history of CAD with CABG x4 in 2006 for multivessel CAD, HTN, HLD, obesity, CKD stage 3a.  He f/w Dr. Reveles for his heart disease. He underwent a repeat left heart cath in 7/2018 showing patent grafts x3 and medication management was recommended.   EF per Echo 10/8/2021 40-45%.     He retired years ago from [employer] service and then [employer] service all over [place]. It was extremely stressful.    OV 2/6/2023, he reports SOB that is not resolving but improving. He denies exertional chest pain, PND, palpitations, light-headedness, edema. He states he has lost 10# this past year. He has a chronic eye condition that causes cloudy vision for which he receives injections and is on prednisone (f/w CEI). 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    OV, 12/5/2023, He reports he has occasional episodes of dizziness. He decreased entresto 49-51 to half tablet bid. 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 He has recently returned from his [place] home.     On 3/12/24 he underwent cystoscopy for hematuria. 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 We will need to set him up for a cystoscopy in 3 months for bladder cancer surveillance     OV, 10/22/2024, He reports his shortness of breath is unchanged. He is taking entresto 49-51 half tablet twice daily. He reports he is following with Urology for reoccurring abnormal cystoscopies. He recently underwent a cystoscopy and tumor removal. On the cystoscopy this week,

## 2025-05-21 ENCOUNTER — APPOINTMENT (OUTPATIENT)
Dept: GENERAL RADIOLOGY | Age: 75
End: 2025-05-21
Attending: SURGERY
Payer: MEDICARE

## 2025-05-21 ENCOUNTER — ANESTHESIA (OUTPATIENT)
Dept: OPERATING ROOM | Age: 75
End: 2025-05-21
Payer: MEDICARE

## 2025-05-21 ENCOUNTER — HOSPITAL ENCOUNTER (OUTPATIENT)
Age: 75
Setting detail: OUTPATIENT SURGERY
Discharge: HOME | End: 2025-05-21
Attending: SURGERY | Admitting: SURGERY
Payer: MEDICARE

## 2025-05-21 ENCOUNTER — CARE COORDINATION (OUTPATIENT)
Dept: CASE MANAGEMENT | Age: 75
End: 2025-05-21

## 2025-05-21 VITALS
BODY MASS INDEX: 30.21 KG/M2 | DIASTOLIC BLOOD PRESSURE: 71 MMHG | HEART RATE: 69 BPM | HEIGHT: 70 IN | RESPIRATION RATE: 16 BRPM | TEMPERATURE: 97.3 F | OXYGEN SATURATION: 100 % | SYSTOLIC BLOOD PRESSURE: 114 MMHG | WEIGHT: 211 LBS

## 2025-05-21 DIAGNOSIS — G89.18 POSTOPERATIVE PAIN: Primary | ICD-10-CM

## 2025-05-21 LAB
ANION GAP SERPL CALCULATED.3IONS-SCNC: 13 MMOL/L (ref 3–16)
BUN SERPL-MCNC: 31 MG/DL (ref 7–20)
CALCIUM SERPL-MCNC: 9.2 MG/DL (ref 8.3–10.6)
CHLORIDE SERPL-SCNC: 102 MMOL/L (ref 99–110)
CO2 SERPL-SCNC: 23 MMOL/L (ref 21–32)
CREAT SERPL-MCNC: 2.1 MG/DL (ref 0.8–1.3)
GFR SERPLBLD CREATININE-BSD FMLA CKD-EPI: 32 ML/MIN/{1.73_M2}
GLUCOSE SERPL-MCNC: 96 MG/DL (ref 70–99)
POTASSIUM SERPL-SCNC: 4.6 MMOL/L (ref 3.5–5.1)
SODIUM SERPL-SCNC: 138 MMOL/L (ref 136–145)

## 2025-05-21 PROCEDURE — 77001 FLUOROGUIDE FOR VEIN DEVICE: CPT | Performed by: SURGERY

## 2025-05-21 PROCEDURE — 3600000012 HC SURGERY LEVEL 2 ADDTL 15MIN: Performed by: SURGERY

## 2025-05-21 PROCEDURE — 3600000002 HC SURGERY LEVEL 2 BASE: Performed by: SURGERY

## 2025-05-21 PROCEDURE — 2500000003 HC RX 250 WO HCPCS: Performed by: SURGERY

## 2025-05-21 PROCEDURE — 76937 US GUIDE VASCULAR ACCESS: CPT | Performed by: SURGERY

## 2025-05-21 PROCEDURE — 6360000002 HC RX W HCPCS: Performed by: SURGERY

## 2025-05-21 PROCEDURE — 77001 FLUOROGUIDE FOR VEIN DEVICE: CPT

## 2025-05-21 PROCEDURE — 3700000001 HC ADD 15 MINUTES (ANESTHESIA): Performed by: SURGERY

## 2025-05-21 PROCEDURE — 7100000010 HC PHASE II RECOVERY - FIRST 15 MIN: Performed by: SURGERY

## 2025-05-21 PROCEDURE — 6360000002 HC RX W HCPCS: Performed by: NURSE ANESTHETIST, CERTIFIED REGISTERED

## 2025-05-21 PROCEDURE — 2580000003 HC RX 258: Performed by: ANESTHESIOLOGY

## 2025-05-21 PROCEDURE — 7100000011 HC PHASE II RECOVERY - ADDTL 15 MIN: Performed by: SURGERY

## 2025-05-21 PROCEDURE — 2709999900 HC NON-CHARGEABLE SUPPLY: Performed by: SURGERY

## 2025-05-21 PROCEDURE — 3700000000 HC ANESTHESIA ATTENDED CARE: Performed by: SURGERY

## 2025-05-21 PROCEDURE — 80048 BASIC METABOLIC PNL TOTAL CA: CPT

## 2025-05-21 PROCEDURE — C1788 PORT, INDWELLING, IMP: HCPCS | Performed by: SURGERY

## 2025-05-21 PROCEDURE — 36561 INSERT TUNNELED CV CATH: CPT | Performed by: SURGERY

## 2025-05-21 DEVICE — PORT INFUS SGL LUMN ATTCH POLYUR OPN END CATH 8FR POWERPRT: Type: IMPLANTABLE DEVICE | Status: FUNCTIONAL

## 2025-05-21 RX ORDER — OXYCODONE HYDROCHLORIDE 5 MG/1
5 TABLET ORAL PRN
Refills: 0 | Status: CANCELLED | OUTPATIENT
Start: 2025-05-21 | End: 2025-05-21

## 2025-05-21 RX ORDER — LABETALOL HYDROCHLORIDE 5 MG/ML
5 INJECTION, SOLUTION INTRAVENOUS EVERY 10 MIN PRN
Status: CANCELLED | OUTPATIENT
Start: 2025-05-21

## 2025-05-21 RX ORDER — SODIUM CHLORIDE 9 MG/ML
INJECTION, SOLUTION INTRAVENOUS PRN
Status: DISCONTINUED | OUTPATIENT
Start: 2025-05-21 | End: 2025-05-21 | Stop reason: HOSPADM

## 2025-05-21 RX ORDER — NALOXONE HYDROCHLORIDE 0.4 MG/ML
INJECTION, SOLUTION INTRAMUSCULAR; INTRAVENOUS; SUBCUTANEOUS PRN
Status: CANCELLED | OUTPATIENT
Start: 2025-05-21

## 2025-05-21 RX ORDER — LIDOCAINE HYDROCHLORIDE 10 MG/ML
1 INJECTION, SOLUTION EPIDURAL; INFILTRATION; INTRACAUDAL; PERINEURAL
Status: DISCONTINUED | OUTPATIENT
Start: 2025-05-21 | End: 2025-05-21 | Stop reason: HOSPADM

## 2025-05-21 RX ORDER — ONDANSETRON 2 MG/ML
4 INJECTION INTRAMUSCULAR; INTRAVENOUS
Status: CANCELLED | OUTPATIENT
Start: 2025-05-21

## 2025-05-21 RX ORDER — SODIUM CHLORIDE 0.9 % (FLUSH) 0.9 %
5-40 SYRINGE (ML) INJECTION EVERY 12 HOURS SCHEDULED
Status: CANCELLED | OUTPATIENT
Start: 2025-05-21

## 2025-05-21 RX ORDER — SODIUM CHLORIDE 0.9 % (FLUSH) 0.9 %
5-40 SYRINGE (ML) INJECTION EVERY 12 HOURS SCHEDULED
Status: DISCONTINUED | OUTPATIENT
Start: 2025-05-21 | End: 2025-05-21 | Stop reason: HOSPADM

## 2025-05-21 RX ORDER — SODIUM CHLORIDE 0.9 % (FLUSH) 0.9 %
5-40 SYRINGE (ML) INJECTION PRN
Status: CANCELLED | OUTPATIENT
Start: 2025-05-21

## 2025-05-21 RX ORDER — SODIUM CHLORIDE, SODIUM LACTATE, POTASSIUM CHLORIDE, CALCIUM CHLORIDE 600; 310; 30; 20 MG/100ML; MG/100ML; MG/100ML; MG/100ML
INJECTION, SOLUTION INTRAVENOUS CONTINUOUS
Status: DISCONTINUED | OUTPATIENT
Start: 2025-05-21 | End: 2025-05-21 | Stop reason: HOSPADM

## 2025-05-21 RX ORDER — OXYCODONE HYDROCHLORIDE 5 MG/1
10 TABLET ORAL PRN
Refills: 0 | Status: CANCELLED | OUTPATIENT
Start: 2025-05-21 | End: 2025-05-21

## 2025-05-21 RX ORDER — OXYCODONE HYDROCHLORIDE 5 MG/1
5 TABLET ORAL EVERY 8 HOURS PRN
Qty: 6 TABLET | Refills: 0 | Status: SHIPPED | OUTPATIENT
Start: 2025-05-21 | End: 2025-05-24

## 2025-05-21 RX ORDER — PROPOFOL 10 MG/ML
INJECTION, EMULSION INTRAVENOUS
Status: DISCONTINUED | OUTPATIENT
Start: 2025-05-21 | End: 2025-05-21 | Stop reason: SDUPTHER

## 2025-05-21 RX ORDER — DIPHENHYDRAMINE HYDROCHLORIDE 50 MG/ML
12.5 INJECTION, SOLUTION INTRAMUSCULAR; INTRAVENOUS
Status: CANCELLED | OUTPATIENT
Start: 2025-05-21

## 2025-05-21 RX ORDER — SODIUM CHLORIDE 0.9 % (FLUSH) 0.9 %
5-40 SYRINGE (ML) INJECTION PRN
Status: DISCONTINUED | OUTPATIENT
Start: 2025-05-21 | End: 2025-05-21 | Stop reason: HOSPADM

## 2025-05-21 RX ORDER — BUPIVACAINE HYDROCHLORIDE AND EPINEPHRINE 5; 5 MG/ML; UG/ML
INJECTION, SOLUTION PERINEURAL PRN
Status: DISCONTINUED | OUTPATIENT
Start: 2025-05-21 | End: 2025-05-21 | Stop reason: ALTCHOICE

## 2025-05-21 RX ORDER — PHENYLEPHRINE HCL IN 0.9% NACL 1 MG/10 ML
SYRINGE (ML) INTRAVENOUS
Status: DISCONTINUED | OUTPATIENT
Start: 2025-05-21 | End: 2025-05-21 | Stop reason: SDUPTHER

## 2025-05-21 RX ORDER — MIDAZOLAM HYDROCHLORIDE 1 MG/ML
2 INJECTION, SOLUTION INTRAMUSCULAR; INTRAVENOUS
Status: DISCONTINUED | OUTPATIENT
Start: 2025-05-21 | End: 2025-05-21 | Stop reason: HOSPADM

## 2025-05-21 RX ORDER — LIDOCAINE HYDROCHLORIDE 20 MG/ML
INJECTION, SOLUTION EPIDURAL; INFILTRATION; INTRACAUDAL; PERINEURAL
Status: DISCONTINUED | OUTPATIENT
Start: 2025-05-21 | End: 2025-05-21 | Stop reason: SDUPTHER

## 2025-05-21 RX ORDER — SODIUM CHLORIDE 9 MG/ML
INJECTION, SOLUTION INTRAVENOUS PRN
Status: CANCELLED | OUTPATIENT
Start: 2025-05-21

## 2025-05-21 RX ADMIN — LIDOCAINE HYDROCHLORIDE 100 MG: 20 INJECTION, SOLUTION EPIDURAL; INFILTRATION; INTRACAUDAL at 09:35

## 2025-05-21 RX ADMIN — Medication 200 MCG: at 09:53

## 2025-05-21 RX ADMIN — CEFAZOLIN 2000 MG: 2 INJECTION, POWDER, FOR SOLUTION INTRAVENOUS at 09:37

## 2025-05-21 RX ADMIN — PROPOFOL 100 MCG/KG/MIN: 10 INJECTION, EMULSION INTRAVENOUS at 09:35

## 2025-05-21 RX ADMIN — SODIUM CHLORIDE: 9 INJECTION, SOLUTION INTRAVENOUS at 09:31

## 2025-05-21 RX ADMIN — PROPOFOL 20 MG: 10 INJECTION, EMULSION INTRAVENOUS at 09:51

## 2025-05-21 RX ADMIN — Medication 200 MCG: at 10:00

## 2025-05-21 RX ADMIN — PROPOFOL 20 MG: 10 INJECTION, EMULSION INTRAVENOUS at 09:37

## 2025-05-21 ASSESSMENT — PAIN SCALES - GENERAL: PAINLEVEL_OUTOF10: 0

## 2025-05-21 ASSESSMENT — PAIN - FUNCTIONAL ASSESSMENT
PAIN_FUNCTIONAL_ASSESSMENT: 0-10
PAIN_FUNCTIONAL_ASSESSMENT: 0-10

## 2025-05-21 ASSESSMENT — ENCOUNTER SYMPTOMS: SHORTNESS OF BREATH: 1

## 2025-05-21 NOTE — H&P
I have reviewed the history and physical and examined the patient.  I find no relevant changes.  I have reviewed with the patient and/or family members, during the preoperative office visit the risks, benefits, and alternatives to the procedure.    Tyrel Valverde MD
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
## 2025-05-21 NOTE — DISCHARGE INSTRUCTIONS
surgery   3. Drainage of cloudy fluid or pus coming from the surgical area    Some of the things we/ you can do to prevent SSI's are:   1. Clean hands with soap and water or an alcohol-based hand rub before and after caring for the operative area. This occurs the day of surgery and for the next 2 weeks.   2.Sometimes you receive an appropriate antibiotic within 60 minutes before your surgery or take one for several days after surgery depending on your surgeon's instructions and/or the type of surgery you are having.    3. Family and/or friends who visit you should NOT touch the surgical wound or dressings until advised by your surgeon.    4. Be sure to elevate and decrease the swelling after your surgery to help prevent infection.   5. If you are a diabetic, you need to closely monitor your blood sugar levels and report any significant increases or changes to your surgeon to help promote the healing process.    ANESTHESIA DISCHARGE INSTRUCTIONS    You are under the influence of drugs- do not drink alcohol, drive a car, operate machinery(such as power tools, kitchen appliances, etc), sign legal documents, or make any important decisions for 24 hours (or while on pain medications).   Children should not ride bikes or skate boards or play on gym sets  for 24 hours after surgery.  A responsible adult should be with you for 24 hours.  Rest at home today- increase activity as tolerated.  Progress slowly to a regular diet unless your physician has instructed you otherwise. Drink plenty of water.    CALL YOUR DOCTOR IF YOU:  Have moderate to severe nausea or vomiting AND are unable to hold down fluids or prescribed medications.  Have bright red bloody drainage from your dressing that won't stop oozing.  Do not get relief with your pain medication    NORMAL (POSSIBLE) SIDE EFFECTS FROM ANESTHESIA:     Confusion, temporary memory loss, delayed reaction times in the first 24 hours  Lightheadedness, dizziness, difficulty

## 2025-05-21 NOTE — CARE COORDINATION
Care Transitions Note    Follow Up Call     Patient Current Location:  Home: [address]  [address] [facility] [address]    Care Transition Nurse contacted the patient by telephone. Verified name and  as identifiers.    Additional needs identified to be addressed with provider   No needs identified                 Method of communication with provider: none.    Care Summary Note: CTN spoke with patient this afternoon for follow up CTN call.  Patient states he is doing okay.  Really tired and fatigued today, had Port placed to right chest.  Some slight discomfort, but no reports of any fever chills, nausea, vomiting, chest pain, SOB or cough.   Patient states BP's are still running slightly low, states he had HFU with Cardiologist, and Entresto 24-26 was decreased to a half a tab, BID.   Patient denies having any signs of infection at incision site, no increased redness or swelling.  Patient with no congestion, difficulty emptying bladder, LE edema, feeling lightheaded, dizziness, and heart palpitations.   No other issues or concerns, no other changed medications, states he has follow up with [facility] on Friday.      Plan of care updates since last contact:  Education: patient instructed to continue to monitor for any worsening discomfort, any signs of infection at port insertion site, any fevers or chills, reporting to MD immediately.  Review of patient management of conditions/medications: make sure to rest as needed, taking all medications as prescribed.       Advance Care Planning:   Does patient have an Advance Directive: reviewed during previous call, see note.  and   Primary Decision Maker: Madelyn Brasher - [phone] .    Medication Review:  No changes since last call.     Assessments:  Care Transitions Subsequent and Final Call    Schedule Follow Up Appointment with PCP: Completed  Subsequent and Final Calls  Do you have any ongoing symptoms?: Yes  Onset of Patient-reported symptoms:

## 2025-05-21 NOTE — ANESTHESIA POSTPROCEDURE EVALUATION
Department of Anesthesiology  Postprocedure Note    Patient: Alfredo Chong  MRN: [id number]  YOB: 1950  Date of evaluation: 5/21/2025    Procedure Summary       Date: 05/21/25 Room / Location: [address]    Anesthesia Start: 0931 Anesthesia Stop: 1014    Procedure: PORT INSERTION Diagnosis:       Malignant neoplasm of trigone of urinary bladder (HCC)      (Malignant neoplasm of trigone of urinary bladder (HCC) [C67.0])    Surgeons: Tyrel Valverde MD Responsible Provider: Tiffanie Hart MD    Anesthesia Type: MAC ASA Status: 3            Anesthesia Type: MAC    Emily Phase I: Emily Score: 10    Emily Phase II: Emily Score: 9    Anesthesia Post Evaluation    Comments: Postoperative Anesthesia Note    Name:    Alfredo Chong  MRN:      [id number]    Patient Vitals in the past 12 hrs:  05/21/25 1030, BP:114/71, Pulse:69, Resp:16, SpO2:100 %  05/21/25 1017, BP:106/71, Pulse:90, Resp:18, SpO2:95 %  05/21/25 1012, BP:94/60, Temp:97.3 °F (36.3 °C), Temp src:Oral, Pulse:96, Resp:16, SpO2:95 %  05/21/25 0759, BP:94/62, Temp:98.3 °F (36.8 °C), Temp src:Oral, Pulse:99, Resp:18, SpO2:97 %, Height:1.778 m (5' 10\"), Weight:95.7 kg (211 lb)     LABS:    CBC  Lab Results       Component                Value               Date/Time                  WBC                      10.2                04/29/2025 04:50 AM        HGB                      8.0 (L)             04/29/2025 04:50 AM        HCT                      23.0 (L)            04/29/2025 04:50 AM        PLT                      430                 04/29/2025 04:50 AM   RENAL  Lab Results       Component                Value               Date/Time                  NA                       138                 05/21/2025 08:32 AM        K                        4.6                 05/21/2025 08:32 AM        K                        4.4                 04/20/2025 04:16 AM        CL                       102

## 2025-05-21 NOTE — OP NOTE
Operative Note      Patient: Alfredo Chong  YOB: 1950  MRN: [id number]    Date of Procedure: 5/21/2025    Pre-Op Diagnosis Codes:      * Malignant neoplasm of trigone of urinary bladder (HCC) [C67.0]    Post-Op Diagnosis: Same       Procedure(s):  PORT INSERTION    Surgeon(s):  Tyrel Valverde MD    Assistant:   Surgical Assistant: Joseph Alfaro    Anesthesia: Monitor Anesthesia Care    Estimated Blood Loss (mL): Minimal    Complications: None    Specimens:   * No specimens in log *    Implants:  Implant Name Type Inv. Item Serial No.  Lot No. LRB No. Used Action   PORT INFUS SGL LUMN ATTCH POLYUR OPN END CATH 8FR POWERPRT - [id number]  PORT INFUS SGL LUMN ATTCH POLYUR OPN END CATH 8FR POWERPRT  [id number] N/A 1 Implanted         Drains:   [REMOVED] Ureteral Drain/Stent 04/23/25 Left Ureter (Removed)   Site Assessment Clean, dry & intact 04/23/25 1152   Urine Color Bloody 04/23/25 1152   Urine Appearance Clear 04/23/25 1152   Dressing Status Clean, dry & intact 04/23/25 1152   Status Draining 04/23/25 1152   Collection Container Standard 04/23/25 1152   Securement Method Other (Comment) 04/23/25 1152       [REMOVED] Nephrostomy Tube 1 Left Flank 8 fr (Removed)   Dressing Status Clean, dry & intact 04/25/25 0828   Dressing Type Dry dressing 04/23/25 2058   Collection Container Standard 04/23/25 2058   Securement Method Other (Comment) 04/23/25 1148   Status Other (comment) 04/24/25 0619   Urine Color Red 04/24/25 0623   Urine Appearance Clear 04/24/25 0623   Urine Odor Malodorous 04/23/25 2058   Output (mL) 80 mL 04/24/25 0623       [REMOVED] Urinary Catheter 04/19/25 3 Way (Removed)   $ Urethral catheter insertion $ Not inserted for procedure 04/19/25 1632   Catheter Indications Urinary retention (acute or chronic), continuous bladder irrigation or bladder outlet obstruction 04/20/25 1747   Site Assessment No urethral drainage 04/20/25 0811   Urine

## 2025-05-21 NOTE — PROGRESS NOTES
Surgery Date and Time: 5/21/25 0945 am    Arrival Time: 0745 am       The instructions given when a patient needs to stop oral intake prior to surgery varies. Follow the instructions you were     given by your Surgeon or RN during the Pre-op call.      __X__Do not eat or drink anything after Midnight the night before the surgery. NO gum, mints, candy or ice chips the day of surgery.              Only take the following medications with a small sip of water the morning of surgery: metoprolol, midodrine, and levothyroxine      Hold the following medications (per anesthesia or surgeon request): entresto     Last Dose: 5/20/25 am dose; medication is to be held for 24 hours prior to surgery        Aspirin, Ibuprofen, Advil, Naproxen, Vitamin E and other Anti-inflammatory products and supplements should be stopped       for 5-7days before surgery or as directed by your physician/surgeon.        Check with your Surgeon/PCP/Cardiologist regarding stopping your Blood Thinner & follow their instructions:        Medication: N/A                 Last dose:                - If you take a long acting-insulin, take your normal dose the night before surgery & half the dose if taken in the morning.     - Do not smoke or vape, and do not drink any alcoholic beverages 24 hours prior to surgery, this includes NA Beer. Refrain from using     any recreational drugs, including non-prescribed prescription drugs.     -You may brush your teeth and gargle the morning of surgery.  DO NOT SWALLOW WATER.    -You MUST plan for a responsible adult to stay on site while you are here and take you home after your surgery. You will not be allowed                to leave alone or drive yourself home. It is requested someone stay with you the first 24 hrs. Your surgery will be cancelled if you do not                have a ride home with a responsible adult.    -A parent/legal guardian must accompany a child scheduled for surgery and plan to stay at 
Patient admitted to [facility] in preparation for surgery, VSS. Consent confirmed. IV inserted into RAC, NS infusing. Belongings in locker number 2. NPO since midnight. Significant other at bedside, phone number in system for text updates, call light within reach.     
[facility], phase two initiated. Placed on bedside monitor, VSS. Report obtained from OR RN and anesthesia. Patient on room air. Assessment WNL. Warm blankets applied. Side rails in place, will monitor patient closely.    
Patient meets criteria for discharge per policy. Patient up to the bathroom to void without difficulty. Discharge instructions given to patient and family, verbalized understanding. PIV removed. Patient discharged via wheelchair to the care of their family in stable condition.     
_________________________  (__) C-REACTIVE PROTEIN ___________    (__) VITAMIN D HYDROXY ___________  (__) BETABLOCKER  _________________                                                                                       (__) ACE/ARBS:__________________________    (__) GLP-1 Agonist ___________________                Ride home/Contact #_______________________   (__) SCLT2 inhibitor ___________________

## 2025-05-27 ENCOUNTER — CARE COORDINATION (OUTPATIENT)
Dept: CASE MANAGEMENT | Age: 75
End: 2025-05-27

## 2025-05-27 NOTE — CARE COORDINATION
Care Transitions Note    Final Call     Patient Current Location:  Home: [address]  [address] [facility] [address]    Care Transition Nurse contacted the patient by telephone. Verified name and  as identifiers.    Patient graduated from the Care Transitions program on 2025.  Patient/family verbalizes confidence in the ability to self-manage at this time. has the ability to self-manage at this time. progressing towards self-management. .      Advance Care Planning:   Does patient have an Advance Directive: reviewed during previous call, see note.  and   Primary Decision Maker: SameeraMadelyn - Quinn - [phone] .    Handoff:   Patient was not referred to the ACM team due to no additional needs identified.       Care Summary Note: CTN spoke with patient this afternoon for final follow up CTN call.  Patient states he is still really weak and fatigued, states he is feeling better.   States he is taking it one day at a time, trying not to over exert himself.  SOBE, is improving as well, able to recover himself quickly.  No reports of any fever chills, nausea, vomiting, chest pain, SOB at rest or cough.   No dizziness or lightheadedness, BP's are running much better, since medication changes from Cardiologist.  Patient with no congestion, pain, difficulty emptying bladder, LE edema, f and heart palpitations.  Incision site were PORT was placed is clean, dry and intact, no signs of infection.  He is using Ice Compression for swelling, down significantly.  No other issues or concerns at this time.      Assessments:  Care Transitions Subsequent and Final Call    Schedule Follow Up Appointment with PCP: Completed  Subsequent and Final Calls  Do you have any ongoing symptoms?: Yes  Onset of Patient-reported symptoms: Other  Patient-reported symptoms: Weakness, Fatigue  Have your medications changed?: No  Do you have any questions related to your medications?: No  Do you currently have any active services?:

## 2025-06-07 DIAGNOSIS — E03.9 HYPOTHYROIDISM, UNSPECIFIED TYPE: ICD-10-CM

## 2025-06-08 RX ORDER — LEVOTHYROXINE SODIUM 75 UG/1
75 TABLET ORAL DAILY
Qty: 90 TABLET | Refills: 3 | Status: SHIPPED | OUTPATIENT
Start: 2025-06-08

## 2025-06-15 ENCOUNTER — HOSPITAL ENCOUNTER (INPATIENT)
Age: 75
LOS: 13 days | Discharge: NURSING, REHAB OR LONG-TERM CARE | End: 2025-06-28
Attending: EMERGENCY MEDICINE | Admitting: INTERNAL MEDICINE
Payer: MEDICARE

## 2025-06-15 ENCOUNTER — APPOINTMENT (OUTPATIENT)
Dept: GENERAL RADIOLOGY | Age: 75
End: 2025-06-15
Payer: MEDICARE

## 2025-06-15 ENCOUNTER — APPOINTMENT (OUTPATIENT)
Dept: CT IMAGING | Age: 75
End: 2025-06-15
Payer: MEDICARE

## 2025-06-15 DIAGNOSIS — I95.9 HYPOTENSION, UNSPECIFIED HYPOTENSION TYPE: ICD-10-CM

## 2025-06-15 DIAGNOSIS — I48.91 NEW ONSET ATRIAL FIBRILLATION (HCC): ICD-10-CM

## 2025-06-15 DIAGNOSIS — R79.89 ELEVATED BRAIN NATRIURETIC PEPTIDE (BNP) LEVEL: ICD-10-CM

## 2025-06-15 DIAGNOSIS — N17.9 ACUTE KIDNEY INJURY SUPERIMPOSED ON CHRONIC KIDNEY DISEASE: ICD-10-CM

## 2025-06-15 DIAGNOSIS — I48.91 RAPID ATRIAL FIBRILLATION (HCC): ICD-10-CM

## 2025-06-15 DIAGNOSIS — R42 DIZZINESS: Primary | ICD-10-CM

## 2025-06-15 DIAGNOSIS — I48.0 PAROXYSMAL ATRIAL FIBRILLATION (HCC): ICD-10-CM

## 2025-06-15 DIAGNOSIS — N18.9 ACUTE KIDNEY INJURY SUPERIMPOSED ON CHRONIC KIDNEY DISEASE: ICD-10-CM

## 2025-06-15 DIAGNOSIS — R79.89 TROPONIN LEVEL ELEVATED: ICD-10-CM

## 2025-06-15 LAB
ABO/RH: NORMAL
ALBUMIN SERPL-MCNC: 3.2 G/DL (ref 3.4–5)
ALBUMIN/GLOB SERPL: 0.9 {RATIO} (ref 1.1–2.2)
ALP SERPL-CCNC: 124 U/L (ref 40–129)
ALT SERPL-CCNC: 35 U/L (ref 10–40)
ANION GAP SERPL CALCULATED.3IONS-SCNC: 15 MMOL/L (ref 3–16)
ANISOCYTOSIS BLD QL SMEAR: ABNORMAL
ANTI-XA UNFRAC HEPARIN: 0.39 IU/ML (ref 0.3–0.7)
ANTIBODY SCREEN: NORMAL
APTT BLD: 37.9 SEC (ref 22.8–35.8)
AST SERPL-CCNC: 54 U/L (ref 15–37)
BASE EXCESS BLDV CALC-SCNC: -3.8 MMOL/L (ref -3–3)
BASOPHILS # BLD: 0 K/UL (ref 0–0.2)
BASOPHILS NFR BLD: 0 %
BILIRUB SERPL-MCNC: 0.7 MG/DL (ref 0–1)
BUN SERPL-MCNC: 29 MG/DL (ref 7–20)
CALCIUM SERPL-MCNC: 9.3 MG/DL (ref 8.3–10.6)
CHLORIDE SERPL-SCNC: 98 MMOL/L (ref 99–110)
CK SERPL-CCNC: 65 U/L (ref 39–308)
CO2 BLDV-SCNC: 22 MMOL/L
CO2 SERPL-SCNC: 20 MMOL/L (ref 21–32)
COHGB MFR BLDV: 2.7 % (ref 0–1.5)
CREAT SERPL-MCNC: 3.1 MG/DL (ref 0.8–1.3)
DEPRECATED RDW RBC AUTO: 16 % (ref 12.4–15.4)
DEPRECATED RDW RBC AUTO: 16.3 % (ref 12.4–15.4)
EKG DIAGNOSIS: NORMAL
EKG Q-T INTERVAL: 354 MS
EKG QRS DURATION: 86 MS
EKG QTC CALCULATION (BAZETT): 508 MS
EKG R AXIS: 62 DEGREES
EKG T AXIS: 26 DEGREES
EKG VENTRICULAR RATE: 124 BPM
EOSINOPHIL # BLD: 0.3 K/UL (ref 0–0.6)
EOSINOPHIL NFR BLD: 2 %
GFR SERPLBLD CREATININE-BSD FMLA CKD-EPI: 20 ML/MIN/{1.73_M2}
GLUCOSE SERPL-MCNC: 139 MG/DL (ref 70–99)
HCO3 BLDV-SCNC: 20.6 MMOL/L (ref 23–29)
HCT VFR BLD AUTO: 23.7 % (ref 40.5–52.5)
HCT VFR BLD AUTO: 31.7 % (ref 40.5–52.5)
HGB BLD-MCNC: 10.5 G/DL (ref 13.5–17.5)
HGB BLD-MCNC: 7.8 G/DL (ref 13.5–17.5)
INR PPP: 0.97 (ref 0.86–1.14)
INR PPP: 1.36 (ref 0.86–1.14)
LACTATE BLDV-SCNC: 2.3 MMOL/L (ref 0.4–2)
LACTATE BLDV-SCNC: 3 MMOL/L (ref 0.4–2)
LYMPHOCYTES # BLD: 1.5 K/UL (ref 1–5.1)
LYMPHOCYTES NFR BLD: 10 %
MACROCYTES BLD QL SMEAR: ABNORMAL
MAGNESIUM SERPL-MCNC: 2.56 MG/DL (ref 1.8–2.4)
MCH RBC QN AUTO: 32.1 PG (ref 26–34)
MCH RBC QN AUTO: 32.3 PG (ref 26–34)
MCHC RBC AUTO-ENTMCNC: 33.1 G/DL (ref 31–36)
MCHC RBC AUTO-ENTMCNC: 33.3 G/DL (ref 31–36)
MCV RBC AUTO: 96.5 FL (ref 80–100)
MCV RBC AUTO: 97.5 FL (ref 80–100)
METHGB MFR BLDV: 0 %
MICROCYTES BLD QL SMEAR: ABNORMAL
MONOCYTES # BLD: 1.3 K/UL (ref 0–1.3)
MONOCYTES NFR BLD: 9 %
NEUTROPHILS # BLD: 10.9 K/UL (ref 1.7–7.7)
NEUTROPHILS NFR BLD: 74 %
NEUTS BAND NFR BLD MANUAL: 4 % (ref 0–7)
NRBC BLD-RTO: 1 /100 WBC
NT-PROBNP SERPL-MCNC: 4567 PG/ML (ref 0–449)
O2 THERAPY: ABNORMAL
OVALOCYTES BLD QL SMEAR: ABNORMAL
PCO2 BLDV: 34.9 MMHG (ref 40–50)
PH BLDV: 7.39 [PH] (ref 7.35–7.45)
PLATELET # BLD AUTO: 286 K/UL (ref 135–450)
PLATELET # BLD AUTO: 399 K/UL (ref 135–450)
PLATELET BLD QL SMEAR: ADEQUATE
PMV BLD AUTO: 6.8 FL (ref 5–10.5)
PMV BLD AUTO: 7.1 FL (ref 5–10.5)
PO2 BLDV: 31 MMHG (ref 25–40)
POLYCHROMASIA BLD QL SMEAR: ABNORMAL
POTASSIUM SERPL-SCNC: 4 MMOL/L (ref 3.5–5.1)
PROCALCITONIN SERPL IA-MCNC: 0.36 NG/ML (ref 0–0.15)
PROT SERPL-MCNC: 6.8 G/DL (ref 6.4–8.2)
PROTHROMBIN TIME: 13.2 SEC (ref 12.1–14.9)
PROTHROMBIN TIME: 16.9 SEC (ref 12.1–14.9)
RBC # BLD AUTO: 2.43 M/UL (ref 4.2–5.9)
RBC # BLD AUTO: 3.28 M/UL (ref 4.2–5.9)
SAO2 % BLDV: 61 %
SLIDE REVIEW: ABNORMAL
SODIUM SERPL-SCNC: 133 MMOL/L (ref 136–145)
TROPONIN, HIGH SENSITIVITY: 125 NG/L (ref 0–22)
TROPONIN, HIGH SENSITIVITY: 134 NG/L (ref 0–22)
TSH SERPL DL<=0.005 MIU/L-ACNC: 0.68 UIU/ML (ref 0.27–4.2)
VARIANT LYMPHS NFR BLD MANUAL: 1 % (ref 0–6)
WBC # BLD AUTO: 10.4 K/UL (ref 4–11)
WBC # BLD AUTO: 14 K/UL (ref 4–11)

## 2025-06-15 PROCEDURE — 86850 RBC ANTIBODY SCREEN: CPT

## 2025-06-15 PROCEDURE — 36415 COLL VENOUS BLD VENIPUNCTURE: CPT

## 2025-06-15 PROCEDURE — 83735 ASSAY OF MAGNESIUM: CPT

## 2025-06-15 PROCEDURE — 70450 CT HEAD/BRAIN W/O DYE: CPT

## 2025-06-15 PROCEDURE — 83605 ASSAY OF LACTIC ACID: CPT

## 2025-06-15 PROCEDURE — 6360000002 HC RX W HCPCS: Performed by: INTERNAL MEDICINE

## 2025-06-15 PROCEDURE — 2500000003 HC RX 250 WO HCPCS: Performed by: INTERNAL MEDICINE

## 2025-06-15 PROCEDURE — 6370000000 HC RX 637 (ALT 250 FOR IP)

## 2025-06-15 PROCEDURE — 99285 EMERGENCY DEPT VISIT HI MDM: CPT

## 2025-06-15 PROCEDURE — 3E033XZ INTRODUCTION OF VASOPRESSOR INTO PERIPHERAL VEIN, PERCUTANEOUS APPROACH: ICD-10-PCS

## 2025-06-15 PROCEDURE — 83880 ASSAY OF NATRIURETIC PEPTIDE: CPT

## 2025-06-15 PROCEDURE — 84484 ASSAY OF TROPONIN QUANT: CPT

## 2025-06-15 PROCEDURE — 85027 COMPLETE CBC AUTOMATED: CPT

## 2025-06-15 PROCEDURE — 84145 PROCALCITONIN (PCT): CPT

## 2025-06-15 PROCEDURE — 80053 COMPREHEN METABOLIC PANEL: CPT

## 2025-06-15 PROCEDURE — 85520 HEPARIN ASSAY: CPT

## 2025-06-15 PROCEDURE — 6360000002 HC RX W HCPCS

## 2025-06-15 PROCEDURE — 85025 COMPLETE CBC W/AUTO DIFF WBC: CPT

## 2025-06-15 PROCEDURE — 93005 ELECTROCARDIOGRAM TRACING: CPT | Performed by: EMERGENCY MEDICINE

## 2025-06-15 PROCEDURE — 84443 ASSAY THYROID STIM HORMONE: CPT

## 2025-06-15 PROCEDURE — 86901 BLOOD TYPING SEROLOGIC RH(D): CPT

## 2025-06-15 PROCEDURE — 82550 ASSAY OF CK (CPK): CPT

## 2025-06-15 PROCEDURE — 85610 PROTHROMBIN TIME: CPT

## 2025-06-15 PROCEDURE — 82803 BLOOD GASES ANY COMBINATION: CPT

## 2025-06-15 PROCEDURE — 6370000000 HC RX 637 (ALT 250 FOR IP): Performed by: INTERNAL MEDICINE

## 2025-06-15 PROCEDURE — 2580000003 HC RX 258: Performed by: INTERNAL MEDICINE

## 2025-06-15 PROCEDURE — 2060000000 HC ICU INTERMEDIATE R&B

## 2025-06-15 PROCEDURE — 71045 X-RAY EXAM CHEST 1 VIEW: CPT

## 2025-06-15 PROCEDURE — 96360 HYDRATION IV INFUSION INIT: CPT

## 2025-06-15 PROCEDURE — 85730 THROMBOPLASTIN TIME PARTIAL: CPT

## 2025-06-15 PROCEDURE — 86900 BLOOD TYPING SEROLOGIC ABO: CPT

## 2025-06-15 PROCEDURE — 87040 BLOOD CULTURE FOR BACTERIA: CPT

## 2025-06-15 PROCEDURE — 2500000003 HC RX 250 WO HCPCS

## 2025-06-15 PROCEDURE — 2580000003 HC RX 258: Performed by: EMERGENCY MEDICINE

## 2025-06-15 PROCEDURE — 2580000003 HC RX 258

## 2025-06-15 PROCEDURE — 99222 1ST HOSP IP/OBS MODERATE 55: CPT | Performed by: INTERNAL MEDICINE

## 2025-06-15 RX ORDER — 0.9 % SODIUM CHLORIDE 0.9 %
500 INTRAVENOUS SOLUTION INTRAVENOUS ONCE
Status: COMPLETED | OUTPATIENT
Start: 2025-06-15 | End: 2025-06-15

## 2025-06-15 RX ORDER — HEPARIN SODIUM 1000 [USP'U]/ML
4000 INJECTION, SOLUTION INTRAVENOUS; SUBCUTANEOUS ONCE
Status: COMPLETED | OUTPATIENT
Start: 2025-06-15 | End: 2025-06-15

## 2025-06-15 RX ORDER — ACETAMINOPHEN 325 MG/1
650 TABLET ORAL EVERY 6 HOURS PRN
Status: DISCONTINUED | OUTPATIENT
Start: 2025-06-15 | End: 2025-06-28 | Stop reason: HOSPADM

## 2025-06-15 RX ORDER — ACETAMINOPHEN 650 MG/1
650 SUPPOSITORY RECTAL EVERY 6 HOURS PRN
Status: DISCONTINUED | OUTPATIENT
Start: 2025-06-15 | End: 2025-06-28 | Stop reason: HOSPADM

## 2025-06-15 RX ORDER — POLYETHYLENE GLYCOL 3350 17 G/17G
17 POWDER, FOR SOLUTION ORAL DAILY PRN
Status: DISCONTINUED | OUTPATIENT
Start: 2025-06-15 | End: 2025-06-28 | Stop reason: HOSPADM

## 2025-06-15 RX ORDER — CETIRIZINE HYDROCHLORIDE 10 MG/1
10 TABLET ORAL DAILY
Status: DISCONTINUED | OUTPATIENT
Start: 2025-06-15 | End: 2025-06-28 | Stop reason: HOSPADM

## 2025-06-15 RX ORDER — MIDODRINE HYDROCHLORIDE 5 MG/1
10 TABLET ORAL 3 TIMES DAILY
Status: DISCONTINUED | OUTPATIENT
Start: 2025-06-15 | End: 2025-06-23

## 2025-06-15 RX ORDER — PROCHLORPERAZINE MALEATE 5 MG/1
10 TABLET ORAL EVERY 8 HOURS PRN
Status: DISCONTINUED | OUTPATIENT
Start: 2025-06-15 | End: 2025-06-28 | Stop reason: HOSPADM

## 2025-06-15 RX ORDER — HEPARIN SODIUM 10000 [USP'U]/100ML
5-30 INJECTION, SOLUTION INTRAVENOUS CONTINUOUS
Status: DISCONTINUED | OUTPATIENT
Start: 2025-06-15 | End: 2025-06-23

## 2025-06-15 RX ORDER — ONDANSETRON 2 MG/ML
4 INJECTION INTRAMUSCULAR; INTRAVENOUS EVERY 6 HOURS PRN
Status: DISCONTINUED | OUTPATIENT
Start: 2025-06-15 | End: 2025-06-15

## 2025-06-15 RX ORDER — SODIUM CHLORIDE 9 MG/ML
INJECTION, SOLUTION INTRAVENOUS PRN
Status: DISCONTINUED | OUTPATIENT
Start: 2025-06-15 | End: 2025-06-28 | Stop reason: HOSPADM

## 2025-06-15 RX ORDER — ENOXAPARIN SODIUM 100 MG/ML
30 INJECTION SUBCUTANEOUS DAILY
Status: DISCONTINUED | OUTPATIENT
Start: 2025-06-15 | End: 2025-06-15

## 2025-06-15 RX ORDER — PROCHLORPERAZINE EDISYLATE 5 MG/ML
10 INJECTION INTRAMUSCULAR; INTRAVENOUS EVERY 6 HOURS PRN
Status: DISCONTINUED | OUTPATIENT
Start: 2025-06-15 | End: 2025-06-28 | Stop reason: HOSPADM

## 2025-06-15 RX ORDER — HEPARIN SODIUM 1000 [USP'U]/ML
4000 INJECTION, SOLUTION INTRAVENOUS; SUBCUTANEOUS PRN
Status: DISCONTINUED | OUTPATIENT
Start: 2025-06-15 | End: 2025-06-23

## 2025-06-15 RX ORDER — SODIUM CHLORIDE 0.9 % (FLUSH) 0.9 %
5-40 SYRINGE (ML) INJECTION PRN
Status: DISCONTINUED | OUTPATIENT
Start: 2025-06-15 | End: 2025-06-28 | Stop reason: HOSPADM

## 2025-06-15 RX ORDER — HEPARIN SODIUM 1000 [USP'U]/ML
2000 INJECTION, SOLUTION INTRAVENOUS; SUBCUTANEOUS PRN
Status: DISCONTINUED | OUTPATIENT
Start: 2025-06-15 | End: 2025-06-23

## 2025-06-15 RX ORDER — ONDANSETRON 4 MG/1
4 TABLET, ORALLY DISINTEGRATING ORAL EVERY 8 HOURS PRN
Status: DISCONTINUED | OUTPATIENT
Start: 2025-06-15 | End: 2025-06-15

## 2025-06-15 RX ORDER — SODIUM CHLORIDE 0.9 % (FLUSH) 0.9 %
5-40 SYRINGE (ML) INJECTION EVERY 12 HOURS SCHEDULED
Status: DISCONTINUED | OUTPATIENT
Start: 2025-06-15 | End: 2025-06-28 | Stop reason: HOSPADM

## 2025-06-15 RX ORDER — MIDODRINE HYDROCHLORIDE 5 MG/1
5 TABLET ORAL 3 TIMES DAILY
Status: DISCONTINUED | OUTPATIENT
Start: 2025-06-15 | End: 2025-06-15

## 2025-06-15 RX ORDER — SODIUM CHLORIDE, SODIUM LACTATE, POTASSIUM CHLORIDE, CALCIUM CHLORIDE 600; 310; 30; 20 MG/100ML; MG/100ML; MG/100ML; MG/100ML
INJECTION, SOLUTION INTRAVENOUS CONTINUOUS
Status: DISCONTINUED | OUTPATIENT
Start: 2025-06-15 | End: 2025-06-15

## 2025-06-15 RX ADMIN — MIDODRINE HYDROCHLORIDE 10 MG: 5 TABLET ORAL at 14:38

## 2025-06-15 RX ADMIN — CEFEPIME 2000 MG: 2 INJECTION, POWDER, FOR SOLUTION INTRAVENOUS at 16:04

## 2025-06-15 RX ADMIN — ACETAMINOPHEN 650 MG: 325 TABLET ORAL at 14:38

## 2025-06-15 RX ADMIN — VANCOMYCIN HYDROCHLORIDE 2000 MG: 10 INJECTION, POWDER, LYOPHILIZED, FOR SOLUTION INTRAVENOUS at 16:04

## 2025-06-15 RX ADMIN — HEPARIN SODIUM 4000 UNITS: 1000 INJECTION INTRAVENOUS; SUBCUTANEOUS at 17:58

## 2025-06-15 RX ADMIN — MIDODRINE HYDROCHLORIDE 5 MG: 5 TABLET ORAL at 11:20

## 2025-06-15 RX ADMIN — SODIUM CHLORIDE, SODIUM LACTATE, POTASSIUM CHLORIDE, AND CALCIUM CHLORIDE: .6; .31; .03; .02 INJECTION, SOLUTION INTRAVENOUS at 11:18

## 2025-06-15 RX ADMIN — LEVOTHYROXINE SODIUM 75 MCG: 0.05 TABLET ORAL at 11:20

## 2025-06-15 RX ADMIN — SODIUM CHLORIDE 500 ML: 0.9 INJECTION, SOLUTION INTRAVENOUS at 07:28

## 2025-06-15 RX ADMIN — SODIUM CHLORIDE 500 ML: 0.9 INJECTION, SOLUTION INTRAVENOUS at 08:09

## 2025-06-15 RX ADMIN — SODIUM CHLORIDE 20 ML: 0.9 INJECTION, SOLUTION INTRAVENOUS at 16:02

## 2025-06-15 RX ADMIN — SODIUM BICARBONATE: 84 INJECTION, SOLUTION INTRAVENOUS at 14:40

## 2025-06-15 RX ADMIN — SODIUM CHLORIDE, PRESERVATIVE FREE 10 ML: 5 INJECTION INTRAVENOUS at 11:21

## 2025-06-15 RX ADMIN — ENOXAPARIN SODIUM 30 MG: 100 INJECTION SUBCUTANEOUS at 11:20

## 2025-06-15 RX ADMIN — HEPARIN SODIUM 11 UNITS/KG/HR: 10000 INJECTION, SOLUTION INTRAVENOUS at 18:01

## 2025-06-15 RX ADMIN — MIDODRINE HYDROCHLORIDE 10 MG: 5 TABLET ORAL at 20:56

## 2025-06-15 ASSESSMENT — LIFESTYLE VARIABLES
HOW OFTEN DO YOU HAVE A DRINK CONTAINING ALCOHOL: NEVER
HOW MANY STANDARD DRINKS CONTAINING ALCOHOL DO YOU HAVE ON A TYPICAL DAY: PATIENT DOES NOT DRINK

## 2025-06-15 NOTE — CONSULTS
Patient seen and examined, consult note dictated.    Assessment and Plan:    1- A/CKD: The patient has chronic kidney disease with a baseline creatinine of 1.5 to 2 mg/dl. His AMARILYS is likely secondary to a pre-renal component, although a non oliguric ATN in the setting of hypotension cannot be ruled out.  - Continue volume expansion.  - Avoid all nephrotoxic agents at this time.  - Maintain systolic blood pressure > 120 mmHg.    2- No significant electrolytes disorders noted.    3- Hypotension: Blood pressure chronically low, will increase Midodrine dose and monitor for now.    4- Anemia: Stable hemoglobin, monitor.

## 2025-06-15 NOTE — CONSULTS
Pharmacy to Manage Heparin Infusion per Hospital Nomogram    Dx: CAD/AFIB  Pt wt = 86.2 Kg  Baseline aPTT = pending    Heparin (weight-based) Infusion: CAD/STEMI/NSTEMI/UA/AFib)   Heparin 60 units/kg IVP bolus (max 4,000 units) followed by Heparin infusion at 11 units/kg/hr (recommended max initial rate: 1000 units/hr).  Recheck anti-Xa (unless aPTT being used) in 6 hours.  Goal anti-Xa 0.3-0.7 IU/mL  Goal aPTT =  seconds.    anti-Xa < 0.1            Heparin 60 units/kg bolus (max 4,000 units)    Increase infusion by 4 units/kg/hr  anti-Xa 0.1-0.29       Heparin 30 units/kg bolus (max 2,000 units)    Increase infusion by 2 units/kg/hr  anti-Xa 0.3-0.7         No bolus                                                           No change   anti-Xa 0.71-0.8       No bolus                                                           Decrease infusion by 1 units/kg/hr  anti-Xa 0.81-0.99     No bolus                                                           Decrease infusion by 2 units/kg/hr  anti-Xa 1 or more     Hold heparin for 1 hour                                    Decrease infusion by 3 units/kg/hr    When Anti-Xa  is within target range for two consecutive times, check Anti-Xa once daily with morning labs.     Alex Otero, PharmD, BCPS  6/15/2025 at 4:10 PM    6/15/25 at 2309  anti-Xa level = 0.39 IU/mL  Continue current heparin infusion rate   Recheck level 0500  Lamin Perez PharmD 6/16/2025  12:36 AM    6/16/2025  at 0522  anti-Xa level = 0.23 IU/mL  Give 2000 units of heparin as bolus  Increase heparin infusion rate to 13 units/kg/hr  Recheck anti-Xa at 1230  Al Chris PharmD 6/16/2025  6:18 AM      6/16/2025  anti-Xa = 0.32 IU/mL at 1336  No change to Heparin at this time, continue Heparin infusion at _13_ units/kg/hr  Recheck level in 6 hours  Astrid Villalba, PharmD  6/16/2025 1:58 PM     6/17 @ 1709  Anti-Xa <0.10 at 1609  Heparin 4000 units IVP bolus then increase Heparin infusion to 17  infusion at _23_ units/kg/hr.  - Recheck Anti-Xa daily  Jordin Padilla, Pharm D.6/23/2025 5:24 AM

## 2025-06-15 NOTE — CONSULTS
[address] [address]                              CONSULTATION      PATIENT NAME: ALEXX APONTE              : 1950  MED REC NO: [id number]                      ROOM: 0441  ACCOUNT NO: [id number]                       ADMIT DATE: 06/15/2025  PROVIDER: Ady Montejo MD      CONSULT DATE: 06/15/2025    REASON FOR CONSULTATION:  Acute on chronic kidney disease.    HISTORY OF PRESENT ILLNESS:  The patient is a 74-year-old  male patient with a past medical history significant for chronic kidney disease and a baseline creatinine ranging between 1.5 and 2 mg/dL.  The patient presented to [facility] after sustaining a fall at home.  Upon presentation, he was noted to have a profound hypotension with a blood pressure of 75/61 mmHg.  He was also noted to have an acute on chronic kidney injury, which prompted Nephrology consultation.    PAST MEDICAL HISTORY:    1. Chronic kidney disease.  2. Coronary artery disease.  3. Bladder cancer.  4. Hyperlipidemia.  5. Hypertension.  6. Hypothyroidism.    PAST SURGICAL HISTORY:    1. Appendectomy.  2. Coronary artery bypass graft.  3. Cystoscopy.  4. Tonsillectomy.  5. TURP.    ALLERGIES:  THE PATIENT IS ALLERGIC TO SIMVASTATIN.      SOCIAL HISTORY:  The patient quit smoking many years ago and drinks alcohol occasionally.    FAMILY HISTORY:  Significant for diabetes mellitus.    REVIEW OF SYSTEMS:  The patient denied any nausea, vomiting, or abdominal pain.  Otherwise, a 10-point review of systems was relatively unremarkable.    PHYSICAL EXAMINATION:  VITAL SIGNS:  Blood pressure 96/65, heart rate 108, respirations 18, temperature 98.3 Fahrenheit.  The patient is saturating 95% on room air.  GENERAL APPEARANCE:  The patient is alert and oriented x3, not in acute distress.  EYES:  Reveal normal conjunctivae.  Reactive pupils.  NECK:  Reveals midline trachea.  Nonpalpable

## 2025-06-15 NOTE — H&P
[facility] Medicine History & Physical    V 5.1    Date of Admission: 6/15/2025    Date of Service:  Pt seen/examined on 6/15/2025    [x]Admitted to Inpatient with expected LOS greater than two midnights due to medical therapy.  []Placed in Observation status.    Chief Admission Complaint: Low blood pressure    Presenting Admission History:      Patient is a 74-year-old male with a past medical history of CAD, advanced bladder cancer undergoing active immunotherapy, hypertension, thyroid disease who presents for low blood pressure and falls.  He states that he has had ongoing issues with his blood pressure since his last admission.  He states last night he fell down and was unable to get up.  He states that his blood pressure ranged from the 40s to the 80s.  He is endorsing lightheadedness.  He denies any chest pain or shortness of breath.  On arrival to the ED he was found to be tachycardic and hypotensive.  ED workup was remarkable for an elevated creatinine, lactic acidosis, elevated troponin, elevated BNP, leukocytosis.  We were consulted for admission.    Assessment/Plan:      Hypotension  - Etiology unclear  - Nephrology consulted - continue IV fluid resuscitation and increase midodrine dosing    SIRS positive  - Leukocytosis, tachycardia and lactic acidosis  - Infection source unclear  - Blood cultures pending  - Pro-Davy ordered and pending  - Given that patient is on active immunotherapy will start broad-spectrum antibiotics with cefepime and vancomycin    Elevated Troponin  - No active chest pain  - Secondary to new A-fib and AMARILYS  - Continue to monitor on tele    AMARILYS on CKD Stage   - Likely secondary to hypotension  - Baseline creatinine of 1.5-2  - Nephrology consulted - continue volume expansion, avoid nephrotoxic agents, maintain systolic blood pressure greater than 120    Atrial fibrillation  - EKG on admission shows atrial fibrillation with RVR  - Does not appear that the patient has a history of

## 2025-06-15 NOTE — CONSULTS
Pharmacy Note  Vancomycin Consult    Alfredo Chong is a 74 y.o. male started on Vancomycin to treat Sepsis for 7 days; consult received from Dr. Robbins to manage therapy. Also receiving the following antibiotics: cefepime.    Allergies:  Simvastatin     Recent Labs     06/15/25  0729   WBC 14.0*   CREATININE 3.1*   Estimated Creatinine Clearance: 21 mL/min (A) (based on SCr of 3.1 mg/dL (H)).    No intake or output data in the 24 hours ending 06/15/25 1444  Wt Readings from Last 1 Encounters:   06/15/25 86.2 kg (190 lb)       Body mass index is 27.26 kg/m².    Pulse dose: fluctuating renal function, AMARILYS, ESRD   Goal Vancomycin trough: 10-20 mcg/mL   Goal Vancomycin AUC: 400-600 mg/L.hr    Assessment/Plan:  Will initiate Vancomycin with a one time loading dose of 2000 mg x1. Will pulse dose. Vancomycin level ordered for 6/16 at 0600. Timing of the next level will be determined based on culture results, renal function, and clinical response.     Thank you for the consult.  Foreign Ramirez, Henok    6/15/2025 2:44 PM     Vancomycin Day 2  Recent Labs     06/16/25  0523   VANCORANDOM 18.2     Recent Labs     06/15/25  0729 06/16/25  0523   CREATININE 3.1* 3.1*     Estimated Creatinine Clearance: 21 mL/min (A) (based on SCr of 3.1 mg/dL (H)).  Recent Labs     06/15/25  0729 06/15/25  1621 06/16/25  0523   WBC 14.0* 10.4 13.7*     Give vancomycin 750mg x1 dose at 1500  Recheck vancomycin level 6/17 at 1500  Boaz Perez RPH 6/16/2025 6:44 AM

## 2025-06-15 NOTE — CONSULTS
[facility] - INITIAL CONSULTATION        CHIEF COMPLAINT  Atrial fibrillation with RVR      HISTORY OF PRESENTING ILLNESS  Alfredo hCong is a 74 y.o. patient with extensive cardiac history including CAD and CABG, cardiomyopathy, managed by his cardiologists Dr. Reveles and Dr. Rodriguez in outpatient over the recent years, bladder cancer recently started immunotherapy who presented to the hospital with complaints of ongoing fatigue, lightheadedness, decreased appetite and subsequently a fall last night.  On admission, he was noted to be hypotensive with atrial fibrillation and RVR.  This is first documented episode of A-fib for him.  Since admission, his blood pressure remains soft and he remains tachycardic with heart rate in the 110s.  He reports mild fatigue at rest but no other prominent symptoms. Cardiology is consulted for further management of A-fib.      PAST MEDICAL HISTORY   has a past medical history of Actinic keratosis, Allergic rhinitis, CAD (coronary artery disease), Cancer (HCC), Chronic uveitis, bilateral, History of blood transfusion, [place] (hard of hearing), Hyperlipidemia, Hypertension, MI (myocardial infarction) (HCC), Osteoarthritis, Thyroid disease, and Uveitis of both eyes.    SURGICAL HISTORY   has a past surgical history that includes Diagnostic Cardiac Cath Lab Procedure; Appendectomy; fracture surgery; Coronary artery bypass graft (08/2006); Colonoscopy (2005); Tonsillectomy; Coronary angioplasty (07/27/2018); joint replacement (Left, 03/19/2024); Cystoscopy (03/12/2024); Cystoscopy (N/A, 04/20/2025); Cystoscopy (N/A, 04/20/2025); TURP (N/A, 04/20/2025); IR GUIDED NEPHROSTOMY CATH PLACEMENT LEFT (04/23/2025); and Port Surgery (N/A, 5/21/2025).     SOCIAL HISTORY   reports that he quit smoking about 11 years ago. His smoking use included cigars and cigarettes. He started smoking about 60 years ago. He has a 49 pack-year smoking history. He has never used smokeless tobacco. He

## 2025-06-15 NOTE — ED PROVIDER NOTES
(2/13/2023)    Exercise Vital Sign     Days of Exercise per Week: 7 days     Minutes of Exercise per Session: 150+ min    Received from [facility], [facility]    Interpersonal Safety   Housing Stability: High Risk (4/20/2025)    Housing Stability Vital Sign     Unable to Pay for Housing in the Last Year: No     Number of Times Moved in the Last Year: 0     Homeless in the Last Year: Yes       SCREENINGS   NIH Stroke Scale  NIH Stroke Scale Assessed: Yes  Interval: Baseline  Level of Consciousness (1a): Alert  LOC Questions (1b): Answers both correctly  LOC Commands (1c): Performs both tasks correctly  Best Gaze (2): Normal  Visual (3): No visual loss  Facial Palsy (4): Normal symmetrical movement  Motor Arm, Left (5a): No drift  Motor Arm, Right (5b): No drift  Motor Leg, Left (6a): No drift  Motor Leg, Right (6b): No drift  Limb Ataxia (7): Absent  Sensory (8): Normal  Best Language (9): No aphasia  Dysarthria (10): Normal  Extinction and Inattention (11): No abnormality  Total: 0Glasgow Coma Scale  Eye Opening: Spontaneous  Best Verbal Response: Oriented  Best Motor Response: Obeys commands  [place] Coma Scale Score: 15           PHYSICAL EXAM    (up to 7 for level 4, 8 or more for level 5)     ED Triage Vitals   BP Systolic BP Percentile Diastolic BP Percentile Temp Temp src Pulse Resp SpO2   -- -- -- -- -- -- -- --      Height Weight         -- --             Physical Exam  Vitals and nursing note reviewed.   Constitutional:       General: He is awake. He is not in acute distress.     Appearance: Normal appearance. He is well-developed, well-groomed and overweight. He is ill-appearing (chronically). He is not toxic-appearing or diaphoretic.   HENT:      Head: Normocephalic and atraumatic. No raccoon eyes, abrasion, contusion, right periorbital erythema, left periorbital erythema or laceration.      Jaw: There is normal jaw occlusion. No trismus.         atrophy, abnormal muscle tone, seizure activity or pronator drift.      Coordination: Finger-Nose-Finger Test normal.      Comments: No obvious pronator drift with the left arm but he does report chronic issues with the right arm due to prior trauma but denies any changes with this over the last 24 hours and he can raise the right arm but does states it is difficult to do so due to the prior trauma    Normal finger-to-nose with left hand but very difficult time doing with the right arm due to prior issues   Psychiatric:         Attention and Perception: Attention normal.         Mood and Affect: Mood and affect normal. Mood is not anxious.         Speech: Speech normal. Speech is not delayed or slurred.         Behavior: Behavior normal. Behavior is cooperative.             DIAGNOSTIC RESULTS   :    Labs Reviewed   CBC WITH AUTO DIFFERENTIAL - Abnormal; Notable for the following components:       Result Value    WBC 14.0 (*)     RBC 3.28 (*)     Hemoglobin 10.5 (*)     Hematocrit 31.7 (*)     RDW 16.0 (*)     Neutrophils Absolute 10.9 (*)     nRBC 1 (*)     Anisocytosis Occasional (*)     Macrocytes Occasional (*)     Microcytes Occasional (*)     Polychromasia Occasional (*)     Ovalocytes Occasional (*)     All other components within normal limits   COMPREHENSIVE METABOLIC PANEL - Abnormal; Notable for the following components:    Sodium 133 (*)     Chloride 98 (*)     CO2 20 (*)     Glucose 139 (*)     BUN 29 (*)     Creatinine 3.1 (*)     Est, Glom Filt Rate 20 (*)     Albumin 3.2 (*)     Albumin/Globulin Ratio 0.9 (*)     AST 54 (*)     All other components within normal limits   LACTIC ACID - Abnormal; Notable for the following components:    Lactic Acid 3.0 (*)     All other components within normal limits   BLOOD GAS, VENOUS - Abnormal; Notable for the following components:    pCO2, Kenn 34.9 (*)     HCO3, Venous 20.6 (*)     Base Excess, Kenn -3.8 (*)     Carboxyhemoglobin 2.7 (*)     All other components

## 2025-06-15 NOTE — ED NOTES
Alfredo Chong is a 74 y.o. male admitted for  Principal Problem:    Hypotension  Resolved Problems:    * No resolved hospital problems. *  .   Patient Home via EMS transportation with   Chief Complaint   Patient presents with    Fall     Arrived by EMS, fall last night at 2200. Dizziness reported with any movement, EMS noted afib, no prior hx.     Dizziness   .  Patient is alert and Person, Place, Time, and Situation  Patient's baseline mobility: Baseline Mobility: Independent   Code Status: Prior   Cardiac Rhythm: Cardiac Rhythm: Atrial fib     Abnormal Assessment Findings: hypotension, new onset afib?    Isolation: None      NIH Score:    C-SSRS: Risk of Suicide: No Risk  Bedside swallow:        Active LDA's:   Peripheral IV 06/15/25 Right Antecubital (Active)     Family/Caregiver Present no Any Concerns: no   Restraints no  Sitter no         Vitals: MEWS Score: 4    Vitals:    06/15/25 0731 06/15/25 0735 06/15/25 0802 06/15/25 0836   BP: (!) 87/63  (!) 75/61 (!) 88/55   Pulse: (!) 105 (!) 108 (!) 105 (!) 114   Resp: 16 22 21 20   Temp:       TempSrc:       SpO2:  96% 92% 97%   Weight:       Height:           Last documented pain score (0-10 scale)    Pain medication administered No.    Pertinent or High Risk Medications/Drips: No.    Pending Blood Product Administration: no    Abnormal labs:   Abnormal Labs Reviewed   CBC WITH AUTO DIFFERENTIAL - Abnormal; Notable for the following components:       Result Value    WBC 14.0 (*)     RBC 3.28 (*)     Hemoglobin 10.5 (*)     Hematocrit 31.7 (*)     RDW 16.0 (*)     Neutrophils Absolute 10.9 (*)     nRBC 1 (*)     Anisocytosis Occasional (*)     Macrocytes Occasional (*)     Microcytes Occasional (*)     Polychromasia Occasional (*)     Ovalocytes Occasional (*)     All other components within normal limits   COMPREHENSIVE METABOLIC PANEL - Abnormal; Notable for the following components:    Sodium 133 (*)     Chloride 98 (*)     CO2 20 (*)     Glucose 139 (*)

## 2025-06-16 ENCOUNTER — APPOINTMENT (OUTPATIENT)
Age: 75
End: 2025-06-16
Attending: INTERNAL MEDICINE
Payer: MEDICARE

## 2025-06-16 ENCOUNTER — CARE COORDINATION (OUTPATIENT)
Dept: CASE MANAGEMENT | Age: 75
End: 2025-06-16

## 2025-06-16 PROBLEM — D50.9 IRON DEFICIENCY ANEMIA: Status: ACTIVE | Noted: 2025-04-14

## 2025-06-16 PROBLEM — I48.91 RAPID ATRIAL FIBRILLATION (HCC): Status: ACTIVE | Noted: 2025-06-16

## 2025-06-16 PROBLEM — R42 DIZZINESS: Status: ACTIVE | Noted: 2025-06-16

## 2025-06-16 PROBLEM — Z92.89 HISTORY OF RECENT BLOOD TRANSFUSION: Status: ACTIVE | Noted: 2025-06-16

## 2025-06-16 LAB
ALBUMIN SERPL-MCNC: 3 G/DL (ref 3.4–5)
ALP SERPL-CCNC: 105 U/L (ref 40–129)
ALT SERPL-CCNC: 27 U/L (ref 10–40)
ANION GAP SERPL CALCULATED.3IONS-SCNC: 12 MMOL/L (ref 3–16)
ANTI-XA UNFRAC HEPARIN: 0.23 IU/ML (ref 0.3–0.7)
ANTI-XA UNFRAC HEPARIN: 0.32 IU/ML (ref 0.3–0.7)
AST SERPL-CCNC: 39 U/L (ref 15–37)
BACTERIA URNS QL MICRO: ABNORMAL /HPF
BASOPHILS # BLD: 0.1 K/UL (ref 0–0.2)
BASOPHILS NFR BLD: 0.6 %
BILIRUB DIRECT SERPL-MCNC: 0.4 MG/DL (ref 0–0.3)
BILIRUB INDIRECT SERPL-MCNC: 0.2 MG/DL (ref 0–1)
BILIRUB SERPL-MCNC: 0.6 MG/DL (ref 0–1)
BILIRUB UR QL STRIP.AUTO: NEGATIVE
BILIRUB UR QL STRIP.AUTO: NEGATIVE
BUN SERPL-MCNC: 30 MG/DL (ref 7–20)
CALCIUM SERPL-MCNC: 8.4 MG/DL (ref 8.3–10.6)
CHARACTER UR: ABNORMAL
CHLORIDE SERPL-SCNC: 101 MMOL/L (ref 99–110)
CHLORIDE UR-SCNC: 59 MMOL/L
CLARITY UR: ABNORMAL
CLARITY UR: ABNORMAL
CO2 SERPL-SCNC: 20 MMOL/L (ref 21–32)
COLOR UR: YELLOW
COLOR UR: YELLOW
CREAT SERPL-MCNC: 3.1 MG/DL (ref 0.8–1.3)
DEPRECATED RDW RBC AUTO: 15.8 % (ref 12.4–15.4)
ECHO AO ASC DIAM: 3.4 CM
ECHO AO ASCENDING AORTA INDEX: 1.67 CM/M2
ECHO AO ROOT DIAM: 3.6 CM
ECHO AO ROOT INDEX: 1.76 CM/M2
ECHO AV AREA PEAK VELOCITY: 1.6 CM2
ECHO AV AREA VTI: 1.5 CM2
ECHO AV AREA/BSA PEAK VELOCITY: 0.8 CM2/M2
ECHO AV AREA/BSA VTI: 0.7 CM2/M2
ECHO AV MEAN GRADIENT: 9 MMHG
ECHO AV MEAN VELOCITY: 1.4 M/S
ECHO AV PEAK GRADIENT: 22 MMHG
ECHO AV PEAK VELOCITY: 2.3 M/S
ECHO AV VELOCITY RATIO: 0.48
ECHO AV VTI: 32.1 CM
ECHO BSA: 2.06 M2
ECHO EST RA PRESSURE: 8 MMHG
ECHO LA AREA 2C: 18.2 CM2
ECHO LA AREA 4C: 20.5 CM2
ECHO LA DIAMETER INDEX: 1.62 CM/M2
ECHO LA DIAMETER: 3.3 CM
ECHO LA MAJOR AXIS: 6.2 CM
ECHO LA MINOR AXIS: 6.5 CM
ECHO LA TO AORTIC ROOT RATIO: 0.92
ECHO LA VOL BP: 48 ML (ref 18–58)
ECHO LA VOL MOD A2C: 41 ML (ref 18–58)
ECHO LA VOL MOD A4C: 54 ML (ref 18–58)
ECHO LA VOL/BSA BIPLANE: 24 ML/M2 (ref 16–34)
ECHO LA VOLUME INDEX MOD A2C: 20 ML/M2 (ref 16–34)
ECHO LA VOLUME INDEX MOD A4C: 26 ML/M2 (ref 16–34)
ECHO LV E' LATERAL VELOCITY: 15.6 CM/S
ECHO LV E' SEPTAL VELOCITY: 7.72 CM/S
ECHO LV EDV A2C: 99 ML
ECHO LV EDV A4C: 94 ML
ECHO LV EDV INDEX A4C: 46 ML/M2
ECHO LV EDV NDEX A2C: 49 ML/M2
ECHO LV EF PHYSICIAN: 55 %
ECHO LV EJECTION FRACTION A2C: 53 %
ECHO LV EJECTION FRACTION A4C: 67 %
ECHO LV EJECTION FRACTION BIPLANE: 60 % (ref 55–100)
ECHO LV ESV A2C: 46 ML
ECHO LV ESV A4C: 31 ML
ECHO LV ESV INDEX A2C: 23 ML/M2
ECHO LV ESV INDEX A4C: 15 ML/M2
ECHO LV FRACTIONAL SHORTENING: 27 % (ref 28–44)
ECHO LV INTERNAL DIMENSION DIASTOLE INDEX: 2.75 CM/M2
ECHO LV INTERNAL DIMENSION DIASTOLIC: 5.6 CM (ref 4.2–5.9)
ECHO LV INTERNAL DIMENSION SYSTOLIC INDEX: 2.01 CM/M2
ECHO LV INTERNAL DIMENSION SYSTOLIC: 4.1 CM
ECHO LV IVSD: 1 CM (ref 0.6–1)
ECHO LV MASS 2D: 219.7 G (ref 88–224)
ECHO LV MASS INDEX 2D: 107.7 G/M2 (ref 49–115)
ECHO LV POSTERIOR WALL DIASTOLIC: 1 CM (ref 0.6–1)
ECHO LV RELATIVE WALL THICKNESS RATIO: 0.36
ECHO LVOT AREA: 3.5 CM2
ECHO LVOT AV VTI INDEX: 0.44
ECHO LVOT DIAM: 2.1 CM
ECHO LVOT MEAN GRADIENT: 2 MMHG
ECHO LVOT PEAK GRADIENT: 5 MMHG
ECHO LVOT PEAK VELOCITY: 1.1 M/S
ECHO LVOT STROKE VOLUME INDEX: 24.1 ML/M2
ECHO LVOT SV: 49.2 ML
ECHO LVOT VTI: 14.2 CM
ECHO MV A VELOCITY: 0.98 M/S
ECHO MV E DECELERATION TIME (DT): 152 MS
ECHO MV E VELOCITY: 0.72 M/S
ECHO MV E/A RATIO: 0.73
ECHO MV E/E' LATERAL: 4.62
ECHO MV E/E' RATIO (AVERAGED): 6.97
ECHO MV E/E' SEPTAL: 9.33
ECHO RA AREA 4C: 13.1 CM2
ECHO RA END SYSTOLIC VOLUME APICAL 4 CHAMBER INDEX BSA: 14 ML/M2
ECHO RA VOLUME: 29 ML
ECHO RV FREE WALL PEAK S': 9.5 CM/S
ECHO RV TAPSE: 1.5 CM (ref 1.7–?)
EOSINOPHIL # BLD: 0.5 K/UL (ref 0–0.6)
EOSINOPHIL NFR BLD: 3.7 %
GFR SERPLBLD CREATININE-BSD FMLA CKD-EPI: 20 ML/MIN/{1.73_M2}
GLUCOSE SERPL-MCNC: 124 MG/DL (ref 70–99)
GLUCOSE UR STRIP.AUTO-MCNC: NEGATIVE MG/DL
GLUCOSE UR STRIP.AUTO-MCNC: NEGATIVE MG/DL
HCT VFR BLD AUTO: 26.8 % (ref 40.5–52.5)
HCT VFR BLD AUTO: 26.8 % (ref 40.5–52.5)
HCT VFR BLD AUTO: 28.4 % (ref 40.5–52.5)
HGB BLD-MCNC: 8.9 G/DL (ref 13.5–17.5)
HGB BLD-MCNC: 9.1 G/DL (ref 13.5–17.5)
HGB BLD-MCNC: 9.4 G/DL (ref 13.5–17.5)
HGB UR QL STRIP.AUTO: ABNORMAL
HGB UR QL STRIP.AUTO: ABNORMAL
KETONES UR STRIP.AUTO-MCNC: ABNORMAL MG/DL
KETONES UR STRIP.AUTO-MCNC: ABNORMAL MG/DL
LACTATE BLDV-SCNC: 3 MMOL/L (ref 0.4–2)
LACTATE BLDV-SCNC: 3.1 MMOL/L (ref 0.4–2)
LEUKOCYTE ESTERASE UR QL STRIP.AUTO: ABNORMAL
LEUKOCYTE ESTERASE UR QL STRIP.AUTO: ABNORMAL
LYMPHOCYTES # BLD: 1.1 K/UL (ref 1–5.1)
LYMPHOCYTES NFR BLD: 7.7 %
MCH RBC QN AUTO: 32.5 PG (ref 26–34)
MCHC RBC AUTO-ENTMCNC: 33.9 G/DL (ref 31–36)
MCV RBC AUTO: 95.8 FL (ref 80–100)
MONOCYTES # BLD: 1.2 K/UL (ref 0–1.3)
MONOCYTES NFR BLD: 9 %
NEUTROPHILS # BLD: 10.8 K/UL (ref 1.7–7.7)
NEUTROPHILS NFR BLD: 79 %
NITRITE UR QL STRIP.AUTO: NEGATIVE
NITRITE UR QL STRIP.AUTO: NEGATIVE
PH UR STRIP.AUTO: 6 [PH] (ref 5–8)
PH UR STRIP.AUTO: 6.5 [PH] (ref 5–8)
PLATELET # BLD AUTO: 317 K/UL (ref 135–450)
PMV BLD AUTO: 7.1 FL (ref 5–10.5)
POTASSIUM SERPL-SCNC: 4 MMOL/L (ref 3.5–5.1)
POTASSIUM UR-SCNC: 19.3 MMOL/L
PROT SERPL-MCNC: 5.8 G/DL (ref 6.4–8.2)
PROT UR STRIP.AUTO-MCNC: >=300 MG/DL
PROT UR STRIP.AUTO-MCNC: >=300 MG/DL
RBC # BLD AUTO: 2.79 M/UL (ref 4.2–5.9)
RBC #/AREA URNS HPF: ABNORMAL /HPF (ref 0–4)
RBC #/AREA URNS HPF: ABNORMAL /HPF (ref 0–4)
SODIUM SERPL-SCNC: 133 MMOL/L (ref 136–145)
SODIUM UR-SCNC: 90 MMOL/L
SP GR UR STRIP.AUTO: 1.01 (ref 1–1.03)
SP GR UR STRIP.AUTO: 1.02 (ref 1–1.03)
UA COMPLETE W REFLEX CULTURE PNL UR: YES
UA DIPSTICK W REFLEX MICRO PNL UR: YES
UA DIPSTICK W REFLEX MICRO PNL UR: YES
URN SPEC COLLECT METH UR: ABNORMAL
URN SPEC COLLECT METH UR: ABNORMAL
UROBILINOGEN UR STRIP-ACNC: 0.2 E.U./DL
UROBILINOGEN UR STRIP-ACNC: 0.2 E.U./DL
VANCOMYCIN SERPL-MCNC: 18.2 UG/ML
WBC # BLD AUTO: 13.7 K/UL (ref 4–11)
WBC #/AREA URNS HPF: >100 /HPF (ref 0–5)
WBC #/AREA URNS HPF: >100 /HPF (ref 0–5)

## 2025-06-16 PROCEDURE — 99223 1ST HOSP IP/OBS HIGH 75: CPT | Performed by: INTERNAL MEDICINE

## 2025-06-16 PROCEDURE — 80048 BASIC METABOLIC PNL TOTAL CA: CPT

## 2025-06-16 PROCEDURE — 2000000000 HC ICU R&B

## 2025-06-16 PROCEDURE — 6360000002 HC RX W HCPCS: Performed by: STUDENT IN AN ORGANIZED HEALTH CARE EDUCATION/TRAINING PROGRAM

## 2025-06-16 PROCEDURE — 99291 CRITICAL CARE FIRST HOUR: CPT | Performed by: STUDENT IN AN ORGANIZED HEALTH CARE EDUCATION/TRAINING PROGRAM

## 2025-06-16 PROCEDURE — 93356 MYOCRD STRAIN IMG SPCKL TRCK: CPT | Performed by: INTERNAL MEDICINE

## 2025-06-16 PROCEDURE — 2580000003 HC RX 258: Performed by: INTERNAL MEDICINE

## 2025-06-16 PROCEDURE — 2580000003 HC RX 258

## 2025-06-16 PROCEDURE — 6360000002 HC RX W HCPCS: Performed by: INTERNAL MEDICINE

## 2025-06-16 PROCEDURE — 84300 ASSAY OF URINE SODIUM: CPT

## 2025-06-16 PROCEDURE — 2500000003 HC RX 250 WO HCPCS: Performed by: INTERNAL MEDICINE

## 2025-06-16 PROCEDURE — 6360000002 HC RX W HCPCS

## 2025-06-16 PROCEDURE — 87086 URINE CULTURE/COLONY COUNT: CPT

## 2025-06-16 PROCEDURE — 51798 US URINE CAPACITY MEASURE: CPT

## 2025-06-16 PROCEDURE — 6360000004 HC RX CONTRAST MEDICATION: Performed by: INTERNAL MEDICINE

## 2025-06-16 PROCEDURE — 51702 INSERT TEMP BLADDER CATH: CPT

## 2025-06-16 PROCEDURE — 85018 HEMOGLOBIN: CPT

## 2025-06-16 PROCEDURE — 93306 TTE W/DOPPLER COMPLETE: CPT | Performed by: INTERNAL MEDICINE

## 2025-06-16 PROCEDURE — 80076 HEPATIC FUNCTION PANEL: CPT

## 2025-06-16 PROCEDURE — 36415 COLL VENOUS BLD VENIPUNCTURE: CPT

## 2025-06-16 PROCEDURE — 2500000003 HC RX 250 WO HCPCS

## 2025-06-16 PROCEDURE — C8929 TTE W OR WO FOL WCON,DOPPLER: HCPCS

## 2025-06-16 PROCEDURE — 82436 ASSAY OF URINE CHLORIDE: CPT

## 2025-06-16 PROCEDURE — 83520 IMMUNOASSAY QUANT NOS NONAB: CPT

## 2025-06-16 PROCEDURE — 85025 COMPLETE CBC W/AUTO DIFF WBC: CPT

## 2025-06-16 PROCEDURE — 84133 ASSAY OF URINE POTASSIUM: CPT

## 2025-06-16 PROCEDURE — 83605 ASSAY OF LACTIC ACID: CPT

## 2025-06-16 PROCEDURE — 85520 HEPARIN ASSAY: CPT

## 2025-06-16 PROCEDURE — 6370000000 HC RX 637 (ALT 250 FOR IP): Performed by: INTERNAL MEDICINE

## 2025-06-16 PROCEDURE — 80202 ASSAY OF VANCOMYCIN: CPT

## 2025-06-16 PROCEDURE — 85014 HEMATOCRIT: CPT

## 2025-06-16 PROCEDURE — 81001 URINALYSIS AUTO W/SCOPE: CPT

## 2025-06-16 PROCEDURE — 6370000000 HC RX 637 (ALT 250 FOR IP)

## 2025-06-16 RX ORDER — PANTOPRAZOLE SODIUM 40 MG/10ML
40 INJECTION, POWDER, LYOPHILIZED, FOR SOLUTION INTRAVENOUS 2 TIMES DAILY
Status: DISCONTINUED | OUTPATIENT
Start: 2025-06-16 | End: 2025-06-17

## 2025-06-16 RX ADMIN — PHENYLEPHRINE HYDROCHLORIDE 30 MCG/MIN: 50 INJECTION INTRAVENOUS at 17:34

## 2025-06-16 RX ADMIN — SODIUM BICARBONATE: 84 INJECTION, SOLUTION INTRAVENOUS at 21:28

## 2025-06-16 RX ADMIN — PANTOPRAZOLE SODIUM 40 MG: 40 INJECTION, POWDER, FOR SOLUTION INTRAVENOUS at 21:19

## 2025-06-16 RX ADMIN — SODIUM BICARBONATE: 84 INJECTION, SOLUTION INTRAVENOUS at 05:16

## 2025-06-16 RX ADMIN — SULFUR HEXAFLUORIDE 2 ML: 60.7; .19; .19 INJECTION, POWDER, LYOPHILIZED, FOR SUSPENSION INTRAVENOUS; INTRAVESICAL at 09:55

## 2025-06-16 RX ADMIN — PROCHLORPERAZINE EDISYLATE 10 MG: 5 INJECTION INTRAMUSCULAR; INTRAVENOUS at 12:07

## 2025-06-16 RX ADMIN — MIDODRINE HYDROCHLORIDE 10 MG: 5 TABLET ORAL at 14:54

## 2025-06-16 RX ADMIN — HEPARIN SODIUM 2000 UNITS: 1000 INJECTION INTRAVENOUS; SUBCUTANEOUS at 06:51

## 2025-06-16 RX ADMIN — MIDODRINE HYDROCHLORIDE 10 MG: 5 TABLET ORAL at 10:10

## 2025-06-16 RX ADMIN — CEFEPIME 1000 MG: 1 INJECTION, POWDER, FOR SOLUTION INTRAMUSCULAR; INTRAVENOUS at 02:11

## 2025-06-16 RX ADMIN — CETIRIZINE HYDROCHLORIDE 10 MG: 10 TABLET, FILM COATED ORAL at 10:10

## 2025-06-16 RX ADMIN — MIDODRINE HYDROCHLORIDE 10 MG: 5 TABLET ORAL at 21:19

## 2025-06-16 RX ADMIN — LEVOTHYROXINE SODIUM 75 MCG: 0.05 TABLET ORAL at 10:10

## 2025-06-16 RX ADMIN — SODIUM CHLORIDE, PRESERVATIVE FREE 10 ML: 5 INJECTION INTRAVENOUS at 21:22

## 2025-06-16 RX ADMIN — VANCOMYCIN HYDROCHLORIDE 750 MG: 750 INJECTION, POWDER, LYOPHILIZED, FOR SOLUTION INTRAVENOUS at 13:39

## 2025-06-16 ASSESSMENT — PAIN SCALES - GENERAL
PAINLEVEL_OUTOF10: 0

## 2025-06-16 NOTE — ACP (ADVANCE CARE PLANNING)
Advance Care Planning     General Advance Care Planning (ACP) Conversation    Date of Conversation: 6/16/2025  Conducted with: Patient with Decision Making Capacity  Other persons present: None    Healthcare Decision Maker:   Primary Decision Maker: Madelyn Brasher - Other - [phone]     Today we documented Decision Maker(s) consistent with ACP documents on file.  Content/Action Overview:  Has ACP document(s) on file - reflects the patient's care preferences  Reviewed DNR/DNI and patient elects Full Code (Attempt Resuscitation)  MARJAN Monge

## 2025-06-16 NOTE — PROGRESS NOTES
[facility] Progress Note           Subjective/   74 y.o. year old male who we are seeing in consultation for AMARILYS/CKD stage 3b.     HPI:  Renal function stable at lower levels, oliguric.  BP's low, transferred to ICU.    ROS:  +weak, intake reduced, denies any shortness of breath, on RA.    Objective/   GEN:  Chronically ill, BP 90/62   Pulse (!) 122   Temp 98.5 °F (36.9 °C) (Oral)   Resp 30   Ht 1.778 m (5' 10\")   Wt 86.2 kg (190 lb)   SpO2 94%   BMI 27.26 kg/m²   HEENT: non-icteric, no JVD  CV: S1, S2, tachycardic, without m/r/g; no LE edema  RESP: CTA B without w/r/r; breathing wnl  ABD: +bs, soft, nt, no hsm  SKIN: warm, no rashes    Data/  Recent Labs     06/15/25  0729 06/15/25  1621 06/16/25  0523   WBC 14.0* 10.4 13.7*   HGB 10.5* 7.8* 9.1*   HCT 31.7* 23.7* 26.8*   MCV 96.5 97.5 95.8    286 317     Recent Labs     06/15/25  0729 06/16/25  0523   * 133*   K 4.0 4.0   CL 98* 101   CO2 20* 20*   GLUCOSE 139* 124*   MG 2.56*  --    BUN 29* 30*   CREATININE 3.1* 3.1*   LABGLOM 20* 20*     UA large blood, >300 protein, s.g. 1.020, >100 wbc's    Assessment/     - Acute kidney injury - renal hypoperfusion injury in setting of hypotension    - Chronic kidney disease stage 3b - baseline SCr 1.6-2.0 since 2023, follows with Dr. Glynn     - Hypotension    - Anion-gap metabolic acidosis with elevated lactate      Plan/     - Increase IVF's with bicarb to 100 ml/hour  - Continue with increased midodrine 10 mg po tid  - Recheck UA, urine electrolytes  - Check bladder scans daily with prior history of urinary retention issues requiring baeza  - Trend labs, bp's, weights, & urine output    ____________________________________  Matthew Hermosillo MD  [facility]  [email or web address]  Office: [phone]

## 2025-06-16 NOTE — CARE COORDINATION
CTN contacted [facility] inpatient  on C4 and l/m on [phone] regarding patient eligibility for RPM.     Tanna Wright RN   [phone]

## 2025-06-16 NOTE — CARE COORDINATION
Case Management Assessment  Initial Evaluation    Date/Time of Evaluation: 6/16/2025 10:39 AM  Assessment Completed by: Jaqueline Ye RN    If patient is discharged prior to next notation, then this note serves as note for discharge by case management.    Patient Name: Alfredo Chong                   YOB: 1950  Diagnosis: Dizziness [R42]  Hypotension [I95.9]  New onset atrial fibrillation (HCC) [I48.91]  Troponin level elevated [R79.89]  Elevated brain natriuretic peptide (BNP) level [R79.89]  Hypotension, unspecified hypotension type [I95.9]  Acute kidney injury superimposed on chronic kidney disease [N17.9, N18.9]                   Date / Time: 6/15/2025  6:53 AM    Patient Admission Status: Inpatient   Readmission Risk (Low < 19, Mod (19-27), High > 27): Readmission Risk Score: 23    Current PCP: Heath Hernandez MD  PCP verified by CM? Yes    Chart Reviewed: Yes      History Provided by: Patient  Patient Orientation: Alert and Oriented, Person, Place, Situation    Patient Cognition: Alert    Hospitalization in the last 30 days (Readmission):  No    If yes, Readmission Assessment in CM Navigator will be completed.    Advance Directives:    Code Status: Full Code   Patient's Primary Decision Maker is: Named in Scanned ACP Document    Primary Decision Maker: SameeraMadelyn - Other - [phone]    Discharge Planning:  Patient lives with: Spouse/Significant Other Type of Home: House  Primary Care Giver: Self  Patient Support Systems include: Spouse/Significant Other, Children   Current Financial resources: Medicare  Current community resources: None  Current services prior to admission: Durable Medical Equipment, Home Care ([facility] active for nurse)            Current DME: Walker            Type of Home Care services:  Nursing Services    ADLS  Prior functional level: Assistance with the following:, Housework  Current functional level: Assistance with the following:, Mobility,

## 2025-06-16 NOTE — CONSULTS
CARDIAC ELECTROPHYSIOLOGY NOTE  06/16/25  Patient Name: Alfredo Chong  YOB: 1950    Primary Care Physician: Heath Hernandez MD     Alfredo Chong was seen today on 6/16/2025 in consultation due to chief complaint of atrial fibrillation with rapid ventricular response with hypotension.    Patient is a 74 y.o. male with PMH of chronic systolic heart failure with ejection fraction 40-45%, CAD was a history of CABG, mild aortic stenosis, hypertension, dyslipidemia, iron deficiency anemia, obesity, CKD stage IIIa, bladder cancer, who was seen today for atrial fibrillation with rapid ventricular response with hypotension     Patient was recently seen by Dr. Duong in heart failure on 5/20/2025: It was noted that the patient had increased weakness over time and fatigue since his cystoscopy.  Ejection fraction continued to be 45 to 50% range on the last echocardiogram in December with moderate aortic stenosis and ascending aortic dilation that was being followed serially.  Patient was also diagnosed with advanced bladder cancer in May 2025 with need for multiple units of blood.  Patient had a port placement for malignancy treatment.  Patient's heart failure medications were optimized.    Patient has been started on immunotherapy.    Patient presented yesterday and was seen by general cardiology for hypotension with atrial fibrillation with rapid ventricular response.  Patient's heart rate was only in the 110s at that time.  Patient was fatigued.  Patient was started on midodrine by primary team and evaluation and treatment for sepsis were being undertaken.  IV heparin was started and the patient was kept n.p.o. after midnight.  Patient's hemoglobin was 10.5 on 615 at 7:29 AM and then 7.8 on the recheck on the same day-this was likely erroneous and no blood transfusion was undertaken and is 9.1 today.  TSH is normal.  Patient's blood pressures remain in the 80s to 90s range.  Heart rate remains in the

## 2025-06-16 NOTE — CONSULTS
PULMONARY AND CRITICAL CARE INPATIENT CONSULTATION      6/16/2025    Patient Name:  Alfredo Juddr       1950       Evaluation was requested by Dr. Krishna regarding shock.    HPI:   Patient is a 74 y.o. male with past medical history of bladder cancer the presents to [facility] for falls.  Patient was admitted on 6/15/2025 for low blood pressure and falls.  He reports that his blood pressure is on the lower side but he is also having symptoms of lightheadedness.  He denies any additional symptoms such as fever, chills, chest pain, cough, nausea, vomiting, abdominal pain.    Patient presented to the ER and had a CT head without contrast that was negative.  He was started on fluids and had a persistent lactic acidosis.  He also had worsening kidney function.  He was treated for infection and admitted to stepdown unit.    Patient's blood pressure continued to be low despite fluids and he was transferred to ICU.    Since admission to the ICU, patient had 2 bowel movements that were dark.  He was started on a heparin drip for A-fib with RVR.  Cardiology was consulted and he was scheduled to have a KYM with cardioversion in the future.      Invasive Lines: PICC D#None   CVC D#None  Art Line D#None            ROS:   Review of Systems   Constitutional:  Negative for activity change, appetite change, chills, diaphoresis and fatigue.   HENT:  Negative for congestion, sore throat and trouble swallowing.    Eyes:  Negative for pain, discharge, redness and itching.   Respiratory:  Positive for cough. Negative for shortness of breath, wheezing and stridor.    Cardiovascular:  Negative for chest pain, palpitations and leg swelling.   Gastrointestinal:  Negative for abdominal distention, abdominal pain, constipation, diarrhea, nausea and vomiting.   Endocrine: Negative for polydipsia, polyphagia and polyuria.   Genitourinary:  Negative for difficulty urinating.   Musculoskeletal:  Negative for back pain, myalgias and        Echocardiogram:  PFT:    Imaging:  I have reviewed radiology images personally.    CT HEAD WO CONTRAST  Result Date: 6/15/2025  1. Brain atrophy 2. Periventricular microangiopathic ischemic changes, chronic small vessel disease and old basal ganglion lacunar ischemic infarcts 3. No evidence of acute intracranial hemorrhage Electronically signed by Adrián Ayala MD    XR CHEST PORTABLE  Result Date: 6/15/2025  1. No acute cardiopulmonary abnormalities. Electronically signed by Adrián Ayala MD       ASSESSMENT:  Undifferentiated Shock  Melena  UTI  A-fib with RVR  HFrEF  Normocytic Anemia  AMARILYS on CKD  NAGMA  Hyponatremia  Elevated BNP    Plan:   - Pt AOx3, he is answering questions appropriately. CT head w/o con on admission negative for acute abnormalities  - Start on Neosyn for pressor support, keep goal MAP > 65 or SBP > 90  - trend lactic acid q6h  - EP following for A-fib with RVR, they are planning cardioversion in the next 24-48 hours, however, pt had episode of melena so will hold heparin therapy at this time  - Supplemental O2 therapy with goal saturation 88-92%, he is on RA  - CLD with dark stools  - Start on Protonix BID for GI ppx  - No baeza  - Replace electrolytes PRN, strict I&O's, daily wt  - Vanc/Cefepime for possible distributive shock, blood cx NGTD x 2, consider stopping Vancomycin tomorrow  - Keep blood glucose 140-180, SSl if needed  - SCD's for DVT ppx, H/H q6h with bloody BM. Can give pRBC's if continues to have dark stools    Ppx/Baeza/Lines  - PIV, port  - No baeza  - Protonix BID, SCD's for DVT ppx    Critical care time spent reviewing labs/films, examining patient, collaborating with other physicians but excluding procedures for life threatening organ failure is 44 minutes.    Thank you for the consultation. We will continue to follow with you.    Electronically signed by:  Arthur Kathleen MD    6/16/2025    3:33 PM.

## 2025-06-16 NOTE — PROGRESS NOTES
3.0.  With increasing lactic acid will send patient to ICU  - Nephrology consulted appreciate recommendation  - continue IV fluid resuscitation and increase midodrine dosing     SIRS positive  - On arrival, RR of 20,  bpm, Temp 98.5 F, and WBC of 14.0. Source of infection  unclear  - Procal 0.36. LA of 3  - UA showing large blood, protein and leukocytes.   - Urine Culture Pending.   - Blood Cultures NGTD  - CXR showing no acute cardiopulmonary abnormalities  - CT showing **  - Received IVF bolus and empiric antibiotics in ED.   - Will continue patient on Vanco and cefepime at this time.      Elevated Troponin  - Appears non-ischemic and non-MI in etiology  - No chest pain noted on arrival  - Troponin on arrival 134-> 125  - Likely due to poor kidney function     AMARILYS on CKD 3B  - Likely secondary to hypotension  - Creatinine on admission to 3.1 and GFR of 20.  No improvement 6/16  - Baseline creatinine of 1.5-2  - Nephrology consulted appreciate recommendation  - continue volume expansion, avoid nephrotoxic agents, maintain systolic blood pressure greater than 120     Atrial fibrillation  - EKG on admission shows atrial fibrillation with RVR  - Does not appear that the patient has a history of A-fib  - Cardiology consulted and appreciate their recommendations  - Patient had TTE which showed preliminary EF of 60%  - Currently on heparin that was started yesterday but with some fluctuation to the hemoglobin await other 24 hours  - Keeping patient n.p.o. at midnight with likely KYM and cardioversion tomorrow with transition to Eliquis 5 mg daily     Advanced Bladder Cancer   - Recent diagnosis  - Follows with [facility]  - On active immunotherapy  - Recommend outpatient follow-up with oncology     CAD  - S/p CABG  - Holding metoprolol and Entresto in the setting of hypotension     Hypothyroidism   - Clinically euthyroid on exam   - Continue levothyroxine 75 mics  - Continue outpatient monitoring as scheduled     Ongoing  Full Code    PT/OT Eval Status: Not yet ordered    Multi-Disciplinary Rounds with Case Management completed on 6/16/2025 with the following recs:     Anticipated Discharge Location: [facility]     Anticipated Discharge Day/Date:  4-5 days    Barriers to Discharge: Clinical Course and Subspecialty recs    Likely rate limiting factor: Clinical course    --------------------------------------------------    MDM (any 2 required for High level billing)    A. Problems (any 1)  [] Acute/Chronic Illness/injury posing ongoing threat to life and/or bodily function without ongoing treatment    [] Severe exacerbation of chronic illness    --------------------------------------------------  B. Risk of Treatment (any 1)    [] Drugs/treatments that require intensive monitoring for toxicity    [] IV ABX (Vancomycin, Aminoglycosides, etc)     [] Post-Cath/Contrast study requiring serial monitoring    [] IV Narcotic analgesia    [] Aggressive IV diuresis    [] Hypertonic Saline    [] Critical electrolyte abnormalities requiring IV replacement    [] Insulin - Scheduled/SSI or Insulin gtt    [] Anticoagulation (Heparin gtt or Coumadin - other anticoagulants in special circumstances)    [] Cardiac Medications (IV Amiodarone/Diltiazem, Tikosyn, etc)    [] Hemodialysis    [] Other -    [] Change in code status    [] Decision to escalate care    [] Major surgery/procedure with associated risk factors    --------------------------------------------------  C. Data (any 2)    [] Data Review (any 3)    [] Consultant/subspecialist notes from yesterday/today    [x] All available current labs reviewed interpreted for clinical significance    [x] Appropriate follow-up labs were ordered  [] Collateral history obtained     [] Independent Interpretation of tests (any 1)    [] Telemetry (Rhythm Strip) personally reviewed and interpreted        [] Imaging personally reviewed and interpreted     [x] Discussion (any 1)  [x] Multi-Disciplinary Rounds with Case

## 2025-06-17 ENCOUNTER — APPOINTMENT (OUTPATIENT)
Dept: CT IMAGING | Age: 75
End: 2025-06-17
Payer: MEDICARE

## 2025-06-17 PROBLEM — I48.0 PAROXYSMAL ATRIAL FIBRILLATION (HCC): Status: ACTIVE | Noted: 2025-06-17

## 2025-06-17 LAB
ALBUMIN SERPL-MCNC: 2.4 G/DL (ref 3.4–5)
ANION GAP SERPL CALCULATED.3IONS-SCNC: 11 MMOL/L (ref 3–16)
ANTI-XA UNFRAC HEPARIN: 0.29 IU/ML (ref 0.3–0.7)
ANTI-XA UNFRAC HEPARIN: <0.1 IU/ML (ref 0.3–0.7)
BACTERIA UR CULT: NORMAL
BUN SERPL-MCNC: 30 MG/DL (ref 7–20)
CALCIUM SERPL-MCNC: 7.5 MG/DL (ref 8.3–10.6)
CHLORIDE SERPL-SCNC: 101 MMOL/L (ref 99–110)
CO2 SERPL-SCNC: 22 MMOL/L (ref 21–32)
CREAT SERPL-MCNC: 3.1 MG/DL (ref 0.8–1.3)
DEPRECATED RDW RBC AUTO: 16 % (ref 12.4–15.4)
EKG ATRIAL RATE: 109 BPM
EKG DIAGNOSIS: NORMAL
EKG P AXIS: 79 DEGREES
EKG P-R INTERVAL: 192 MS
EKG Q-T INTERVAL: 358 MS
EKG QRS DURATION: 80 MS
EKG QTC CALCULATION (BAZETT): 482 MS
EKG R AXIS: 77 DEGREES
EKG T AXIS: 55 DEGREES
EKG VENTRICULAR RATE: 109 BPM
GFR SERPLBLD CREATININE-BSD FMLA CKD-EPI: 20 ML/MIN/{1.73_M2}
GLUCOSE SERPL-MCNC: 105 MG/DL (ref 70–99)
HCT VFR BLD AUTO: 23.6 % (ref 40.5–52.5)
HCT VFR BLD AUTO: 26 % (ref 40.5–52.5)
HGB BLD-MCNC: 8.1 G/DL (ref 13.5–17.5)
HGB BLD-MCNC: 8.9 G/DL (ref 13.5–17.5)
LACTATE BLDV-SCNC: 1.9 MMOL/L (ref 0.4–2)
MAGNESIUM SERPL-MCNC: 2.19 MG/DL (ref 1.8–2.4)
MCH RBC QN AUTO: 32.5 PG (ref 26–34)
MCHC RBC AUTO-ENTMCNC: 34.1 G/DL (ref 31–36)
MCV RBC AUTO: 95.2 FL (ref 80–100)
PHOSPHATE SERPL-MCNC: 1.8 MG/DL (ref 2.5–4.9)
PLATELET # BLD AUTO: 312 K/UL (ref 135–450)
PMV BLD AUTO: 7.1 FL (ref 5–10.5)
POTASSIUM SERPL-SCNC: 4 MMOL/L (ref 3.5–5.1)
RBC # BLD AUTO: 2.73 M/UL (ref 4.2–5.9)
SODIUM SERPL-SCNC: 134 MMOL/L (ref 136–145)
WBC # BLD AUTO: 11.2 K/UL (ref 4–11)

## 2025-06-17 PROCEDURE — 93010 ELECTROCARDIOGRAM REPORT: CPT | Performed by: INTERNAL MEDICINE

## 2025-06-17 PROCEDURE — 6360000002 HC RX W HCPCS

## 2025-06-17 PROCEDURE — 99291 CRITICAL CARE FIRST HOUR: CPT | Performed by: STUDENT IN AN ORGANIZED HEALTH CARE EDUCATION/TRAINING PROGRAM

## 2025-06-17 PROCEDURE — 2580000003 HC RX 258: Performed by: INTERNAL MEDICINE

## 2025-06-17 PROCEDURE — 6370000000 HC RX 637 (ALT 250 FOR IP): Performed by: INTERNAL MEDICINE

## 2025-06-17 PROCEDURE — 2500000003 HC RX 250 WO HCPCS

## 2025-06-17 PROCEDURE — 85018 HEMOGLOBIN: CPT

## 2025-06-17 PROCEDURE — 2500000003 HC RX 250 WO HCPCS: Performed by: NURSE PRACTITIONER

## 2025-06-17 PROCEDURE — 83605 ASSAY OF LACTIC ACID: CPT

## 2025-06-17 PROCEDURE — 2500000003 HC RX 250 WO HCPCS: Performed by: STUDENT IN AN ORGANIZED HEALTH CARE EDUCATION/TRAINING PROGRAM

## 2025-06-17 PROCEDURE — 85014 HEMATOCRIT: CPT

## 2025-06-17 PROCEDURE — 2500000003 HC RX 250 WO HCPCS: Performed by: INTERNAL MEDICINE

## 2025-06-17 PROCEDURE — 6360000002 HC RX W HCPCS: Performed by: INTERNAL MEDICINE

## 2025-06-17 PROCEDURE — 2700000000 HC OXYGEN THERAPY PER DAY

## 2025-06-17 PROCEDURE — 85027 COMPLETE CBC AUTOMATED: CPT

## 2025-06-17 PROCEDURE — 6360000002 HC RX W HCPCS: Performed by: STUDENT IN AN ORGANIZED HEALTH CARE EDUCATION/TRAINING PROGRAM

## 2025-06-17 PROCEDURE — 80069 RENAL FUNCTION PANEL: CPT

## 2025-06-17 PROCEDURE — 99291 CRITICAL CARE FIRST HOUR: CPT | Performed by: NURSE PRACTITIONER

## 2025-06-17 PROCEDURE — 6370000000 HC RX 637 (ALT 250 FOR IP)

## 2025-06-17 PROCEDURE — 83735 ASSAY OF MAGNESIUM: CPT

## 2025-06-17 PROCEDURE — 85520 HEPARIN ASSAY: CPT

## 2025-06-17 PROCEDURE — 2000000000 HC ICU R&B

## 2025-06-17 PROCEDURE — 2580000003 HC RX 258

## 2025-06-17 PROCEDURE — 2580000003 HC RX 258: Performed by: STUDENT IN AN ORGANIZED HEALTH CARE EDUCATION/TRAINING PROGRAM

## 2025-06-17 PROCEDURE — 94761 N-INVAS EAR/PLS OXIMETRY MLT: CPT

## 2025-06-17 PROCEDURE — 6360000002 HC RX W HCPCS: Performed by: HOSPITALIST

## 2025-06-17 PROCEDURE — 74176 CT ABD & PELVIS W/O CONTRAST: CPT

## 2025-06-17 PROCEDURE — 36415 COLL VENOUS BLD VENIPUNCTURE: CPT

## 2025-06-17 PROCEDURE — 93005 ELECTROCARDIOGRAM TRACING: CPT | Performed by: NURSE PRACTITIONER

## 2025-06-17 RX ORDER — MUPIROCIN 2 %
OINTMENT (GRAM) TOPICAL 2 TIMES DAILY
Status: DISPENSED | OUTPATIENT
Start: 2025-06-17 | End: 2025-06-22

## 2025-06-17 RX ORDER — NOREPINEPHRINE BITARTRATE 0.06 MG/ML
1-100 INJECTION, SOLUTION INTRAVENOUS CONTINUOUS
Status: DISCONTINUED | OUTPATIENT
Start: 2025-06-17 | End: 2025-06-18

## 2025-06-17 RX ORDER — PANTOPRAZOLE SODIUM 40 MG/10ML
40 INJECTION, POWDER, LYOPHILIZED, FOR SOLUTION INTRAVENOUS DAILY
Status: DISCONTINUED | OUTPATIENT
Start: 2025-06-18 | End: 2025-06-18

## 2025-06-17 RX ADMIN — MUPIROCIN: 20 OINTMENT TOPICAL at 20:56

## 2025-06-17 RX ADMIN — PHENYLEPHRINE HYDROCHLORIDE 60 MCG/MIN: 50 INJECTION INTRAVENOUS at 08:00

## 2025-06-17 RX ADMIN — SODIUM BICARBONATE: 84 INJECTION, SOLUTION INTRAVENOUS at 08:52

## 2025-06-17 RX ADMIN — CEFEPIME 1000 MG: 1 INJECTION, POWDER, FOR SOLUTION INTRAMUSCULAR; INTRAVENOUS at 02:58

## 2025-06-17 RX ADMIN — MUPIROCIN: 20 OINTMENT TOPICAL at 09:44

## 2025-06-17 RX ADMIN — SODIUM PHOSPHATE, MONOBASIC, MONOHYDRATE AND SODIUM PHOSPHATE, DIBASIC, ANHYDROUS 15 MMOL: 142; 276 INJECTION, SOLUTION INTRAVENOUS at 09:43

## 2025-06-17 RX ADMIN — PHENYLEPHRINE HYDROCHLORIDE 175 MCG/MIN: 50 INJECTION INTRAVENOUS at 16:32

## 2025-06-17 RX ADMIN — CEFEPIME 1000 MG: 1 INJECTION, POWDER, FOR SOLUTION INTRAMUSCULAR; INTRAVENOUS at 14:15

## 2025-06-17 RX ADMIN — MIDODRINE HYDROCHLORIDE 10 MG: 5 TABLET ORAL at 10:07

## 2025-06-17 RX ADMIN — HEPARIN SODIUM 4000 UNITS: 1000 INJECTION INTRAVENOUS; SUBCUTANEOUS at 17:16

## 2025-06-17 RX ADMIN — LEVOTHYROXINE SODIUM 75 MCG: 0.05 TABLET ORAL at 09:59

## 2025-06-17 RX ADMIN — VASOPRESSIN IN 0.9% SODIUM CHLORIDE 0.03 UNITS/MIN: 20 INJECTION INTRAVENOUS at 05:23

## 2025-06-17 RX ADMIN — SODIUM BICARBONATE: 84 INJECTION, SOLUTION INTRAVENOUS at 20:46

## 2025-06-17 RX ADMIN — NOREPINEPHRINE BITARTRATE 5 MCG/MIN: 64 SOLUTION INTRAVENOUS at 16:00

## 2025-06-17 RX ADMIN — CETIRIZINE HYDROCHLORIDE 10 MG: 10 TABLET, FILM COATED ORAL at 09:59

## 2025-06-17 RX ADMIN — MIDODRINE HYDROCHLORIDE 10 MG: 5 TABLET ORAL at 20:56

## 2025-06-17 RX ADMIN — SODIUM CHLORIDE, PRESERVATIVE FREE 10 ML: 5 INJECTION INTRAVENOUS at 20:57

## 2025-06-17 RX ADMIN — PANTOPRAZOLE SODIUM 40 MG: 40 INJECTION, POWDER, FOR SOLUTION INTRAVENOUS at 09:59

## 2025-06-17 RX ADMIN — ACETAMINOPHEN 650 MG: 325 TABLET ORAL at 20:56

## 2025-06-17 RX ADMIN — MIDODRINE HYDROCHLORIDE 10 MG: 5 TABLET ORAL at 14:16

## 2025-06-17 ASSESSMENT — PAIN DESCRIPTION - DESCRIPTORS: DESCRIPTORS: ACHING

## 2025-06-17 ASSESSMENT — PAIN DESCRIPTION - LOCATION: LOCATION: GENERALIZED

## 2025-06-17 ASSESSMENT — PAIN SCALES - GENERAL
PAINLEVEL_OUTOF10: 3
PAINLEVEL_OUTOF10: 2

## 2025-06-17 NOTE — PROGRESS NOTES
Pulmonary & Critical Care Medicine ICU Progress Note      Events of Last 24 hours:   Vasopressin was added overnight. Pt had BM when see at bedside today and appeared normal. He has no complaints.         Invasive Lines: PICC D#None   CVC D#None  Art Line D#None            Vitals:  BP 90/62   Pulse 99   Temp 98.5 °F (36.9 °C) (Oral)   Resp (!) 32   Ht 1.778 m (5' 10\")   Wt 86.9 kg (191 lb 9.3 oz)   SpO2 93%   BMI 27.49 kg/m²    Tmax:  CVP:        Intake/Output Summary (Last 24 hours) at 6/17/2025 0743  Last data filed at 6/17/2025 0725  Gross per 24 hour   Intake 2016.24 ml   Output 2350 ml   Net -333.76 ml       EXAM:  Physical Exam  Constitutional:       Appearance: He is ill-appearing.   HENT:      Head: Normocephalic and atraumatic.      Nose: Nose normal.      Mouth/Throat:      Pharynx: No oropharyngeal exudate.   Eyes:      General: No scleral icterus.        Right eye: No discharge.         Left eye: No discharge.   Cardiovascular:      Rate and Rhythm: Normal rate.      Heart sounds: No murmur heard.     No gallop.   Pulmonary:      Effort: Pulmonary effort is normal. No respiratory distress.      Breath sounds: No wheezing or rales.   Abdominal:      General: Abdomen is flat. Bowel sounds are normal. There is no distension.      Tenderness: There is no abdominal tenderness.   Musculoskeletal:         General: No swelling.      Cervical back: Normal range of motion.   Skin:     General: Skin is warm and dry.   Neurological:      Mental Status: He is alert and oriented to person, place, and time.          Medications:  Scheduled Meds:   mupirocin   Each Nostril BID    pantoprazole  40 mg IntraVENous BID    levothyroxine  75 mcg Oral Daily    cetirizine  10 mg Oral Daily    sodium chloride flush  5-40 mL IntraVENous 2 times per day    midodrine  10 mg Oral TID    cefepime  1,000 mg IntraVENous Q12H    vancomycin (VANCOCIN) intermittent dosing (placeholder)   Other RX Placeholder       PRN

## 2025-06-17 NOTE — CARE COORDINATION
Chart review day 2- from home with sig other; Active with [facility] for SN. Urosepsis, pressors, iv abx, hx of advanced bladder cancer. Cm will continue to follow.

## 2025-06-17 NOTE — PROGRESS NOTES
Component Value Date/Time    CHOL 155 06/04/2024 10:38 AM    HDL 37 06/04/2024 10:38 AM    HDL 37 05/16/2011 09:07 AM    TRIG 109 06/04/2024 10:38 AM     Cardiac Enzymes:  CK/MbTroponin  Lab Results   Component Value Date/Time    CKTOTAL 65 06/15/2025 04:21 PM     PT/ INR   Lab Results   Component Value Date/Time    INR 1.36 06/15/2025 04:21 PM    INR 0.97 06/15/2025 07:29 AM    INR 1.07 04/23/2025 08:12 AM    PROTIME 16.9 06/15/2025 04:21 PM    PROTIME 13.2 06/15/2025 07:29 AM    PROTIME 14.1 04/23/2025 08:12 AM     PTT No components found for: \"PTT\"   Lab Results   Component Value Date/Time    MG 2.19 06/17/2025 04:26 AM      Lab Results   Component Value Date/Time    TSH 0.69 02/13/2023 11:20 AM       Assessment:  Atrial fibrillation of unknown duration: ongoing   -[id number] score 5 (age, HTN, CHF, vasacular disease)   ST vs AT   First degree AVB   HFrEF  CAD s/p CABG   HTN: hypotensive this admission requiring vasopressor support   HLD   Moderate AS   JOSE: ongoing   AMARILYS/CKD   Advanced bladder cancer on immunotherapy     Plan:   1. Continue to closely monitor on telemetry   2. Options for rate control are limited given vasopressor use and renal function. Will allow for some permissive tachycardia given critical illness.  3. Continue IV heparin for now  4. Will hold off on any plans for KYM/CV given ongoing anemia and critical illness.  Will continue to monitor for necessity/appropriateness.  5. EKG now     Critical care time spent reviewing labs/films, examining patient, collaborating with other physicians but excluding procedures for life threatening organ failure is 40 minutes, thus far today.      Zaira Westbrook, APRN-CNP  [facility]  [phone]

## 2025-06-17 NOTE — PROGRESS NOTES
[facility] Progress Note           Subjective/   74 y.o. year old male who we are seeing in consultation for AMARILYS/CKD stage 3b.     HPI:  Renal function stable at lower levels, non-oliguric.  Bladder scan with 941 ml on 6/16, baeza placed.  BP's low, transferred to ICU on 6/16, better with fluids, pressors.    ROS:  +weak, intake reduced, c/o some shortness of breath, on RA.    Objective/   GEN:  Chronically ill, BP 96/62   Pulse 97   Temp 98.5 °F (36.9 °C) (Oral)   Resp 25   Ht 1.778 m (5' 10\")   Wt 86.9 kg (191 lb 9.3 oz)   SpO2 95%   BMI 27.49 kg/m²   HEENT: non-icteric, no JVD  CV: S1, S2 without m/r/g; no LE edema  RESP: CTA B without w/r/r; breathing wnl  ABD: +bs, soft, nt, no hsm  SKIN: warm, no rashes    Data/  Recent Labs     06/15/25  1621 06/16/25  0523 06/16/25  1621 06/16/25  2252 06/17/25  0426   WBC 10.4 13.7*  --   --  11.2*   HGB 7.8* 9.1* 9.4* 8.9* 8.9*   HCT 23.7* 26.8* 28.4* 26.8* 26.0*   MCV 97.5 95.8  --   --  95.2    317  --   --  312     Recent Labs     06/15/25  0729 06/16/25  0523 06/17/25  0426   * 133* 134*   K 4.0 4.0 4.0   CL 98* 101 101   CO2 20* 20* 22   GLUCOSE 139* 124* 105*   PHOS  --   --  1.8*   MG 2.56*  --  2.19   BUN 29* 30* 30*   CREATININE 3.1* 3.1* 3.1*   LABGLOM 20* 20* 20*     UA large blood, >300 protein, s.g. 1.015, >100 wbc's, 21-50 rbc's  Urine sodium 90  Urine chloride 59    Assessment/     - Acute kidney injury - renal hypoperfusion injury in setting of hypotension    - Chronic kidney disease stage 3b - baseline SCr 1.6-2.0 since 2023, follows with Dr. Glynn     - Hypotension    - Anion-gap metabolic acidosis with elevated lactate    - HGurothelial cancer in 3/2024, recurrence of LG urothelial CA in 11/2024  - Completed induction BCG with [facility] [place] [facility]  - S/p bilateral ureteral stents     - Urinary retention - baeza mgmt from 6/16      Plan/     - IVF's with bicarb to 100 ml/hour  - Continue pressors & increased

## 2025-06-17 NOTE — PROGRESS NOTES
Shift: [id number]    Reason for ICU Admission: Hypotension/Sepsis    Most recent vitals: BP 98/62   Pulse 98   Temp 98.2 °F (36.8 °C) (Oral)   Resp 28   Ht 1.778 m (5' 10\")   Wt 86.9 kg (191 lb 9.3 oz)   SpO2 94%   BMI 27.49 kg/m²      Drip rates at handoff:    VASOpressin 0.03 Units/min (06/17/25 0523)    phenylephrine (FIGUEROA-SYNEPHRINE) 50 mg in sodium chloride 0.9 % 250 mL infusion 80 mcg/min (06/17/25 0433)    sodium chloride Stopped (06/15/25 1644)    sodium bicarbonate 75 mEq in sodium chloride 0.45 % 1,000 mL infusion 100 mL/hr at 06/16/25 2128    [Held by provider] heparin (PORCINE) Infusion Stopped (06/16/25 1649)       Current O2:   [] Room Air   [x] NC  2L   [] BiPaP    [] Vented  % Fi02,  Peep    Hospital Course:  ICU Day # 1 (6/16)  - Admitted to ICU for hypotension  - Started on figueroa, added vaso overnight  - Started on sodium bicarb drip  - Placed baeza catheter for retention (history of bladder cancer) 1L came out  - UOP = 1150 mL  - Large, dark bowel movements overnight, fecal occult sent/pending

## 2025-06-17 NOTE — PROGRESS NOTES
Shift: [id number]    Reason for ICU Admission: Hypotension/Sepsis    Most recent vitals: BP (!) 94/55   Pulse (!) 113   Temp 97 °F (36.1 °C) (Oral)   Resp 15   Ht 1.778 m (5' 10\")   Wt 86.9 kg (191 lb 9.3 oz)   SpO2 94%   BMI 27.49 kg/m²      Drip rates at handoff:    norepinephrine 8 mcg/min (06/17/25 1731)    phenylephrine (FIGUEROA-SYNEPHRINE) 50 mg in sodium chloride 0.9 % 250 mL infusion 125 mcg/min (06/17/25 1731)    sodium chloride Stopped (06/15/25 1644)    sodium bicarbonate 75 mEq in sodium chloride 0.45 % 1,000 mL infusion 100 mL/hr at 06/17/25 0852    heparin (PORCINE) Infusion 17 Units/kg/hr (06/17/25 1717)       Current O2:   [x] Room Air   [] NC  2L   [] BiPaP    [] Vented  % Fi02,  Peep    Hospital Course:  ICU Day # 1 (6/16)  - Admitted to ICU for hypotension  - Started on figueroa, added vaso overnight  - Started on sodium bicarb drip  - Placed baeza catheter for retention (history of bladder cancer) 1L came out  - UOP = 1150 mL  - Large, dark bowel movements overnight, fecal occult sent/pending     ICU Day #2 (6/17)  - Vaso D/C'd  - Levo started and titrated to 9  - Figueroa titrated up to 175 at max during shift. Titrated down to 100 by end of shift  - Port accessed d/t multiple pressors  - Heparin restarted to anticoagulate in presence of new onset A-fib  - Heparin bolus and rate change d/t non-therapeutic Xa  - Liquid diet ordered.   - Phos replaced.

## 2025-06-17 NOTE — PLAN OF CARE
Problem: Chronic Conditions and Co-morbidities  Goal: Patient's chronic conditions and co-morbidity symptoms are monitored and maintained or improved  Outcome: Progressing     Problem: Discharge Planning  Goal: Discharge to home or other facility with appropriate resources  Outcome: Progressing     Problem: Safety - Adult  Goal: Free from fall injury  Outcome: Progressing  Flowsheets (Taken 6/17/2025 0733)  Free From Fall Injury: Instruct family/caregiver on patient safety     Problem: Skin/Tissue Integrity  Goal: Skin integrity remains intact  Description: 1.  Monitor for areas of redness and/or skin breakdown  2.  Assess vascular access sites hourly  3.  Every 4-6 hours minimum:  Change oxygen saturation probe site  4.  Every 4-6 hours:  If on nasal continuous positive airway pressure, respiratory therapy assess nares and determine need for appliance change or resting period  Outcome: Progressing  Flowsheets (Taken 6/17/2025 0733)  Skin Integrity Remains Intact:   Monitor for areas of redness and/or skin breakdown   Positioning devices   Turn and reposition as indicated   Assess need for specialty bed     Problem: ABCDS Injury Assessment  Goal: Absence of physical injury  Outcome: Progressing

## 2025-06-17 NOTE — PLAN OF CARE
Problem: Chronic Conditions and Co-morbidities  Goal: Patient's chronic conditions and co-morbidity symptoms are monitored and maintained or improved  Outcome: Progressing     Problem: Discharge Planning  Goal: Discharge to home or other facility with appropriate resources  Outcome: Progressing     Problem: Safety - Adult  Goal: Free from fall injury  Outcome: Progressing  Flowsheets (Taken 6/16/2025 2253)  Free From Fall Injury: Instruct family/caregiver on patient safety     Problem: Skin/Tissue Integrity  Goal: Skin integrity remains intact  Description: 1.  Monitor for areas of redness and/or skin breakdown  2.  Assess vascular access sites hourly  3.  Every 4-6 hours minimum:  Change oxygen saturation probe site  4.  Every 4-6 hours:  If on nasal continuous positive airway pressure, respiratory therapy assess nares and determine need for appliance change or resting period  Outcome: Progressing  Flowsheets (Taken 6/16/2025 2253)  Skin Integrity Remains Intact:   Monitor for areas of redness and/or skin breakdown   Monitor skin under medical devices

## 2025-06-17 NOTE — PROGRESS NOTES
[facility] Medicine Progress Note  V 5.17      Date of Admission: 6/15/2025    Hospital Day: 3      Chief Admission Complaint: Falls    Subjective: Overall patient is feeling slightly better today than he has the past couple.  He is accompanied by his wife in the room this morning.  He is mildly disheartened at the number of issues actively going on with him.  Denies any chest pain, shortness of breath, nausea, vomiting, diarrhea, constipation    Presenting Admission History:       Patient is a 74-year-old male with a past medical history of CAD, advanced bladder cancer undergoing active immunotherapy, hypertension, thyroid disease who presents for low blood pressure and falls. He states that he has had ongoing issues with his blood pressure since his last admission. He states last night he fell down and was unable to get up. He states that his blood pressure ranged from the 40s to the 80s. He is endorsing lightheadedness. He denies any chest pain or shortness of breath. On arrival to the ED he was found to be tachycardic and hypotensive. ED workup was remarkable for an elevated creatinine, lactic acidosis, elevated troponin, elevated BNP, leukocytosis. We were consulted for admission.     Assessment/Plan:      Undifferentiated shock  Hypotension  - Likely septic, less likely hypovolemic and low likelihood for cardiogenic  - Patient fell prior to arriving in the ED due to lightheadedness  - Patient has had chronic low blood pressure  - CT head without contrast showed  - Brain atrophy  - Ischemic changes and chronic small vessel disease with old basal ganglia lunar ischemic infarcts  - No acute intracranial hemorrhage  - On admission BP was 82/61  - Patient was on midodrine 5 mg at home has been increased to 10 mg 3 times daily  - Patient given IVF  - Patient's hypotension has not improved  - Serial lactic acid shows LA of 3.0-> 2.3 -> 3.0.  With increasing lactic acid will send patient to ICU  - Nephrology

## 2025-06-18 ENCOUNTER — APPOINTMENT (OUTPATIENT)
Dept: GENERAL RADIOLOGY | Age: 75
End: 2025-06-18
Payer: MEDICARE

## 2025-06-18 LAB
ALBUMIN SERPL-MCNC: 2.3 G/DL (ref 3.4–5)
ANION GAP SERPL CALCULATED.3IONS-SCNC: 15 MMOL/L (ref 3–16)
ANTI-XA UNFRAC HEPARIN: 0.27 IU/ML (ref 0.3–0.7)
ANTI-XA UNFRAC HEPARIN: 0.28 IU/ML (ref 0.3–0.7)
ANTI-XA UNFRAC HEPARIN: 0.34 IU/ML (ref 0.3–0.7)
BUN SERPL-MCNC: 26 MG/DL (ref 7–20)
CALCIUM SERPL-MCNC: 6.3 MG/DL (ref 8.3–10.6)
CHLORIDE SERPL-SCNC: 101 MMOL/L (ref 99–110)
CO2 SERPL-SCNC: 20 MMOL/L (ref 21–32)
CREAT SERPL-MCNC: 2.8 MG/DL (ref 0.8–1.3)
DEPRECATED RDW RBC AUTO: 16.1 % (ref 12.4–15.4)
GFR SERPLBLD CREATININE-BSD FMLA CKD-EPI: 23 ML/MIN/{1.73_M2}
GLUCOSE SERPL-MCNC: 133 MG/DL (ref 70–99)
HCT VFR BLD AUTO: 21.9 % (ref 40.5–52.5)
HCT VFR BLD AUTO: 22 % (ref 40.5–52.5)
HCT VFR BLD AUTO: 23 % (ref 40.5–52.5)
HGB BLD-MCNC: 7.5 G/DL (ref 13.5–17.5)
HGB BLD-MCNC: 7.6 G/DL (ref 13.5–17.5)
HGB BLD-MCNC: 7.7 G/DL (ref 13.5–17.5)
LACTATE BLDV-SCNC: 2.6 MMOL/L (ref 0.4–2)
LACTATE BLDV-SCNC: 3.6 MMOL/L (ref 0.4–2)
LACTATE BLDV-SCNC: 3.6 MMOL/L (ref 0.4–2)
MAGNESIUM SERPL-MCNC: 1.94 MG/DL (ref 1.8–2.4)
MCH RBC QN AUTO: 32.2 PG (ref 26–34)
MCHC RBC AUTO-ENTMCNC: 33.6 G/DL (ref 31–36)
MCV RBC AUTO: 95.8 FL (ref 80–100)
PHOSPHATE SERPL-MCNC: 1.7 MG/DL (ref 2.5–4.9)
PLATELET # BLD AUTO: 284 K/UL (ref 135–450)
PMV BLD AUTO: 7.1 FL (ref 5–10.5)
POTASSIUM SERPL-SCNC: 3.7 MMOL/L (ref 3.5–5.1)
RBC # BLD AUTO: 2.4 M/UL (ref 4.2–5.9)
SODIUM SERPL-SCNC: 136 MMOL/L (ref 136–145)
WBC # BLD AUTO: 8.1 K/UL (ref 4–11)

## 2025-06-18 PROCEDURE — 87040 BLOOD CULTURE FOR BACTERIA: CPT

## 2025-06-18 PROCEDURE — 4A133B1 MONITORING OF ARTERIAL PRESSURE, PERIPHERAL, PERCUTANEOUS APPROACH: ICD-10-PCS

## 2025-06-18 PROCEDURE — 36620 INSERTION CATHETER ARTERY: CPT | Performed by: NURSE PRACTITIONER

## 2025-06-18 PROCEDURE — 94761 N-INVAS EAR/PLS OXIMETRY MLT: CPT

## 2025-06-18 PROCEDURE — 2500000003 HC RX 250 WO HCPCS: Performed by: INTERNAL MEDICINE

## 2025-06-18 PROCEDURE — 2580000003 HC RX 258: Performed by: STUDENT IN AN ORGANIZED HEALTH CARE EDUCATION/TRAINING PROGRAM

## 2025-06-18 PROCEDURE — 71045 X-RAY EXAM CHEST 1 VIEW: CPT

## 2025-06-18 PROCEDURE — 6360000002 HC RX W HCPCS

## 2025-06-18 PROCEDURE — 99291 CRITICAL CARE FIRST HOUR: CPT | Performed by: STUDENT IN AN ORGANIZED HEALTH CARE EDUCATION/TRAINING PROGRAM

## 2025-06-18 PROCEDURE — 85018 HEMOGLOBIN: CPT

## 2025-06-18 PROCEDURE — 6360000002 HC RX W HCPCS: Performed by: NURSE PRACTITIONER

## 2025-06-18 PROCEDURE — 6360000002 HC RX W HCPCS: Performed by: INTERNAL MEDICINE

## 2025-06-18 PROCEDURE — 6360000002 HC RX W HCPCS: Performed by: STUDENT IN AN ORGANIZED HEALTH CARE EDUCATION/TRAINING PROGRAM

## 2025-06-18 PROCEDURE — 2000000000 HC ICU R&B

## 2025-06-18 PROCEDURE — 85520 HEPARIN ASSAY: CPT

## 2025-06-18 PROCEDURE — 2580000003 HC RX 258: Performed by: INTERNAL MEDICINE

## 2025-06-18 PROCEDURE — 6370000000 HC RX 637 (ALT 250 FOR IP)

## 2025-06-18 PROCEDURE — 2580000003 HC RX 258

## 2025-06-18 PROCEDURE — 85014 HEMATOCRIT: CPT

## 2025-06-18 PROCEDURE — 99291 CRITICAL CARE FIRST HOUR: CPT | Performed by: NURSE PRACTITIONER

## 2025-06-18 PROCEDURE — 2500000003 HC RX 250 WO HCPCS: Performed by: NURSE PRACTITIONER

## 2025-06-18 PROCEDURE — 36415 COLL VENOUS BLD VENIPUNCTURE: CPT

## 2025-06-18 PROCEDURE — 4A133J1 MONITORING OF ARTERIAL PULSE, PERIPHERAL, PERCUTANEOUS APPROACH: ICD-10-PCS

## 2025-06-18 PROCEDURE — 6370000000 HC RX 637 (ALT 250 FOR IP): Performed by: NURSE PRACTITIONER

## 2025-06-18 PROCEDURE — 03HY32Z INSERTION OF MONITORING DEVICE INTO UPPER ARTERY, PERCUTANEOUS APPROACH: ICD-10-PCS

## 2025-06-18 PROCEDURE — 83735 ASSAY OF MAGNESIUM: CPT

## 2025-06-18 PROCEDURE — 80069 RENAL FUNCTION PANEL: CPT

## 2025-06-18 PROCEDURE — 83605 ASSAY OF LACTIC ACID: CPT

## 2025-06-18 PROCEDURE — 2500000003 HC RX 250 WO HCPCS

## 2025-06-18 PROCEDURE — 2580000003 HC RX 258: Performed by: NURSE PRACTITIONER

## 2025-06-18 PROCEDURE — 36620 INSERTION CATHETER ARTERY: CPT

## 2025-06-18 PROCEDURE — 85027 COMPLETE CBC AUTOMATED: CPT

## 2025-06-18 PROCEDURE — 2700000000 HC OXYGEN THERAPY PER DAY

## 2025-06-18 PROCEDURE — 6370000000 HC RX 637 (ALT 250 FOR IP): Performed by: INTERNAL MEDICINE

## 2025-06-18 RX ORDER — NOREPINEPHRINE BITARTRATE 0.06 MG/ML
1-100 INJECTION, SOLUTION INTRAVENOUS CONTINUOUS
Status: DISCONTINUED | OUTPATIENT
Start: 2025-06-18 | End: 2025-06-20

## 2025-06-18 RX ORDER — PANTOPRAZOLE SODIUM 40 MG/10ML
40 INJECTION, POWDER, LYOPHILIZED, FOR SOLUTION INTRAVENOUS 2 TIMES DAILY
Status: DISCONTINUED | OUTPATIENT
Start: 2025-06-18 | End: 2025-06-19

## 2025-06-18 RX ORDER — HYDROCORTISONE SODIUM SUCCINATE 100 MG/2ML
100 INJECTION INTRAMUSCULAR; INTRAVENOUS EVERY 8 HOURS
Status: DISCONTINUED | OUTPATIENT
Start: 2025-06-18 | End: 2025-06-20

## 2025-06-18 RX ORDER — GUAIFENESIN/DEXTROMETHORPHAN 100-10MG/5
5 SYRUP ORAL EVERY 4 HOURS PRN
Status: DISCONTINUED | OUTPATIENT
Start: 2025-06-18 | End: 2025-06-28 | Stop reason: HOSPADM

## 2025-06-18 RX ORDER — BENZONATATE 100 MG/1
100 CAPSULE ORAL 3 TIMES DAILY PRN
Status: DISCONTINUED | OUTPATIENT
Start: 2025-06-18 | End: 2025-06-28 | Stop reason: HOSPADM

## 2025-06-18 RX ADMIN — HEPARIN SODIUM 21 UNITS/KG/HR: 10000 INJECTION, SOLUTION INTRAVENOUS at 17:10

## 2025-06-18 RX ADMIN — PANTOPRAZOLE SODIUM 40 MG: 40 INJECTION, POWDER, FOR SOLUTION INTRAVENOUS at 21:40

## 2025-06-18 RX ADMIN — PANTOPRAZOLE SODIUM 40 MG: 40 INJECTION, POWDER, FOR SOLUTION INTRAVENOUS at 08:52

## 2025-06-18 RX ADMIN — HEPARIN SODIUM 2000 UNITS: 1000 INJECTION INTRAVENOUS; SUBCUTANEOUS at 06:13

## 2025-06-18 RX ADMIN — BENZONATATE 100 MG: 100 CAPSULE ORAL at 23:51

## 2025-06-18 RX ADMIN — HEPARIN SODIUM 2000 UNITS: 1000 INJECTION INTRAVENOUS; SUBCUTANEOUS at 00:32

## 2025-06-18 RX ADMIN — CEFEPIME 1000 MG: 1 INJECTION, POWDER, FOR SOLUTION INTRAMUSCULAR; INTRAVENOUS at 02:57

## 2025-06-18 RX ADMIN — PROCHLORPERAZINE EDISYLATE 10 MG: 5 INJECTION INTRAMUSCULAR; INTRAVENOUS at 08:52

## 2025-06-18 RX ADMIN — ACETAMINOPHEN 650 MG: 325 TABLET ORAL at 23:41

## 2025-06-18 RX ADMIN — CEFEPIME 1000 MG: 1 INJECTION, POWDER, FOR SOLUTION INTRAMUSCULAR; INTRAVENOUS at 15:31

## 2025-06-18 RX ADMIN — HEPARIN SODIUM 2000 UNITS: 1000 INJECTION INTRAVENOUS; SUBCUTANEOUS at 20:41

## 2025-06-18 RX ADMIN — VANCOMYCIN HYDROCHLORIDE 750 MG: 750 INJECTION, POWDER, LYOPHILIZED, FOR SOLUTION INTRAVENOUS at 18:03

## 2025-06-18 RX ADMIN — PHENYLEPHRINE HYDROCHLORIDE 80 MCG/MIN: 50 INJECTION INTRAVENOUS at 01:23

## 2025-06-18 RX ADMIN — HEPARIN SODIUM 19 UNITS/KG/HR: 10000 INJECTION, SOLUTION INTRAVENOUS at 04:08

## 2025-06-18 RX ADMIN — ACETAMINOPHEN 650 MG: 325 TABLET ORAL at 12:49

## 2025-06-18 RX ADMIN — SODIUM BICARBONATE: 84 INJECTION, SOLUTION INTRAVENOUS at 07:26

## 2025-06-18 RX ADMIN — MIDODRINE HYDROCHLORIDE 10 MG: 5 TABLET ORAL at 21:40

## 2025-06-18 RX ADMIN — LEVOTHYROXINE SODIUM 75 MCG: 0.05 TABLET ORAL at 08:44

## 2025-06-18 RX ADMIN — SODIUM CHLORIDE, PRESERVATIVE FREE 10 ML: 5 INJECTION INTRAVENOUS at 21:40

## 2025-06-18 RX ADMIN — MUPIROCIN: 20 OINTMENT TOPICAL at 21:40

## 2025-06-18 RX ADMIN — SODIUM PHOSPHATE, MONOBASIC, MONOHYDRATE AND SODIUM PHOSPHATE, DIBASIC, ANHYDROUS 30 MMOL: 142; 276 INJECTION, SOLUTION INTRAVENOUS at 08:54

## 2025-06-18 RX ADMIN — VASOPRESSIN 0.03 UNITS/MIN: 20 INJECTION INTRAVENOUS at 19:32

## 2025-06-18 RX ADMIN — CETIRIZINE HYDROCHLORIDE 10 MG: 10 TABLET, FILM COATED ORAL at 08:44

## 2025-06-18 RX ADMIN — GUAIFENESIN AND DEXTROMETHORPHAN 5 ML: 100; 10 SYRUP ORAL at 09:53

## 2025-06-18 RX ADMIN — VASOPRESSIN 0.03 UNITS/MIN: 20 INJECTION INTRAVENOUS at 09:57

## 2025-06-18 RX ADMIN — MIDODRINE HYDROCHLORIDE 10 MG: 5 TABLET ORAL at 08:44

## 2025-06-18 RX ADMIN — HYDROCORTISONE SODIUM SUCCINATE 100 MG: 100 INJECTION, POWDER, FOR SOLUTION INTRAMUSCULAR; INTRAVENOUS at 17:53

## 2025-06-18 RX ADMIN — NOREPINEPHRINE BITARTRATE 18 MCG/MIN: 64 SOLUTION INTRAVENOUS at 12:26

## 2025-06-18 RX ADMIN — ACETAMINOPHEN 650 MG: 325 TABLET ORAL at 17:50

## 2025-06-18 RX ADMIN — MEROPENEM 500 MG: 500 INJECTION INTRAVENOUS at 20:40

## 2025-06-18 ASSESSMENT — PAIN DESCRIPTION - DESCRIPTORS: DESCRIPTORS: ACHING

## 2025-06-18 ASSESSMENT — PAIN SCALES - GENERAL
PAINLEVEL_OUTOF10: 0
PAINLEVEL_OUTOF10: 3

## 2025-06-18 ASSESSMENT — PAIN DESCRIPTION - LOCATION: LOCATION: GENERALIZED

## 2025-06-18 NOTE — PROGRESS NOTES
Shift: 7310-2848    Reason for ICU Admission: Hypotension/Sepsis    Most recent vitals: BP (!) 90/59   Pulse (!) 107   Temp 97.9 °F (36.6 °C) (Axillary)   Resp (!) 36   Ht 1.778 m (5' 10\")   Wt 86.1 kg (189 lb 13.1 oz)   SpO2 93%   BMI 27.24 kg/m²      Drip rates at handoff:    norepinephrine 11 mcg/min (06/17/25 2122)    phenylephrine (FIGUEROA-SYNEPHRINE) 50 mg in sodium chloride 0.9 % 250 mL infusion 80 mcg/min (06/18/25 0123)    sodium chloride Stopped (06/15/25 1644)    sodium bicarbonate 75 mEq in sodium chloride 0.45 % 1,000 mL infusion 100 mL/hr at 06/18/25 0726    heparin (PORCINE) Infusion 21 Units/kg/hr (06/18/25 0613)       Current O2:   [x] Room Air   [] NC  2L   [] BiPaP    [] Vented  % Fi02,  Peep    Hospital Course:  ICU Day # 1 (6/16)  - Admitted to ICU for hypotension  - Started on figueroa, added vaso overnight  - Started on sodium bicarb drip  - Placed baeza catheter for retention (history of bladder cancer) 1L came out  - UOP = 1150 mL  - Large, dark bowel movements overnight, fecal occult sent/pending     ICU Day #2 (6/17)  - Vaso D/C'd  - Levo started and titrated to 9  - Figueroa titrated up to 175 at max during shift. Titrated down to 100 by end of shift  - Port accessed d/t multiple pressors  - Heparin restarted to anticoagulate in presence of new onset A-fib  - Heparin bolus and rate change d/t non-therapeutic Xa  - Liquid diet ordered.   - Phos replaced.     ICU Day #2 Nights  - levo/figueroa weaned as possible  - 1100 UOP   - Heparin bolus x 2

## 2025-06-18 NOTE — CONSULTS
Pharmacy Note  Vancomycin Consult    Alfredo Chong is a 74 y.o. male started on Vancomycin for Sepsis; consult received from Dr. Kathleen to manage therapy. Also receiving the following antibiotics: Cefepime.    Allergies:  Simvastatin     Tmax: 101.6    Micro: NGTD    Recent Labs     06/17/25  0426 06/18/25  0450   CREATININE 3.1* 2.8*       Recent Labs     06/17/25  0426 06/18/25  0450   WBC 11.2* 8.1       Estimated Creatinine Clearance: 24 mL/min (A) (based on SCr of 2.8 mg/dL (H)).      Intake/Output Summary (Last 24 hours) at 6/18/2025 1728  Last data filed at 6/18/2025 1710  Gross per 24 hour   Intake 1341.74 ml   Output 1980 ml   Net -638.26 ml       Wt Readings from Last 1 Encounters:   06/18/25 86.1 kg (189 lb 13.1 oz)         Body mass index is 27.24 kg/m².    Loading dose (critically ill or in ICU, require dialysis or renal replacement therapy): Vancomycin 25 mg/kg IVPB x 1 (maximum 2500 mg).  Maintenance dose: 10-20 mg/kg (maximum: 2000 mg/dose and 4500 mg/day) starting at the next dosing interval determined by renal function  Pulse dose: fluctuating renal function, AMARILYS, ESRD  CRRT: 7.5-10 mg/kg q12h   Goal Vancomycin trough: 15-20 mcg/mL   Goal Vancomycin AUC: 400-600     Assessment/Plan:  Will initiate Vancomycin with a one time dose of 750 mg. Will pulse dose vancomycin due to current renal function. Vancomyicn level ordered for 6/19 1800. Timing of trough level will be determined based on culture results, renal function, and clinical response.     Thank you for the consult.  Hardeep Pulido, EarlD, BCPS 6/18/2025 5:40 PM    Vancomycin Day 2  Current Dosing: Pulse dosing    Recent Labs     06/17/25  0426 06/18/25  0450 06/19/25  0500   CREATININE 3.1* 2.8* 2.9*     Estimated Creatinine Clearance: 23 mL/min (A) (based on SCr of 2.9 mg/dL (H)).  Recent Labs     06/17/25  0426 06/18/25  0450 06/19/25  0500   WBC 11.2* 8.1 3.5*     Vanc level later today at 1800  Cx bernadette Otero, [facility]

## 2025-06-18 NOTE — CONSULTS
95/76   Pulse (!) 105   Temp (!) 101.6 °F (38.7 °C) (Axillary)   Resp 29   Ht 1.778 m (5' 10\")   Wt 86.1 kg (189 lb 13.1 oz)   SpO2 98%   BMI 27.24 kg/m²   Temp  Av.4 °F (37.4 °C)  Min: 97 °F (36.1 °C)  Max: 102.2 °F (39 °C)    Intake/Output Summary (Last 24 hours) at 2025 1630  Last data filed at 2025 1405  Gross per 24 hour   Intake 1495.14 ml   Output 1855 ml   Net -359.86 ml         Physical:  Well developed, well nourished in no acute distress  Mood indicates no abnormalities. Pt doesn’t appear depressed  Orientated to time and place  Neck is supple, trachea is midline  Respiratory effort is normal  Abdomen soft, not tender, not distended   Skin show no abnormal lesions     Male :  Gordon draining yellow urine       Labs:  WBC:    Lab Results   Component Value Date/Time    WBC 8.1 2025 04:50 AM     Hemoglobin/Hematocrit:    Lab Results   Component Value Date/Time    HGB 7.7 2025 04:50 AM    HCT 23.0 2025 04:50 AM     BMP:    Lab Results   Component Value Date/Time     2025 04:50 AM    K 3.7 2025 04:50 AM    K 4.0 2025 05:23 AM     2025 04:50 AM    CO2 20 2025 04:50 AM    BUN 26 2025 04:50 AM    CREATININE 2.8 2025 04:50 AM    CALCIUM 6.3 2025 04:50 AM    GFRAA >60 2021 03:50 PM    GFRAA >60 2013 08:24 AM    LABGLOM 23 2025 04:50 AM    LABGLOM 36 2024 12:21 PM     PT/INR:    Lab Results   Component Value Date/Time    PROTIME 16.9 06/15/2025 04:21 PM    INR 1.36 06/15/2025 04:21 PM     PTT:    Lab Results   Component Value Date/Time    APTT 37.9 06/15/2025 04:21 PM   [APTT    Urinalysis: >100 wbc, 21-50 rbc, large le    Urine Culture: ng      Imaging:   XR CHEST PORTABLE   Final Result      Stable findings.      Electronically signed by Romulo Lagunas      CT ABDOMEN PELVIS WO CONTRAST Additional Contrast? Radiologist Recommendation   Final Result         1. Bilateral ureteral stents are seen.  There is mild right hydronephrosis. Ureteral cysts are suboptimally visualized without contrast, it could be further characterized via ultrasound. The bladder is underdistended, obscuring potential bladder mass    lesion. There is a Baeza catheter in place.      2. Diverticulosis      Electronically signed by Romulo Lagunas      CT HEAD WO CONTRAST   Final Result   1. Brain atrophy   2. Periventricular microangiopathic ischemic changes, chronic small vessel disease and old basal ganglion lacunar ischemic infarcts   3. No evidence of acute intracranial hemorrhage      Electronically signed by Adrián Ayala MD      XR CHEST PORTABLE   Final Result   1. No acute cardiopulmonary abnormalities.      Electronically signed by Adrián Ayala MD            Impression/Plan:     #Hypotension  #Indwelling ureteral stents due for exchange late July 2025   #Urinary retention- 1 L drained on baeza placement   #High grade muscle invasive bladder CA    -74 y.o. male with high grade muscle invasive cancer and bilateral renal obstruction managed with indwelling stents.  He is admitted with hypotension.  Ct shows mild right hydronephrosis with decompressed bladder and stents in appropriate position  - his urine culture is no growth   - do not suspect that the minimal hydro noted on his CT is the cause of his current presentation.  Do not recommend emergent cysto for stent exchange.  Will continue to plan for stent exchange next month when it is due, after he is medically improved  - keep baeza until he regains strength and is closer to discharge, can trial voiding at that time.  Of note, he had PVRs in the high 200s at discharge during his last admit     - he will reschedule his appt with Dr Moctezuma to discuss future cystectomy, this appt was missed due to hospitalization       Dinora Chavez PA-C  6/18/2025

## 2025-06-18 NOTE — PROCEDURES
PROCEDURE NOTE  Date: 6/18/2025   Name: Alfredo Chong  YOB: 1950    Insert Arterial Line    Date/Time: 6/18/2025 5:22 PM    Performed by: Ilsa Huerta APRN - CNP  Authorized by: Ilsa Huerta APRN - CNP  Consent: Verbal consent obtained.  Consent given by: patient  Patient identity confirmed: verbally with patient  Time out: Immediately prior to procedure a \"time out\" was called to verify the correct patient, procedure, equipment, support staff and site/side marked as required.  Preparation: Patient was prepped and draped in the usual sterile fashion.  Indications: hemodynamic monitoring  Location: right radial    Sedation:  Patient sedated: no    Cj's test normal: yes  Needle gauge: 20  Number of attempts: 1  Post-procedure: dressing applied  Post-procedure CMS: normal  Patient tolerance: patient tolerated the procedure well with no immediate complications  Comments: EBL less than 2cc

## 2025-06-18 NOTE — PROGRESS NOTES
[facility]     Electrophysiology                                     Progress Note    Admission date:  6/15/2025    Reason for follow up visit: AF    HPI/CC: Alfredo Chong was admitted on 6/15/2025 with sepsis secondary to likely UTI. EP following for rapid AF. On 2025, patient was transferred to the ICU for worsening hypotension.  IV heparin was started however this was briefly discontinued secondary to worsening anemia. Heparin was resumed today.    Has also been treated for AMARILYS/CKD. He is on Sven-synephrine, Levophed and vasopressin. Rhythm has been ST with PAC's.     Subjective: He is not feeling well.     Vitals:  Blood pressure (!) 89/49, pulse (!) 125, temperature (!) 102.2 °F (39 °C), temperature source Axillary, resp. rate 30, height 1.778 m (5' 10\"), weight 86.1 kg (189 lb 13.1 oz), SpO2 (!) 88%.  Temp  Av.4 °F (36.9 °C)  Min: 97 °F (36.1 °C)  Max: 102.2 °F (39 °C)  Pulse  Av.1  Min: 65  Max: 135  BP  Min: 73/51  Max: 102/58  SpO2  Av.1 %  Min: 86 %  Max: 96 %    24 hour I/O    Intake/Output Summary (Last 24 hours) at 2025 1301  Last data filed at 2025 1235  Gross per 24 hour   Intake 1306.61 ml   Output 2130 ml   Net -823.39 ml     Current Facility-Administered Medications   Medication Dose Route Frequency Provider Last Rate Last Admin    sodium phosphate 30 mmol in sodium chloride 0.9 % 500 mL IVPB  30 mmol IntraVENous Once Matthew Hermosillo MD 83.3 mL/hr at 25 0854 30 mmol at 25 0854    VASOpressin 20 Units in sodium chloride 0.9 % 100 mL infusion  0.01-0.03 Units/min IntraVENous Continuous Ilsa Huerta APRN - CNP 9 mL/hr at 25 0957 0.03 Units/min at 25 0957    benzonatate (TESSALON) capsule 100 mg  100 mg Oral TID PRN Ilsa Huerta APRN - CNP        guaiFENesin-dextromethorphan (ROBITUSSIN DM) 100-10 MG/5ML syrup 5 mL  5 mL Oral Q4H PRN Ilsa Huerta APRN - CNP   5 mL at 25 8668    pantoprazole (PROTONIX) injection

## 2025-06-18 NOTE — PROGRESS NOTES
[facility] Medicine Progress Note  V 5.17      Date of Admission: 6/15/2025    Hospital Day: 4      Chief Admission Complaint: Falls    Subjective: Overall patient is feeling slightly better today and has no complaints.    Presenting Admission History:       Patient is a 74-year-old male with a past medical history of CAD, advanced bladder cancer undergoing active immunotherapy, hypertension, thyroid disease who presents for low blood pressure and falls. He states that he has had ongoing issues with his blood pressure since his last admission. He states last night he fell down and was unable to get up. He states that his blood pressure ranged from the 40s to the 80s. He is endorsing lightheadedness. He denies any chest pain or shortness of breath. On arrival to the ED he was found to be tachycardic and hypotensive. ED workup was remarkable for an elevated creatinine, lactic acidosis, elevated troponin, elevated BNP, leukocytosis. We were consulted for admission.     Assessment/Plan:      Undifferentiated shock  Hypotension  - Likely septic, less likely hypovolemic and low likelihood for cardiogenic  - Patient fell prior to arriving in the ED due to lightheadedness  - Patient has had chronic low blood pressure  - CT head without contrast showed  - Brain atrophy  - Ischemic changes and chronic small vessel disease with old basal ganglia lunar ischemic infarcts  - No acute intracranial hemorrhage  - On admission BP was 82/61  - Patient was on midodrine 5 mg at home has been increased to 10 mg 3 times daily  - Patient given IVF  - Patient's hypotension has not improved  - Serial lactic acid shows LA of 3.0-> 2.3 -> 3.0.  With increasing lactic acid will send patient to ICU  - Nephrology consulted appreciate recommendation  - Increase IVF with bicarb 200 mL an hour  - Continue with midodrine 10 mg p.o. 3 times daily  - Pulmonary crit care consult appreciate recommendation  - Patient is on Levophed, vasopressin  -  cefepime  1,000 mg IntraVENous Q12H     PRN Meds: benzonatate, guaiFENesin-dextromethorphan, sodium chloride flush, sodium chloride, polyethylene glycol, acetaminophen **OR** acetaminophen, prochlorperazine **OR** prochlorperazine, heparin (porcine), heparin (porcine)    Physical Exam Performed:      General appearance:  No apparent distress  Respiratory:  Normal respiratory effort without tachypnea.  No wheezing rales or rhonchi.  Patient has some coughing during exam  Cardiovascular:  Tachycardic w/ Irregular rhythm.  No murmurs gallops rubs  Abdomen:  Soft, non-tender, non-distended. Bowel sounds normal  Musculoskeletal:  No edema  Neurologic:  Neurovascularly intact without focal sensory/motor deficits.  Psychiatric:  Alert and oriented    BP 98/65   Pulse 73   Temp 98.5 °F (36.9 °C) (Axillary)   Resp 28   Ht 1.778 m (5' 10\")   Wt 86.1 kg (189 lb 13.1 oz)   SpO2 95%   BMI 27.24 kg/m²     Telemetry:      Personally reviewed and interpreted telemetry (Rhythm Strip) on 6/18/2025.  Patient is currently ON tele demonstrating Sinus Tachycardia w/ rate > 100.    Diet: ADULT DIET; Clear Liquid    DVT Prophylaxis: Heparin gtt    Code status: Full Code    PT/OT Eval Status: Not yet ordered    Multi-Disciplinary Rounds with Case Management completed on 6/18/2025 with the following recs:     Anticipated Discharge Location: [facility]     Anticipated Discharge Day/Date:  4-5 days    Barriers to Discharge: Clinical Course and Subspecialty recs    Likely rate limiting factor: Clinical course    --------------------------------------------------    MDM (any 2 required for High level billing)    A. Problems (any 1)  [] Acute/Chronic Illness/injury posing ongoing threat to life and/or bodily function without ongoing treatment    [] Severe exacerbation of chronic illness    --------------------------------------------------  B. Risk of Treatment (any 1)    [] Drugs/treatments that require intensive monitoring for toxicity    [] IV

## 2025-06-18 NOTE — PLAN OF CARE
Problem: Chronic Conditions and Co-morbidities  Goal: Patient's chronic conditions and co-morbidity symptoms are monitored and maintained or improved  6/18/2025 0907 by Dee Boyd RN  Outcome: Progressing  6/17/2025 2241 by Kaylee Brower RN  Outcome: Progressing  Flowsheets (Taken 6/17/2025 2000)  Care Plan - Patient's Chronic Conditions and Co-Morbidity Symptoms are Monitored and Maintained or Improved: Monitor and assess patient's chronic conditions and comorbid symptoms for stability, deterioration, or improvement     Problem: Discharge Planning  Goal: Discharge to home or other facility with appropriate resources  6/18/2025 0907 by Dee Boyd RN  Outcome: Progressing  6/17/2025 2241 by Kaylee Brower RN  Outcome: Progressing  Flowsheets (Taken 6/17/2025 2000)  Discharge to home or other facility with appropriate resources: Identify barriers to discharge with patient and caregiver     Problem: Safety - Adult  Goal: Free from fall injury  6/18/2025 0907 by Dee Boyd RN  Outcome: Progressing  6/17/2025 2241 by Kaylee Brower RN  Outcome: Progressing  Flowsheets (Taken 6/17/2025 2110)  Free From Fall Injury: Instruct family/caregiver on patient safety     Problem: Skin/Tissue Integrity  Goal: Skin integrity remains intact  Description: 1.  Monitor for areas of redness and/or skin breakdown  2.  Assess vascular access sites hourly  3.  Every 4-6 hours minimum:  Change oxygen saturation probe site  4.  Every 4-6 hours:  If on nasal continuous positive airway pressure, respiratory therapy assess nares and determine need for appliance change or resting period  6/18/2025 0907 by Dee Boyd RN  Outcome: Progressing  6/17/2025 2241 by Kaylee Brower RN  Outcome: Progressing  Flowsheets  Taken 6/17/2025 2110  Skin Integrity Remains Intact:   Monitor for areas of redness and/or skin breakdown   Monitor skin under medical devices  Taken 6/17/2025 2000  Skin Integrity Remains Intact:

## 2025-06-18 NOTE — PROGRESS NOTES
Shift: 4840-4171    Reason for ICU Admission: Hypotension/Sepsis    Most recent vitals: /64   Pulse (!) 104   Temp (!) 101.6 °F (38.7 °C) (Axillary)   Resp (!) 35   Ht 1.778 m (5' 10\")   Wt 86.1 kg (189 lb 13.1 oz)   SpO2 92%   BMI 27.24 kg/m²      Drip rates at handoff:    VASOpressin 0.03 Units/min (06/18/25 1405)    norepinephrine Stopped (06/18/25 1533)    sodium chloride Stopped (06/15/25 1644)    heparin (PORCINE) Infusion 21 Units/kg/hr (06/18/25 1710)       Current O2:   [] Room Air   [x] NC  2L   [] BiPaP    [] Vented  % Fi02,  Peep    Hospital Course:  ICU Day # 1 (6/16)  - Admitted to ICU for hypotension  - Started on sven, added vaso overnight  - Started on sodium bicarb drip  - Placed baeza catheter for retention (history of bladder cancer) 1L came out  - UOP = 1150 mL  - Large, dark bowel movements overnight, fecal occult sent/pending     ICU Day #2 (6/17)  - Vaso D/C'd  - Levo started and titrated to 9  - Sven titrated up to 175 at max during shift. Titrated down to 100 by end of shift  - Port accessed d/t multiple pressors  - Heparin restarted to anticoagulate in presence of new onset A-fib  - Heparin bolus and rate change d/t non-therapeutic Xa  - Liquid diet ordered.   - Phos replaced.     ICU Day #2 Nights  - levo/sven weaned as possible  - 1100 UOP   - Heparin bolus x 2    Hospital Day #3/ICU Day #2  6/18/25 Days:  vaso restarted & sven off; febrile 102; poor oral intake due to nausea/retching/couch; phos replaced; prn robitussin ordered; febrile; cultures ordered; antibiotics broadened; steroid given

## 2025-06-18 NOTE — PROGRESS NOTES
4 Eyes Skin Assessment     NAME:  Alfredo Chong  YOB: 1950  MEDICAL RECORD NUMBER:  [id number]    The patient is being assessed for  Shift Handoff    I agree that at least one RN has performed a thorough Head to Toe Skin Assessment on the patient. ALL assessment sites listed below have been assessed.      Areas assessed by both nurses:    Head, Face, Ears, Shoulders, Back, Chest, Arms, Elbows, Hands, Sacrum. Buttock, Coccyx, Ischium, and Legs. Feet and Heels        Does the Patient have a Wound? No noted wound(s)       Mateus Prevention initiated by RN: Yes  Wound Care Orders initiated by RN: No    Pressure Injury (Stage 3,4, Unstageable, DTI, NWPT, and Complex wounds) if present, place Wound referral order by RN under : No    New Ostomies, if present place, Ostomy referral order under : No     Nurse 1 eSignature: Electronically signed by Chelsie Calderon RN on 6/18/25 at 7:06 PM EDT    **SHARE this note so that the co-signing nurse can place an eSignature**    Nurse 2 eSignature: {Esignature:[id number]}

## 2025-06-18 NOTE — PROGRESS NOTES
Pulmonary & Critical Care Medicine ICU Progress Note      Events of Last 24 hours:   Pt having cough and SOB. He is tachypnea. He appears tired.       Invasive Lines: PICC D#None   CVC D#None  Art Line D#None            Vitals:  BP (!) 90/59   Pulse (!) 107   Temp 97.9 °F (36.6 °C) (Axillary)   Resp (!) 36   Ht 1.778 m (5' 10\")   Wt 86.1 kg (189 lb 13.1 oz)   SpO2 93%   BMI 27.24 kg/m²  on   Tmax:  CVP:        Intake/Output Summary (Last 24 hours) at 6/18/2025 0740  Last data filed at 6/18/2025 0600  Gross per 24 hour   Intake 852.14 ml   Output 2300 ml   Net -1447.86 ml       EXAM:  Physical Exam  Constitutional:       Appearance: He is ill-appearing and toxic-appearing.   HENT:      Head: Normocephalic and atraumatic.      Nose: Nose normal.      Mouth/Throat:      Pharynx: No oropharyngeal exudate.   Eyes:      General: No scleral icterus.        Right eye: No discharge.         Left eye: No discharge.   Cardiovascular:      Rate and Rhythm: Tachycardia present. Rhythm irregular.      Heart sounds: No murmur heard.  Pulmonary:      Effort: Pulmonary effort is normal. No respiratory distress.      Breath sounds: Rales present. No wheezing.   Abdominal:      General: Abdomen is flat. Bowel sounds are normal. There is no distension.      Tenderness: There is no abdominal tenderness.   Musculoskeletal:         General: No swelling.      Cervical back: Normal range of motion.   Skin:     General: Skin is warm and dry.   Neurological:      Mental Status: He is alert and oriented to person, place, and time.          Medications:  Scheduled Meds:   mupirocin   Each Nostril BID    pantoprazole  40 mg IntraVENous Daily    levothyroxine  75 mcg Oral Daily    cetirizine  10 mg Oral Daily    sodium chloride flush  5-40 mL IntraVENous 2 times per day    midodrine  10 mg Oral TID    cefepime  1,000 mg IntraVENous Q12H       PRN Meds:  sodium chloride flush, sodium chloride, polyethylene glycol, acetaminophen **OR**

## 2025-06-18 NOTE — PROGRESS NOTES
[facility] Progress Note           Subjective/   74 y.o. year old male who we are seeing in consultation for AMARILYS/CKD stage 3b.     HPI:  Renal function stable at lower levels, non-oliguric.  Bladder scan with 941 ml on 6/16, baeza placed.  BP's low, transferred to ICU on 6/16, persists despite fluids, pressors.    ROS:  +weak, intake reduced, c/o some shortness of breath, on RA.    Objective/   GEN:  Chronically ill, BP (!) 90/59   Pulse (!) 107   Temp 97.9 °F (36.6 °C) (Axillary)   Resp (!) 36   Ht 1.778 m (5' 10\")   Wt 86.1 kg (189 lb 13.1 oz)   SpO2 93%   BMI 27.24 kg/m²   HEENT: non-icteric, no JVD  CV: S1, S2, tachycardic, without m/r/g; no LE edema  RESP: CTA B without w/r/r; breathing wnl  ABD: +bs, soft, nt, no hsm  SKIN: warm, no rashes    Data/  Recent Labs     06/16/25  0523 06/16/25  1621 06/17/25  0426 06/17/25  1609 06/18/25  0450   WBC 13.7*  --  11.2*  --  8.1   HGB 9.1*   < > 8.9* 8.1* 7.7*   HCT 26.8*   < > 26.0* 23.6* 23.0*   MCV 95.8  --  95.2  --  95.8     --  312  --  284    < > = values in this interval not displayed.     Recent Labs     06/16/25  0523 06/17/25  0426 06/18/25  0450   * 134* 136   K 4.0 4.0 3.7    101 101   CO2 20* 22 20*   GLUCOSE 124* 105* 133*   PHOS  --  1.8* 1.7*   MG  --  2.19 1.94   BUN 30* 30* 26*   CREATININE 3.1* 3.1* 2.8*   LABGLOM 20* 20* 23*     UA large blood, >300 protein, s.g. 1.015, >100 wbc's, 21-50 rbc's  Urine sodium 90  Urine chloride 59    CT A/P ->   1. Bilateral ureteral stents are seen. There is mild right hydronephrosis. Ureteral cysts are suboptimally visualized without contrast, it could be further characterized via ultrasound. The bladder is underdistended, obscuring potential bladder mass   lesion. There is a Baeza catheter in place.     2. Diverticulosis      Assessment/     - Acute kidney injury - renal hypoperfusion injury in setting of hypotension    - Chronic kidney disease stage 3b -

## 2025-06-18 NOTE — CARE COORDINATION
Chart review day 3- from home with Madelyn MARIN. Active with [facility] for SN. Urosepsis, pressors, IV abx. Hx of advanced bladder ca. Will need PT/OT when medically able. Cm will continue to follow.

## 2025-06-19 LAB
ALBUMIN SERPL-MCNC: 2.4 G/DL (ref 3.4–5)
ANION GAP SERPL CALCULATED.3IONS-SCNC: 15 MMOL/L (ref 3–16)
ANTI-XA UNFRAC HEPARIN: 0.43 IU/ML (ref 0.3–0.7)
ANTI-XA UNFRAC HEPARIN: 0.46 IU/ML (ref 0.3–0.7)
BACTERIA BLD CULT ORG #2: NORMAL
BACTERIA BLD CULT: NORMAL
BUN SERPL-MCNC: 26 MG/DL (ref 7–20)
CALCIUM SERPL-MCNC: 5.3 MG/DL (ref 8.3–10.6)
CHLORIDE SERPL-SCNC: 101 MMOL/L (ref 99–110)
CO2 SERPL-SCNC: 20 MMOL/L (ref 21–32)
CREAT SERPL-MCNC: 2.9 MG/DL (ref 0.8–1.3)
DEPRECATED RDW RBC AUTO: 16.3 % (ref 12.4–15.4)
GFR SERPLBLD CREATININE-BSD FMLA CKD-EPI: 22 ML/MIN/{1.73_M2}
GLUCOSE BLD-MCNC: 163 MG/DL (ref 70–99)
GLUCOSE BLD-MCNC: 197 MG/DL (ref 70–99)
GLUCOSE BLD-MCNC: 276 MG/DL (ref 70–99)
GLUCOSE SERPL-MCNC: 246 MG/DL (ref 70–99)
HCT VFR BLD AUTO: 20 % (ref 40.5–52.5)
HCT VFR BLD AUTO: 21.4 % (ref 40.5–52.5)
HGB BLD-MCNC: 6.9 G/DL (ref 13.5–17.5)
HGB BLD-MCNC: 7.4 G/DL (ref 13.5–17.5)
LACTATE BLDV-SCNC: 2.3 MMOL/L (ref 0.4–2)
LACTATE BLDV-SCNC: 3.2 MMOL/L (ref 0.4–2)
LACTATE BLDV-SCNC: 3.3 MMOL/L (ref 0.4–2)
LACTATE BLDV-SCNC: 3.7 MMOL/L (ref 0.4–2)
MAGNESIUM SERPL-MCNC: 1.85 MG/DL (ref 1.8–2.4)
MCH RBC QN AUTO: 32.8 PG (ref 26–34)
MCHC RBC AUTO-ENTMCNC: 34.7 G/DL (ref 31–36)
MCV RBC AUTO: 94.5 FL (ref 80–100)
PERFORMED ON: ABNORMAL
PHOSPHATE SERPL-MCNC: 2.4 MG/DL (ref 2.5–4.9)
PLATELET # BLD AUTO: 249 K/UL (ref 135–450)
PMV BLD AUTO: 7.6 FL (ref 5–10.5)
POTASSIUM SERPL-SCNC: 4 MMOL/L (ref 3.5–5.1)
RBC # BLD AUTO: 2.27 M/UL (ref 4.2–5.9)
SODIUM SERPL-SCNC: 136 MMOL/L (ref 136–145)
VANCOMYCIN SERPL-MCNC: 11.7 UG/ML
WBC # BLD AUTO: 3.5 K/UL (ref 4–11)

## 2025-06-19 PROCEDURE — 99291 CRITICAL CARE FIRST HOUR: CPT | Performed by: NURSE PRACTITIONER

## 2025-06-19 PROCEDURE — 94761 N-INVAS EAR/PLS OXIMETRY MLT: CPT

## 2025-06-19 PROCEDURE — 6360000002 HC RX W HCPCS: Performed by: STUDENT IN AN ORGANIZED HEALTH CARE EDUCATION/TRAINING PROGRAM

## 2025-06-19 PROCEDURE — 37799 UNLISTED PX VASCULAR SURGERY: CPT

## 2025-06-19 PROCEDURE — 2580000003 HC RX 258: Performed by: NURSE PRACTITIONER

## 2025-06-19 PROCEDURE — 80069 RENAL FUNCTION PANEL: CPT

## 2025-06-19 PROCEDURE — 6370000000 HC RX 637 (ALT 250 FOR IP): Performed by: INTERNAL MEDICINE

## 2025-06-19 PROCEDURE — 99291 CRITICAL CARE FIRST HOUR: CPT | Performed by: STUDENT IN AN ORGANIZED HEALTH CARE EDUCATION/TRAINING PROGRAM

## 2025-06-19 PROCEDURE — 85018 HEMOGLOBIN: CPT

## 2025-06-19 PROCEDURE — 6370000000 HC RX 637 (ALT 250 FOR IP)

## 2025-06-19 PROCEDURE — 83735 ASSAY OF MAGNESIUM: CPT

## 2025-06-19 PROCEDURE — 2580000003 HC RX 258

## 2025-06-19 PROCEDURE — 85520 HEPARIN ASSAY: CPT

## 2025-06-19 PROCEDURE — 6360000002 HC RX W HCPCS: Performed by: HOSPITALIST

## 2025-06-19 PROCEDURE — 6360000002 HC RX W HCPCS: Performed by: NURSE PRACTITIONER

## 2025-06-19 PROCEDURE — 2500000003 HC RX 250 WO HCPCS: Performed by: HOSPITALIST

## 2025-06-19 PROCEDURE — 2580000003 HC RX 258: Performed by: HOSPITALIST

## 2025-06-19 PROCEDURE — 83605 ASSAY OF LACTIC ACID: CPT

## 2025-06-19 PROCEDURE — 2500000003 HC RX 250 WO HCPCS

## 2025-06-19 PROCEDURE — 80202 ASSAY OF VANCOMYCIN: CPT

## 2025-06-19 PROCEDURE — 85027 COMPLETE CBC AUTOMATED: CPT

## 2025-06-19 PROCEDURE — 6370000000 HC RX 637 (ALT 250 FOR IP): Performed by: STUDENT IN AN ORGANIZED HEALTH CARE EDUCATION/TRAINING PROGRAM

## 2025-06-19 PROCEDURE — 85014 HEMATOCRIT: CPT

## 2025-06-19 PROCEDURE — 2500000003 HC RX 250 WO HCPCS: Performed by: NURSE PRACTITIONER

## 2025-06-19 PROCEDURE — 2580000003 HC RX 258: Performed by: STUDENT IN AN ORGANIZED HEALTH CARE EDUCATION/TRAINING PROGRAM

## 2025-06-19 PROCEDURE — 2000000000 HC ICU R&B

## 2025-06-19 PROCEDURE — 2700000000 HC OXYGEN THERAPY PER DAY

## 2025-06-19 RX ORDER — DEXTROSE MONOHYDRATE 100 MG/ML
INJECTION, SOLUTION INTRAVENOUS CONTINUOUS PRN
Status: DISCONTINUED | OUTPATIENT
Start: 2025-06-19 | End: 2025-06-28 | Stop reason: HOSPADM

## 2025-06-19 RX ORDER — GLUCAGON 1 MG/ML
1 KIT INJECTION PRN
Status: DISCONTINUED | OUTPATIENT
Start: 2025-06-19 | End: 2025-06-28 | Stop reason: HOSPADM

## 2025-06-19 RX ORDER — PANTOPRAZOLE SODIUM 40 MG/10ML
40 INJECTION, POWDER, LYOPHILIZED, FOR SOLUTION INTRAVENOUS DAILY
Status: DISCONTINUED | OUTPATIENT
Start: 2025-06-19 | End: 2025-06-28 | Stop reason: HOSPADM

## 2025-06-19 RX ORDER — CALCIUM GLUCONATE 20 MG/ML
2000 INJECTION, SOLUTION INTRAVENOUS ONCE
Status: COMPLETED | OUTPATIENT
Start: 2025-06-19 | End: 2025-06-19

## 2025-06-19 RX ORDER — CALCIUM GLUCONATE 20 MG/ML
1000 INJECTION, SOLUTION INTRAVENOUS ONCE
Status: COMPLETED | OUTPATIENT
Start: 2025-06-19 | End: 2025-06-19

## 2025-06-19 RX ORDER — INSULIN LISPRO 100 [IU]/ML
0-8 INJECTION, SOLUTION INTRAVENOUS; SUBCUTANEOUS
Status: DISCONTINUED | OUTPATIENT
Start: 2025-06-19 | End: 2025-06-28 | Stop reason: HOSPADM

## 2025-06-19 RX ADMIN — VANCOMYCIN HYDROCHLORIDE 1000 MG: 1 INJECTION, POWDER, LYOPHILIZED, FOR SOLUTION INTRAVENOUS at 20:01

## 2025-06-19 RX ADMIN — INSULIN LISPRO 2 UNITS: 100 INJECTION, SOLUTION INTRAVENOUS; SUBCUTANEOUS at 17:39

## 2025-06-19 RX ADMIN — VASOPRESSIN 0.03 UNITS/MIN: 20 INJECTION INTRAVENOUS at 07:28

## 2025-06-19 RX ADMIN — HYDROCORTISONE SODIUM SUCCINATE 100 MG: 100 INJECTION, POWDER, FOR SOLUTION INTRAMUSCULAR; INTRAVENOUS at 02:19

## 2025-06-19 RX ADMIN — SODIUM CHLORIDE: 0.9 INJECTION, SOLUTION INTRAVENOUS at 19:58

## 2025-06-19 RX ADMIN — NOREPINEPHRINE BITARTRATE 14 MCG/MIN: 64 SOLUTION INTRAVENOUS at 02:17

## 2025-06-19 RX ADMIN — HYDROCORTISONE SODIUM SUCCINATE 100 MG: 100 INJECTION, POWDER, FOR SOLUTION INTRAMUSCULAR; INTRAVENOUS at 09:18

## 2025-06-19 RX ADMIN — SODIUM CHLORIDE, PRESERVATIVE FREE 10 ML: 5 INJECTION INTRAVENOUS at 21:02

## 2025-06-19 RX ADMIN — MEROPENEM 500 MG: 500 INJECTION INTRAVENOUS at 17:38

## 2025-06-19 RX ADMIN — HEPARIN SODIUM 23 UNITS/KG/HR: 10000 INJECTION, SOLUTION INTRAVENOUS at 18:11

## 2025-06-19 RX ADMIN — CALCIUM GLUCONATE 2000 MG: 20 INJECTION, SOLUTION INTRAVENOUS at 13:55

## 2025-06-19 RX ADMIN — HEPARIN SODIUM 23 UNITS/KG/HR: 10000 INJECTION, SOLUTION INTRAVENOUS at 06:32

## 2025-06-19 RX ADMIN — CETIRIZINE HYDROCHLORIDE 10 MG: 10 TABLET, FILM COATED ORAL at 09:18

## 2025-06-19 RX ADMIN — MIDODRINE HYDROCHLORIDE 10 MG: 5 TABLET ORAL at 13:49

## 2025-06-19 RX ADMIN — MIDODRINE HYDROCHLORIDE 10 MG: 5 TABLET ORAL at 21:02

## 2025-06-19 RX ADMIN — CALCIUM GLUCONATE 1000 MG: 20 INJECTION, SOLUTION INTRAVENOUS at 06:40

## 2025-06-19 RX ADMIN — PANTOPRAZOLE SODIUM 40 MG: 40 INJECTION, POWDER, FOR SOLUTION INTRAVENOUS at 09:18

## 2025-06-19 RX ADMIN — INSULIN LISPRO 4 UNITS: 100 INJECTION, SOLUTION INTRAVENOUS; SUBCUTANEOUS at 11:05

## 2025-06-19 RX ADMIN — SODIUM PHOSPHATE, MONOBASIC, MONOHYDRATE AND SODIUM PHOSPHATE, DIBASIC, ANHYDROUS 20 MMOL: 142; 276 INJECTION, SOLUTION INTRAVENOUS at 09:23

## 2025-06-19 RX ADMIN — SODIUM CHLORIDE, PRESERVATIVE FREE 10 ML: 5 INJECTION INTRAVENOUS at 09:21

## 2025-06-19 RX ADMIN — LEVOTHYROXINE SODIUM 75 MCG: 0.05 TABLET ORAL at 09:18

## 2025-06-19 RX ADMIN — HYDROCORTISONE SODIUM SUCCINATE 100 MG: 100 INJECTION, POWDER, FOR SOLUTION INTRAMUSCULAR; INTRAVENOUS at 17:57

## 2025-06-19 RX ADMIN — MEROPENEM 500 MG: 500 INJECTION INTRAVENOUS at 05:11

## 2025-06-19 RX ADMIN — MIDODRINE HYDROCHLORIDE 10 MG: 5 TABLET ORAL at 09:18

## 2025-06-19 RX ADMIN — MUPIROCIN: 20 OINTMENT TOPICAL at 21:02

## 2025-06-19 ASSESSMENT — PAIN SCALES - GENERAL
PAINLEVEL_OUTOF10: 0

## 2025-06-19 NOTE — PROGRESS NOTES
[facility] Progress Note           Subjective/   74 y.o. year old male who we are seeing in consultation for AMARILYS/CKD stage 3b.     HPI:  Renal function stable at lower levels, non-oliguric.  Bladder scan with 941 ml on 6/16, baeza placed, having some pink-colored urine after baeza exchange.  BP's low, transferred to ICU on 6/16, persists despite fluids, pressors, though improving.    ROS:  +weak, intake improving, c/o some shortness of breath, on 2 L NC.    Objective/   GEN:  Chronically ill, BP (!) 114/51   Pulse 90   Temp 98.9 °F (37.2 °C) (Axillary)   Resp (!) 34   Ht 1.778 m (5' 10\")   Wt 86 kg (189 lb 9.5 oz)   SpO2 94%   BMI 27.20 kg/m²   HEENT: non-icteric, no JVD  CV: S1, S2, without m/r/g; no LE edema  RESP: CTA B without w/r/r; breathing wnl  ABD: +bs, soft, nt, no hsm  SKIN: warm, no rashes    Data/  Recent Labs     06/17/25  0426 06/17/25  1609 06/18/25  0450 06/18/25  1623 06/18/25  2345 06/19/25  0500   WBC 11.2*  --  8.1  --   --  3.5*   HGB 8.9*   < > 7.7* 7.6* 7.5* 7.4*   HCT 26.0*   < > 23.0* 22.0* 21.9* 21.4*   MCV 95.2  --  95.8  --   --  94.5     --  284  --   --  249    < > = values in this interval not displayed.     Recent Labs     06/17/25  0426 06/18/25  0450 06/19/25  0500   * 136 136   K 4.0 3.7 4.0    101 101   CO2 22 20* 20*   GLUCOSE 105* 133* 246*   PHOS 1.8* 1.7* 2.4*   MG 2.19 1.94 1.85   BUN 30* 26* 26*   CREATININE 3.1* 2.8* 2.9*   LABGLOM 20* 23* 22*     UA large blood, >300 protein, s.g. 1.015, >100 wbc's, 21-50 rbc's  Urine sodium 90  Urine chloride 59    CT A/P ->   1. Bilateral ureteral stents are seen. There is mild right hydronephrosis. Ureteral cysts are suboptimally visualized without contrast, it could be further characterized via ultrasound. The bladder is underdistended, obscuring potential bladder mass   lesion. There is a Baeza catheter in place.     2. Diverticulosis      Assessment/     - Acute kidney injury -

## 2025-06-19 NOTE — PROGRESS NOTES
[facility] ICU Progress Note      Events of Last 24 hours:   Pt says he feels stronger today. He has less pains. Pressor requirements are decreasing.       Invasive Lines: PICC D#None   CVC D#None  Art Line D#None            Vitals:  BP (!) 114/51   Pulse 90   Temp 98.9 °F (37.2 °C) (Axillary)   Resp (!) 34   Ht 1.778 m (5' 10\")   Wt 86 kg (189 lb 9.5 oz)   SpO2 94%   BMI 27.20 kg/m²    Tmax:  CVP:        Intake/Output Summary (Last 24 hours) at 6/19/2025 0912  Last data filed at 6/19/2025 0644  Gross per 24 hour   Intake 1785.77 ml   Output 1335 ml   Net 450.77 ml       EXAM:  Physical Exam  Constitutional:       Appearance: He is ill-appearing.   HENT:      Head: Normocephalic and atraumatic.      Nose: Nose normal.      Mouth/Throat:      Pharynx: No oropharyngeal exudate.   Eyes:      General: No scleral icterus.        Right eye: No discharge.         Left eye: No discharge.   Cardiovascular:      Rate and Rhythm: Normal rate and regular rhythm.      Heart sounds: No murmur heard.     No friction rub. No gallop.   Pulmonary:      Effort: Pulmonary effort is normal. No respiratory distress.      Breath sounds: No wheezing or rales.   Abdominal:      General: Abdomen is flat. Bowel sounds are normal. There is no distension.      Palpations: Abdomen is soft.      Tenderness: There is no abdominal tenderness.   Musculoskeletal:         General: Normal range of motion.      Cervical back: Normal range of motion.   Skin:     General: Skin is warm and dry.   Neurological:      General: No focal deficit present.      Mental Status: He is alert and oriented to person, place, and time.          Medications:  Scheduled Meds:   sodium phosphate IVPB (PERIPHERAL line)  20 mmol IntraVENous Once    insulin lispro  0-8 Units SubCUTAneous 4x Daily AC & HS    pantoprazole  40 mg IntraVENous BID    meropenem  500 mg IntraVENous Q12H    hydrocortisone sodium succinate PF  100 mg IntraVENous Q8H     vancomycin (VANCOCIN) intermittent dosing (placeholder)   Other RX Placeholder    mupirocin   Each Nostril BID    levothyroxine  75 mcg Oral Daily    cetirizine  10 mg Oral Daily    sodium chloride flush  5-40 mL IntraVENous 2 times per day    midodrine  10 mg Oral TID       PRN Meds:  glucose, dextrose bolus **OR** dextrose bolus, glucagon (rDNA), dextrose, benzonatate, guaiFENesin-dextromethorphan, sodium chloride flush, sodium chloride, polyethylene glycol, acetaminophen **OR** acetaminophen, prochlorperazine **OR** prochlorperazine, heparin (porcine), heparin (porcine)    Results:  CBC:   Recent Labs     06/17/25  0426 06/17/25  1609 06/18/25  0450 06/18/25  1623 06/18/25  2345 06/19/25  0500   WBC 11.2*  --  8.1  --   --  3.5*   HGB 8.9*   < > 7.7* 7.6* 7.5* 7.4*   HCT 26.0*   < > 23.0* 22.0* 21.9* 21.4*   MCV 95.2  --  95.8  --   --  94.5     --  284  --   --  249    < > = values in this interval not displayed.     BMP:   Recent Labs     06/17/25  0426 06/18/25  0450 06/19/25  0500   * 136 136   K 4.0 3.7 4.0    101 101   CO2 22 20* 20*   PHOS 1.8* 1.7* 2.4*   BUN 30* 26* 26*   CREATININE 3.1* 2.8* 2.9*     LIVER PROFILE: No results for input(s): \"AST\", \"ALT\", \"LIPASE\", \"AMYLASE\", \"BILIDIR\", \"BILITOT\", \"ALKPHOS\" in the last 72 hours.    Invalid input(s): \"ALB\"  PT/INR: No results for input(s): \"PROTIME\", \"INR\" in the last 72 hours.  APTT: No results for input(s): \"APTT\" in the last 72 hours.  UA:  Recent Labs     06/16/25  1730   COLORU Yellow   PHUR 6.5   WBCUA >100*   RBCUA 21-50*   BACTERIA 3+*   CLARITYU CLOUDY*   LEUKOCYTESUR LARGE*   UROBILINOGEN 0.2   BILIRUBINUR Negative   BLOODU LARGE*   GLUCOSEU Negative       Cultures:  Blood cx NGTD x 2     Films:  I have reviewed radiology images personally.     CT HEAD WO CONTRAST  Result Date: 6/15/2025  1. Brain atrophy 2. Periventricular microangiopathic ischemic changes, chronic small vessel disease and old basal ganglion lacunar ischemic

## 2025-06-19 NOTE — PROGRESS NOTES
Shift: 5439-8815    Reason for ICU Admission: Hypotension/Sepsis    Most recent vitals: BP (!) 114/51   Pulse 88   Temp 98 °F (36.7 °C) (Bladder)   Resp (!) 31   Ht 1.778 m (5' 10\")   Wt 86 kg (189 lb 9.5 oz)   SpO2 96%   BMI 27.20 kg/m²      Drip rates at handoff:    dextrose      VASOpressin Stopped (06/19/25 1547)    norepinephrine 4 mcg/min (06/19/25 1816)    sodium chloride Stopped (06/15/25 1644)    heparin (PORCINE) Infusion 23 Units/kg/hr (06/19/25 1811)       Current O2:   [] Room Air   [x] NC  2L   [] BiPaP    [] Vented  % Fi02,  Peep    Hospital Course:  ICU Day # 1 (6/16)  - Admitted to ICU for hypotension  - Started on sven, added vaso overnight  - Started on sodium bicarb drip  - Placed baeza catheter for retention (history of bladder cancer) 1L came out  - UOP = 1150 mL  - Large, dark bowel movements overnight, fecal occult sent/pending     ICU Day #2 (6/17)  - Vaso D/C'd  - Levo started and titrated to 9  - Sven titrated up to 175 at max during shift. Titrated down to 100 by end of shift  - Port accessed d/t multiple pressors  - Heparin restarted to anticoagulate in presence of new onset A-fib  - Heparin bolus and rate change d/t non-therapeutic Xa  - Liquid diet ordered.   - Phos replaced.     ICU Day #2 Nights  - levo/sven weaned as possible  - 1100 UOP   - Heparin bolus x 2    Hospital Day #3/ICU Day #2  6/18/25 Days:  vaso restarted & sven off; febrile 102; poor oral intake due to nausea/retching/couch; phos replaced; prn robitussin ordered; febrile; cultures ordered; antibiotics broadened; steroid given     ICU Day #3 6/18 Nights:  - heparin 2000U bolus given, therapeutic x 1  - 800 UOP   - blood pressure medications continued  - fecal management system inserted     ICU Day #4 6/19  Days  -weaned pressors, Levo only  -Felt better this am.  -Advanced diet  -IV steroids.   -No Bms  -No fever

## 2025-06-19 NOTE — PROGRESS NOTES
Hospital Medicine Progress Note  V 5.17      Date of Admission: 6/15/2025    Hospital Day: 5      Chief Admission Complaint: Falls    Subjective: Overall patient is feeling much better today than he has previously.  States that is the best he is felt in weeks.  Denies any chest pain, shortness of breath, palpitations or constipation    Presenting Admission History:       Patient is a 74-year-old male with a past medical history of CAD, advanced bladder cancer undergoing active immunotherapy, hypertension, thyroid disease who presents for low blood pressure and falls. He states that he has had ongoing issues with his blood pressure since his last admission. He states last night he fell down and was unable to get up. He states that his blood pressure ranged from the 40s to the 80s. He is endorsing lightheadedness. He denies any chest pain or shortness of breath. On arrival to the ED he was found to be tachycardic and hypotensive. ED workup was remarkable for an elevated creatinine, lactic acidosis, elevated troponin, elevated BNP, leukocytosis. We were consulted for admission.     Assessment/Plan:      Undifferentiated shock  Hypotension  - Likely septic, less likely hypovolemic and low likelihood for cardiogenic  - Patient fell prior to arriving in the ED due to lightheadedness  - Patient has had chronic low blood pressure  - CT head without contrast showed  - Brain atrophy  - Ischemic changes and chronic small vessel disease with old basal ganglia lunar ischemic infarcts  - No acute intracranial hemorrhage  - On admission BP was 82/61  - Patient was on midodrine 5 mg at home has been increased to 10 mg 3 times daily  - Patient given IVF  - Patient's hypotension has not improved  - Serial lactic acid shows LA of 3.0 on admission.  3.6 6/18 -> 3.2 6/19  - Hypocalcemia 5.3.  Actively being repleted with calcium gluconate  - Arterial line has been placed  - New oxygen requirements with 2 L nasal cannula  -

## 2025-06-19 NOTE — PROGRESS NOTES
[facility]     Electrophysiology                                     Progress Note    Admission date:  6/15/2025    Reason for follow up visit: AF    HPI/CC: Alfredo Chong was admitted on 6/15/2025 with sepsis secondary to likely UTI. EP following for rapid AF. On 2025, patient was transferred to the ICU for worsening hypotension.  IV heparin was started however this was briefly discontinued secondary to worsening anemia. Heparin was resumed today.    Has also been treated for AMARILYS/CKD. He is on Sven-synephrine, Levophed and vasopressin. On 2025 around noon, he went back into rate controlled AF.     Subjective: He felt more energetic this morning but has become more fatigued as the day has progressed. Continues to be unaware of arrhythmia.     Vitals:  Blood pressure (!) 114/51, pulse 90, temperature 98 °F (36.7 °C), temperature source Bladder, resp. rate 16, height 1.778 m (5' 10\"), weight 86 kg (189 lb 9.5 oz), SpO2 97%.  Temp  Av.9 °F (37.2 °C)  Min: 98 °F (36.7 °C)  Max: 100.1 °F (37.8 °C)  Pulse  Av.8  Min: 86  Max: 105  BP  Min: 95/46  Max: 114/51  SpO2  Av.8 %  Min: 88 %  Max: 99 %    24 hour I/O    Intake/Output Summary (Last 24 hours) at 2025 1545  Last data filed at 2025 0644  Gross per 24 hour   Intake 1058.28 ml   Output 955 ml   Net 103.28 ml     Current Facility-Administered Medications   Medication Dose Route Frequency Provider Last Rate Last Admin    insulin lispro (HUMALOG,ADMELOG) injection vial 0-8 Units  0-8 Units SubCUTAneous 4x Daily AC & HS Arthur Kathleen MD   4 Units at 25 1105    glucose chewable tablet 16 g  4 tablet Oral PRN Arthur Kathleen MD        dextrose bolus 10% 125 mL  125 mL IntraVENous PRN Arthur Kathleen MD        Or    dextrose bolus 10% 250 mL  250 mL IntraVENous PRN Arthur Kathleen MD        glucagon injection 1 mg  1 mg SubCUTAneous PRArthur Gibbons MD        dextrose 10 % infusion   IntraVENous Continuous  PRN Arthur Kathleen MD        pantoprazole (PROTONIX) injection 40 mg  40 mg IntraVENous Daily Arthur Kathleen MD   40 mg at 06/19/25 0918    VASOpressin 20 Units in sodium chloride 0.9 % 100 mL infusion  0.01-0.03 Units/min IntraVENous Continuous Ilsa Huerta APRN - CNP 9 mL/hr at 06/19/25 0728 0.03 Units/min at 06/19/25 0728    benzonatate (TESSALON) capsule 100 mg  100 mg Oral TID PRN Ilsa Huerta, APRN - CNP   100 mg at 06/18/25 2351    guaiFENesin-dextromethorphan (ROBITUSSIN DM) 100-10 MG/5ML syrup 5 mL  5 mL Oral Q4H PRN Ilsa Huerta APRN - CNP   5 mL at 06/18/25 0953    norepinephrine (LEVOPHED) 16 mg in sodium chloride 0.9 % 250 mL infusion (premix)  1-100 mcg/min IntraVENous Continuous Ilsa Huerta APRCHAYO - CNP 7.5 mL/hr at 06/19/25 0644 8 mcg/min at 06/19/25 0644    meropenem (MERREM) 500 mg in sodium chloride 0.9 % 100 mL IVPB (addEASE)  500 mg IntraVENous Q12H Arthur Kathleen MD   Stopped at 06/19/25 0842    hydrocortisone sodium succinate PF (SOLU-CORTEF) injection 100 mg  100 mg IntraVENous Q8H Arthur Kathleen MD   100 mg at 06/19/25 0918    vancomycin (VANCOCIN) intermittent dosing (placeholder)   Other RX Placeholder Arthur Kathleen MD        mupirocin (BACTROBAN) 2 % ointment   Each Nostril BID Dhiraj Krishna MD   Given at 06/18/25 2140    levothyroxine (SYNTHROID) tablet 75 mcg  75 mcg Oral Daily Maritza Robbins DO   75 mcg at 06/19/25 0918    cetirizine (ZYRTEC) tablet 10 mg  10 mg Oral Daily Maritza Robbins DO   10 mg at 06/19/25 0918    sodium chloride flush 0.9 % injection 5-40 mL  5-40 mL IntraVENous 2 times per day Maritza Robbins DO   10 mL at 06/19/25 0921    sodium chloride flush 0.9 % injection 5-40 mL  5-40 mL IntraVENous PRN Maritza Robbins DO        0.9 % sodium chloride infusion   IntraVENous PRN Maritza Robbins DO   Stopped at 06/15/25 1644    polyethylene glycol (GLYCOLAX) packet 17 g  17 g Oral Daily PRN Maritza Robbins DO

## 2025-06-19 NOTE — CARE COORDINATION
Chart reviewed. LOS day #  4. Admitted as inpatient. Followed by IM, Pulm, Uro, EP, Nephro. New A fib. ON IV abx and IV steroids. Still on pressors, ELEN. Clear liquid diet. Pt is from home with SO. Active with [facility] the [facility] for SN. Pt will likely have SNF needs. Therapy eval when approp.

## 2025-06-19 NOTE — PROGRESS NOTES
Urology Progress Note      Subjective: Alfredo SWEENEY Arlette feels stronger today.  Had fevers yesterday , but pressor needs are decreasing     Vitals:  BP (!) 114/51   Pulse 90   Temp 98 °F (36.7 °C) (Bladder)   Resp 16   Ht 1.778 m (5' 10\")   Wt 86 kg (189 lb 9.5 oz)   SpO2 97%   BMI 27.20 kg/m²   Temp  Av.9 °F (37.2 °C)  Min: 98 °F (36.7 °C)  Max: 100.1 °F (37.8 °C)    Intake/Output Summary (Last 24 hours) at 2025 1546  Last data filed at 2025 0644  Gross per 24 hour   Intake 1058.28 ml   Output 955 ml   Net 103.28 ml       Exam:     Physical:  Well developed, well nourished in no acute distress  Mood indicates no abnormalities. Pt doesn’t appear depressed  Orientated to time and place  Neck is supple, trachea is midline  Respiratory effort is normal  Abdomen soft, not tender, not distended   Skin show no abnormal lesions      Male :  Gordon draining yellow urine    Labs:  WBC:    Lab Results   Component Value Date/Time    WBC 3.5 2025 05:00 AM     Hemoglobin/Hematocrit:    Lab Results   Component Value Date/Time    HGB 6.9 2025 01:50 PM    HCT 20.0 2025 01:50 PM     BMP:    Lab Results   Component Value Date/Time     2025 05:00 AM    K 4.0 2025 05:00 AM    K 4.0 2025 05:23 AM     2025 05:00 AM    CO2 20 2025 05:00 AM    BUN 26 2025 05:00 AM    CREATININE 2.9 2025 05:00 AM    CALCIUM 5.3 2025 05:00 AM    GFRAA >60 2021 03:50 PM    GFRAA >60 2013 08:24 AM    LABGLOM 22 2025 05:00 AM    LABGLOM 36 2024 12:21 PM     PT/INR:    Lab Results   Component Value Date/Time    PROTIME 16.9 06/15/2025 04:21 PM    INR 1.36 06/15/2025 04:21 PM     PTT:    Lab Results   Component Value Date/Time    APTT 37.9 06/15/2025 04:21 PM   [APTT    Urinalysis: >100 wbc, 21-50 rbc, large le     Urine Culture: ng        Imaging:   XR CHEST PORTABLE   Final Result       Stable findings.       Electronically signed by

## 2025-06-19 NOTE — PLAN OF CARE
Problem: Chronic Conditions and Co-morbidities  Goal: Patient's chronic conditions and co-morbidity symptoms are monitored and maintained or improved  Outcome: Progressing  Flowsheets (Taken 6/18/2025 2000)  Care Plan - Patient's Chronic Conditions and Co-Morbidity Symptoms are Monitored and Maintained or Improved: Monitor and assess patient's chronic conditions and comorbid symptoms for stability, deterioration, or improvement     Problem: Discharge Planning  Goal: Discharge to home or other facility with appropriate resources  Outcome: Progressing  Flowsheets (Taken 6/18/2025 2000)  Discharge to home or other facility with appropriate resources: Identify barriers to discharge with patient and caregiver     Problem: Safety - Adult  Goal: Free from fall injury  Outcome: Progressing  Flowsheets (Taken 6/18/2025 2103)  Free From Fall Injury: Instruct family/caregiver on patient safety     Problem: Skin/Tissue Integrity  Goal: Skin integrity remains intact  Description: 1.  Monitor for areas of redness and/or skin breakdown  2.  Assess vascular access sites hourly  3.  Every 4-6 hours minimum:  Change oxygen saturation probe site  4.  Every 4-6 hours:  If on nasal continuous positive airway pressure, respiratory therapy assess nares and determine need for appliance change or resting period  Outcome: Progressing  Flowsheets (Taken 6/18/2025 2103)  Skin Integrity Remains Intact:   Monitor for areas of redness and/or skin breakdown   Monitor skin under medical devices     Problem: ABCDS Injury Assessment  Goal: Absence of physical injury  Outcome: Progressing  Flowsheets (Taken 6/18/2025 2103)  Absence of Physical Injury: Implement safety measures based on patient assessment

## 2025-06-19 NOTE — PROGRESS NOTES
Shift: 2682-0644    Reason for ICU Admission: Hypotension/Sepsis    Most recent vitals: BP (!) 114/51   Pulse 90   Temp 98.9 °F (37.2 °C) (Axillary)   Resp (!) 34   Ht 1.778 m (5' 10\")   Wt 86 kg (189 lb 9.5 oz)   SpO2 94%   BMI 27.20 kg/m²      Drip rates at handoff:    VASOpressin 0.03 Units/min (06/19/25 0531)    norepinephrine 10 mcg/min (06/19/25 0531)    sodium chloride Stopped (06/15/25 1644)    heparin (PORCINE) Infusion 23 Units/kg/hr (06/18/25 2040)       Current O2:   [] Room Air   [x] NC  2L   [] BiPaP    [] Vented  % Fi02,  Peep    Hospital Course:  ICU Day # 1 (6/16)  - Admitted to ICU for hypotension  - Started on sven, added vaso overnight  - Started on sodium bicarb drip  - Placed baeza catheter for retention (history of bladder cancer) 1L came out  - UOP = 1150 mL  - Large, dark bowel movements overnight, fecal occult sent/pending     ICU Day #2 (6/17)  - Vaso D/C'd  - Levo started and titrated to 9  - Sven titrated up to 175 at max during shift. Titrated down to 100 by end of shift  - Port accessed d/t multiple pressors  - Heparin restarted to anticoagulate in presence of new onset A-fib  - Heparin bolus and rate change d/t non-therapeutic Xa  - Liquid diet ordered.   - Phos replaced.     ICU Day #2 Nights  - levo/sven weaned as possible  - 1100 UOP   - Heparin bolus x 2    Hospital Day #3/ICU Day #2  6/18/25 Days:  vaso restarted & sven off; febrile 102; poor oral intake due to nausea/retching/couch; phos replaced; prn robitussin ordered; febrile; cultures ordered; antibiotics broadened; steroid given     ICU Day #3 6/18 Nights:  - heparin 2000U bolus given, therapeutic x 1  - 800 UOP   - blood pressure medications continued  - fecal management system inserted

## 2025-06-20 LAB
ALBUMIN SERPL-MCNC: 2.3 G/DL (ref 3.4–5)
ANION GAP SERPL CALCULATED.3IONS-SCNC: 16 MMOL/L (ref 3–16)
ANTI-XA UNFRAC HEPARIN: 0.5 IU/ML (ref 0.3–0.7)
BUN SERPL-MCNC: 29 MG/DL (ref 7–20)
CALCIUM SERPL-MCNC: 5.5 MG/DL (ref 8.3–10.6)
CHLORIDE SERPL-SCNC: 103 MMOL/L (ref 99–110)
CO2 SERPL-SCNC: 19 MMOL/L (ref 21–32)
CREAT SERPL-MCNC: 3.1 MG/DL (ref 0.8–1.3)
DEPRECATED RDW RBC AUTO: 16.3 % (ref 12.4–15.4)
GFR SERPLBLD CREATININE-BSD FMLA CKD-EPI: 20 ML/MIN/{1.73_M2}
GLUCOSE BLD-MCNC: 162 MG/DL (ref 70–99)
GLUCOSE BLD-MCNC: 163 MG/DL (ref 70–99)
GLUCOSE SERPL-MCNC: 153 MG/DL (ref 70–99)
HCT VFR BLD AUTO: 21 % (ref 40.5–52.5)
HCT VFR BLD AUTO: 21.5 % (ref 40.5–52.5)
HCT VFR BLD AUTO: 23.5 % (ref 40.5–52.5)
HGB BLD-MCNC: 7.2 G/DL (ref 13.5–17.5)
HGB BLD-MCNC: 7.4 G/DL (ref 13.5–17.5)
HGB BLD-MCNC: 7.9 G/DL (ref 13.5–17.5)
LACTATE BLDV-SCNC: 2.2 MMOL/L (ref 0.4–2)
LACTATE BLDV-SCNC: 2.4 MMOL/L (ref 0.4–2)
LACTATE BLDV-SCNC: 2.8 MMOL/L (ref 0.4–2)
MAGNESIUM SERPL-MCNC: 1.76 MG/DL (ref 1.8–2.4)
MCH RBC QN AUTO: 32.4 PG (ref 26–34)
MCHC RBC AUTO-ENTMCNC: 34.5 G/DL (ref 31–36)
MCV RBC AUTO: 94 FL (ref 80–100)
PERFORMED ON: ABNORMAL
PERFORMED ON: ABNORMAL
PHOSPHATE SERPL-MCNC: 2.8 MG/DL (ref 2.5–4.9)
PLATELET # BLD AUTO: 271 K/UL (ref 135–450)
PMV BLD AUTO: 7.7 FL (ref 5–10.5)
POTASSIUM SERPL-SCNC: 3.5 MMOL/L (ref 3.5–5.1)
RBC # BLD AUTO: 2.29 M/UL (ref 4.2–5.9)
SODIUM SERPL-SCNC: 138 MMOL/L (ref 136–145)
WBC # BLD AUTO: 2.4 K/UL (ref 4–11)

## 2025-06-20 PROCEDURE — 99232 SBSQ HOSP IP/OBS MODERATE 35: CPT | Performed by: NURSE PRACTITIONER

## 2025-06-20 PROCEDURE — 85027 COMPLETE CBC AUTOMATED: CPT

## 2025-06-20 PROCEDURE — 6360000002 HC RX W HCPCS

## 2025-06-20 PROCEDURE — 99291 CRITICAL CARE FIRST HOUR: CPT | Performed by: STUDENT IN AN ORGANIZED HEALTH CARE EDUCATION/TRAINING PROGRAM

## 2025-06-20 PROCEDURE — 85014 HEMATOCRIT: CPT

## 2025-06-20 PROCEDURE — 97530 THERAPEUTIC ACTIVITIES: CPT

## 2025-06-20 PROCEDURE — 85520 HEPARIN ASSAY: CPT

## 2025-06-20 PROCEDURE — 6370000000 HC RX 637 (ALT 250 FOR IP): Performed by: INTERNAL MEDICINE

## 2025-06-20 PROCEDURE — 97535 SELF CARE MNGMENT TRAINING: CPT

## 2025-06-20 PROCEDURE — 37799 UNLISTED PX VASCULAR SURGERY: CPT

## 2025-06-20 PROCEDURE — 80069 RENAL FUNCTION PANEL: CPT

## 2025-06-20 PROCEDURE — 6360000002 HC RX W HCPCS: Performed by: STUDENT IN AN ORGANIZED HEALTH CARE EDUCATION/TRAINING PROGRAM

## 2025-06-20 PROCEDURE — 2000000000 HC ICU R&B

## 2025-06-20 PROCEDURE — 6370000000 HC RX 637 (ALT 250 FOR IP)

## 2025-06-20 PROCEDURE — 97166 OT EVAL MOD COMPLEX 45 MIN: CPT

## 2025-06-20 PROCEDURE — 97163 PT EVAL HIGH COMPLEX 45 MIN: CPT

## 2025-06-20 PROCEDURE — 2700000000 HC OXYGEN THERAPY PER DAY

## 2025-06-20 PROCEDURE — 83605 ASSAY OF LACTIC ACID: CPT

## 2025-06-20 PROCEDURE — 36415 COLL VENOUS BLD VENIPUNCTURE: CPT

## 2025-06-20 PROCEDURE — 2500000003 HC RX 250 WO HCPCS

## 2025-06-20 PROCEDURE — 94761 N-INVAS EAR/PLS OXIMETRY MLT: CPT

## 2025-06-20 PROCEDURE — 83735 ASSAY OF MAGNESIUM: CPT

## 2025-06-20 PROCEDURE — 2580000003 HC RX 258: Performed by: STUDENT IN AN ORGANIZED HEALTH CARE EDUCATION/TRAINING PROGRAM

## 2025-06-20 PROCEDURE — 85018 HEMOGLOBIN: CPT

## 2025-06-20 RX ORDER — LANOLIN ALCOHOL/MO/W.PET/CERES
400 CREAM (GRAM) TOPICAL DAILY
Status: DISCONTINUED | OUTPATIENT
Start: 2025-06-20 | End: 2025-06-21

## 2025-06-20 RX ORDER — HYDROCORTISONE SODIUM SUCCINATE 100 MG/2ML
50 INJECTION INTRAMUSCULAR; INTRAVENOUS EVERY 8 HOURS
Status: DISCONTINUED | OUTPATIENT
Start: 2025-06-20 | End: 2025-06-21

## 2025-06-20 RX ORDER — CALCIUM GLUCONATE 20 MG/ML
2000 INJECTION, SOLUTION INTRAVENOUS ONCE
Status: COMPLETED | OUTPATIENT
Start: 2025-06-20 | End: 2025-06-20

## 2025-06-20 RX ADMIN — PROCHLORPERAZINE EDISYLATE 10 MG: 5 INJECTION INTRAMUSCULAR; INTRAVENOUS at 08:58

## 2025-06-20 RX ADMIN — MIDODRINE HYDROCHLORIDE 10 MG: 5 TABLET ORAL at 14:27

## 2025-06-20 RX ADMIN — LEVOTHYROXINE SODIUM 75 MCG: 0.05 TABLET ORAL at 08:34

## 2025-06-20 RX ADMIN — HYDROCORTISONE SODIUM SUCCINATE 100 MG: 100 INJECTION, POWDER, FOR SOLUTION INTRAMUSCULAR; INTRAVENOUS at 02:39

## 2025-06-20 RX ADMIN — MUPIROCIN: 20 OINTMENT TOPICAL at 08:36

## 2025-06-20 RX ADMIN — MUPIROCIN: 20 OINTMENT TOPICAL at 20:52

## 2025-06-20 RX ADMIN — MEROPENEM 500 MG: 500 INJECTION INTRAVENOUS at 17:59

## 2025-06-20 RX ADMIN — HEPARIN SODIUM 23 UNITS/KG/HR: 10000 INJECTION, SOLUTION INTRAVENOUS at 08:25

## 2025-06-20 RX ADMIN — Medication 400 MG: at 08:34

## 2025-06-20 RX ADMIN — ACETAMINOPHEN 650 MG: 325 TABLET ORAL at 20:52

## 2025-06-20 RX ADMIN — CALCIUM GLUCONATE 2000 MG: 20 INJECTION, SOLUTION INTRAVENOUS at 08:38

## 2025-06-20 RX ADMIN — HYDROCORTISONE SODIUM SUCCINATE 100 MG: 100 INJECTION, POWDER, FOR SOLUTION INTRAMUSCULAR; INTRAVENOUS at 08:35

## 2025-06-20 RX ADMIN — CETIRIZINE HYDROCHLORIDE 10 MG: 10 TABLET, FILM COATED ORAL at 08:35

## 2025-06-20 RX ADMIN — MEROPENEM 500 MG: 500 INJECTION INTRAVENOUS at 05:23

## 2025-06-20 RX ADMIN — SODIUM CHLORIDE, PRESERVATIVE FREE 10 ML: 5 INJECTION INTRAVENOUS at 08:35

## 2025-06-20 RX ADMIN — HEPARIN SODIUM 23 UNITS/KG/HR: 10000 INJECTION, SOLUTION INTRAVENOUS at 22:40

## 2025-06-20 RX ADMIN — MIDODRINE HYDROCHLORIDE 10 MG: 5 TABLET ORAL at 20:52

## 2025-06-20 RX ADMIN — MIDODRINE HYDROCHLORIDE 10 MG: 5 TABLET ORAL at 08:34

## 2025-06-20 RX ADMIN — HYDROCORTISONE SODIUM SUCCINATE 50 MG: 100 INJECTION, POWDER, FOR SOLUTION INTRAMUSCULAR; INTRAVENOUS at 22:30

## 2025-06-20 RX ADMIN — SODIUM CHLORIDE, PRESERVATIVE FREE 10 ML: 5 INJECTION INTRAVENOUS at 20:53

## 2025-06-20 RX ADMIN — HYDROCORTISONE SODIUM SUCCINATE 50 MG: 100 INJECTION, POWDER, FOR SOLUTION INTRAMUSCULAR; INTRAVENOUS at 14:32

## 2025-06-20 RX ADMIN — PANTOPRAZOLE SODIUM 40 MG: 40 INJECTION, POWDER, FOR SOLUTION INTRAVENOUS at 08:35

## 2025-06-20 ASSESSMENT — PAIN SCALES - GENERAL
PAINLEVEL_OUTOF10: 3
PAINLEVEL_OUTOF10: 0

## 2025-06-20 ASSESSMENT — PAIN DESCRIPTION - LOCATION: LOCATION: ARM;SHOULDER

## 2025-06-20 ASSESSMENT — PAIN DESCRIPTION - ORIENTATION: ORIENTATION: RIGHT

## 2025-06-20 ASSESSMENT — PAIN DESCRIPTION - DESCRIPTORS: DESCRIPTORS: ACHING;SORE

## 2025-06-20 NOTE — PROGRESS NOTES
[facility] Progress Note           Subjective/   74 y.o. year old male who we are seeing in consultation for AMARILYS/CKD stage 3b.     HPI:  Renal function stable at lower levels, non-oliguric.  Bladder scan with 941 ml on 6/16, baeza placed, having some pink-colored urine after baeza exchange.  BP's low, transferred to ICU on 6/16, persists despite fluids, pressors, though now off pressors from 6/20.    ROS:  +weak, intake improving, c/o some shortness of breath, on 2 L NC.    Objective/   GEN:  Chronically ill, BP (!) 127/57   Pulse 96   Temp 98.4 °F (36.9 °C) (Bladder)   Resp 21   Ht 1.778 m (5' 10\")   Wt 96.9 kg (213 lb 10 oz)   SpO2 98%   BMI 30.65 kg/m²   HEENT: non-icteric, no JVD  CV: S1, S2, without m/r/g; no LE edema  RESP: CTA B without w/r/r; breathing wnl  ABD: +bs, soft, nt, no hsm  SKIN: warm, no rashes    Data/  Recent Labs     06/18/25  0450 06/18/25  1623 06/19/25  0500 06/19/25  1350 06/20/25  0000 06/20/25  0410   WBC 8.1  --  3.5*  --   --  2.4*   HGB 7.7*   < > 7.4* 6.9* 7.2* 7.4*   HCT 23.0*   < > 21.4* 20.0* 21.0* 21.5*   MCV 95.8  --  94.5  --   --  94.0     --  249  --   --  271    < > = values in this interval not displayed.     Recent Labs     06/18/25  0450 06/19/25  0500 06/20/25  0410    136 138   K 3.7 4.0 3.5    101 103   CO2 20* 20* 19*   GLUCOSE 133* 246* 153*   PHOS 1.7* 2.4* 2.8   MG 1.94 1.85 1.76*   BUN 26* 26* 29*   CREATININE 2.8* 2.9* 3.1*   LABGLOM 23* 22* 20*     UA large blood, >300 protein, s.g. 1.015, >100 wbc's, 21-50 rbc's  Urine sodium 90  Urine chloride 59    CT A/P ->   1. Bilateral ureteral stents are seen. There is mild right hydronephrosis. Ureteral cysts are suboptimally visualized without contrast, it could be further characterized via ultrasound. The bladder is underdistended, obscuring potential bladder mass   lesion. There is a Baeza catheter in place.     2. Diverticulosis      Assessment/     - Acute kidney

## 2025-06-20 NOTE — PROGRESS NOTES
Physical Therapy  Facility/Department: [facility] C2 CARD TELEMETRY  Physical Therapy Initial Assessment/Treatment    Name: Alfredo Chong  : 1950  MRN: [id number]  Date of Service: 2025    Discharge Recommendations:  Subacute/Skilled Nursing Facility, Therapy recommended at discharge   PT Equipment Recommendations  Equipment Needed: No  Other: defer      Therapy discharge recommendations are subject to collaboration from the patient’s interdisciplinary healthcare team, including MD and case management recommendations.    Barriers to Home Discharge:   [x] Steps to access home entry or bed/bath:   [x] Unable to transfer, ambulate, or propel wheelchair household distances without assist   [] Limited available assist at home upon discharge    [] Patient or family requests d/c to post-acute facility    [] Poor cognition/safety awareness for d/c to home alone    [] Unable to maintain ordered weight bearing status    [x] Patient with salient signs of long-standing immobility   [x] Decreased independence with ADLs   [x] Increased risk for falls   [] Other:    If pt is unable to be seen after this session, please let this note serve as discharge summary.  Please see case management note for discharge disposition.  Thank you.    Patient Diagnosis(es): The primary encounter diagnosis was Dizziness. Diagnoses of Hypotension, unspecified hypotension type, Acute kidney injury superimposed on chronic kidney disease, Elevated brain natriuretic peptide (BNP) level, Troponin level elevated, New onset atrial fibrillation (HCC), Paroxysmal atrial fibrillation (HCC), and Rapid atrial fibrillation (HCC) were also pertinent to this visit.  Past Medical History:  has a past medical history of Actinic keratosis, Allergic rhinitis, CAD (coronary artery disease), Cancer (HCC), Chronic uveitis, bilateral, History of blood transfusion, [place] (hard of hearing), Hyperlipidemia, Hypertension, MI (myocardial infarction) (HCC), Osteoarthritis,

## 2025-06-20 NOTE — PROGRESS NOTES
Shift: [id number]    Reason for ICU Admission: Hypotension/Sepsis    Most recent vitals: BP (!) 113/53   Pulse (!) 101   Temp 98.4 °F (36.9 °C) (Bladder)   Resp 28   Ht 1.778 m (5' 10\")   Wt 96.9 kg (213 lb 10 oz)   SpO2 96%   BMI 30.65 kg/m²      Drip rates at handoff:    dextrose      VASOpressin Stopped (06/19/25 1547)    norepinephrine 4 mcg/min (06/20/25 0400)    sodium chloride 5 mL/hr at 06/20/25 0400    heparin (PORCINE) Infusion 23 Units/kg/hr (06/20/25 0503)       Current O2:   [] Room Air   [x] NC  2L   [] BiPaP    [] Vented  % Fi02,  Peep    Hospital Course:  ICU Day # 1 (6/16)  - Admitted to ICU for hypotension  - Started on sven, added vaso overnight  - Started on sodium bicarb drip  - Placed baeza catheter for retention (history of bladder cancer) 1L came out  - UOP = 1150 mL  - Large, dark bowel movements overnight, fecal occult sent/pending     ICU Day #2 (6/17)  - Vaso D/C'd  - Levo started and titrated to 9  - Sven titrated up to 175 at max during shift. Titrated down to 100 by end of shift  - Port accessed d/t multiple pressors  - Heparin restarted to anticoagulate in presence of new onset A-fib  - Heparin bolus and rate change d/t non-therapeutic Xa  - Liquid diet ordered.   - Phos replaced.     ICU Day #2 Nights  - levo/sven weaned as possible  - 1100 UOP   - Heparin bolus x 2    Hospital Day #3/ICU Day #2  6/18/25 Days:  vaso restarted & sven off; febrile 102; poor oral intake due to nausea/retching/couch; phos replaced; prn robitussin ordered; febrile; cultures ordered; antibiotics broadened; steroid given     ICU Day #3 6/18 Nights:  - heparin 2000U bolus given, therapeutic x 1  - 800 UOP   - blood pressure medications continued  - fecal management system inserted     ICU Day #4 6/19  Days  -weaned pressors, Levo only  -Felt better this am.  -Advanced diet  -IV steroids.   -No Bms  -No fever    ICU Nights #4 (6/19/25)  - Remains on heparin gtt  - Remains low dose Levo gtt  - H&H

## 2025-06-20 NOTE — PLAN OF CARE
Problem: Chronic Conditions and Co-morbidities  Goal: Patient's chronic conditions and co-morbidity symptoms are monitored and maintained or improved  6/20/2025 0012 by Julia Francis RN  Outcome: Progressing  6/19/2025 1541 by Dee Boyd RN  Outcome: Progressing     Problem: Discharge Planning  Goal: Discharge to home or other facility with appropriate resources  6/20/2025 0012 by Julia Francis RN  Outcome: Progressing  6/19/2025 1541 by Dee Boyd RN  Outcome: Progressing     Problem: Safety - Adult  Goal: Free from fall injury  6/20/2025 0012 by Julia Francis RN  Outcome: Progressing  6/19/2025 1541 by Dee Boyd RN  Outcome: Progressing     Problem: Skin/Tissue Integrity  Goal: Skin integrity remains intact  Description: 1.  Monitor for areas of redness and/or skin breakdown  2.  Assess vascular access sites hourly  3.  Every 4-6 hours minimum:  Change oxygen saturation probe site  4.  Every 4-6 hours:  If on nasal continuous positive airway pressure, respiratory therapy assess nares and determine need for appliance change or resting period  6/20/2025 0012 by Julia Francis RN  Outcome: Progressing  Flowsheets (Taken 6/19/2025 2000)  Skin Integrity Remains Intact: Monitor for areas of redness and/or skin breakdown  6/19/2025 1541 by Dee Boyd RN  Outcome: Progressing     Problem: ABCDS Injury Assessment  Goal: Absence of physical injury  6/20/2025 0012 by Julia Francis RN  Outcome: Progressing  6/19/2025 1541 by Dee Boyd RN  Outcome: Progressing     Problem: Pain  Goal: Verbalizes/displays adequate comfort level or baseline comfort level  6/20/2025 0012 by Julia Francis RN  Outcome: Progressing  6/19/2025 1541 by Dee Boyd RN  Outcome: Progressing

## 2025-06-20 NOTE — PROGRESS NOTES
[facility]     Electrophysiology                                     Progress Note    Admission date:  6/15/2025    Reason for follow up visit: AF    HPI/CC: Alfredo Chong was admitted on 6/15/2025 with sepsis secondary to likely UTI. EP following for rapid AF. On 2025, patient was transferred to the ICU for worsening hypotension.  IV heparin was started however this was briefly discontinued secondary to worsening anemia. Heparin was resumed today.    Has also been treated for AMARILYS/CKD. He is on Sven-synephrine, Levophed and vasopressin. On 2025 around noon, he went back into rate controlled AF. Rhythm has remained AF with rates around 100.     Subjective: He complains of some nausea and fatigue.    Vitals:  Blood pressure (!) 117/59, pulse 98, temperature 98.2 °F (36.8 °C), temperature source Bladder, resp. rate 24, height 1.778 m (5' 10\"), weight 96.9 kg (213 lb 10 oz), SpO2 98%.  Temp  Av.5 °F (36.9 °C)  Min: 98 °F (36.7 °C)  Max: 98.9 °F (37.2 °C)  Pulse  Av.1  Min: 83  Max: 123  BP  Min: 97/47  Max: 127/57  SpO2  Av.5 %  Min: 87 %  Max: 100 %    24 hour I/O    Intake/Output Summary (Last 24 hours) at 2025 0916  Last data filed at 2025 0600  Gross per 24 hour   Intake 1324.58 ml   Output 1235 ml   Net 89.58 ml     Current Facility-Administered Medications   Medication Dose Route Frequency Provider Last Rate Last Admin    calcium gluconate 2,000 mg in sodium chloride 100 mL  2,000 mg IntraVENous Once Ramiro Moreno DO 50 mL/hr at 25 0838 2,000 mg at 25 0838    magnesium oxide (MAG-OX) tablet 400 mg  400 mg Oral Daily Ramiro Moreno DO   400 mg at 25 0834    insulin lispro (HUMALOG,ADMELOG) injection vial 0-8 Units  0-8 Units SubCUTAneous 4x Daily AC & HS Arthur Kathleen MD   2 Units at 25 1739    glucose chewable tablet 16 g  4 tablet Oral PRN Arthur Kathleen MD        dextrose bolus 10% 125 mL  125 mL IntraVENous  components found for: \"PTT\"   Lab Results   Component Value Date/Time    MG 1.76 06/20/2025 04:10 AM      Lab Results   Component Value Date/Time    TSH 0.69 02/13/2023 11:20 AM       Assessment:  Paroxysmal atrial fibrillation: ongoing   -[id number] score 5 (age, HTN, CHF, vasacular disease)   Sinus tachycardia  Frequent PACs  First degree AVB   HFrEF  CAD s/p CABG   HTN: hypotensive this admission requiring vasopressor support   HLD   Moderate AS   JOSE: ongoing   AMARILYS/CKD   Lactic acidosis  Sepsis  UTI  Advanced bladder cancer on immunotherapy   Fever  Anemia: ongoing    Plan:   1. Continue to closely monitor on telemetry   2. Options for rate control are limited given vasopressor use and renal function. Will have to allow for some permissive tachycardia given critical illness.  3. Continue IV heparin for now.  Hemoglobin has improved. This morning. Continue to monitor for appropriateness. Patient and spouse understand stroke risk should anticoagulation be held. Unclear etiology for ongoing anemia.   4. EP will follow peripherally over the weekend. Please reach out with any needs.         Zaira Westbrook, APRN-CNP  [facility]  [phone]

## 2025-06-20 NOTE — PROGRESS NOTES
Pulmonary & Critical Care Medicine ICU Progress Note      Events of Last 24 hours:   Pt pressor requirements are off. He is still having some confusion.       Invasive Lines: PICC D#None   CVC D#none  Art Line D#None            Vitals:  BP (!) 127/57   Pulse 96   Temp 98.4 °F (36.9 °C) (Bladder)   Resp 21   Ht 1.778 m (5' 10\")   Wt 96.9 kg (213 lb 10 oz)   SpO2 98%   BMI 30.65 kg/m²    Tmax:  CVP:        Intake/Output Summary (Last 24 hours) at 6/20/2025 0738  Last data filed at 6/20/2025 0600  Gross per 24 hour   Intake 1324.58 ml   Output 1235 ml   Net 89.58 ml       EXAM:  Physical Exam  Constitutional:       Appearance: He is ill-appearing.   HENT:      Head: Normocephalic and atraumatic.      Nose: Nose normal.      Mouth/Throat:      Pharynx: No oropharyngeal exudate.   Eyes:      General: No scleral icterus.        Right eye: No discharge.         Left eye: No discharge.   Cardiovascular:      Rate and Rhythm: Normal rate.      Heart sounds: No murmur heard.     No gallop.   Pulmonary:      Effort: Pulmonary effort is normal. No respiratory distress.      Breath sounds: Rales present. No wheezing.   Abdominal:      General: Abdomen is flat. Bowel sounds are normal. There is no distension.      Tenderness: There is no abdominal tenderness.   Musculoskeletal:         General: No swelling.      Cervical back: Normal range of motion.   Skin:     General: Skin is warm and dry.   Neurological:      Mental Status: He is alert and oriented to person, place, and time.          Medications:  Scheduled Meds:   insulin lispro  0-8 Units SubCUTAneous 4x Daily AC & HS    pantoprazole  40 mg IntraVENous Daily    meropenem  500 mg IntraVENous Q12H    hydrocortisone sodium succinate PF  100 mg IntraVENous Q8H    vancomycin (VANCOCIN) intermittent dosing (placeholder)   Other RX Placeholder    mupirocin   Each Nostril BID    levothyroxine  75 mcg Oral Daily    cetirizine  10 mg Oral Daily    sodium chloride flush   6/15/2025  1. No acute cardiopulmonary abnormalities. Electronically signed by Adrián Ayala MD        ASSESSMENT:  Septic Shock  UTI  A-fib with RVR  HFrEF  Normocytic Anemia  AMARILYS on CKD  NAGMA  Elevated BNP     Plan:   - Pt AOx3, family member at bedside says he is having some confusion. Will monitor closely. CT head w/o con on admission negative for acute abnormalities  - No pressors, decrease Solucortef to 50mg IV q8h, keep goal MAP > 65 or SBP > 90. Cont midodrine  - Lactic acid still elevated, cont to trend  - EP following for A-fib with RVR, they are considering cardioversion when patient is more medically stable  - Supplemental O2 therapy with goal saturation 88-92%, he is on RA  - advance diet as tolerated  - Protonix qd  - Gordon   - Replace electrolytes PRN, strict I&O's, daily wt  - Stop Vancomycin, cont Merrem for full 5 day course, blood cx NGTD x 2  - Keep blood glucose 140-180, SSl if needed  - H/H q12h, cont Heparin gtt     Ppx/Gordon/Lines  - PIV, port  - Gordon  - Protonix BID, Heparin gtt for A-fib/DVT ppx    Critical care time spent reviewing labs/films, examining patient, collaborating with other physicians but excluding procedures for life threatening organ failure is 42 minutes.        Electronically signed by:  Arthur Kathleen MD    6/20/2025    7:38 AM.

## 2025-06-20 NOTE — CARE COORDINATION
Chart reviewed. LOS day # 5 . Admitted as inpatient. Followed by IM, Nephro, EP, Pulm. Off pressors this AM. Remains on hep. Gtt. Hgb trending up. Lactic trending down. On IV abx/steroids. Pt is from home w. SO. Therapy eval when approp. Active w/ [facility]. Follow.

## 2025-06-20 NOTE — PROGRESS NOTES
[facility] Medicine Progress Note  V 5.17      Date of Admission: 6/15/2025    Hospital Day: 6      Chief Admission Complaint: Falls    Subjective: Patient continues to feel well today with no complaints.  He is going to try and eat some breakfast this morning.  Denies any chest pain, shortness of breath, palpitations or constipation     Presenting Admission History:       Patient is a 74-year-old male with a past medical history of CAD, advanced bladder cancer undergoing active immunotherapy, hypertension, thyroid disease who presents for low blood pressure and falls. He states that he has had ongoing issues with his blood pressure since his last admission. He states last night he fell down and was unable to get up. He states that his blood pressure ranged from the 40s to the 80s. He is endorsing lightheadedness. He denies any chest pain or shortness of breath. On arrival to the ED he was found to be tachycardic and hypotensive. ED workup was remarkable for an elevated creatinine, lactic acidosis, elevated troponin, elevated BNP, leukocytosis. We were consulted for admission.     Assessment/Plan:      Undifferentiated shock  Hypotension  - Likely septic, less likely hypovolemic and low likelihood for cardiogenic  - Patient fell prior to arriving in the ED due to lightheadedness  - Patient has had chronic low blood pressure  - CT head without contrast showed  - Brain atrophy  - Ischemic changes and chronic small vessel disease with old basal ganglia lunar ischemic infarcts  - No acute intracranial hemorrhage  - On admission BP was 82/61  - Patient was on midodrine 5 mg at home has been increased to 10 mg 3 times daily  - Serial lactic acid shows LA of 3.0 on admission.   - Hypocalcemia 5.5. 6/20  Actively being repleted with calcium gluconate  - Arterial line has been placed  - New oxygen requirements with 2 L nasal cannula  - Nephrology consulted appreciate recommendation  - Trial of fluids  - Continue with

## 2025-06-20 NOTE — PROGRESS NOTES
Occupational Therapy  Facility/Department: [facility] C2 CARD TELEMETRY  Occupational Therapy Initial Assessment and Treatment Note     Name: Alfredo Chong  : 1950  MRN: [id number]  Date of Service: 2025    Discharge Recommendations:  Subacute/Skilled Nursing Facility      Therapy discharge recommendations are subject to collaboration from the patient’s interdisciplinary healthcare team, including MD and case management recommendations.    Barriers to Home Discharge:   [x] Steps to access home entry or bed/bath:   [x] Unable to transfer, ambulate, or propel wheelchair household distances without assist   [] Limited available assist at home upon discharge    [] Patient or family requests d/c to post-acute facility    [] Poor cognition/safety awareness for d/c to home alone    [] Unable to maintain ordered weight bearing status    [x] Patient is at risk for immobility   [x] Decreased independence with ADLs   [x] Increased risk for falls   [] Other:    If pt is unable to be seen after this session, please let this note serve as discharge summary.  Please see case management note for discharge disposition.  Thank you.    Patient Diagnosis(es): The primary encounter diagnosis was Dizziness. Diagnoses of Hypotension, unspecified hypotension type, Acute kidney injury superimposed on chronic kidney disease, Elevated brain natriuretic peptide (BNP) level, Troponin level elevated, New onset atrial fibrillation (HCC), Paroxysmal atrial fibrillation (HCC), and Rapid atrial fibrillation (HCC) were also pertinent to this visit.  Past Medical History:  has a past medical history of Actinic keratosis, Allergic rhinitis, CAD (coronary artery disease), Cancer (HCC), Chronic uveitis, bilateral, History of blood transfusion, [place] (hard of hearing), Hyperlipidemia, Hypertension, MI (myocardial infarction) (HCC), Osteoarthritis, Thyroid disease, and Uveitis of both eyes.  Past Surgical History:  has a past surgical history that  Devices  Type of Devices: Bed alarm in place;Call light within reach;Nurse notified;Heels elevated for pressure relief;Left in bed  Bed Mobility Training  Bed Mobility Training: Yes  Rolling: Substantial/Maximal assistance  Supine to Sit: Partial/Moderate assistance;2 Person assistance  Sit to Supine: Substantial/Maximal assistance;2 Person assistance  Scooting: Substantial/Maximal assistance;2 Person assistance (to HOB)  Balance  Sitting: With support (Pt was hypotensive once seated EOB ()  Transfer Training  Transfer Training: No (Did not perform d/t hypotension (76/64). Pt assisted to supine. RN immediately notified of BP and came into room to assess pt.)     AROM: Generally decreased, functional  Strength: Generally decreased, functional  Coordination: Generally decreased, functional  Tone: Normal  Sensation: Intact  ADL  UE Dressing: Maximum assistance  UE Dressing Skilled Clinical Factors: gown mgmt  Putting On/Taking Off Footwear: Dependent/Total  Putting On/Taking Off Footwear Skilled Clinical Factors: socks  Toileting: Dependent/Total  Toileting Skilled Clinical Factors: Gordon and rectal tube     Activity Tolerance  Activity Tolerance: Treatment limited secondary to medical complications (Hypotension)      Vision  Vision: Impaired  Vision Exceptions: Wears glasses for reading  Hearing  Hearing: Exceptions to WFL  Hearing Exceptions: Right hearing aid;Hard of hearing/hearing concerns (\"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

## 2025-06-21 LAB
ALBUMIN SERPL-MCNC: 2.5 G/DL (ref 3.4–5)
ANION GAP SERPL CALCULATED.3IONS-SCNC: 16 MMOL/L (ref 3–16)
ANION GAP SERPL CALCULATED.3IONS-SCNC: 17 MMOL/L (ref 3–16)
ANTI-XA UNFRAC HEPARIN: 0.61 IU/ML (ref 0.3–0.7)
BUN SERPL-MCNC: 33 MG/DL (ref 7–20)
BUN SERPL-MCNC: 36 MG/DL (ref 7–20)
CA-I BLD-SCNC: 0.78 MMOL/L (ref 1.12–1.32)
CALCIUM SERPL-MCNC: 5.5 MG/DL (ref 8.3–10.6)
CALCIUM SERPL-MCNC: 5.9 MG/DL (ref 8.3–10.6)
CHLORIDE SERPL-SCNC: 101 MMOL/L (ref 99–110)
CHLORIDE SERPL-SCNC: 102 MMOL/L (ref 99–110)
CO2 SERPL-SCNC: 18 MMOL/L (ref 21–32)
CO2 SERPL-SCNC: 19 MMOL/L (ref 21–32)
CREAT SERPL-MCNC: 3.3 MG/DL (ref 0.8–1.3)
CREAT SERPL-MCNC: 3.4 MG/DL (ref 0.8–1.3)
DEPRECATED RDW RBC AUTO: 16.7 % (ref 12.4–15.4)
GFR SERPLBLD CREATININE-BSD FMLA CKD-EPI: 18 ML/MIN/{1.73_M2}
GFR SERPLBLD CREATININE-BSD FMLA CKD-EPI: 19 ML/MIN/{1.73_M2}
GLUCOSE BLD-MCNC: 145 MG/DL (ref 70–99)
GLUCOSE BLD-MCNC: 151 MG/DL (ref 70–99)
GLUCOSE BLD-MCNC: 153 MG/DL (ref 70–99)
GLUCOSE BLD-MCNC: 163 MG/DL (ref 70–99)
GLUCOSE SERPL-MCNC: 137 MG/DL (ref 70–99)
GLUCOSE SERPL-MCNC: 138 MG/DL (ref 70–99)
HCT VFR BLD AUTO: 22.4 % (ref 40.5–52.5)
HCT VFR BLD AUTO: 23.7 % (ref 40.5–52.5)
HGB BLD-MCNC: 7.7 G/DL (ref 13.5–17.5)
HGB BLD-MCNC: 8.1 G/DL (ref 13.5–17.5)
LACTATE BLDV-SCNC: 2.4 MMOL/L (ref 0.4–2)
LACTATE BLDV-SCNC: 3.3 MMOL/L (ref 0.4–2)
MAGNESIUM SERPL-MCNC: 1.92 MG/DL (ref 1.8–2.4)
MAGNESIUM SERPL-MCNC: 1.93 MG/DL (ref 1.8–2.4)
MCH RBC QN AUTO: 32.2 PG (ref 26–34)
MCHC RBC AUTO-ENTMCNC: 34 G/DL (ref 31–36)
MCV RBC AUTO: 94.6 FL (ref 80–100)
PERFORMED ON: ABNORMAL
PH BLDV: 7.43 [PH] (ref 7.35–7.45)
PHOSPHATE SERPL-MCNC: 3.6 MG/DL (ref 2.5–4.9)
PLATELET # BLD AUTO: 284 K/UL (ref 135–450)
PMV BLD AUTO: 8.2 FL (ref 5–10.5)
POTASSIUM SERPL-SCNC: 3.3 MMOL/L (ref 3.5–5.1)
POTASSIUM SERPL-SCNC: 3.4 MMOL/L (ref 3.5–5.1)
RBC # BLD AUTO: 2.51 M/UL (ref 4.2–5.9)
SODIUM SERPL-SCNC: 136 MMOL/L (ref 136–145)
SODIUM SERPL-SCNC: 137 MMOL/L (ref 136–145)
WBC # BLD AUTO: 2.1 K/UL (ref 4–11)

## 2025-06-21 PROCEDURE — 2700000000 HC OXYGEN THERAPY PER DAY

## 2025-06-21 PROCEDURE — 94761 N-INVAS EAR/PLS OXIMETRY MLT: CPT

## 2025-06-21 PROCEDURE — 2580000003 HC RX 258

## 2025-06-21 PROCEDURE — 36415 COLL VENOUS BLD VENIPUNCTURE: CPT

## 2025-06-21 PROCEDURE — 6370000000 HC RX 637 (ALT 250 FOR IP)

## 2025-06-21 PROCEDURE — 85018 HEMOGLOBIN: CPT

## 2025-06-21 PROCEDURE — 2580000003 HC RX 258: Performed by: STUDENT IN AN ORGANIZED HEALTH CARE EDUCATION/TRAINING PROGRAM

## 2025-06-21 PROCEDURE — 6360000002 HC RX W HCPCS: Performed by: STUDENT IN AN ORGANIZED HEALTH CARE EDUCATION/TRAINING PROGRAM

## 2025-06-21 PROCEDURE — 83605 ASSAY OF LACTIC ACID: CPT

## 2025-06-21 PROCEDURE — 85014 HEMATOCRIT: CPT

## 2025-06-21 PROCEDURE — 6370000000 HC RX 637 (ALT 250 FOR IP): Performed by: INTERNAL MEDICINE

## 2025-06-21 PROCEDURE — 85027 COMPLETE CBC AUTOMATED: CPT

## 2025-06-21 PROCEDURE — 6360000002 HC RX W HCPCS

## 2025-06-21 PROCEDURE — 99233 SBSQ HOSP IP/OBS HIGH 50: CPT | Performed by: STUDENT IN AN ORGANIZED HEALTH CARE EDUCATION/TRAINING PROGRAM

## 2025-06-21 PROCEDURE — 2060000000 HC ICU INTERMEDIATE R&B

## 2025-06-21 PROCEDURE — 83735 ASSAY OF MAGNESIUM: CPT

## 2025-06-21 PROCEDURE — 85520 HEPARIN ASSAY: CPT

## 2025-06-21 PROCEDURE — 82330 ASSAY OF CALCIUM: CPT

## 2025-06-21 PROCEDURE — 2500000003 HC RX 250 WO HCPCS

## 2025-06-21 PROCEDURE — 80069 RENAL FUNCTION PANEL: CPT

## 2025-06-21 RX ORDER — CALCIUM CARBONATE 500 MG/1
500 TABLET, CHEWABLE ORAL 3 TIMES DAILY
Status: DISCONTINUED | OUTPATIENT
Start: 2025-06-21 | End: 2025-06-25

## 2025-06-21 RX ORDER — LANOLIN ALCOHOL/MO/W.PET/CERES
400 CREAM (GRAM) TOPICAL DAILY
Status: COMPLETED | OUTPATIENT
Start: 2025-06-21 | End: 2025-06-23

## 2025-06-21 RX ORDER — CALCIUM GLUCONATE 20 MG/ML
2000 INJECTION, SOLUTION INTRAVENOUS ONCE
Status: DISCONTINUED | OUTPATIENT
Start: 2025-06-21 | End: 2025-06-21

## 2025-06-21 RX ORDER — HYDROCORTISONE SODIUM SUCCINATE 100 MG/2ML
50 INJECTION INTRAMUSCULAR; INTRAVENOUS DAILY
Status: COMPLETED | OUTPATIENT
Start: 2025-06-22 | End: 2025-06-24

## 2025-06-21 RX ORDER — CALCIUM GLUCONATE 20 MG/ML
2000 INJECTION, SOLUTION INTRAVENOUS ONCE
Status: COMPLETED | OUTPATIENT
Start: 2025-06-21 | End: 2025-06-21

## 2025-06-21 RX ADMIN — MEROPENEM 500 MG: 500 INJECTION INTRAVENOUS at 17:15

## 2025-06-21 RX ADMIN — MIDODRINE HYDROCHLORIDE 10 MG: 5 TABLET ORAL at 09:39

## 2025-06-21 RX ADMIN — Medication 400 MG: at 09:39

## 2025-06-21 RX ADMIN — CALCIUM CARBONATE 500 MG: 500 TABLET, CHEWABLE ORAL at 15:28

## 2025-06-21 RX ADMIN — HYDROCORTISONE SODIUM SUCCINATE 50 MG: 100 INJECTION, POWDER, FOR SOLUTION INTRAMUSCULAR; INTRAVENOUS at 09:38

## 2025-06-21 RX ADMIN — MIDODRINE HYDROCHLORIDE 10 MG: 5 TABLET ORAL at 20:30

## 2025-06-21 RX ADMIN — ACETAMINOPHEN 650 MG: 325 TABLET ORAL at 20:30

## 2025-06-21 RX ADMIN — MIDODRINE HYDROCHLORIDE 10 MG: 5 TABLET ORAL at 15:28

## 2025-06-21 RX ADMIN — POTASSIUM BICARBONATE 40 MEQ: 782 TABLET, EFFERVESCENT ORAL at 17:17

## 2025-06-21 RX ADMIN — CALCIUM GLUCONATE 2000 MG: 20 INJECTION, SOLUTION INTRAVENOUS at 09:55

## 2025-06-21 RX ADMIN — SODIUM CHLORIDE, PRESERVATIVE FREE 10 ML: 5 INJECTION INTRAVENOUS at 09:46

## 2025-06-21 RX ADMIN — MEROPENEM 500 MG: 500 INJECTION INTRAVENOUS at 06:01

## 2025-06-21 RX ADMIN — PANTOPRAZOLE SODIUM 40 MG: 40 INJECTION, POWDER, FOR SOLUTION INTRAVENOUS at 09:38

## 2025-06-21 RX ADMIN — LEVOTHYROXINE SODIUM 75 MCG: 0.05 TABLET ORAL at 09:39

## 2025-06-21 RX ADMIN — ACETAMINOPHEN 650 MG: 325 TABLET ORAL at 09:39

## 2025-06-21 RX ADMIN — CALCIUM GLUCONATE 4000 MG: 98 INJECTION, SOLUTION INTRAVENOUS at 15:28

## 2025-06-21 RX ADMIN — CALCIUM CARBONATE 500 MG: 500 TABLET, CHEWABLE ORAL at 20:30

## 2025-06-21 RX ADMIN — CETIRIZINE HYDROCHLORIDE 10 MG: 10 TABLET, FILM COATED ORAL at 09:39

## 2025-06-21 RX ADMIN — HEPARIN SODIUM 23 UNITS/KG/HR: 10000 INJECTION, SOLUTION INTRAVENOUS at 12:54

## 2025-06-21 ASSESSMENT — PAIN DESCRIPTION - LOCATION
LOCATION: OTHER (COMMENT)
LOCATION: GENERALIZED
LOCATION: GENERALIZED

## 2025-06-21 ASSESSMENT — PAIN SCALES - GENERAL
PAINLEVEL_OUTOF10: 5
PAINLEVEL_OUTOF10: 4
PAINLEVEL_OUTOF10: 0
PAINLEVEL_OUTOF10: 4
PAINLEVEL_OUTOF10: 0

## 2025-06-21 NOTE — PLAN OF CARE
Problem: Chronic Conditions and Co-morbidities  Goal: Patient's chronic conditions and co-morbidity symptoms are monitored and maintained or improved  6/21/2025 1553 by Jack Sahu RN  Outcome: Progressing  6/21/2025 0502 by Reuben Foley RN  Outcome: Progressing  Flowsheets (Taken 6/20/2025 2000)  Care Plan - Patient's Chronic Conditions and Co-Morbidity Symptoms are Monitored and Maintained or Improved:   Monitor and assess patient's chronic conditions and comorbid symptoms for stability, deterioration, or improvement   Collaborate with multidisciplinary team to address chronic and comorbid conditions and prevent exacerbation or deterioration   Update acute care plan with appropriate goals if chronic or comorbid symptoms are exacerbated and prevent overall improvement and discharge     Problem: Discharge Planning  Goal: Discharge to home or other facility with appropriate resources  6/21/2025 1553 by Jack Sahu RN  Outcome: Progressing  6/21/2025 0502 by Reuben Foley RN  Outcome: Progressing  Flowsheets (Taken 6/20/2025 2000)  Discharge to home or other facility with appropriate resources:   Identify barriers to discharge with patient and caregiver   Arrange for needed discharge resources and transportation as appropriate   Identify discharge learning needs (meds, wound care, etc)   Refer to discharge planning if patient needs post-hospital services based on physician order or complex needs related to functional status, cognitive ability or social support system     Problem: Safety - Adult  Goal: Free from fall injury  6/21/2025 1553 by Jack Sahu RN  Outcome: Progressing  6/21/2025 0502 by Reuben Foley RN  Outcome: Progressing     Problem: Skin/Tissue Integrity  Goal: Skin integrity remains intact  Description: 1.  Monitor for areas of redness and/or skin breakdown  2.  Assess vascular access sites hourly  3.  Every 4-6 hours minimum:  Change oxygen saturation probe site  4.  Every 4-6  Independent Practitioner if unable to irrigate     Problem: Metabolic/Fluid and Electrolytes - Adult  Goal: Electrolytes maintained within normal limits  6/21/2025 1553 by Jakc Sahu RN  Outcome: Progressing  6/21/2025 0502 by Reuben Foley RN  Outcome: Progressing  Flowsheets (Taken 6/20/2025 2000)  Electrolytes maintained within normal limits:   Monitor labs and assess patient for signs and symptoms of electrolyte imbalances   Administer electrolyte replacement as ordered   Monitor response to electrolyte replacements, including repeat lab results as appropriate   Fluid restriction as ordered   Instruct patient on fluid and nutrition restrictions as appropriate  Goal: Hemodynamic stability and optimal renal function maintained  6/21/2025 1553 by Jack Sahu RN  Outcome: Progressing  6/21/2025 0502 by Reuben Foley RN  Outcome: Progressing  Flowsheets (Taken 6/20/2025 2000)  Hemodynamic stability and optimal renal function maintained:   Monitor labs and assess for signs and symptoms of volume excess or deficit   Monitor intake, output and patient weight   Monitor urine specific gravity, serum osmolarity and serum sodium as indicated or ordered   Monitor response to interventions for patient's volume status, including labs, urine output, blood pressure (other measures as available)   Encourage oral intake as appropriate   Instruct patient on fluid and nutrition restrictions as appropriate  Goal: Glucose maintained within prescribed range  6/21/2025 1553 by Jack Sahu RN  Outcome: Progressing  6/21/2025 0502 by Reuben Foley RN  Outcome: Progressing  Flowsheets (Taken 6/20/2025 2000)  Glucose maintained within prescribed range:   Monitor blood glucose as ordered   Assess for signs and symptoms of hyperglycemia and hypoglycemia   Administer ordered medications to maintain glucose within target range   Assess barriers to adequate nutritional intake and initiate nutrition consult as needed

## 2025-06-21 NOTE — PROGRESS NOTES
Patient transferred to room 421 from C2.  VSS. 2L O2.    0500:  Upon skin assessment, patient noted to have multiple areas of skin shearing (Bilateral axilla, L inner groin) possibly due to moisture. Stage II on buttocks.   Interdry applied to bilateral axilla and groin sites. Buttocks KEEGAN.   Wound care consult placed.

## 2025-06-21 NOTE — PLAN OF CARE
Problem: Chronic Conditions and Co-morbidities  Goal: Patient's chronic conditions and co-morbidity symptoms are monitored and maintained or improved  Outcome: Progressing  Flowsheets (Taken 6/20/2025 2000)  Care Plan - Patient's Chronic Conditions and Co-Morbidity Symptoms are Monitored and Maintained or Improved:   Monitor and assess patient's chronic conditions and comorbid symptoms for stability, deterioration, or improvement   Collaborate with multidisciplinary team to address chronic and comorbid conditions and prevent exacerbation or deterioration   Update acute care plan with appropriate goals if chronic or comorbid symptoms are exacerbated and prevent overall improvement and discharge     Problem: Discharge Planning  Goal: Discharge to home or other facility with appropriate resources  Outcome: Progressing  Flowsheets (Taken 6/20/2025 2000)  Discharge to home or other facility with appropriate resources:   Identify barriers to discharge with patient and caregiver   Arrange for needed discharge resources and transportation as appropriate   Identify discharge learning needs (meds, wound care, etc)   Refer to discharge planning if patient needs post-hospital services based on physician order or complex needs related to functional status, cognitive ability or social support system     Problem: Safety - Adult  Goal: Free from fall injury  Outcome: Progressing     Problem: Skin/Tissue Integrity  Goal: Skin integrity remains intact  Description: 1.  Monitor for areas of redness and/or skin breakdown  2.  Assess vascular access sites hourly  3.  Every 4-6 hours minimum:  Change oxygen saturation probe site  4.  Every 4-6 hours:  If on nasal continuous positive airway pressure, respiratory therapy assess nares and determine need for appliance change or resting period  Outcome: Progressing  Flowsheets (Taken 6/20/2025 2000)  Skin Integrity Remains Intact:   Monitor for areas of redness and/or skin breakdown

## 2025-06-21 NOTE — PROGRESS NOTES
Shift: [id number]    Reason for ICU Admission: Hypotension/Sepsis    Most recent vitals: /60   Pulse (!) 115   Temp 98.8 °F (37.1 °C) (Bladder)   Resp 26   Ht 1.778 m (5' 10\")   Wt 96.9 kg (213 lb 10 oz)   SpO2 96%   BMI 30.65 kg/m²      Drip rates at handoff:    dextrose      sodium chloride 10 mL/hr at 06/20/25 2118    heparin (PORCINE) Infusion 23 Units/kg/hr (06/20/25 2240)       Current O2:   [] Room Air   [x] NC  2L   [] BiPaP    [] Vented  % Fi02,  Peep    Hospital Course:  ICU Day # 1 (6/16)  - Admitted to ICU for hypotension  - Started on sven, added vaso overnight  - Started on sodium bicarb drip  - Placed baeza catheter for retention (history of bladder cancer) 1L came out  - UOP = 1150 mL  - Large, dark bowel movements overnight, fecal occult sent/pending     ICU Day #2 (6/17)  - Vaso D/C'd  - Levo started and titrated to 9  - Sven titrated up to 175 at max during shift. Titrated down to 100 by end of shift  - Port accessed d/t multiple pressors  - Heparin restarted to anticoagulate in presence of new onset A-fib  - Heparin bolus and rate change d/t non-therapeutic Xa  - Liquid diet ordered.   - Phos replaced.     ICU Day #2 Nights  - levo/sven weaned as possible  - 1100 UOP   - Heparin bolus x 2    Hospital Day #3/ICU Day #2  6/18/25 Days:  vaso restarted & sven off; febrile 102; poor oral intake due to nausea/retching/couch; phos replaced; prn robitussin ordered; febrile; cultures ordered; antibiotics broadened; steroid given     ICU Day #3 6/18 Nights:  - Heparin 2000U bolus given, therapeutic x 1  - 800 UOP   - blood pressure medications continued  - fecal management system inserted     ICU Day #4 6/19  Days  - Weaned pressors, Levo only  - Felt better this am.  - Advanced diet  - IV steroids.   - Vaso off  - No BMs  - No fever    ICU Nights #4 (6/19/25)  - Remains on heparin gtt  - Remains low dose Levo gtt  - H&H stabilizing  - Hemoglobin 7.4 trending up  - Lactic 2.2 trending down  -  Calcium 5.5, replaced yesterday  - New dressing on Port  - UOP: 760 mL    ICU Day #5: Days (06/20)  - Levo off  - Art line out  - Decreased Solu-Cortef  - Stopped Vanc  - Still having Nausea and weakness  - Still in AFib; rate over 100.   - UOP: 650 mL    ICU Day #5: Nights (06/20)  - Lactic is beginning to rise; night MD notified and was instructed to continue to monitor. Lactic: 2.4/2.8  -   - UOP: *** mL

## 2025-06-21 NOTE — PROGRESS NOTES
[facility] Medicine Progress Note  V 5.17      Date of Admission: 6/15/2025    Hospital Day: 7      Chief Admission Complaint: Falls    Subjective: Overall patient feels well today with no complaints.  Denies any chest pain, shortness of breath, palpitations or constipation     Presenting Admission History:       Patient is a 74-year-old male with a past medical history of CAD, advanced bladder cancer undergoing active immunotherapy, hypertension, thyroid disease who presents for low blood pressure and falls. He states that he has had ongoing issues with his blood pressure since his last admission. He states last night he fell down and was unable to get up. He states that his blood pressure ranged from the 40s to the 80s. He is endorsing lightheadedness. He denies any chest pain or shortness of breath. On arrival to the ED he was found to be tachycardic and hypotensive. ED workup was remarkable for an elevated creatinine, lactic acidosis, elevated troponin, elevated BNP, leukocytosis. We were consulted for admission.     Assessment/Plan:      Septic shock-improved  Hypotension-improved  - Patient fell prior to arriving in the ED due to lightheadedness  - Patient has had chronic low blood pressure  - CT head without contrast showed  - Brain atrophy  - Ischemic changes and chronic small vessel disease with old basal ganglia lunar ischemic infarcts  - No acute intracranial hemorrhage  - On admission BP was 82/61  - Patient was on midodrine 5 mg at home has been increased to 10 mg 3 times daily  - Serial lactic acid shows LA of 3.0 on admission.   - Hypocalcemia 5.5.  With a corrected calcium of 6.7 due to low albumin 6/21  Actively being repleted with calcium gluconate.  Will recheck BMP this afternoon  - Arterial line has been removed  - New oxygen requirements with 2 L nasal cannula.  Weaning off while keeping O2 above 92%  - Nephrology consulted appreciate recommendation  - Trial of fluids  - Continue with  midodrine 10 mg p.o. 3 times daily continue pressors  - Pulmonary crit care consult appreciate recommendation  - Patient is off of pressors and has moved out of the ICU  - Trending LA. 3.6-> 3.2-> 2.2->2.8->2.4-> 3.3 6/21  - Protonix qD    Severe sepsis  - On arrival, RR of 20,  bpm, Temp 98.5 F, and WBC of 14.0. Source of infection UTI  - Procal 0.36. LA of 3.0 on admission  - UA showing large blood, protein and leukocytes.  The patient has no symptoms of UTI.  Could potentially be a result of bladder cancer but due to overall picture of septic shock we will treat  - Urine Culture NGTD  - Blood Cultures NGTD  - CXR showing no acute cardiopulmonary abnormalities  - Repeat CXR shows no acute abnormalities  - Received IVF bolus and empiric antibiotics in ED.   - Pulmonary crit care consult appreciate recommendations  - Stop vanc and continue meropenem for full 5-day course last day 6/22  - Continue IV Solu-cortef steroids 50 mg Q8 x 3 more days     Elevated Troponin  - Appears non-ischemic and non-MI in etiology  - No chest pain noted on arrival  - Troponin on arrival 134-> 125  - Likely due to poor kidney function     AMARILYS on CKD 3B  - Likely secondary to hypotension  - Creatinine on admission to 3.1 and GFR of 20.    - Creatinine  3.3 with a GFR of 19 6/21  - Baseline creatinine of 1.5-2  - Nephrology consulted appreciate recommendation  - Trial of fluid since 6/18  - Gordon management     Atrial fibrillation  - EKG on admission shows atrial fibrillation with RVR  - Does not appear that the patient has a history of A-fib  - Cardiology consulted and appreciate their recommendations  - Patient had TTE which showed   - EF of 55 to 60%  - Cannot improve rate control given vasopressor use and renal function  - Continue IV heparin for now.  If hemoglobin continues to decline can stop with plans to resume once hemoglobin is stabilizes    Melena-resolved  Anemia-stable  - Hemoglobin has been stable but low during

## 2025-06-21 NOTE — PROGRESS NOTES
[facility] Progress Note           Subjective/   74 y.o. year old male who we are seeing in consultation for AMARILYS/CKD stage 3b.     HPI:  Renal function slowly worsening, non-oliguric.  Bladder scan with 941 ml on 6/16, baeza placed, having some pink-colored urine after baeza exchange now clearing.  BP's low, transferred to ICU on 6/16, persists despite fluids, pressors, though now off pressors from 6/20.    ROS:  +weak, intake improving, c/o some shortness of breath, on 1-2 L NC.    Objective/   GEN:  Chronically ill, /75   Pulse (!) 145   Temp 97.4 °F (36.3 °C) (Oral)   Resp 28   Ht 1.778 m (5' 10\")   Wt 96.9 kg (213 lb 10 oz)   SpO2 98%   BMI 30.65 kg/m²   HEENT: non-icteric, no JVD  CV: S1, S2, without m/r/g; no LE edema  RESP: CTA B without w/r/r; breathing wnl  ABD: +bs, soft, nt, no hsm  SKIN: warm, no rashes    Data/  Recent Labs     06/19/25  0500 06/19/25  1350 06/20/25  0410 06/20/25  1204 06/21/25  0117 06/21/25  0425   WBC 3.5*  --  2.4*  --   --  2.1*   HGB 7.4*   < > 7.4* 7.9* 7.7* 8.1*   HCT 21.4*   < > 21.5* 23.5* 22.4* 23.7*   MCV 94.5  --  94.0  --   --  94.6     --  271  --   --  284    < > = values in this interval not displayed.     Recent Labs     06/19/25  0500 06/20/25  0410 06/21/25  0425 06/21/25  1323    138 137 136   K 4.0 3.5 3.3* 3.4*    103 102 101   CO2 20* 19* 19* 18*   GLUCOSE 246* 153* 137* 138*   PHOS 2.4* 2.8 3.6  --    MG 1.85 1.76* 1.92 1.93   BUN 26* 29* 33* 36*   CREATININE 2.9* 3.1* 3.3* 3.4*   LABGLOM 22* 20* 19* 18*     UA large blood, >300 protein, s.g. 1.015, >100 wbc's, 21-50 rbc's  Urine sodium 90  Urine chloride 59    CT A/P ->   1. Bilateral ureteral stents are seen. There is mild right hydronephrosis. Ureteral cysts are suboptimally visualized without contrast, it could be further characterized via ultrasound. The bladder is underdistended, obscuring potential bladder mass   lesion. There is a Baeza catheter

## 2025-06-21 NOTE — PROGRESS NOTES
Patient: Alfredo Chong MRN: [id number]  Date of  Admission: 6/15/2025   YOB: 1950  Age: 74 y.o.  Sex: male    [address]  [facility]- Admitting Physician: IMER VELASQUEZ    Attending Physician:  Dhiraj Krishna MD         Pulmonary Service Note      SUBJECTIVE:    Patient still having some confusion.  He is off pressors.        OBJECTIVE    Medications    Continuous Infusions:   dextrose      sodium chloride 10 mL/hr at 06/20/25 2118    heparin (PORCINE) Infusion 23 Units/kg/hr (06/20/25 2240)       Scheduled Meds:   magnesium oxide  400 mg Oral Daily    calcium gluconate  2,000 mg IntraVENous Once    hydrocortisone sodium succinate PF  50 mg IntraVENous Q8H    insulin lispro  0-8 Units SubCUTAneous 4x Daily AC & HS    pantoprazole  40 mg IntraVENous Daily    meropenem  500 mg IntraVENous Q12H    mupirocin   Each Nostril BID    levothyroxine  75 mcg Oral Daily    cetirizine  10 mg Oral Daily    sodium chloride flush  5-40 mL IntraVENous 2 times per day    midodrine  10 mg Oral TID       PRN Meds:  glucose, dextrose bolus **OR** dextrose bolus, glucagon (rDNA), dextrose, benzonatate, guaiFENesin-dextromethorphan, sodium chloride flush, sodium chloride, polyethylene glycol, acetaminophen **OR** acetaminophen, prochlorperazine **OR** prochlorperazine, heparin (porcine), heparin (porcine)    Physical    Patient Vitals for the past 24 hrs:   BP Temp Temp src Pulse Resp SpO2   06/21/25 0855 107/70 97.4 °F (36.3 °C) Oral (!) 136 28 97 %   06/21/25 0445 104/65 97.5 °F (36.4 °C) Oral (!) 115 -- 98 %   06/21/25 0400 108/68 -- -- (!) 101 27 95 %   06/21/25 0300 113/60 -- -- (!) 115 26 97 %   06/21/25 0200 105/68 -- -- (!) 112 30 96 %   06/21/25 0100 110/77 -- -- (!) 114 25 97 %   06/21/25 0000 110/69 -- -- (!) 108 27 99 %   06/20/25 2300 108/67 -- -- (!) 126 30 98 %   06/20/25 2200 115/79 -- -- (!) 108 29 --   06/20/25 2122 -- -- -- (!) 113 25 90 %   06/20/25 2100 98/74 -- -- (!) 112 (!) 31  questions about the content of this document, please contact the author as some errors in transcription may have occurred.

## 2025-06-21 NOTE — PROGRESS NOTES
4 Eyes Skin Assessment     NAME:  Alfredo Chong  YOB: 1950  MEDICAL RECORD NUMBER:  [id number]    The patient is being assessed for  Transfer to New Unit    I agree that at least one RN has performed a thorough Head to Toe Skin Assessment on the patient. ALL assessment sites listed below have been assessed.      Areas assessed by both nurses:    Head, Face, Ears, Shoulders, Back, Chest, and Legs. Feet and Heels        Does the Patient have a Wound? Yes wound(s) were present on assessment. LDA wound assessment was Initiated and completed by RN       Mateus Prevention initiated by RN: Yes  Wound Care Orders initiated by RN: Yes    Pressure Injury (Stage 3,4, Unstageable, DTI, NWPT, and Complex wounds) if present, place Wound referral order by RN under : No    New Ostomies, if present place, Ostomy referral order under : No     Nurse 1 eSignature: Electronically signed by Billie Shaw RN on 6/21/25 at 6:15 AM EDT    **SHARE this note so that the co-signing nurse can place an eSignature**    Nurse 2 eSignature: Electronically signed by Carey Lau RN on 6/21/25 at 6:26 AM EDT

## 2025-06-22 LAB
ALBUMIN SERPL-MCNC: 2.4 G/DL (ref 3.4–5)
ANION GAP SERPL CALCULATED.3IONS-SCNC: 15 MMOL/L (ref 3–16)
ANTI-XA UNFRAC HEPARIN: 0.63 IU/ML (ref 0.3–0.7)
BACTERIA BLD CULT ORG #2: NORMAL
BACTERIA BLD CULT: NORMAL
BUN SERPL-MCNC: 41 MG/DL (ref 7–20)
CALCIUM SERPL-MCNC: 6.8 MG/DL (ref 8.3–10.6)
CHLORIDE SERPL-SCNC: 99 MMOL/L (ref 99–110)
CO2 SERPL-SCNC: 19 MMOL/L (ref 21–32)
CREAT SERPL-MCNC: 3.6 MG/DL (ref 0.8–1.3)
DEPRECATED RDW RBC AUTO: 16.4 % (ref 12.4–15.4)
GFR SERPLBLD CREATININE-BSD FMLA CKD-EPI: 17 ML/MIN/{1.73_M2}
GLUCOSE BLD-MCNC: 113 MG/DL (ref 70–99)
GLUCOSE BLD-MCNC: 121 MG/DL (ref 70–99)
GLUCOSE BLD-MCNC: 123 MG/DL (ref 70–99)
GLUCOSE BLD-MCNC: 123 MG/DL (ref 70–99)
GLUCOSE SERPL-MCNC: 99 MG/DL (ref 70–99)
HCT VFR BLD AUTO: 25.3 % (ref 40.5–52.5)
HGB BLD-MCNC: 8.7 G/DL (ref 13.5–17.5)
MAGNESIUM SERPL-MCNC: 1.95 MG/DL (ref 1.8–2.4)
MCH RBC QN AUTO: 32.4 PG (ref 26–34)
MCHC RBC AUTO-ENTMCNC: 34.6 G/DL (ref 31–36)
MCV RBC AUTO: 93.7 FL (ref 80–100)
PERFORMED ON: ABNORMAL
PHOSPHATE SERPL-MCNC: 4.6 MG/DL (ref 2.5–4.9)
PLATELET # BLD AUTO: 279 K/UL (ref 135–450)
PMV BLD AUTO: 8.9 FL (ref 5–10.5)
POTASSIUM SERPL-SCNC: 3.5 MMOL/L (ref 3.5–5.1)
RBC # BLD AUTO: 2.7 M/UL (ref 4.2–5.9)
SODIUM SERPL-SCNC: 133 MMOL/L (ref 136–145)
WBC # BLD AUTO: 2.4 K/UL (ref 4–11)

## 2025-06-22 PROCEDURE — 6370000000 HC RX 637 (ALT 250 FOR IP)

## 2025-06-22 PROCEDURE — 2500000003 HC RX 250 WO HCPCS: Performed by: INTERNAL MEDICINE

## 2025-06-22 PROCEDURE — 6370000000 HC RX 637 (ALT 250 FOR IP): Performed by: INTERNAL MEDICINE

## 2025-06-22 PROCEDURE — 6360000002 HC RX W HCPCS: Performed by: STUDENT IN AN ORGANIZED HEALTH CARE EDUCATION/TRAINING PROGRAM

## 2025-06-22 PROCEDURE — 36415 COLL VENOUS BLD VENIPUNCTURE: CPT

## 2025-06-22 PROCEDURE — 83735 ASSAY OF MAGNESIUM: CPT

## 2025-06-22 PROCEDURE — 2580000003 HC RX 258: Performed by: STUDENT IN AN ORGANIZED HEALTH CARE EDUCATION/TRAINING PROGRAM

## 2025-06-22 PROCEDURE — 2500000003 HC RX 250 WO HCPCS

## 2025-06-22 PROCEDURE — 2060000000 HC ICU INTERMEDIATE R&B

## 2025-06-22 PROCEDURE — 85027 COMPLETE CBC AUTOMATED: CPT

## 2025-06-22 PROCEDURE — 2580000003 HC RX 258: Performed by: INTERNAL MEDICINE

## 2025-06-22 PROCEDURE — 94761 N-INVAS EAR/PLS OXIMETRY MLT: CPT

## 2025-06-22 PROCEDURE — 85520 HEPARIN ASSAY: CPT

## 2025-06-22 PROCEDURE — 2700000000 HC OXYGEN THERAPY PER DAY

## 2025-06-22 PROCEDURE — 80069 RENAL FUNCTION PANEL: CPT

## 2025-06-22 RX ORDER — 0.9 % SODIUM CHLORIDE 0.9 %
500 INTRAVENOUS SOLUTION INTRAVENOUS ONCE
Status: COMPLETED | OUTPATIENT
Start: 2025-06-22 | End: 2025-06-22

## 2025-06-22 RX ORDER — METOPROLOL TARTRATE 25 MG/1
25 TABLET, FILM COATED ORAL 2 TIMES DAILY
Status: DISCONTINUED | OUTPATIENT
Start: 2025-06-22 | End: 2025-06-28 | Stop reason: HOSPADM

## 2025-06-22 RX ADMIN — HYDROCORTISONE SODIUM SUCCINATE 50 MG: 100 INJECTION, POWDER, FOR SOLUTION INTRAMUSCULAR; INTRAVENOUS at 10:07

## 2025-06-22 RX ADMIN — MEROPENEM 500 MG: 500 INJECTION INTRAVENOUS at 18:24

## 2025-06-22 RX ADMIN — ACETAMINOPHEN 650 MG: 325 TABLET ORAL at 10:06

## 2025-06-22 RX ADMIN — MIDODRINE HYDROCHLORIDE 10 MG: 5 TABLET ORAL at 14:23

## 2025-06-22 RX ADMIN — LEVOTHYROXINE SODIUM 75 MCG: 0.05 TABLET ORAL at 10:06

## 2025-06-22 RX ADMIN — MIDODRINE HYDROCHLORIDE 10 MG: 5 TABLET ORAL at 22:09

## 2025-06-22 RX ADMIN — SODIUM CHLORIDE, PRESERVATIVE FREE 10 ML: 5 INJECTION INTRAVENOUS at 10:19

## 2025-06-22 RX ADMIN — SODIUM BICARBONATE: 84 INJECTION, SOLUTION INTRAVENOUS at 16:10

## 2025-06-22 RX ADMIN — HEPARIN SODIUM 23 UNITS/KG/HR: 10000 INJECTION, SOLUTION INTRAVENOUS at 01:36

## 2025-06-22 RX ADMIN — HEPARIN SODIUM 23 UNITS/KG/HR: 10000 INJECTION, SOLUTION INTRAVENOUS at 14:23

## 2025-06-22 RX ADMIN — CETIRIZINE HYDROCHLORIDE 10 MG: 10 TABLET, FILM COATED ORAL at 10:06

## 2025-06-22 RX ADMIN — CALCIUM CARBONATE 500 MG: 500 TABLET, CHEWABLE ORAL at 10:06

## 2025-06-22 RX ADMIN — MIDODRINE HYDROCHLORIDE 10 MG: 5 TABLET ORAL at 10:06

## 2025-06-22 RX ADMIN — CALCIUM CARBONATE 500 MG: 500 TABLET, CHEWABLE ORAL at 14:23

## 2025-06-22 RX ADMIN — ACETAMINOPHEN 650 MG: 325 TABLET ORAL at 05:31

## 2025-06-22 RX ADMIN — CALCIUM CARBONATE 500 MG: 500 TABLET, CHEWABLE ORAL at 22:09

## 2025-06-22 RX ADMIN — SODIUM CHLORIDE 500 ML: 0.9 INJECTION, SOLUTION INTRAVENOUS at 16:12

## 2025-06-22 RX ADMIN — PANTOPRAZOLE SODIUM 40 MG: 40 INJECTION, POWDER, FOR SOLUTION INTRAVENOUS at 10:07

## 2025-06-22 RX ADMIN — Medication 400 MG: at 10:06

## 2025-06-22 RX ADMIN — MEROPENEM 500 MG: 500 INJECTION INTRAVENOUS at 05:21

## 2025-06-22 RX ADMIN — METOPROLOL TARTRATE 25 MG: 25 TABLET, FILM COATED ORAL at 12:44

## 2025-06-22 ASSESSMENT — PAIN SCALES - GENERAL
PAINLEVEL_OUTOF10: 5
PAINLEVEL_OUTOF10: 4
PAINLEVEL_OUTOF10: 5

## 2025-06-22 ASSESSMENT — PAIN DESCRIPTION - LOCATION: LOCATION: GENERALIZED

## 2025-06-22 NOTE — PROGRESS NOTES
[facility] Progress Note           Subjective/   74 y.o. year old male who we are seeing in consultation for AMARILYS/CKD stage 3b.     HPI:  Renal function slowly worsening, oliguric.  Bladder scan with 941 ml on 6/16, baeza placed, having some pink-colored urine after baeza exchange.  BP's low, transferred to ICU on 6/16, persists despite fluids, pressors, though now off pressors from 6/20.    ROS:  +weak, intake reduced, c/o some shortness of breath, now on RA.    Objective/   GEN:  Chronically ill, /74   Pulse (!) 128   Temp 97.5 °F (36.4 °C)   Resp 24   Ht 1.778 m (5' 10\")   Wt 97 kg (213 lb 13.5 oz)   SpO2 94%   BMI 30.68 kg/m²   HEENT: non-icteric, no JVD  CV: S1, S2, tachycardic, without m/r/g; no LE edema  RESP: CTA B without w/r/r; breathing wnl  ABD: +bs, soft, nt, no hsm  SKIN: warm, no rashes    Data/  Recent Labs     06/20/25  0410 06/20/25  1204 06/21/25  0117 06/21/25  0425 06/22/25  0758   WBC 2.4*  --   --  2.1* 2.4*   HGB 7.4*   < > 7.7* 8.1* 8.7*   HCT 21.5*   < > 22.4* 23.7* 25.3*   MCV 94.0  --   --  94.6 93.7     --   --  284 279    < > = values in this interval not displayed.     Recent Labs     06/20/25  0410 06/21/25  0425 06/21/25  1323 06/22/25  0758    137 136 133*   K 3.5 3.3* 3.4* 3.5    102 101 99   CO2 19* 19* 18* 19*   GLUCOSE 153* 137* 138* 99   PHOS 2.8 3.6  --  4.6   MG 1.76* 1.92 1.93 1.95   BUN 29* 33* 36* 41*   CREATININE 3.1* 3.3* 3.4* 3.6*   LABGLOM 20* 19* 18* 17*     UA large blood, >300 protein, s.g. 1.015, >100 wbc's, 21-50 rbc's  Urine sodium 90  Urine chloride 59    CT A/P ->   1. Bilateral ureteral stents are seen. There is mild right hydronephrosis. Ureteral cysts are suboptimally visualized without contrast, it could be further characterized via ultrasound. The bladder is underdistended, obscuring potential bladder mass   lesion. There is a Baeza catheter in place.     2. Diverticulosis      Assessment/     -

## 2025-06-22 NOTE — CARE COORDINATION
Spoke with pt Sig Other who is at bedside with pt. They agree he will need SNF placement at dc. Will provide SNF list to Sig Other for review.

## 2025-06-22 NOTE — PLAN OF CARE
Problem: Chronic Conditions and Co-morbidities  Goal: Patient's chronic conditions and co-morbidity symptoms are monitored and maintained or improved  Outcome: Progressing     Problem: Discharge Planning  Goal: Discharge to home or other facility with appropriate resources  Outcome: Progressing     Problem: Safety - Adult  Goal: Free from fall injury  Outcome: Progressing     Problem: Skin/Tissue Integrity  Goal: Skin integrity remains intact  Description: 1.  Monitor for areas of redness and/or skin breakdown  2.  Assess vascular access sites hourly  3.  Every 4-6 hours minimum:  Change oxygen saturation probe site  4.  Every 4-6 hours:  If on nasal continuous positive airway pressure, respiratory therapy assess nares and determine need for appliance change or resting period  Outcome: Progressing     Problem: ABCDS Injury Assessment  Goal: Absence of physical injury  Outcome: Progressing     Problem: Pain  Goal: Verbalizes/displays adequate comfort level or baseline comfort level  Outcome: Progressing     Problem: Neurosensory - Adult  Goal: Achieves stable or improved neurological status  Outcome: Progressing     Problem: Respiratory - Adult  Goal: Achieves optimal ventilation and oxygenation  Outcome: Progressing     Problem: Cardiovascular - Adult  Goal: Maintains optimal cardiac output and hemodynamic stability  Outcome: Progressing  Goal: Absence of cardiac dysrhythmias or at baseline  Outcome: Progressing     Problem: Skin/Tissue Integrity - Adult  Goal: Incisions, wounds, or drain sites healing without S/S of infection  Outcome: Progressing     Problem: Musculoskeletal - Adult  Goal: Return mobility to safest level of function  Outcome: Progressing     Problem: Gastrointestinal - Adult  Goal: Minimal or absence of nausea and vomiting  Outcome: Progressing  Goal: Maintains or returns to baseline bowel function  Outcome: Progressing  Goal: Maintains adequate nutritional intake  Outcome: Progressing

## 2025-06-22 NOTE — PLAN OF CARE
Problem: Chronic Conditions and Co-morbidities  Goal: Patient's chronic conditions and co-morbidity symptoms are monitored and maintained or improved  6/21/2025 2231 by Billie Shaw RN  Outcome: Progressing  6/21/2025 1553 by Jack Sahu RN  Outcome: Progressing     Problem: Discharge Planning  Goal: Discharge to home or other facility with appropriate resources  6/21/2025 2231 by Billie Shaw RN  Outcome: Progressing  6/21/2025 1553 by Jack Sahu RN  Outcome: Progressing     Problem: Safety - Adult  Goal: Free from fall injury  6/21/2025 2231 by Billie Shaw RN  Outcome: Progressing  6/21/2025 1553 by Jack Sahu RN  Outcome: Progressing     Problem: Skin/Tissue Integrity  Goal: Skin integrity remains intact  Description: 1.  Monitor for areas of redness and/or skin breakdown  2.  Assess vascular access sites hourly  3.  Every 4-6 hours minimum:  Change oxygen saturation probe site  4.  Every 4-6 hours:  If on nasal continuous positive airway pressure, respiratory therapy assess nares and determine need for appliance change or resting period  6/21/2025 2231 by Billie Shaw RN  Outcome: Progressing  6/21/2025 1553 by Jack Sahu RN  Outcome: Progressing     Problem: ABCDS Injury Assessment  Goal: Absence of physical injury  6/21/2025 2231 by Billie Shaw RN  Outcome: Progressing  6/21/2025 1553 by Jack Sahu RN  Outcome: Progressing     Problem: Pain  Goal: Verbalizes/displays adequate comfort level or baseline comfort level  6/21/2025 2231 by Billie Shaw RN  Outcome: Progressing  6/21/2025 1553 by Jack Sahu RN  Outcome: Progressing     Problem: Neurosensory - Adult  Goal: Achieves stable or improved neurological status  6/21/2025 2231 by Billie Shaw RN  Outcome: Progressing  6/21/2025 1553 by Jack Sahu RN  Outcome: Progressing     Problem: Respiratory - Adult  Goal: Achieves optimal ventilation and oxygenation  6/21/2025 2231 by Colin

## 2025-06-22 NOTE — PROGRESS NOTES
[facility] Medicine Progress Note  V 5.17      Date of Admission: 6/15/2025    Hospital Day: 8      Chief Admission Complaint: Falls    Subjective: Patient resting in bed at time of evaluation. No complaints of chest pain, shortness of breath, palpitations or constipation endorsed.    Presenting Admission History:       Patient is a 74-year-old male with a past medical history of CAD, advanced bladder cancer undergoing active immunotherapy, hypertension, thyroid disease who presents for low blood pressure and falls. He states that he has had ongoing issues with his blood pressure since his last admission. He states last night he fell down and was unable to get up. He states that his blood pressure ranged from the 40s to the 80s. He is endorsing lightheadedness. He denies any chest pain or shortness of breath. On arrival to the ED he was found to be tachycardic and hypotensive. ED workup was remarkable for an elevated creatinine, lactic acidosis, elevated troponin, elevated BNP, leukocytosis. We were consulted for admission.     Assessment/Plan:      Septic shock-improved  Hypotension-improved  - Patient fell prior to arriving in the ED due to lightheadedness  - Patient has had chronic low blood pressure  - CT head without contrast showed  - Brain atrophy  - Ischemic changes and chronic small vessel disease with old basal ganglia lunar ischemic infarcts  - No acute intracranial hemorrhage  - On admission BP was 82/61  - Patient was on midodrine 5 mg at home has been increased to 10 mg 3 times daily  - Serial lactic acid shows LA of 3.0 on admission.   - Hypocalcemia 5.5.  With a corrected calcium of 6.7 due to low albumin 6/21  Actively being repleted with calcium gluconate.  Will recheck BMP this afternoon  - Arterial line has been removed  - New oxygen requirements with 2 L nasal cannula.  Weaning off while keeping O2 above 92%  - Nephrology consulted appreciate recommendation  - Trial of fluids  - Continue  --   --  284     Recent Labs     06/20/25  0410 06/21/25  0425 06/21/25  1323    137 136   K 3.5 3.3* 3.4*    102 101   CO2 19* 19* 18*   BUN 29* 33* 36*   CREATININE 3.1* 3.3* 3.4*   CALCIUM 5.5* 5.5* 5.9*   MG 1.76* 1.92 1.93   PHOS 2.8 3.6  --      No results for input(s): \"PROBNP\", \"TROPHS\" in the last 72 hours.    No results for input(s): \"LABA1C\" in the last 72 hours.  No results for input(s): \"AST\", \"ALT\", \"BILIDIR\", \"BILITOT\", \"ALKPHOS\" in the last 72 hours.    Recent Labs     06/20/25  2235 06/21/25  0426 06/21/25  1021   LACTA 2.8* 2.4* 3.3*       Urine Cultures:   Lab Results   Component Value Date/Time    LABURIN No growth at 18 to 36 hours 06/16/2025 05:26 AM     Blood Cultures:   Lab Results   Component Value Date/Time    BC  06/18/2025 09:26 PM     No Growth to date.  Any change in status will be called.     Lab Results   Component Value Date/Time    BLOODCULT2  06/18/2025 09:26 PM     No Growth to date.  Any change in status will be called.     Organism:   Lab Results   Component Value Date/Time    ORG Staphylococcus lugdunensis 12/18/2024 03:55 PM         Dejuan Giron DO

## 2025-06-23 LAB
ALBUMIN SERPL-MCNC: 2.2 G/DL (ref 3.4–5)
ANION GAP SERPL CALCULATED.3IONS-SCNC: 14 MMOL/L (ref 3–16)
ANTI-XA UNFRAC HEPARIN: 0.66 IU/ML (ref 0.3–0.7)
BILIRUB UR QL STRIP.AUTO: ABNORMAL
BUN SERPL-MCNC: 44 MG/DL (ref 7–20)
CALCIUM SERPL-MCNC: 7.1 MG/DL (ref 8.3–10.6)
CHARACTER UR: ABNORMAL
CHLORIDE SERPL-SCNC: 98 MMOL/L (ref 99–110)
CLARITY UR: CLEAR
CO2 SERPL-SCNC: 22 MMOL/L (ref 21–32)
COARSE GRAN CASTS #/AREA URNS LPF: ABNORMAL /LPF (ref 0–2)
COLOR UR: ABNORMAL
CREAT SERPL-MCNC: 3.7 MG/DL (ref 0.8–1.3)
CREAT UR-MCNC: 67.3 MG/DL (ref 39–259)
DEPRECATED RDW RBC AUTO: 16.8 % (ref 12.4–15.4)
GFR SERPLBLD CREATININE-BSD FMLA CKD-EPI: 16 ML/MIN/{1.73_M2}
GLUCOSE BLD-MCNC: 109 MG/DL (ref 70–99)
GLUCOSE BLD-MCNC: 113 MG/DL (ref 70–99)
GLUCOSE BLD-MCNC: 114 MG/DL (ref 70–99)
GLUCOSE BLD-MCNC: 93 MG/DL (ref 70–99)
GLUCOSE SERPL-MCNC: 95 MG/DL (ref 70–99)
GLUCOSE UR STRIP.AUTO-MCNC: NEGATIVE MG/DL
HCT VFR BLD AUTO: 25.1 % (ref 40.5–52.5)
HGB BLD-MCNC: 8.7 G/DL (ref 13.5–17.5)
HGB UR QL STRIP.AUTO: ABNORMAL
KETONES UR STRIP.AUTO-MCNC: NEGATIVE MG/DL
LACTATE BLDV-SCNC: 2.2 MMOL/L (ref 0.4–2)
LEUKOCYTE ESTERASE UR QL STRIP.AUTO: ABNORMAL
MCH RBC QN AUTO: 32.4 PG (ref 26–34)
MCHC RBC AUTO-ENTMCNC: 34.5 G/DL (ref 31–36)
MCV RBC AUTO: 94 FL (ref 80–100)
NITRITE UR QL STRIP.AUTO: NEGATIVE
PERFORMED ON: ABNORMAL
PERFORMED ON: NORMAL
PH UR STRIP.AUTO: 6.5 [PH] (ref 5–8)
PHOSPHATE SERPL-MCNC: 5.4 MG/DL (ref 2.5–4.9)
PLATELET # BLD AUTO: 270 K/UL (ref 135–450)
PMV BLD AUTO: 8.9 FL (ref 5–10.5)
POTASSIUM SERPL-SCNC: 3.5 MMOL/L (ref 3.5–5.1)
PROT UR STRIP.AUTO-MCNC: 100 MG/DL
PROT UR-MCNC: 148 MG/DL
PROT/CREAT UR-RTO: 2.2 MG/DL
RBC # BLD AUTO: 2.67 M/UL (ref 4.2–5.9)
RBC #/AREA URNS HPF: >100 /HPF (ref 0–4)
SODIUM SERPL-SCNC: 134 MMOL/L (ref 136–145)
SODIUM UR-SCNC: 67 MMOL/L
SP GR UR STRIP.AUTO: 1.01 (ref 1–1.03)
UA DIPSTICK W REFLEX MICRO PNL UR: YES
URN SPEC COLLECT METH UR: ABNORMAL
UROBILINOGEN UR STRIP-ACNC: 0.2 E.U./DL
WBC # BLD AUTO: 3.2 K/UL (ref 4–11)
WBC #/AREA URNS HPF: ABNORMAL /HPF (ref 0–5)

## 2025-06-23 PROCEDURE — 6360000002 HC RX W HCPCS: Performed by: STUDENT IN AN ORGANIZED HEALTH CARE EDUCATION/TRAINING PROGRAM

## 2025-06-23 PROCEDURE — 2500000003 HC RX 250 WO HCPCS: Performed by: NURSE PRACTITIONER

## 2025-06-23 PROCEDURE — 82570 ASSAY OF URINE CREATININE: CPT

## 2025-06-23 PROCEDURE — 83605 ASSAY OF LACTIC ACID: CPT

## 2025-06-23 PROCEDURE — 81001 URINALYSIS AUTO W/SCOPE: CPT

## 2025-06-23 PROCEDURE — 6370000000 HC RX 637 (ALT 250 FOR IP): Performed by: INTERNAL MEDICINE

## 2025-06-23 PROCEDURE — 84156 ASSAY OF PROTEIN URINE: CPT

## 2025-06-23 PROCEDURE — 2500000003 HC RX 250 WO HCPCS

## 2025-06-23 PROCEDURE — 6370000000 HC RX 637 (ALT 250 FOR IP)

## 2025-06-23 PROCEDURE — 6370000000 HC RX 637 (ALT 250 FOR IP): Performed by: NURSE PRACTITIONER

## 2025-06-23 PROCEDURE — 85520 HEPARIN ASSAY: CPT

## 2025-06-23 PROCEDURE — 85027 COMPLETE CBC AUTOMATED: CPT

## 2025-06-23 PROCEDURE — 84300 ASSAY OF URINE SODIUM: CPT

## 2025-06-23 PROCEDURE — 2060000000 HC ICU INTERMEDIATE R&B

## 2025-06-23 PROCEDURE — 6360000002 HC RX W HCPCS: Performed by: INTERNAL MEDICINE

## 2025-06-23 PROCEDURE — 36415 COLL VENOUS BLD VENIPUNCTURE: CPT

## 2025-06-23 PROCEDURE — 80069 RENAL FUNCTION PANEL: CPT

## 2025-06-23 PROCEDURE — 2500000003 HC RX 250 WO HCPCS: Performed by: INTERNAL MEDICINE

## 2025-06-23 PROCEDURE — 2580000003 HC RX 258: Performed by: STUDENT IN AN ORGANIZED HEALTH CARE EDUCATION/TRAINING PROGRAM

## 2025-06-23 PROCEDURE — 2580000003 HC RX 258: Performed by: INTERNAL MEDICINE

## 2025-06-23 PROCEDURE — P9047 ALBUMIN (HUMAN), 25%, 50ML: HCPCS | Performed by: INTERNAL MEDICINE

## 2025-06-23 RX ORDER — ALBUMIN (HUMAN) 12.5 G/50ML
25 SOLUTION INTRAVENOUS ONCE
Status: COMPLETED | OUTPATIENT
Start: 2025-06-23 | End: 2025-06-23

## 2025-06-23 RX ORDER — MIDODRINE HYDROCHLORIDE 5 MG/1
15 TABLET ORAL 3 TIMES DAILY
Status: DISCONTINUED | OUTPATIENT
Start: 2025-06-23 | End: 2025-06-28 | Stop reason: HOSPADM

## 2025-06-23 RX ORDER — METOPROLOL TARTRATE 1 MG/ML
2.5 INJECTION, SOLUTION INTRAVENOUS ONCE
Status: COMPLETED | OUTPATIENT
Start: 2025-06-23 | End: 2025-06-23

## 2025-06-23 RX ADMIN — MIDODRINE HYDROCHLORIDE 10 MG: 5 TABLET ORAL at 07:25

## 2025-06-23 RX ADMIN — MIDODRINE HYDROCHLORIDE 15 MG: 5 TABLET ORAL at 20:15

## 2025-06-23 RX ADMIN — HEPARIN SODIUM 23 UNITS/KG/HR: 10000 INJECTION, SOLUTION INTRAVENOUS at 04:22

## 2025-06-23 RX ADMIN — HYDROCORTISONE SODIUM SUCCINATE 50 MG: 100 INJECTION, POWDER, FOR SOLUTION INTRAMUSCULAR; INTRAVENOUS at 07:26

## 2025-06-23 RX ADMIN — SODIUM BICARBONATE: 84 INJECTION, SOLUTION INTRAVENOUS at 04:51

## 2025-06-23 RX ADMIN — MIDODRINE HYDROCHLORIDE 15 MG: 5 TABLET ORAL at 12:11

## 2025-06-23 RX ADMIN — LEVOTHYROXINE SODIUM 75 MCG: 0.05 TABLET ORAL at 07:26

## 2025-06-23 RX ADMIN — APIXABAN 5 MG: 5 TABLET, FILM COATED ORAL at 14:53

## 2025-06-23 RX ADMIN — MEROPENEM 500 MG: 500 INJECTION INTRAVENOUS at 05:06

## 2025-06-23 RX ADMIN — METOPROLOL TARTRATE 25 MG: 25 TABLET, FILM COATED ORAL at 20:15

## 2025-06-23 RX ADMIN — PANTOPRAZOLE SODIUM 40 MG: 40 INJECTION, POWDER, FOR SOLUTION INTRAVENOUS at 07:26

## 2025-06-23 RX ADMIN — METOPROLOL TARTRATE 25 MG: 25 TABLET, FILM COATED ORAL at 07:25

## 2025-06-23 RX ADMIN — APIXABAN 5 MG: 5 TABLET, FILM COATED ORAL at 20:15

## 2025-06-23 RX ADMIN — ALBUMIN (HUMAN) 25 G: 0.25 INJECTION, SOLUTION INTRAVENOUS at 11:57

## 2025-06-23 RX ADMIN — SODIUM CHLORIDE, PRESERVATIVE FREE 10 ML: 5 INJECTION INTRAVENOUS at 20:21

## 2025-06-23 RX ADMIN — Medication 400 MG: at 07:26

## 2025-06-23 RX ADMIN — SODIUM BICARBONATE: 84 INJECTION, SOLUTION INTRAVENOUS at 22:13

## 2025-06-23 RX ADMIN — CALCIUM CARBONATE 500 MG: 500 TABLET, CHEWABLE ORAL at 20:15

## 2025-06-23 RX ADMIN — CETIRIZINE HYDROCHLORIDE 10 MG: 10 TABLET, FILM COATED ORAL at 07:25

## 2025-06-23 RX ADMIN — CALCIUM CARBONATE 500 MG: 500 TABLET, CHEWABLE ORAL at 12:11

## 2025-06-23 RX ADMIN — METOPROLOL TARTRATE 2.5 MG: 5 INJECTION INTRAVENOUS at 02:29

## 2025-06-23 RX ADMIN — CALCIUM CARBONATE 500 MG: 500 TABLET, CHEWABLE ORAL at 07:25

## 2025-06-23 ASSESSMENT — PAIN SCALES - GENERAL
PAINLEVEL_OUTOF10: 0
PAINLEVEL_OUTOF10: 0

## 2025-06-23 NOTE — PROGRESS NOTES
Comprehensive Nutrition Assessment    Type and Reason for Visit:  Initial, LOS    Nutrition Recommendations/Plan:   Continue soft and bite sized diet.  Encourage PO intakes.  Ensure ONS BID.  Monitor pertinent labs, bowel habits, weight, N/V, supplement acceptance, clinical progression.       Malnutrition Assessment:  Malnutrition Status:  At risk for malnutrition (06/23/25 1150)    Context:  Acute Illness     Findings of the 6 clinical characteristics of malnutrition:  Energy Intake:  50% or less of estimated energy requirements for 5 or more days  Weight Loss:  No weight loss     Body Fat Loss:  Unable to assess     Muscle Mass Loss:  Unable to assess    Fluid Accumulation:  Unable to assess     Strength:  Not Performed    Nutrition Assessment:    Patient seen for LOS. Admitted for falls. Found to have sepsis. Transferred out of ICU 6/21. PMHx of CAD, advanced bladder cancer undergoing active immunotherapy, hypertension, thyroid disease. Palliative care consulted. No answer per pts room phone. Per chart review, patient prefers soft, moist, easy to chew food. Currently on soft and bite sized diet, tolerating well. Patient with poor PO intakes of 1-50% per EMR. Will order Ensure ONS to better meet nutrient needs. No recent weight changes per EMR. Will continue to monitor.    Nutrition Related Findings:    Na 134. LBM 6/19. Wound Type: Wound Consult Pending       Current Nutrition Intake & Therapies:    Average Meal Intake: 1-25%, 26-50%  Average Supplements Intake: None Ordered  ADULT DIET; Dysphagia - Soft and Bite Sized; Patient prefers soft, moist, easy to chew food.    Anthropometric Measures:  Height: 177.8 cm (5' 10\")  Ideal Body Weight (IBW): 166 lbs (75 kg)       Current Body Weight: 98.3 kg (216 lb 11.4 oz) (6/23/25), 130.5 % IBW. Weight Source: Bed scale  Current BMI (kg/m2): 31.1  Usual Body Weight: 98.4 kg (216 lb 14.9 oz) (4/9/25)     % Weight Change (Calculated): -0.1  Weight Adjustment For: No

## 2025-06-23 NOTE — PROGRESS NOTES
[facility] Medicine Progress Note  V 5.17      Date of Admission: 6/15/2025    Hospital Day: 9      Chief Admission Complaint: Falls    Subjective: Talk to patient and wife who is at bedside today.  Overall patient still not feeling the greatest but no acute complaints.  Spent a considerable amount of time discussing with family the complexity of the different medical issues he has going on and that palliative care would be around to see them today.  No complaints of chest pain, shortness of breath, palpitations or constipation endorsed.     Presenting Admission History:       Patient is a 74-year-old male with a past medical history of CAD, advanced bladder cancer undergoing active immunotherapy, hypertension, thyroid disease who presents for low blood pressure and falls. He states that he has had ongoing issues with his blood pressure since his last admission. He states last night he fell down and was unable to get up. He states that his blood pressure ranged from the 40s to the 80s. He is endorsing lightheadedness. He denies any chest pain or shortness of breath. On arrival to the ED he was found to be tachycardic and hypotensive. ED workup was remarkable for an elevated creatinine, lactic acidosis, elevated troponin, elevated BNP, leukocytosis. We were consulted for admission.     Assessment/Plan:      Septic shock-improved  Hypotension-improved  - Patient fell prior to arriving in the ED due to lightheadedness  - Patient has had chronic low blood pressure  - CT head without contrast showed  - Brain atrophy  - Ischemic changes and chronic small vessel disease with old basal ganglia lunar ischemic infarcts  - No acute intracranial hemorrhage  - On admission BP was 82/61  - Patient was on midodrine 5 mg at home has been increased to 10 mg 3 times daily  - Serial lactic acid shows LA of 3.0 on admission.  LA of 2.2 6/23/2025  - Hypocalcemia has improved.  Corrected calcium is 8.5  - Nephrology consulted  Location: [facility]     Anticipated Discharge Day/Date: 2-3 days    Barriers to Discharge: Clinical Course and Subspecialty recs    Likely rate limiting factor: Clinical course    --------------------------------------------------    MDM (any 2 required for High level billing)    A. Problems (any 1)  [] Acute/Chronic Illness/injury posing ongoing threat to life and/or bodily function without ongoing treatment    [] Severe exacerbation of chronic illness    --------------------------------------------------  B. Risk of Treatment (any 1)    [] Drugs/treatments that require intensive monitoring for toxicity    [] IV ABX (Vancomycin, Aminoglycosides, etc)     [] Post-Cath/Contrast study requiring serial monitoring    [] IV Narcotic analgesia    [] Aggressive IV diuresis    [] Hypertonic Saline    [] Critical electrolyte abnormalities requiring IV replacement    [] Insulin - Scheduled/SSI or Insulin gtt    [] Anticoagulation (Heparin gtt or Coumadin - other anticoagulants in special circumstances)    [] Cardiac Medications (IV Amiodarone/Diltiazem, Tikosyn, etc)    [] Hemodialysis    [] Other -    [] Change in code status    [] Decision to escalate care    [] Major surgery/procedure with associated risk factors    --------------------------------------------------  C. Data (any 2)    [] Data Review (any 3)    [] Consultant/subspecialist notes from yesterday/today    [x] All available current labs reviewed interpreted for clinical significance    [x] Appropriate follow-up labs were ordered  [] Collateral history obtained     [] Independent Interpretation of tests (any 1)    [] Telemetry (Rhythm Strip) personally reviewed and interpreted        [] Imaging personally reviewed and interpreted     [x] Discussion (any 1)  [x] Multi-Disciplinary Rounds with Case Management  [] Discussed management of the case with           Labs:  Personally reviewed on 6/23/2025 and interpreted for clinical significance as documented above.

## 2025-06-23 NOTE — PROGRESS NOTES
Patient's morning assessment has been completed.  Patient is alert and oriented and vital signs are stable.  Patient voiced no complaints or concerns at this time.  Medications administered as ordered.  Patient's bed is in the lowest position and call light is within reach.  Will continue to monitor

## 2025-06-23 NOTE — CONSULTS
Consult Call Back    Who:Leatha Holder   Date:6/23/2025,  Time:12:12 PM    Electronically signed by Araceli Hall on 6/23/25 at 12:12 PM EDT

## 2025-06-23 NOTE — PROGRESS NOTES
Physical/Occupational Therapy    Hold PT/OT this date per RN the patient HR increased to 140's-150's just at rest in bed. Will attempt at a later time/date as pt is appropriate and schedule permits. Thank you.     Bia Juarez PT, DPT  Fe Freeman OTR/L

## 2025-06-23 NOTE — PROGRESS NOTES
Pt is limited code x 4.  DNR/DNI.    Electronically signed by Esthela Villalobos MD on 6/23/2025 at 12:19 PM

## 2025-06-23 NOTE — PROGRESS NOTES
[facility]     Electrophysiology                                     Progress Note    Admission date:  6/15/2025    Reason for follow up visit: AF    HPI/CC: Alfredo Chong was admitted on 6/15/2025 with sepsis secondary to likely UTI. EP following for rapid AF. On 2025, patient was transferred to the ICU for worsening hypotension.  IV heparin was started however this was briefly discontinued secondary to worsening anemia. Heparin was resumed today.    Has also been treated for AMARILYS/CKD. He is on Sven-synephrine, Levophed and vasopressin. On 2025 around noon, he went back into rate controlled AF. Rhythm has remained AF with some RVR.     Subjective: Patient resting quietly at the time of my visit.    Vitals:  Blood pressure 95/60, pulse (!) 112, temperature 98.9 °F (37.2 °C), temperature source Axillary, resp. rate 22, height 1.778 m (5' 10\"), weight 98.3 kg (216 lb 11.4 oz), SpO2 96%.  Temp  Av.1 °F (36.7 °C)  Min: 97.5 °F (36.4 °C)  Max: 99.4 °F (37.4 °C)  Pulse  Av  Min: 109  Max: 128  BP  Min: 93/66  Max: 118/74  SpO2  Av.5 %  Min: 93 %  Max: 98 %    24 hour I/O    Intake/Output Summary (Last 24 hours) at 2025 0852  Last data filed at 2025 0522  Gross per 24 hour   Intake 1738.18 ml   Output 1500 ml   Net 238.18 ml     Current Facility-Administered Medications   Medication Dose Route Frequency Provider Last Rate Last Admin    metoprolol tartrate (LOPRESSOR) tablet 25 mg  25 mg Oral BID Dejuan Giron DO   25 mg at 25 0725    sodium bicarbonate 75 mEq in sodium chloride 0.45 % 1,000 mL infusion   IntraVENous Continuous Matthew Hermosillo  mL/hr at 25 0451 New Bag at 25 0451    hydrocortisone sodium succinate PF (SOLU-CORTEF) injection 50 mg  50 mg IntraVENous Daily Arthur Kathleen MD   50 mg at 25 0726    calcium carbonate (TUMS) chewable tablet 500 mg  500 mg Oral TID Ramiro Moreno DO   500 mg at 25 0725    insulin  to closely monitor on telemetry   2. Continue Lopressor 25 mg p.o. twice daily.  Unable to uptitrate given ongoing hypotension despite midodrine use.  3. Patient is not a candidate for digoxin given his renal function.  4. Options for rate control are limited.  Atrial fibrillation has been persistent since last week and now with RVR.  Likely that patient would benefit from a cardioversion however worry about his ongoing ability to tolerate anticoagulation.  Will stop IV heparin and challenge him with Eliquis 5 mg p.o. twice daily.  Can consider cardioversion on Wednesday if this is well-tolerated.  Will plan for low-dose amiodarone post cardioversion.  If patient converts to sinus rhythm on his own, we will start amiodarone.    Medications and plan thoroughly discussed with primary RN and Dr. Gilliam.     Time Based Itemization  A total of 50 minutes was spent on today's patient encounter.  If applicable, non-patient-facing activities:  (*)Preparing to see the patient and reviewing records  (*) Individual interpretation of results  (*) Discussion or coordination of care with other health care professionals  (*) Ordering of unique tests, medications, or procedures  (*) Documentation within the EHR           Zaira Westbrook, APRN-CNP  [facility]  [phone]

## 2025-06-23 NOTE — PLAN OF CARE
Problem: Chronic Conditions and Co-morbidities  Goal: Patient's chronic conditions and co-morbidity symptoms are monitored and maintained or improved  6/23/2025 0036 by Billie Shaw RN  Outcome: Progressing  6/22/2025 1944 by Jack Sahu RN  Outcome: Progressing     Problem: Discharge Planning  Goal: Discharge to home or other facility with appropriate resources  6/23/2025 0036 by Billie Shaw RN  Outcome: Progressing  6/22/2025 1944 by Jack Sahu RN  Outcome: Progressing     Problem: Safety - Adult  Goal: Free from fall injury  6/23/2025 0036 by Billie Shaw RN  Outcome: Progressing  6/22/2025 1944 by Jack Sahu RN  Outcome: Progressing     Problem: Skin/Tissue Integrity  Goal: Skin integrity remains intact  Description: 1.  Monitor for areas of redness and/or skin breakdown  2.  Assess vascular access sites hourly  3.  Every 4-6 hours minimum:  Change oxygen saturation probe site  4.  Every 4-6 hours:  If on nasal continuous positive airway pressure, respiratory therapy assess nares and determine need for appliance change or resting period  6/23/2025 0036 by Billie Shaw RN  Outcome: Progressing  6/22/2025 1944 by Jack Sahu RN  Outcome: Progressing     Problem: ABCDS Injury Assessment  Goal: Absence of physical injury  6/23/2025 0036 by Billie Shaw RN  Outcome: Progressing  6/22/2025 1944 by Jack Sahu RN  Outcome: Progressing     Problem: Pain  Goal: Verbalizes/displays adequate comfort level or baseline comfort level  6/23/2025 0036 by Billie Shaw RN  Outcome: Progressing  6/22/2025 1944 by Jack Sahu RN  Outcome: Progressing     Problem: Neurosensory - Adult  Goal: Achieves stable or improved neurological status  6/23/2025 0036 by Billie Shaw RN  Outcome: Progressing  6/22/2025 1944 by Jack Sahu RN  Outcome: Progressing     Problem: Respiratory - Adult  Goal: Achieves optimal ventilation and oxygenation  6/23/2025 0036 by Colin  OMAYRA Wise  Outcome: Progressing  6/22/2025 1944 by Jack Sahu RN  Outcome: Progressing     Problem: Cardiovascular - Adult  Goal: Maintains optimal cardiac output and hemodynamic stability  6/23/2025 0036 by Billie Shaw RN  Outcome: Progressing  6/22/2025 1944 by Jack Sahu RN  Outcome: Progressing  Goal: Absence of cardiac dysrhythmias or at baseline  6/23/2025 0036 by Billie Shaw RN  Outcome: Progressing  6/22/2025 1944 by Jack Sahu RN  Outcome: Progressing     Problem: Skin/Tissue Integrity - Adult  Goal: Incisions, wounds, or drain sites healing without S/S of infection  6/23/2025 0036 by Billie Shaw RN  Outcome: Progressing  6/22/2025 1944 by Jack Sahu RN  Outcome: Progressing     Problem: Musculoskeletal - Adult  Goal: Return mobility to safest level of function  6/23/2025 0036 by Billie Shaw RN  Outcome: Progressing  6/22/2025 1944 by Jack Sahu RN  Outcome: Progressing     Problem: Gastrointestinal - Adult  Goal: Minimal or absence of nausea and vomiting  6/23/2025 0036 by Billie Shaw RN  Outcome: Progressing  6/22/2025 1944 by Jack Sahu RN  Outcome: Progressing  Goal: Maintains or returns to baseline bowel function  6/23/2025 0036 by Billie Shaw RN  Outcome: Progressing  6/22/2025 1944 by Jack Sahu RN  Outcome: Progressing  Goal: Maintains adequate nutritional intake  6/23/2025 0036 by Billie Shaw RN  Outcome: Progressing  6/22/2025 1944 by Jack Sahu RN  Outcome: Progressing     Problem: Genitourinary - Adult  Goal: Urinary catheter remains patent  6/23/2025 0036 by Billie Shaw RN  Outcome: Progressing  6/22/2025 1944 by Jack Sahu RN  Outcome: Progressing     Problem: Metabolic/Fluid and Electrolytes - Adult  Goal: Electrolytes maintained within normal limits  6/23/2025 0036 by Billie Shaw RN  Outcome: Progressing  6/22/2025 1944 by Jack Sahu RN  Outcome: Progressing  Goal: Hemodynamic stability
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
## 2025-06-23 NOTE — CONSULTS
Palliative Care Initial Note  Palliative Care Admit date: 6/23/25    ACP/palcare referral noted

## 2025-06-23 NOTE — ACP (ADVANCE CARE PLANNING)
Advance Care Planning     Palliative Team Advance Care Planning (ACP) Conversation    Date of Conversation: 06/23/25    Individuals present for the conversation: Patient with decision making capacity and Significant Other , Madelyn     ACP documents on file prior to discussion:  -Power of  for Healthcare  -Living Will    Previously completed document/s not on file:  N/A    Healthcare Decision Maker:    Primary Decision Maker: Madelyn Brasher - Other - [phone]     Resuscitation Status:   Code Status: Limited   To reflect DNR/DNI    Documentation Completed:  -No new documents completed.    Palliative Care Initial Note  Palliative Care Admit date: 6/23/25  Reason for c/s:  [facility]    Advance Directives:  Reviewed the AD's (HCPOA & Living Will) in this EMR and, in pts HCPOA, he designated his S.O., Jennie Brasher, as his healthcare proxy.      Plan of care/goals: Met w/ pt, Pat was not present.  Pt requires some redirection but was pleasant and conversant.  He affirmed having d/w provider yesterday and said \"I think we're on the road to get this Afib under control then go to have therapy.\"    Pt c/o intermittent shoulder pain, typically rated 4/0-10.  He received prn Acetaminophen 650mg X2 in the last 24 hr.  Pts last doc'd stool op was 6/19.    Writer spoke w/ Pat via phone who provided more input as to overall goals and understanding.  She shared \"we talked to the doctor and I understand, if we get through this (cardioversion), then we will likely go on to therapy (SNF).\"  Pat went on to say that she isn't entirely optimistic as to pts outcome and shared having conversations w/ various provider's and w/ pt re: hospice.  Her understanding, however, is that pt would like to be able to \"fix the afib and do therapy.\"   If that is not altogether feasible, then she is receptive to discussion re: comfort care and hospice.  Pat also said she does not anticipate further immunotherapy for his bladder cancer and understands

## 2025-06-23 NOTE — PROGRESS NOTES
[facility] Progress Note           Subjective/   74 y.o. year old male who we are seeing in consultation for AMARILYS/CKD stage 3b.     Patient seen and examined at bedside. He feels weak today, is hoping to have cardioversion today. He denies any CP, SOB, or palpitations.  He was started on bicarb gtt yesterday and remains on heparin gtt.   - Labs and notes reviewed  - BP low normal  - Urine OP better after IVF, has baeza  - Family at bedside     ROS:  +weak, intake reduced, now on RA.    Objective/   GEN:  Chronically ill, BP 95/60   Pulse (!) 112   Temp 98.9 °F (37.2 °C) (Axillary)   Resp 22   Ht 1.778 m (5' 10\")   Wt 98.3 kg (216 lb 11.4 oz)   SpO2 96%   BMI 31.09 kg/m²     GEN: resting in bed, ill appearing   HEENT: non-icteric, no JVD  CV: S1, S2, tachycardic, without m/r/g; Trace LE edema  RESP: CTA B without w/r/r; breathing wnl  ABD: +bs, soft, nt, no hsm  SKIN: warm, no rash    Data/  Recent Labs     06/21/25  0425 06/22/25  0758 06/23/25  0429   WBC 2.1* 2.4* 3.2*   HGB 8.1* 8.7* 8.7*   HCT 23.7* 25.3* 25.1*   MCV 94.6 93.7 94.0    279 270     Recent Labs     06/21/25  0425 06/21/25  1323 06/22/25  0758 06/23/25  0429    136 133* 134*   K 3.3* 3.4* 3.5 3.5    101 99 98*   CO2 19* 18* 19* 22   GLUCOSE 137* 138* 99 95   PHOS 3.6  --  4.6 5.4*   MG 1.92 1.93 1.95  --    BUN 33* 36* 41* 44*   CREATININE 3.3* 3.4* 3.6* 3.7*   LABGLOM 19* 18* 17* 16*     UA large blood, >300 protein, s.g. 1.015, >100 wbc's, 21-50 rbc's  Urine sodium 90  Urine chloride 59    CT A/P ->   1. Bilateral ureteral stents are seen. There is mild right hydronephrosis. Ureteral cysts are suboptimally visualized without contrast, it could be further characterized via ultrasound. The bladder is underdistended, obscuring potential bladder mass   lesion. There is a Baeza catheter in place.     2. Diverticulosis      Assessment/     - Acute kidney injury - renal hypoperfusion injury in setting of  hypotension, worsening  - Chronic kidney disease stage 3b - baseline SCr 1.6-2.0 since 2023, follows with Dr. Glynn    - Cr today of 3.7, BUN of 44 and Lactic of 2.2   - s/p 500ml NS bolus yesterday, started on Bicarb gtt    - Hypotension   - BP remains low normal, SBP     - Anion-gap metabolic acidosis with elevated lactate   - CO2 up to 22 w/ Bicarb gtt   - Lactic still slightly elevated at 2.2    - Hypocalcemia   - Ca as low as 6.3, ical was 0.78 on 6/21/25  - now 7.1 today (corrected to 8.5 w/ low albumin level)     - HG urothelial cancer in 3/2024, recurrence of LG urothelial CA in 11/2024  - Completed induction BCG with [facility]  - S/p bilateral ureteral stents     - Urinary retention - baeza mgmt from 6/16, plans per Urology    - Anemia   - hgb 8.7      Plan/     - Continue IVF w/ Bicarb for now  - Check Urine studies   - Encourage PO intake when able (noted to be NPO for possible cardioversion today)  - Monitoring off pressors & continue midodrine 10 mg po tid to maintain MAP of 65  - Continue Tums TID  - Continue baeza, monitor I/Os  - Trend labs, bp's, weights, cultures, & urine output    ____________________________________  JOHNY Lynn - NP  [facility]  [email or web address]  Office: [phone]

## 2025-06-24 ENCOUNTER — APPOINTMENT (OUTPATIENT)
Dept: GENERAL RADIOLOGY | Age: 75
End: 2025-06-24
Payer: MEDICARE

## 2025-06-24 LAB
25(OH)D3 SERPL-MCNC: 20.1 NG/ML
ALBUMIN SERPL-MCNC: 2.2 G/DL (ref 3.4–5)
ANION GAP SERPL CALCULATED.3IONS-SCNC: 14 MMOL/L (ref 3–16)
BUN SERPL-MCNC: 45 MG/DL (ref 7–20)
CALCIUM SERPL-MCNC: 7.7 MG/DL (ref 8.3–10.6)
CHLORIDE SERPL-SCNC: 99 MMOL/L (ref 99–110)
CO2 SERPL-SCNC: 23 MMOL/L (ref 21–32)
CORTIS SERPL-MCNC: 15.5 UG/DL
CREAT SERPL-MCNC: 3.3 MG/DL (ref 0.8–1.3)
DEPRECATED RDW RBC AUTO: 16.8 % (ref 12.4–15.4)
GFR SERPLBLD CREATININE-BSD FMLA CKD-EPI: 19 ML/MIN/{1.73_M2}
GLUCOSE BLD-MCNC: 107 MG/DL (ref 70–99)
GLUCOSE BLD-MCNC: 118 MG/DL (ref 70–99)
GLUCOSE BLD-MCNC: 124 MG/DL (ref 70–99)
GLUCOSE BLD-MCNC: 131 MG/DL (ref 70–99)
GLUCOSE SERPL-MCNC: 98 MG/DL (ref 70–99)
HCT VFR BLD AUTO: 22.5 % (ref 40.5–52.5)
HCT VFR BLD AUTO: 22.8 % (ref 40.5–52.5)
HGB BLD-MCNC: 7.5 G/DL (ref 13.5–17.5)
HGB BLD-MCNC: 7.9 G/DL (ref 13.5–17.5)
LACTATE BLDV-SCNC: 2.3 MMOL/L (ref 0.4–2)
MCH RBC QN AUTO: 32.5 PG (ref 26–34)
MCHC RBC AUTO-ENTMCNC: 34.6 G/DL (ref 31–36)
MCV RBC AUTO: 93.9 FL (ref 80–100)
PERFORMED ON: ABNORMAL
PHOSPHATE SERPL-MCNC: 4.7 MG/DL (ref 2.5–4.9)
PLATELET # BLD AUTO: 264 K/UL (ref 135–450)
PMV BLD AUTO: 9.6 FL (ref 5–10.5)
POTASSIUM SERPL-SCNC: 3.2 MMOL/L (ref 3.5–5.1)
PROCALCITONIN SERPL IA-MCNC: 4.4 NG/ML (ref 0–0.15)
RBC # BLD AUTO: 2.43 M/UL (ref 4.2–5.9)
SODIUM SERPL-SCNC: 136 MMOL/L (ref 136–145)
WBC # BLD AUTO: 6.9 K/UL (ref 4–11)

## 2025-06-24 PROCEDURE — 2580000003 HC RX 258: Performed by: INTERNAL MEDICINE

## 2025-06-24 PROCEDURE — 6370000000 HC RX 637 (ALT 250 FOR IP)

## 2025-06-24 PROCEDURE — 99233 SBSQ HOSP IP/OBS HIGH 50: CPT | Performed by: NURSE PRACTITIONER

## 2025-06-24 PROCEDURE — 94761 N-INVAS EAR/PLS OXIMETRY MLT: CPT

## 2025-06-24 PROCEDURE — 2500000003 HC RX 250 WO HCPCS: Performed by: INTERNAL MEDICINE

## 2025-06-24 PROCEDURE — 6370000000 HC RX 637 (ALT 250 FOR IP): Performed by: NURSE PRACTITIONER

## 2025-06-24 PROCEDURE — 83605 ASSAY OF LACTIC ACID: CPT

## 2025-06-24 PROCEDURE — 80069 RENAL FUNCTION PANEL: CPT

## 2025-06-24 PROCEDURE — 2500000003 HC RX 250 WO HCPCS

## 2025-06-24 PROCEDURE — 71045 X-RAY EXAM CHEST 1 VIEW: CPT

## 2025-06-24 PROCEDURE — 85018 HEMOGLOBIN: CPT

## 2025-06-24 PROCEDURE — 2060000000 HC ICU INTERMEDIATE R&B

## 2025-06-24 PROCEDURE — 6360000002 HC RX W HCPCS: Performed by: STUDENT IN AN ORGANIZED HEALTH CARE EDUCATION/TRAINING PROGRAM

## 2025-06-24 PROCEDURE — P9047 ALBUMIN (HUMAN), 25%, 50ML: HCPCS

## 2025-06-24 PROCEDURE — 82533 TOTAL CORTISOL: CPT

## 2025-06-24 PROCEDURE — 84145 PROCALCITONIN (PCT): CPT

## 2025-06-24 PROCEDURE — 87040 BLOOD CULTURE FOR BACTERIA: CPT

## 2025-06-24 PROCEDURE — 6370000000 HC RX 637 (ALT 250 FOR IP): Performed by: INTERNAL MEDICINE

## 2025-06-24 PROCEDURE — 2700000000 HC OXYGEN THERAPY PER DAY

## 2025-06-24 PROCEDURE — 85027 COMPLETE CBC AUTOMATED: CPT

## 2025-06-24 PROCEDURE — 82306 VITAMIN D 25 HYDROXY: CPT

## 2025-06-24 PROCEDURE — 6360000002 HC RX W HCPCS

## 2025-06-24 PROCEDURE — 85014 HEMATOCRIT: CPT

## 2025-06-24 RX ORDER — ALBUMIN (HUMAN) 12.5 G/50ML
25 SOLUTION INTRAVENOUS ONCE
Status: COMPLETED | OUTPATIENT
Start: 2025-06-24 | End: 2025-06-24

## 2025-06-24 RX ORDER — HYDROCORTISONE SODIUM SUCCINATE 100 MG/2ML
50 INJECTION INTRAMUSCULAR; INTRAVENOUS DAILY
Status: DISCONTINUED | OUTPATIENT
Start: 2025-06-25 | End: 2025-06-28 | Stop reason: HOSPADM

## 2025-06-24 RX ADMIN — METOPROLOL TARTRATE 25 MG: 25 TABLET, FILM COATED ORAL at 08:40

## 2025-06-24 RX ADMIN — MIDODRINE HYDROCHLORIDE 15 MG: 5 TABLET ORAL at 21:45

## 2025-06-24 RX ADMIN — MICONAZOLE NITRATE: 2 POWDER TOPICAL at 21:50

## 2025-06-24 RX ADMIN — SODIUM CHLORIDE, PRESERVATIVE FREE 10 ML: 5 INJECTION INTRAVENOUS at 21:45

## 2025-06-24 RX ADMIN — SODIUM BICARBONATE: 84 INJECTION, SOLUTION INTRAVENOUS at 21:26

## 2025-06-24 RX ADMIN — CALCIUM CARBONATE 500 MG: 500 TABLET, CHEWABLE ORAL at 08:40

## 2025-06-24 RX ADMIN — PROCHLORPERAZINE EDISYLATE 10 MG: 5 INJECTION INTRAMUSCULAR; INTRAVENOUS at 10:36

## 2025-06-24 RX ADMIN — MIDODRINE HYDROCHLORIDE 15 MG: 5 TABLET ORAL at 15:47

## 2025-06-24 RX ADMIN — APIXABAN 5 MG: 5 TABLET, FILM COATED ORAL at 08:40

## 2025-06-24 RX ADMIN — METOPROLOL TARTRATE 25 MG: 25 TABLET, FILM COATED ORAL at 21:45

## 2025-06-24 RX ADMIN — APIXABAN 5 MG: 5 TABLET, FILM COATED ORAL at 21:45

## 2025-06-24 RX ADMIN — PANTOPRAZOLE SODIUM 40 MG: 40 INJECTION, POWDER, FOR SOLUTION INTRAVENOUS at 08:41

## 2025-06-24 RX ADMIN — HYDROCORTISONE SODIUM SUCCINATE 50 MG: 100 INJECTION, POWDER, FOR SOLUTION INTRAMUSCULAR; INTRAVENOUS at 08:41

## 2025-06-24 RX ADMIN — CALCIUM CARBONATE 500 MG: 500 TABLET, CHEWABLE ORAL at 15:47

## 2025-06-24 RX ADMIN — MICONAZOLE NITRATE: 2 POWDER TOPICAL at 10:21

## 2025-06-24 RX ADMIN — LEVOTHYROXINE SODIUM 75 MCG: 0.05 TABLET ORAL at 08:40

## 2025-06-24 RX ADMIN — CETIRIZINE HYDROCHLORIDE 10 MG: 10 TABLET, FILM COATED ORAL at 08:40

## 2025-06-24 RX ADMIN — CALCIUM CARBONATE 500 MG: 500 TABLET, CHEWABLE ORAL at 21:45

## 2025-06-24 RX ADMIN — POTASSIUM BICARBONATE 40 MEQ: 782 TABLET, EFFERVESCENT ORAL at 08:40

## 2025-06-24 RX ADMIN — ALBUMIN (HUMAN) 25 G: 0.25 INJECTION, SOLUTION INTRAVENOUS at 10:21

## 2025-06-24 RX ADMIN — MIDODRINE HYDROCHLORIDE 15 MG: 5 TABLET ORAL at 08:40

## 2025-06-24 RX ADMIN — SODIUM CHLORIDE, PRESERVATIVE FREE 10 ML: 5 INJECTION INTRAVENOUS at 08:41

## 2025-06-24 RX ADMIN — POTASSIUM BICARBONATE 40 MEQ: 782 TABLET, EFFERVESCENT ORAL at 21:49

## 2025-06-24 RX ADMIN — ACETAMINOPHEN 650 MG: 325 TABLET ORAL at 10:36

## 2025-06-24 RX ADMIN — SODIUM BICARBONATE: 84 INJECTION, SOLUTION INTRAVENOUS at 10:13

## 2025-06-24 ASSESSMENT — PAIN SCALES - GENERAL
PAINLEVEL_OUTOF10: 0

## 2025-06-24 NOTE — PLAN OF CARE
Problem: Chronic Conditions and Co-morbidities  Goal: Patient's chronic conditions and co-morbidity symptoms are monitored and maintained or improved  6/24/2025 0211 by Aram Abbott RN  Outcome: Progressing  Flowsheets (Taken 6/23/2025 2011)  Care Plan - Patient's Chronic Conditions and Co-Morbidity Symptoms are Monitored and Maintained or Improved: Monitor and assess patient's chronic conditions and comorbid symptoms for stability, deterioration, or improvement    Problem: Skin/Tissue Integrity  Goal: Skin integrity remains intact  Description: 1.  Monitor for areas of redness and/or skin breakdown  2.  Assess vascular access sites hourly  3.  Every 4-6 hours minimum:  Change oxygen saturation probe site  4.  Every 4-6 hours:  If on nasal continuous positive airway pressure, respiratory therapy assess nares and determine need for appliance change or resting period  6/24/2025 0211 by Aram Abbott RN  Outcome: Progressing  Flowsheets (Taken 6/23/2025 2011)  Skin Integrity Remains Intact:   Monitor for areas of redness and/or skin breakdown   Turn and reposition as indicated     Problem: Neurosensory - Adult  Goal: Achieves stable or improved neurological status  6/24/2025 0211 by Aram Abbott RN  Outcome: Progressing  Flowsheets (Taken 6/23/2025 2011)  Achieves stable or improved neurological status: Assess for and report changes in neurological status     Problem: Respiratory - Adult  Goal: Achieves optimal ventilation and oxygenation  6/24/2025 0211 by Aram Abbott RN  Outcome: Progressing  Flowsheets (Taken 6/23/2025 2011)  Achieves optimal ventilation and oxygenation:   Assess for changes in respiratory status   Assess and instruct to report shortness of breath or any respiratory difficulty     Problem: Cardiovascular - Adult  Goal: Maintains optimal cardiac output and hemodynamic stability  6/24/2025 0211 by Aram Abbott RN  Outcome: Progressing  Flowsheets (Taken

## 2025-06-24 NOTE — PROGRESS NOTES
Shift:  07-19    Reason for ICU Admission: Afib with hypotension    Most recent vitals: /77   Pulse (!) 125   Temp 98.9 °F (37.2 °C) (Axillary)   Resp 24   Ht 1.778 m (5' 10\")   Wt 104 kg (229 lb 4.5 oz)   SpO2 90%   BMI 32.90 kg/m²      Drip rates at handoff:    sodium bicarbonate 75 mEq in sodium chloride 0.45 % 1,000 mL infusion 100 mL/hr at 06/24/25 1013    dextrose      sodium chloride 10 mL/hr at 06/20/25 2118       Current O2:   [x] Room Air   [] NC  L   [] BiPaP    [] Vented  % Fi02,  Peep    Hospital Course:  ICU Day # 1   Transferred from  for follow up on afib with hypotension  History of bladder cancer, CAD, Hypothyroidism,   IV NaHCO3 at 100/hr  Wound care consult placed

## 2025-06-24 NOTE — PROGRESS NOTES
[facility] Medicine Progress Note  V 5.17      Date of Admission: 6/15/2025    Hospital Day: 10      Chief Admission Complaint: Falls    Subjective: Patient is doing well today and is hoping for another popsicle this morning.  He also wants to have ensures with every meal.  No complaints of chest pain, shortness of breath, palpitations or constipation endorsed.     Presenting Admission History:       Patient is a 74-year-old male with a past medical history of CAD, advanced bladder cancer undergoing active immunotherapy, hypertension, thyroid disease who presents for low blood pressure and falls. He states that he has had ongoing issues with his blood pressure since his last admission. He states last night he fell down and was unable to get up. He states that his blood pressure ranged from the 40s to the 80s. He is endorsing lightheadedness. He denies any chest pain or shortness of breath. On arrival to the ED he was found to be tachycardic and hypotensive. ED workup was remarkable for an elevated creatinine, lactic acidosis, elevated troponin, elevated BNP, leukocytosis. We were consulted for admission.     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  LA of 2.2 6/23/2025  - Hypocalcemia has improved.  Corrected calcium is 9.1  - Nephrology consulted appreciate recommendation  - HCO3 75 mL an hour  - Continue with midodrine 15 mg p.o. 3 times daily.  Maintain MAP 65   -Gordon management    Severe sepsis-improved  - On arrival, RR of 20,  bpm, Temp 98.5 F, and WBC of  14.0. Source of infection UTI  - Procal 0.36. LA of 3.0 on admission  - UA showing large blood, protein and leukocytes.   - Urine Culture NGTD  - Blood Cultures NGTD  - CXR showing no acute cardiopulmonary abnormalities  - Repeat CXR shows no acute abnormalities  - Received IVF bolus and empiric antibiotics in ED.   - Repeat UA shows large blood and trace leukocytes.  Patient has no symptoms of UTI, fever or leukocytosis and has completed antibiotic therapy at this time.  This abnormal UA is likely a result of patient's bladder cancer  - Pulmonary crit care consult appreciate recommendations  - Stop vanc and continue meropenem for full 5-day course completed  - Continue IV Solu-cortef steroids 50 mg Q8 x 3 more days last day Tuesday 6/24/25-completed     Elevated Troponin-resolved  - Appears non-ischemic and non-MI in etiology  - No chest pain noted on arrival  - Troponin on arrival 134-> 125  - Likely due to poor kidney function     AMARILYS on CKD 3B -worsening  - Likely secondary to hypotension  - Creatinine on admission to 3.1 and GFR of 20.    - Creatinine  3.3 and GFR of 19 on 6/25 2025  - Baseline creatinine of 1.5-2  - Nephrology consulted appreciate recommendation  - Sodium bicarb 100 mL an hour  - Gordon management     Atrial fibrillation  - EKG on admission shows atrial fibrillation with RVR  - Does not appear that the patient has a history of A-fib  - Currently on Lopressor 25 mg twice daily  - Cardiology consulted and appreciate their recommendations  - Patient had TTE which showed   - EF of 55 to 60%  - Cannot improve rate control given vasopressor use and renal function  - Switch from heparin to Eliquis  - Possible cardioversion on Wednesday patient if stable on Eliquis    Melena-resolved  Anemia-stable  - Hemoglobin has been stable but low during inpatient stay.  8.7 6/23/25  - Patient had episode of melena despite being on clear liquid diet  - Patient has been switched to Eliquis will monitor hemoglobin

## 2025-06-24 NOTE — PROGRESS NOTES
Physical/Occupational Therapy    Per RN pt is going to be transferred to ICU. PT/OT will sign off at this time and need new orders when appropriate. Thank you.     Bia Juarez PT, DPT   Fe Freeman OTR/L

## 2025-06-24 NOTE — PROGRESS NOTES
[facility]     Electrophysiology                                     Progress Note    Admission date:  6/15/2025    Reason for follow up visit: AF    HPI/CC: Alfredo Chong was admitted on 6/15/2025 with sepsis secondary to likely UTI. EP following for rapid AF. On 2025, patient was transferred to the ICU for worsening hypotension.  IV heparin was started however this was briefly discontinued secondary to worsening anemia. Heparin was resumed today.    Has also been treated for AMARILYS/CKD. He is on Sven-synephrine, Levophed and vasopressin. On 2025 around noon, he went back into rate controlled AF. Rhythm has remained AF with RVR.     Subjective: He is drowsy and falling asleep at time of my visit.  Daughter at the bedside.    Vitals:  Blood pressure (!) 89/60, pulse (!) 149, temperature 99.4 °F (37.4 °C), temperature source Axillary, resp. rate 16, height 1.778 m (5' 10\"), weight 104 kg (229 lb 4.5 oz), SpO2 95%.  Temp  Av.1 °F (36.7 °C)  Min: 97.5 °F (36.4 °C)  Max: 99.4 °F (37.4 °C)  Pulse  Av.7  Min: 117  Max: 149  BP  Min: 89/60  Max: 103/63  SpO2  Av.6 %  Min: 92 %  Max: 95 %    24 hour I/O    Intake/Output Summary (Last 24 hours) at 2025 1214  Last data filed at 2025 1100  Gross per 24 hour   Intake 0 ml   Output 1585 ml   Net -1585 ml     Current Facility-Administered Medications   Medication Dose Route Frequency Provider Last Rate Last Admin    potassium bicarb-citric acid (EFFER-K) effervescent tablet 40 mEq  40 mEq Oral BID Ramiro Moreno DO   40 mEq at 25 0840    miconazole (MICOTIN) 2 % powder   Topical BID Ramiro Moreno,    Given at 25 1021    [START ON 2025] hydrocortisone sodium succinate PF (SOLU-CORTEF) injection 50 mg  50 mg IntraVENous Daily Hesham Gage MD        midodrine (PROAMATINE) tablet 15 mg  15 mg Oral TID CAMILLA Clemens MD   15 mg at 25 0840    apixaban (ELIQUIS) tablet 5 mg  5 mg

## 2025-06-24 NOTE — PROGRESS NOTES
Pt transferred to ICU room 233. Bedside report given to Jessica CUTLER. Pt's family aware of transfer and have pt's belongings.

## 2025-06-24 NOTE — PROGRESS NOTES
[facility] Progress Note           Subjective/   74 y.o. year old male who we are seeing in consultation for AMARILYS/CKD stage 3b.     Patient seen and examined at bedside. He continues to feel weak, isn't able to eat or drink much.  Hrs have been 140-150 ON, SBP 80-90. He denies any CP, SOB, or palpitations.  Low grade temp ON.   - Labs and notes reviewed  - BP low  - non-oliguric, has baeza, 1250ml UOP  - Family at bedside     ROS:  +weak, intake reduced, now on RA.    Objective/   GEN:  Chronically ill, BP (!) 89/60   Pulse (!) 149   Temp 99.4 °F (37.4 °C) (Axillary)   Resp 16   Ht 1.778 m (5' 10\")   Wt 104 kg (229 lb 4.5 oz)   SpO2 95%   BMI 32.90 kg/m²     GEN: resting in bed, ill appearing   HEENT: non-icteric, no JVD  CV: S1, S2, tachycardic, without m/r/g; Trace LE edema  RESP: CTA B without w/r/r; breathing wnl  ABD: +bs, soft, nt, nd  SKIN: warm, no rash    Data/  Recent Labs     06/22/25  0758 06/23/25  0429 06/24/25  0428   WBC 2.4* 3.2* 6.9   HGB 8.7* 8.7* 7.9*   HCT 25.3* 25.1* 22.8*   MCV 93.7 94.0 93.9    270 264     Recent Labs     06/21/25  1323 06/22/25  0758 06/23/25  0429 06/24/25  0428    133* 134* 136   K 3.4* 3.5 3.5 3.2*    99 98* 99   CO2 18* 19* 22 23   GLUCOSE 138* 99 95 98   PHOS  --  4.6 5.4* 4.7   MG 1.93 1.95  --   --    BUN 36* 41* 44* 45*   CREATININE 3.4* 3.6* 3.7* 3.3*   LABGLOM 18* 17* 16* 19*     UA large blood, >300 protein, s.g. 1.015, >100 wbc's, 21-50 rbc's  Urine sodium 90  Urine chloride 59    CT A/P ->   1. Bilateral ureteral stents are seen. There is mild right hydronephrosis. Ureteral cysts are suboptimally visualized without contrast, it could be further characterized via ultrasound. The bladder is underdistended, obscuring potential bladder mass   lesion. There is a Baeza catheter in place.     2. Diverticulosis    Assessment/     - Acute kidney injury - renal hypoperfusion injury in setting of hypotension, worsening  -  Chronic kidney disease stage 3b - baseline SCr 1.6-2.0 since 2023, follows with Dr. Glynn    - Cr today of 3.4, K 3.2   - on Bicarb gtt   - s/p Albumin yesterday     - Hypotension   - BP remains low normal, SBP 80-90s   - on Midodrine 15mg TID   - received Albumin x1 yesterday     - Anion-gap metabolic acidosis with elevated lactate   - CO2 up to 23 w/ Bicarb gtt   - Last Lactic 2.2    - Hypocalcemia   - Ca as low as 6.3, ical was 0.78 on 6/21/25  - now 7.7 today     - HG urothelial cancer in 3/2024, recurrence of LG urothelial CA in 11/2024  - Completed induction BCG with [facility]  - S/p bilateral ureteral stents   - has baeza, worsening hematuria     - Urinary retention - baeza mgmt from 6/16, plans per Urology    - Anemia   - hgb 8.7 --> 7.9 today     Plan/     - Continue IVF w/ Bicarb for now  - Will give another dose of Albumin today with low BP  - trend hgb, recommend transfuse to keep > 7.0, may not tolerate blood thinners for cardioversion   - Encourage PO intake   - Monitoring off pressors & continue midodrine 15 mg po tid to maintain MAP of 65  - Continue Tums TID  - Continue baeza, monitor I/Os  - Trend labs, bp's, weights, cultures, & urine output  ____________________________________  JOHNY Lynn - NP  [facility]  [email or web address]  Office: [phone]

## 2025-06-24 NOTE — ACP (ADVANCE CARE PLANNING)
Palliative Care Initial Note  Palliative Care Admit date: 6/23/25    Advance Directives:  Pts AD's are in this EMR and pts S.O., Madelyn Brasher, is designated as his healthcare proxy.  Pt elected to amend code status to reflect DNR/DNI on 6/22.    After d/w hospitalist, In light of pts anticipated move to ICU and his ongoing request to remain aggressive, writer f/u w/ Mr. Arlette to ensure his code wish to remain DNR/DNI vs revert to full code.  Writer reminded pt as to the elements of resuscitation and, w/o hesitation, he indicated his wish for FULL CODE at this time.    Plan of care/goals:  Met w/ Pat, Vanessa, a grandson then conversation was joined by hospitalist; Vanessa had the benefit of earlier d/w EP.  Although pt slept for most of writer's visit, he did awaken w/ hospitalist arrival and he and family affirmed goal for remaining aggressive and \"fixing what can be fixed.\"  All agreed to pt potentially being moved to a higher level of care.  Vansesa had questions answered to her satisfaction until other information is known.      Madelyn is hopeful pt will be able to continue to make own healthcare decisions.  She shared that hospitalist, in their discussion over the weekend, also broached the option for hospice but that isn't their overarching goal at this time.      Social/Spiritual: While Madelyn is reluctant to consider any level of need pt may have, she did say that she will be unable to provide physical support for pt in their home by herself.  Vanessa inquired as to levels of care so we began discussion of ICU vs M/S and SNF vs LTAC vs LTC.  This will bear repeating but, for today, she wanted to begin to consider/weigh options down the road.     Plan:  Code status reverted to FULL, hospitalist aware.  Will continue to follow trajectory and support ACP      Reason for consult:  _X_ Advance Care Planning  ___ Transition of Care Planning  ___ Psychosocial/Spiritual Support  ___ Symptom

## 2025-06-24 NOTE — PROGRESS NOTES
4 Eyes Skin Assessment     The patient is being assess for   Transfer to New Unit    I agree that 2 RN's have performed a thorough Head to Toe Skin Assessment on the patient. ALL assessment sites listed below have been assessed.      Areas assessed by both nurses:   [x]   Head, Face, and Ears   [x]   Shoulders, Back, and Chest, Abdomen  [x]   Arms, Elbows, and Hands   [x]   Coccyx, Sacrum, and Ischium  []   Legs, Feet, and Heels        ****    **SHARE this note so that the co-signing nurse is able to place an eSignature**    Co-signer eSignature: Electronically signed by JEN HOOKER RN on 6/24/25 at 5:34 PM EDT    Does the Patient have Skin Breakdown?  Yes LDA WOUND CARE was Initiated documentation include the Anca-wound, Wound Assessment, Measurements, Dressing Treatment, Drainage, and Color\",          Mateus Prevention initiated:  Yes   Wound Care Orders initiated:  Yes      WOC nurse consulted for Pressure Injury (Stage 3,4, Unstageable, DTI, NWPT, Complex wounds)and New or Established Ostomies:  Yes      Primary Nurse eSignature: Electronically signed by JEN HOOKER RN on 6/24/25 at 5:34 PM EDT

## 2025-06-24 NOTE — CARE COORDINATION
Still on HCO3 gtt. Palliatvie care seeing this AM for GOC. SNF recommendations. Active with [facility]. SNF list given by previous CM. Awaiting outcome of palliative care meeting then will discuss SNF. LTACH screening initiated just to review as potential option.     Addendum: Per Leatha Holder RN Palliative Care patient and family remaining aggressive and patient is being moved to ICU where CM will continue to follow for discharge planning.

## 2025-06-24 NOTE — PROGRESS NOTES
Received pt from .  Report from OMAYRA Stubbs received.  Family at bedside.  All questions answered.

## 2025-06-25 LAB
ALBUMIN SERPL-MCNC: 2.4 G/DL (ref 3.4–5)
ANION GAP SERPL CALCULATED.3IONS-SCNC: 10 MMOL/L (ref 3–16)
BACTERIA URNS QL MICRO: ABNORMAL /HPF
BILIRUB UR QL STRIP.AUTO: NEGATIVE
BUN SERPL-MCNC: 45 MG/DL (ref 7–20)
CALCIUM SERPL-MCNC: 9 MG/DL (ref 8.3–10.6)
CHLORIDE SERPL-SCNC: 97 MMOL/L (ref 99–110)
CLARITY UR: ABNORMAL
CO2 SERPL-SCNC: 28 MMOL/L (ref 21–32)
COLOR UR: ABNORMAL
CREAT SERPL-MCNC: 2.9 MG/DL (ref 0.8–1.3)
DEPRECATED RDW RBC AUTO: 16.5 % (ref 12.4–15.4)
EKG ATRIAL RATE: 122 BPM
EKG DIAGNOSIS: NORMAL
EKG P AXIS: 88 DEGREES
EKG P-R INTERVAL: 216 MS
EKG Q-T INTERVAL: 372 MS
EKG QRS DURATION: 92 MS
EKG QTC CALCULATION (BAZETT): 482 MS
EKG R AXIS: 75 DEGREES
EKG T AXIS: 78 DEGREES
EKG VENTRICULAR RATE: 101 BPM
EPI CELLS #/AREA URNS HPF: ABNORMAL /HPF (ref 0–5)
GFR SERPLBLD CREATININE-BSD FMLA CKD-EPI: 22 ML/MIN/{1.73_M2}
GLUCOSE BLD-MCNC: 125 MG/DL (ref 70–99)
GLUCOSE BLD-MCNC: 129 MG/DL (ref 70–99)
GLUCOSE BLD-MCNC: 131 MG/DL (ref 70–99)
GLUCOSE SERPL-MCNC: 93 MG/DL (ref 70–99)
GLUCOSE UR STRIP.AUTO-MCNC: NEGATIVE MG/DL
HCT VFR BLD AUTO: 21.9 % (ref 40.5–52.5)
HCT VFR BLD AUTO: 22.7 % (ref 40.5–52.5)
HCT VFR BLD AUTO: 23.1 % (ref 40.5–52.5)
HGB BLD-MCNC: 7.5 G/DL (ref 13.5–17.5)
HGB BLD-MCNC: 7.6 G/DL (ref 13.5–17.5)
HGB BLD-MCNC: 7.6 G/DL (ref 13.5–17.5)
HGB UR QL STRIP.AUTO: ABNORMAL
KETONES UR STRIP.AUTO-MCNC: ABNORMAL MG/DL
LEUKOCYTE ESTERASE UR QL STRIP.AUTO: ABNORMAL
MAGNESIUM SERPL-MCNC: 1.7 MG/DL (ref 1.8–2.4)
MCH RBC QN AUTO: 32.1 PG (ref 26–34)
MCHC RBC AUTO-ENTMCNC: 34.2 G/DL (ref 31–36)
MCV RBC AUTO: 93.8 FL (ref 80–100)
NITRITE UR QL STRIP.AUTO: NEGATIVE
PERFORMED ON: ABNORMAL
PH UR STRIP.AUTO: 6.5 [PH] (ref 5–8)
PHOSPHATE SERPL-MCNC: 3.5 MG/DL (ref 2.5–4.9)
PLATELET # BLD AUTO: 298 K/UL (ref 135–450)
PMV BLD AUTO: 9.1 FL (ref 5–10.5)
POTASSIUM SERPL-SCNC: 3.2 MMOL/L (ref 3.5–5.1)
PROT UR STRIP.AUTO-MCNC: 100 MG/DL
RBC # BLD AUTO: 2.34 M/UL (ref 4.2–5.9)
RBC #/AREA URNS HPF: >100 /HPF (ref 0–4)
SODIUM SERPL-SCNC: 135 MMOL/L (ref 136–145)
SP GR UR STRIP.AUTO: 1.01 (ref 1–1.03)
UA COMPLETE W REFLEX CULTURE PNL UR: YES
UA DIPSTICK W REFLEX MICRO PNL UR: YES
URN SPEC COLLECT METH UR: ABNORMAL
UROBILINOGEN UR STRIP-ACNC: 0.2 E.U./DL
VANCOMYCIN SERPL-MCNC: 5.8 UG/ML
WBC # BLD AUTO: 12.8 K/UL (ref 4–11)
WBC #/AREA URNS HPF: ABNORMAL /HPF (ref 0–5)

## 2025-06-25 PROCEDURE — 6360000002 HC RX W HCPCS: Performed by: INTERNAL MEDICINE

## 2025-06-25 PROCEDURE — 6370000000 HC RX 637 (ALT 250 FOR IP)

## 2025-06-25 PROCEDURE — 2500000003 HC RX 250 WO HCPCS: Performed by: NURSE PRACTITIONER

## 2025-06-25 PROCEDURE — 99291 CRITICAL CARE FIRST HOUR: CPT | Performed by: NURSE PRACTITIONER

## 2025-06-25 PROCEDURE — 94761 N-INVAS EAR/PLS OXIMETRY MLT: CPT

## 2025-06-25 PROCEDURE — 2580000003 HC RX 258: Performed by: INTERNAL MEDICINE

## 2025-06-25 PROCEDURE — 6360000002 HC RX W HCPCS

## 2025-06-25 PROCEDURE — 2060000000 HC ICU INTERMEDIATE R&B

## 2025-06-25 PROCEDURE — 87086 URINE CULTURE/COLONY COUNT: CPT

## 2025-06-25 PROCEDURE — 80202 ASSAY OF VANCOMYCIN: CPT

## 2025-06-25 PROCEDURE — 2500000003 HC RX 250 WO HCPCS

## 2025-06-25 PROCEDURE — 93010 ELECTROCARDIOGRAM REPORT: CPT | Performed by: INTERNAL MEDICINE

## 2025-06-25 PROCEDURE — 82533 TOTAL CORTISOL: CPT

## 2025-06-25 PROCEDURE — 6360000002 HC RX W HCPCS: Performed by: STUDENT IN AN ORGANIZED HEALTH CARE EDUCATION/TRAINING PROGRAM

## 2025-06-25 PROCEDURE — 85018 HEMOGLOBIN: CPT

## 2025-06-25 PROCEDURE — 6370000000 HC RX 637 (ALT 250 FOR IP): Performed by: NURSE PRACTITIONER

## 2025-06-25 PROCEDURE — 93005 ELECTROCARDIOGRAM TRACING: CPT | Performed by: NURSE PRACTITIONER

## 2025-06-25 PROCEDURE — 80069 RENAL FUNCTION PANEL: CPT

## 2025-06-25 PROCEDURE — 2700000000 HC OXYGEN THERAPY PER DAY

## 2025-06-25 PROCEDURE — 81001 URINALYSIS AUTO W/SCOPE: CPT

## 2025-06-25 PROCEDURE — 6370000000 HC RX 637 (ALT 250 FOR IP): Performed by: STUDENT IN AN ORGANIZED HEALTH CARE EDUCATION/TRAINING PROGRAM

## 2025-06-25 PROCEDURE — 85014 HEMATOCRIT: CPT

## 2025-06-25 PROCEDURE — 83735 ASSAY OF MAGNESIUM: CPT

## 2025-06-25 PROCEDURE — 85027 COMPLETE CBC AUTOMATED: CPT

## 2025-06-25 PROCEDURE — 6370000000 HC RX 637 (ALT 250 FOR IP): Performed by: INTERNAL MEDICINE

## 2025-06-25 RX ORDER — POTASSIUM CHLORIDE 1500 MG/1
40 TABLET, EXTENDED RELEASE ORAL ONCE
Status: COMPLETED | OUTPATIENT
Start: 2025-06-25 | End: 2025-06-25

## 2025-06-25 RX ORDER — SODIUM CHLORIDE, SODIUM LACTATE, POTASSIUM CHLORIDE, CALCIUM CHLORIDE 600; 310; 30; 20 MG/100ML; MG/100ML; MG/100ML; MG/100ML
INJECTION, SOLUTION INTRAVENOUS CONTINUOUS
Status: DISCONTINUED | OUTPATIENT
Start: 2025-06-25 | End: 2025-06-27

## 2025-06-25 RX ORDER — MAGNESIUM SULFATE IN WATER 40 MG/ML
4000 INJECTION, SOLUTION INTRAVENOUS ONCE
Status: COMPLETED | OUTPATIENT
Start: 2025-06-25 | End: 2025-06-25

## 2025-06-25 RX ORDER — AMIODARONE HYDROCHLORIDE 200 MG/1
400 TABLET ORAL 2 TIMES DAILY
Status: DISCONTINUED | OUTPATIENT
Start: 2025-06-25 | End: 2025-06-28 | Stop reason: HOSPADM

## 2025-06-25 RX ADMIN — SODIUM CHLORIDE, PRESERVATIVE FREE 10 ML: 5 INJECTION INTRAVENOUS at 21:39

## 2025-06-25 RX ADMIN — AMIODARONE HYDROCHLORIDE 150 MG: 1.5 INJECTION, SOLUTION INTRAVENOUS at 12:45

## 2025-06-25 RX ADMIN — METOPROLOL TARTRATE 25 MG: 25 TABLET, FILM COATED ORAL at 08:49

## 2025-06-25 RX ADMIN — AMIODARONE HYDROCHLORIDE 1 MG/MIN: 1.8 INJECTION, SOLUTION INTRAVENOUS at 23:45

## 2025-06-25 RX ADMIN — AMIODARONE HYDROCHLORIDE 1 MG/MIN: 1.8 INJECTION, SOLUTION INTRAVENOUS at 12:54

## 2025-06-25 RX ADMIN — CALCIUM CARBONATE 500 MG: 500 TABLET, CHEWABLE ORAL at 08:49

## 2025-06-25 RX ADMIN — MEROPENEM 1000 MG: 1 INJECTION INTRAVENOUS at 12:29

## 2025-06-25 RX ADMIN — LEVOTHYROXINE SODIUM 75 MCG: 0.05 TABLET ORAL at 08:49

## 2025-06-25 RX ADMIN — MICONAZOLE NITRATE: 2 POWDER TOPICAL at 21:39

## 2025-06-25 RX ADMIN — VANCOMYCIN HYDROCHLORIDE 1000 MG: 1 INJECTION, POWDER, LYOPHILIZED, FOR SOLUTION INTRAVENOUS at 14:47

## 2025-06-25 RX ADMIN — AMIODARONE HYDROCHLORIDE 400 MG: 200 TABLET ORAL at 12:44

## 2025-06-25 RX ADMIN — APIXABAN 5 MG: 5 TABLET, FILM COATED ORAL at 08:49

## 2025-06-25 RX ADMIN — CALCIUM CARBONATE 500 MG: 500 TABLET, CHEWABLE ORAL at 12:44

## 2025-06-25 RX ADMIN — SODIUM CHLORIDE, PRESERVATIVE FREE 10 ML: 5 INJECTION INTRAVENOUS at 08:52

## 2025-06-25 RX ADMIN — MEROPENEM 1000 MG: 1 INJECTION INTRAVENOUS at 20:34

## 2025-06-25 RX ADMIN — PANTOPRAZOLE SODIUM 40 MG: 40 INJECTION, POWDER, FOR SOLUTION INTRAVENOUS at 08:48

## 2025-06-25 RX ADMIN — AMIODARONE HYDROCHLORIDE 400 MG: 200 TABLET ORAL at 20:46

## 2025-06-25 RX ADMIN — METOPROLOL TARTRATE 25 MG: 25 TABLET, FILM COATED ORAL at 20:47

## 2025-06-25 RX ADMIN — MIDODRINE HYDROCHLORIDE 15 MG: 5 TABLET ORAL at 12:44

## 2025-06-25 RX ADMIN — POTASSIUM BICARBONATE 40 MEQ: 782 TABLET, EFFERVESCENT ORAL at 20:46

## 2025-06-25 RX ADMIN — MAGNESIUM SULFATE HEPTAHYDRATE 4000 MG: 40 INJECTION, SOLUTION INTRAVENOUS at 08:56

## 2025-06-25 RX ADMIN — HYDROCORTISONE SODIUM SUCCINATE 50 MG: 100 INJECTION, POWDER, FOR SOLUTION INTRAMUSCULAR; INTRAVENOUS at 08:49

## 2025-06-25 RX ADMIN — SODIUM CHLORIDE, SODIUM LACTATE, POTASSIUM CHLORIDE, AND CALCIUM CHLORIDE: .6; .31; .03; .02 INJECTION, SOLUTION INTRAVENOUS at 08:52

## 2025-06-25 RX ADMIN — MIDODRINE HYDROCHLORIDE 15 MG: 5 TABLET ORAL at 20:47

## 2025-06-25 RX ADMIN — POTASSIUM CHLORIDE 40 MEQ: 1500 TABLET, EXTENDED RELEASE ORAL at 06:21

## 2025-06-25 RX ADMIN — CETIRIZINE HYDROCHLORIDE 10 MG: 10 TABLET, FILM COATED ORAL at 09:00

## 2025-06-25 RX ADMIN — MIDODRINE HYDROCHLORIDE 15 MG: 5 TABLET ORAL at 08:48

## 2025-06-25 RX ADMIN — AMIODARONE HYDROCHLORIDE 1 MG/MIN: 1.8 INJECTION, SOLUTION INTRAVENOUS at 18:13

## 2025-06-25 RX ADMIN — APIXABAN 5 MG: 5 TABLET, FILM COATED ORAL at 20:47

## 2025-06-25 ASSESSMENT — PAIN SCALES - GENERAL: PAINLEVEL_OUTOF10: 0

## 2025-06-25 NOTE — PROGRESS NOTES
Shift:  1122-6624    Reason for ICU Admission: Afib with hypotension    Most recent vitals: /72   Pulse (!) 104   Temp 98.7 °F (37.1 °C) (Bladder)   Resp 14   Ht 1.778 m (5' 10\")   Wt 99.3 kg (218 lb 14.7 oz)   SpO2 96%   BMI 31.41 kg/m²      Drip rates at handoff:    lactated ringers 50 mL/hr at 06/26/25 0323    amiodarone 1 mg/min (06/26/25 0533)    dextrose      sodium chloride 10 mL/hr at 06/20/25 2118       Current O2:   [] Room Air   [x] NC  2L   [] BiPaP    [] Vented  % Fi02,  Peep    Hospital Course:  ICU Day # 1   Transferred from  for follow up on afib with hypotension  History of bladder cancer, CAD, Hypothyroidism,   IV NaHCO3 at 100/hr  Wound care consult placed    ICU Day #1 Nights (6/24)  - UOP = 700 mL, rectal tube replaced  - 40 mEq K replaced    ICU Day #2 Nights (6/25)  - Bicarb switched to LR, decreased to 50 mL/hr  - Amio drip started on days, drip continued overnight- PO tablets given  - ABX restarted  - UOP = 750 mL

## 2025-06-25 NOTE — PROGRESS NOTES
[facility] Medicine Progress Note  V 5.17      Date of Admission: 6/15/2025    Hospital Day: 11      Chief Admission Complaint: Falls    Subjective: Patient examined with wife at bedside.  No current acute complaints no complaints of chest pain, shortness of breath, palpitations or constipation endorsed.     Presenting Admission History:       Patient is a 74-year-old male with a past medical history of CAD, advanced bladder cancer undergoing active immunotherapy, hypertension, thyroid disease who presents for low blood pressure and falls. He states that he has had ongoing issues with his blood pressure since his last admission. He states last night he fell down and was unable to get up. He states that his blood pressure ranged from the 40s to the 80s. He is endorsing lightheadedness. He denies any chest pain or shortness of breath. On arrival to the ED he was found to be tachycardic and hypotensive. ED workup was remarkable for an elevated creatinine, lactic acidosis, elevated troponin, elevated BNP, leukocytosis. We were consulted for admission.     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   - Nephrology consulted appreciate recommendation  - LR 75 mL an hour  - Continue with midodrine 15 mg p.o. 3 times daily.  Maintain MAP 65   - Gordon management    Severe sepsis worsening  - On arrival, RR of 20,  bpm, Temp 98.5 F, and WBC of 14.0. Source of infection UTI  - Procal 0.36. LA of 3.0 on admission  - UA showing large blood, protein and leukocytes.   - Urine Culture  Discharge: Clinical Course and Subspecialty recs    Likely rate limiting factor: Clinical course    --------------------------------------------------    MDM (any 2 required for High level billing)    A. Problems (any 1)  [] Acute/Chronic Illness/injury posing ongoing threat to life and/or bodily function without ongoing treatment    [] Severe exacerbation of chronic illness    --------------------------------------------------  B. Risk of Treatment (any 1)    [] Drugs/treatments that require intensive monitoring for toxicity    [] IV ABX (Vancomycin, Aminoglycosides, etc)     [] Post-Cath/Contrast study requiring serial monitoring    [] IV Narcotic analgesia    [] Aggressive IV diuresis    [] Hypertonic Saline    [] Critical electrolyte abnormalities requiring IV replacement    [] Insulin - Scheduled/SSI or Insulin gtt    [] Anticoagulation (Heparin gtt or Coumadin - other anticoagulants in special circumstances)    [] Cardiac Medications (IV Amiodarone/Diltiazem, Tikosyn, etc)    [] Hemodialysis    [] Other -    [] Change in code status    [] Decision to escalate care    [] Major surgery/procedure with associated risk factors    --------------------------------------------------  C. Data (any 2)    [] Data Review (any 3)    [] Consultant/subspecialist notes from yesterday/today    [x] All available current labs reviewed interpreted for clinical significance    [x] Appropriate follow-up labs were ordered  [] Collateral history obtained     [] Independent Interpretation of tests (any 1)    [] Telemetry (Rhythm Strip) personally reviewed and interpreted        [] Imaging personally reviewed and interpreted     [x] Discussion (any 1)  [x] Multi-Disciplinary Rounds with Case Management  [] Discussed management of the case with           Labs:  Personally reviewed on 6/25/2025 and interpreted for clinical significance as documented above.     Recent Labs     06/23/25  0429 06/24/25  0428 06/24/25  2301 06/25/25  [id number]

## 2025-06-25 NOTE — CARE COORDINATION
LOS 10.  Care managed by Hosp Med, Card, Neph, and Pall Care has seen. Here w Sep Shock. From home w spouse-active [facility]. Discussed dispo w pt and spouse at bedside. States are aware of different possible levels of care at DC as discussed by CM yesterday- decline further discussion today. SNF vs LTAC etc? Pt has been previously screened by [facility] and they are following. Payer is Medicare traditional- no cert will be needed. CM remains available and following. Brianna Pace, RN     [id number] Rec'd call from [facility]- pt no longer meeting LTAC criteria- likely need SNF dispo. Brianna Pace RN

## 2025-06-25 NOTE — PROGRESS NOTES
[facility] Progress Note           Subjective/   74 y.o. year old male who we are seeing in consultation for AMARILYS/CKD stage 3b.     Patient seen and examined at bedside. He continues to feel weak, isn't able to eat or drink much.  HR is a little better today, down to low 100s mostly.  He denies any CP, SOB, or palpitations.    - Labs and notes reviewed  - BP low normal 90-100s  - non-oliguric, has baeza, 1250ml UOP  - Family at bedside     ROS:  +weak, intake reduced, now on RA.    Objective/   GEN:  Chronically ill, /75   Pulse 95   Temp 98.8 °F (37.1 °C) (Oral)   Resp 26   Ht 1.778 m (5' 10\")   Wt 104 kg (229 lb 4.5 oz)   SpO2 94%   BMI 32.90 kg/m²     GEN: resting in bed, ill appearing   HEENT: non-icteric, no JVD  CV: S1, S2, tachycardic, without m/r/g; Trace LE edema  RESP: CTA B without w/r/r; breathing wnl  ABD: +bs, soft, nt, nd  SKIN: warm, no rash    Data/  Recent Labs     06/23/25  0429 06/24/25  0428 06/24/25  2200 06/25/25  0430 06/25/25  1200   WBC 3.2* 6.9  --  12.8*  --    HGB 8.7* 7.9* 7.5* 7.5* 7.6*   HCT 25.1* 22.8* 22.5* 21.9* 23.1*   MCV 94.0 93.9  --  93.8  --     264  --  298  --      Recent Labs     06/23/25  0429 06/24/25  0428 06/25/25  0430   * 136 135*   K 3.5 3.2* 3.2*   CL 98* 99 97*   CO2 22 23 28   GLUCOSE 95 98 93   PHOS 5.4* 4.7 3.5   MG  --   --  1.70*   BUN 44* 45* 45*   CREATININE 3.7* 3.3* 2.9*   LABGLOM 16* 19* 22*     UA large blood, >300 protein, s.g. 1.015, >100 wbc's, 21-50 rbc's  Urine sodium 90  Urine chloride 59    CT A/P ->   1. Bilateral ureteral stents are seen. There is mild right hydronephrosis. Ureteral cysts are suboptimally visualized without contrast, it could be further characterized via ultrasound. The bladder is underdistended, obscuring potential bladder mass   lesion. There is a Baeza catheter in place.     2. Diverticulosis    Assessment/     - Acute kidney injury - renal hypoperfusion injury in setting of

## 2025-06-25 NOTE — PLAN OF CARE
Problem: Chronic Conditions and Co-morbidities  Goal: Patient's chronic conditions and co-morbidity symptoms are monitored and maintained or improved  Outcome: Progressing  Flowsheets  Taken 6/24/2025 2000 by Kaylee Brower RN  Care Plan - Patient's Chronic Conditions and Co-Morbidity Symptoms are Monitored and Maintained or Improved: Monitor and assess patient's chronic conditions and comorbid symptoms for stability, deterioration, or improvement  Taken 6/24/2025 1530 by Jessica Cox RN  Care Plan - Patient's Chronic Conditions and Co-Morbidity Symptoms are Monitored and Maintained or Improved:   Monitor and assess patient's chronic conditions and comorbid symptoms for stability, deterioration, or improvement   Collaborate with multidisciplinary team to address chronic and comorbid conditions and prevent exacerbation or deterioration   Update acute care plan with appropriate goals if chronic or comorbid symptoms are exacerbated and prevent overall improvement and discharge     Problem: Safety - Adult  Goal: Free from fall injury  Outcome: Progressing  Flowsheets (Taken 6/24/2025 2233)  Free From Fall Injury: Instruct family/caregiver on patient safety     Problem: Pain  Goal: Verbalizes/displays adequate comfort level or baseline comfort level  Outcome: Progressing  Flowsheets  Taken 6/24/2025 1800 by Jessica Cox RN  Verbalizes/displays adequate comfort level or baseline comfort level: Assess pain using appropriate pain scale  Taken 6/24/2025 1500 by Jessica Cox RN  Verbalizes/displays adequate comfort level or baseline comfort level:   Encourage patient to monitor pain and request assistance   Assess pain using appropriate pain scale   Administer analgesics based on type and severity of pain and evaluate response   Implement non-pharmacological measures as appropriate and evaluate response   Consider cultural and social influences on pain and pain management     Problem: Respiratory - Adult  Goal:

## 2025-06-25 NOTE — PROGRESS NOTES
[facility]     Electrophysiology                                     Progress Note    Admission date:  6/15/2025    Reason for follow up visit: AF    HPI/CC: Alfredo Chong was admitted on 6/15/2025 with sepsis secondary to likely UTI. EP following for rapid AF. On 2025, patient was transferred to the ICU for worsening hypotension.  IV heparin was started however this was briefly discontinued secondary to worsening anemia. Heparin was resumed today.    Has also been treated for AMARILYS/CKD. He is on Sven-synephrine, Levophed and vasopressin. On 2025 around noon, he went back into rate controlled AF. On 2025, he was transferred back to the ICU. Patient spontaneously converted to ST with PAC's.     Subjective: He is sleepy at the time of my visit but no complaints.  Wife and daughter at the bedside.    Vitals:  Blood pressure 98/68, pulse (!) 119, temperature 99.1 °F (37.3 °C), temperature source Bladder, resp. rate 19, height 1.778 m (5' 10\"), weight 104 kg (229 lb 4.5 oz), SpO2 97%.  Temp  Av.6 °F (37 °C)  Min: 97 °F (36.1 °C)  Max: 99.4 °F (37.4 °C)  Pulse  Av.5  Min: 106  Max: 143  BP  Min: 93/80  Max: 119/70  SpO2  Av.6 %  Min: 90 %  Max: 98 %    24 hour I/O    Intake/Output Summary (Last 24 hours) at 2025 0911  Last data filed at 2025 0600  Gross per 24 hour   Intake 737.89 ml   Output 1860 ml   Net -1122.11 ml     Current Facility-Administered Medications   Medication Dose Route Frequency Provider Last Rate Last Admin    magnesium sulfate 4000 mg in 100 mL IVPB premix  4,000 mg IntraVENous Once Ramiro Moreno DO 25 mL/hr at 25 0856 4,000 mg at 25 0856    potassium bicarb-citric acid (EFFER-K) effervescent tablet 40 mEq  40 mEq Oral BID CAMILLA Clemens MD        lactated ringers infusion   IntraVENous Continuous CAMILLA Clemens MD 75 mL/hr at 25 0852 New Bag at 25 0852    meropenem (MERREM) 1,000 mg in sodium chloride 0.9 %

## 2025-06-25 NOTE — CONSULTS
Pharmacy Note  Vancomycin Consult    Alfredo Chong is a 74 y.o. male started on Vancomycin for sepsis; consult received from Dr. Gage to manage therapy. Also receiving the following antibiotics: meropenem.    Allergies:  Simvastatin     Micro: NGTD    Recent Labs     06/24/25  0428 06/25/25  0430   CREATININE 3.3* 2.9*       Recent Labs     06/24/25  0428 06/25/25  0430   WBC 6.9 12.8*     Estimated Creatinine Clearance: 27 mL/min (A) (based on SCr of 2.9 mg/dL (H)).      Intake/Output Summary (Last 24 hours) at 6/25/2025 1049  Last data filed at 6/25/2025 0600  Gross per 24 hour   Intake 737.89 ml   Output 1860 ml   Net -1122.11 ml       Wt Readings from Last 1 Encounters:   06/24/25 104 kg (229 lb 4.5 oz)         Body mass index is 32.9 kg/m².    Pulse dose: fluctuating renal function, AMARILYS, ESRD  Goal Vancomycin trough: 15-20 mcg/mL   Goal Vancomycin AUC: 400-600     Assessment/Plan:  Recent Vancomycin therapy with renal impairment. Based on PK modeling, would recommend vancomycin level now in order to determine dosing strategy.     Timing of trough level will be determined based on culture results, renal function, and clinical response.     Thank you for the consult.  Mehrdad ElliottD, [facility]   6/25/2025 10:51 AM    Vancomycin level 5.8 mcg/mL today  Vancomycin 1000 mg IVPB x 1 (pulse dosing strategy)  Vancomycin level tomorrow AM  Mehrdad ElliottD, [facility]   6/25/2025 1:38 PM    Vancomycin  Recent Labs     06/25/25  1235 06/26/25  0410   VANCORANDOM 5.8 11.9     Recent Labs     06/24/25  0428 06/25/25  0430 06/26/25  0410   CREATININE 3.3* 2.9* 2.4*     Estimated Creatinine Clearance: 31 mL/min (A) (based on SCr of 2.4 mg/dL (H)).  Recent Labs     06/24/25  0428 06/25/25  0430 06/26/25  0410   WBC 6.9 12.8* 14.2*     Plan: Give vancomycin 750mg x1 dose  Recheck vancomycin level 6/27 0600  Boaz Perez RP 6/26/2025 4:52 AM    Vancomycin  Recent Labs     06/25/25  1235 06/26/25  0415

## 2025-06-26 PROBLEM — N18.9 ACUTE KIDNEY INJURY SUPERIMPOSED ON CHRONIC KIDNEY DISEASE: Status: ACTIVE | Noted: 2021-09-07

## 2025-06-26 PROBLEM — D72.829 LEUKOCYTOSIS: Status: ACTIVE | Noted: 2025-06-26

## 2025-06-26 LAB
ALBUMIN SERPL-MCNC: 2.4 G/DL (ref 3.4–5)
ANION GAP SERPL CALCULATED.3IONS-SCNC: 9 MMOL/L (ref 3–16)
BACTERIA UR CULT: NORMAL
BUN SERPL-MCNC: 43 MG/DL (ref 7–20)
C DIFF TOX A+B STL QL IA: NORMAL
CALCIUM SERPL-MCNC: 9.8 MG/DL (ref 8.3–10.6)
CHLORIDE SERPL-SCNC: 98 MMOL/L (ref 99–110)
CO2 SERPL-SCNC: 29 MMOL/L (ref 21–32)
CREAT SERPL-MCNC: 2.4 MG/DL (ref 0.8–1.3)
DEPRECATED RDW RBC AUTO: 16.6 % (ref 12.4–15.4)
EKG DIAGNOSIS: NORMAL
EKG Q-T INTERVAL: 450 MS
EKG QRS DURATION: 94 MS
EKG QTC CALCULATION (BAZETT): 562 MS
EKG R AXIS: 79 DEGREES
EKG T AXIS: 49 DEGREES
EKG VENTRICULAR RATE: 94 BPM
GFR SERPLBLD CREATININE-BSD FMLA CKD-EPI: 27 ML/MIN/{1.73_M2}
GLUCOSE BLD-MCNC: 124 MG/DL (ref 70–99)
GLUCOSE BLD-MCNC: 131 MG/DL (ref 70–99)
GLUCOSE BLD-MCNC: 149 MG/DL (ref 70–99)
GLUCOSE BLD-MCNC: 150 MG/DL (ref 70–99)
GLUCOSE SERPL-MCNC: 128 MG/DL (ref 70–99)
HCT VFR BLD AUTO: 21.7 % (ref 40.5–52.5)
HCT VFR BLD AUTO: 22.6 % (ref 40.5–52.5)
HCT VFR BLD AUTO: 24.4 % (ref 40.5–52.5)
HEMOCCULT SP1 STL QL: NORMAL
HGB BLD-MCNC: 7.2 G/DL (ref 13.5–17.5)
HGB BLD-MCNC: 7.6 G/DL (ref 13.5–17.5)
HGB BLD-MCNC: 8.3 G/DL (ref 13.5–17.5)
MAGNESIUM SERPL-MCNC: 2.08 MG/DL (ref 1.8–2.4)
MCH RBC QN AUTO: 32.1 PG (ref 26–34)
MCHC RBC AUTO-ENTMCNC: 33.9 G/DL (ref 31–36)
MCV RBC AUTO: 94.8 FL (ref 80–100)
PERFORMED ON: ABNORMAL
PHOSPHATE SERPL-MCNC: 3.3 MG/DL (ref 2.5–4.9)
PLATELET # BLD AUTO: 273 K/UL (ref 135–450)
PMV BLD AUTO: 8.8 FL (ref 5–10.5)
POTASSIUM SERPL-SCNC: 3.5 MMOL/L (ref 3.5–5.1)
RBC # BLD AUTO: 2.58 M/UL (ref 4.2–5.9)
SODIUM SERPL-SCNC: 136 MMOL/L (ref 136–145)
VANCOMYCIN SERPL-MCNC: 11.9 UG/ML
WBC # BLD AUTO: 14.2 K/UL (ref 4–11)

## 2025-06-26 PROCEDURE — 2500000003 HC RX 250 WO HCPCS: Performed by: NURSE PRACTITIONER

## 2025-06-26 PROCEDURE — 87506 IADNA-DNA/RNA PROBE TQ 6-11: CPT

## 2025-06-26 PROCEDURE — 99223 1ST HOSP IP/OBS HIGH 75: CPT | Performed by: INTERNAL MEDICINE

## 2025-06-26 PROCEDURE — 80202 ASSAY OF VANCOMYCIN: CPT

## 2025-06-26 PROCEDURE — 85027 COMPLETE CBC AUTOMATED: CPT

## 2025-06-26 PROCEDURE — 80069 RENAL FUNCTION PANEL: CPT

## 2025-06-26 PROCEDURE — 99291 CRITICAL CARE FIRST HOUR: CPT | Performed by: NURSE PRACTITIONER

## 2025-06-26 PROCEDURE — 83735 ASSAY OF MAGNESIUM: CPT

## 2025-06-26 PROCEDURE — 6370000000 HC RX 637 (ALT 250 FOR IP)

## 2025-06-26 PROCEDURE — 93005 ELECTROCARDIOGRAM TRACING: CPT | Performed by: NURSE PRACTITIONER

## 2025-06-26 PROCEDURE — 85018 HEMOGLOBIN: CPT

## 2025-06-26 PROCEDURE — 82668 ASSAY OF ERYTHROPOIETIN: CPT

## 2025-06-26 PROCEDURE — 2060000000 HC ICU INTERMEDIATE R&B

## 2025-06-26 PROCEDURE — 6360000002 HC RX W HCPCS: Performed by: STUDENT IN AN ORGANIZED HEALTH CARE EDUCATION/TRAINING PROGRAM

## 2025-06-26 PROCEDURE — 2700000000 HC OXYGEN THERAPY PER DAY

## 2025-06-26 PROCEDURE — 87324 CLOSTRIDIUM AG IA: CPT

## 2025-06-26 PROCEDURE — 87040 BLOOD CULTURE FOR BACTERIA: CPT

## 2025-06-26 PROCEDURE — 85014 HEMATOCRIT: CPT

## 2025-06-26 PROCEDURE — 2580000003 HC RX 258: Performed by: INTERNAL MEDICINE

## 2025-06-26 PROCEDURE — 6370000000 HC RX 637 (ALT 250 FOR IP): Performed by: INTERNAL MEDICINE

## 2025-06-26 PROCEDURE — 87449 NOS EACH ORGANISM AG IA: CPT

## 2025-06-26 PROCEDURE — 6370000000 HC RX 637 (ALT 250 FOR IP): Performed by: NURSE PRACTITIONER

## 2025-06-26 PROCEDURE — 94761 N-INVAS EAR/PLS OXIMETRY MLT: CPT

## 2025-06-26 PROCEDURE — 82270 OCCULT BLOOD FECES: CPT

## 2025-06-26 PROCEDURE — 2500000003 HC RX 250 WO HCPCS

## 2025-06-26 PROCEDURE — 87641 MR-STAPH DNA AMP PROBE: CPT

## 2025-06-26 PROCEDURE — 6360000002 HC RX W HCPCS: Performed by: INTERNAL MEDICINE

## 2025-06-26 RX ADMIN — APIXABAN 5 MG: 5 TABLET, FILM COATED ORAL at 20:57

## 2025-06-26 RX ADMIN — SODIUM CHLORIDE, PRESERVATIVE FREE 10 ML: 5 INJECTION INTRAVENOUS at 20:58

## 2025-06-26 RX ADMIN — SODIUM CHLORIDE, SODIUM LACTATE, POTASSIUM CHLORIDE, AND CALCIUM CHLORIDE: .6; .31; .03; .02 INJECTION, SOLUTION INTRAVENOUS at 03:23

## 2025-06-26 RX ADMIN — AMIODARONE HYDROCHLORIDE 1 MG/MIN: 1.8 INJECTION, SOLUTION INTRAVENOUS at 18:08

## 2025-06-26 RX ADMIN — MEROPENEM 1000 MG: 1 INJECTION INTRAVENOUS at 21:01

## 2025-06-26 RX ADMIN — ACETAMINOPHEN 650 MG: 325 TABLET ORAL at 16:33

## 2025-06-26 RX ADMIN — HYDROCORTISONE SODIUM SUCCINATE 50 MG: 100 INJECTION, POWDER, FOR SOLUTION INTRAMUSCULAR; INTRAVENOUS at 08:15

## 2025-06-26 RX ADMIN — MIDODRINE HYDROCHLORIDE 15 MG: 5 TABLET ORAL at 20:58

## 2025-06-26 RX ADMIN — MEROPENEM 1000 MG: 1 INJECTION INTRAVENOUS at 08:18

## 2025-06-26 RX ADMIN — APIXABAN 5 MG: 5 TABLET, FILM COATED ORAL at 08:15

## 2025-06-26 RX ADMIN — PANTOPRAZOLE SODIUM 40 MG: 40 INJECTION, POWDER, FOR SOLUTION INTRAVENOUS at 08:15

## 2025-06-26 RX ADMIN — METOPROLOL TARTRATE 25 MG: 25 TABLET, FILM COATED ORAL at 20:58

## 2025-06-26 RX ADMIN — AMIODARONE HYDROCHLORIDE 400 MG: 200 TABLET ORAL at 08:15

## 2025-06-26 RX ADMIN — AMIODARONE HYDROCHLORIDE 400 MG: 200 TABLET ORAL at 20:57

## 2025-06-26 RX ADMIN — SODIUM CHLORIDE, PRESERVATIVE FREE 10 ML: 5 INJECTION INTRAVENOUS at 08:16

## 2025-06-26 RX ADMIN — CETIRIZINE HYDROCHLORIDE 10 MG: 10 TABLET, FILM COATED ORAL at 08:15

## 2025-06-26 RX ADMIN — MICONAZOLE NITRATE: 2 POWDER TOPICAL at 21:07

## 2025-06-26 RX ADMIN — MICONAZOLE NITRATE: 2 POWDER TOPICAL at 09:06

## 2025-06-26 RX ADMIN — AMIODARONE HYDROCHLORIDE 1 MG/MIN: 1.8 INJECTION, SOLUTION INTRAVENOUS at 12:04

## 2025-06-26 RX ADMIN — METOPROLOL TARTRATE 25 MG: 25 TABLET, FILM COATED ORAL at 08:15

## 2025-06-26 RX ADMIN — MIDODRINE HYDROCHLORIDE 15 MG: 5 TABLET ORAL at 08:15

## 2025-06-26 RX ADMIN — AMIODARONE HYDROCHLORIDE 1 MG/MIN: 1.8 INJECTION, SOLUTION INTRAVENOUS at 05:33

## 2025-06-26 RX ADMIN — ACETAMINOPHEN 650 MG: 325 TABLET ORAL at 05:36

## 2025-06-26 RX ADMIN — LEVOTHYROXINE SODIUM 75 MCG: 0.05 TABLET ORAL at 08:15

## 2025-06-26 RX ADMIN — VANCOMYCIN HYDROCHLORIDE 750 MG: 750 INJECTION, POWDER, LYOPHILIZED, FOR SOLUTION INTRAVENOUS at 05:31

## 2025-06-26 RX ADMIN — MIDODRINE HYDROCHLORIDE 15 MG: 5 TABLET ORAL at 15:16

## 2025-06-26 ASSESSMENT — PAIN DESCRIPTION - LOCATION
LOCATION: KNEE;SHOULDER
LOCATION: KNEE;SHOULDER
LOCATION: GENERALIZED

## 2025-06-26 ASSESSMENT — PAIN DESCRIPTION - ORIENTATION
ORIENTATION: RIGHT

## 2025-06-26 ASSESSMENT — PAIN SCALES - GENERAL
PAINLEVEL_OUTOF10: 6
PAINLEVEL_OUTOF10: 5
PAINLEVEL_OUTOF10: 5
PAINLEVEL_OUTOF10: 2
PAINLEVEL_OUTOF10: 2
PAINLEVEL_OUTOF10: 7

## 2025-06-26 NOTE — PROGRESS NOTES
Urology Progress Note    Subjective: Alfredo Chong is seen again for blood in the urine.  He was recently started on eliquis and the blood started thereafter.        Vitals:  /73   Pulse 81   Temp 98.5 °F (36.9 °C) (Bladder)   Resp 25   Ht 1.778 m (5' 10\")   Wt 99.3 kg (218 lb 14.7 oz)   SpO2 98%   BMI 31.41 kg/m²   Temp  Av.4 °F (36.9 °C)  Min: 98 °F (36.7 °C)  Max: 98.7 °F (37.1 °C)    Intake/Output Summary (Last 24 hours) at 2025 1640  Last data filed at 2025 1506  Gross per 24 hour   Intake 3623.78 ml   Output 1875 ml   Net 1748.78 ml       Exam:     Physical:  Well developed, well nourished in no acute distress  Mood indicates no abnormalities. Pt doesn’t appear depressed  Orientated to time and place  Neck is supple, trachea is midline  Respiratory effort is normal  Abdomen soft, not tender, not distended   Skin show no abnormal lesions      Male :  Gordon draining light red, thin urine, no clots noted     Labs:  WBC:    Lab Results   Component Value Date/Time    WBC 14.2 2025 04:10 AM     Hemoglobin/Hematocrit:    Lab Results   Component Value Date/Time    HGB 7.6 2025 10:16 AM    HCT 22.6 2025 10:16 AM     BMP:    Lab Results   Component Value Date/Time     2025 04:10 AM    K 3.5 2025 04:10 AM    K 4.0 2025 05:23 AM    CL 98 2025 04:10 AM    CO2 29 2025 04:10 AM    BUN 43 2025 04:10 AM    CREATININE 2.4 2025 04:10 AM    CALCIUM 9.8 2025 04:10 AM    GFRAA >60 2021 03:50 PM    GFRAA >60 2013 08:24 AM    LABGLOM 27 2025 04:10 AM    LABGLOM 36 2024 12:21 PM     PT/INR:    Lab Results   Component Value Date/Time    PROTIME 16.9 06/15/2025 04:21 PM    INR 1.36 06/15/2025 04:21 PM     PTT:    Lab Results   Component Value Date/Time    APTT 37.9 06/15/2025 04:21 PM   [APTT    Urinalysis: >100 wbc, 21-50 rbc, large le     Urine Culture: ng        Imaging:   XR CHEST PORTABLE   Final

## 2025-06-26 NOTE — PROGRESS NOTES
Shift:  [id number]    Reason for ICU Admission: Afib with hypotension    Most recent vitals: /80   Pulse 72   Temp 98.5 °F (36.9 °C) (Bladder)   Resp 10   Ht 1.778 m (5' 10\")   Wt 99.3 kg (218 lb 14.7 oz)   SpO2 97%   BMI 31.41 kg/m²      Drip rates at handoff:    lactated ringers 50 mL/hr at 06/26/25 1506    amiodarone 1 mg/min (06/26/25 1808)    dextrose      sodium chloride 10 mL/hr at 06/20/25 2118       Current O2:   [x] Room Air   [] NC  2L   [] BiPaP    [] Vented  % Fi02,  Peep    Hospital Course:  ICU Day # 1   Transferred from  for follow up on afib with hypotension  History of bladder cancer, CAD, Hypothyroidism,   IV NaHCO3 at 100/hr  Wound care consult placed    ICU Day #1 Nights (6/24)  - UOP = 700 mL, rectal tube replaced  - 40 mEq K replaced    ICU Day #2 Nights (6/25)  - Bicarb switched to LR, decreased to 50 mL/hr  - Amio drip started on days, drip continued overnight- PO tablets given  - ABX restarted  - UOP = 750 mL       ICU Day #3 Days (6/26)  -MRSA, Cdiff, GI panel, & blood cultures x1 from port sent to lab (cdiff negative)  -new blisters noted on pubic/minna area & new possible pressure injuries to foreskin from baeza? Wound care re-consulted  -Urology recommending transitioning away from AC to help with hematuria. Cardiology aware and planning to continue eliquis for several more days until loaded with amio, then stopping.   -hem/onc checking epo level to see if patient would benefit from epoetin kp to help with anemia.  -ID consulted. Elevated WBC may be from infection, cancer, and/or steroid use. Continuing to monitor cultures and labs.   -UOP = 700  -Rectal output = 300

## 2025-06-26 NOTE — CONSULTS
[facility]    Consult Note      Requesting Physician:  Dinora Chavez PA-C    CHIEF COMPLAINT:  high grade muscle invasive bladder cancer- continuity of care consult       HISTORY OF PRESENT ILLNESS:    Alfredo Chong is a 74 year old male with PMHX of bladder cancer undergoing treatment at [facility], CKD, CAD, HTN who presented to [facility] ED on 06/15/2025 with concerns for fall after dizzy episode. He was admitted for further workup and evaluation after noting new afib with rvr. Noted to have sepsis as well.     Hem/onc consulted for continuity of care.     Mr. Chong  is a 74 y.o. male we are seeing in consultation for     ICD-10-CM    1. Dizziness  R42       2. Hypotension, unspecified hypotension type  I95.9       3. Acute kidney injury superimposed on chronic kidney disease  N17.9     N18.9       4. Elevated brain natriuretic peptide (BNP) level  R79.89       5. Troponin level elevated  R79.89       6. New onset atrial fibrillation (HCC)  I48.91       7. Paroxysmal atrial fibrillation (HCC)  I48.0 Echo (TTE) complete (PRN contrast/bubble/strain/3D)     Echo (TTE) complete (PRN contrast/bubble/strain/3D)      8. Rapid atrial fibrillation (HCC)  I48.91 Insert arterial line     Insert Arterial Line             Past Medical History:  Past Medical History:   Diagnosis Date    Actinic keratosis     Allergic rhinitis     CAD (coronary artery disease)     [facility] cardiology.  Dr. Reveles    Cancer (HCC)     bladder    Chronic uveitis, bilateral     History of blood transfusion     [place] (hard of hearing[place]     Hyperlipidemia     Hypertension     MI (myocardial infarction) (HCC)     Osteoarthritis     knees,     Thyroid disease     Uveitis of both eyes        Past Surgical History:  Past Surgical History:   Procedure Laterality Date    APPENDECTOMY      COLONOSCOPY  2005    repeat 10yr    CORONARY

## 2025-06-26 NOTE — PLAN OF CARE
Problem: Chronic Conditions and Co-morbidities  Goal: Patient's chronic conditions and co-morbidity symptoms are monitored and maintained or improved  6/25/2025 2359 by Kaylee Brower RN  Outcome: Progressing  Flowsheets (Taken 6/25/2025 2000)  Care Plan - Patient's Chronic Conditions and Co-Morbidity Symptoms are Monitored and Maintained or Improved: Update acute care plan with appropriate goals if chronic or comorbid symptoms are exacerbated and prevent overall improvement and discharge  6/25/2025 1321 by Dee Boyd RN  Outcome: Progressing  6/25/2025 1321 by Dee Boyd RN  Outcome: Progressing     Problem: Pain  Goal: Verbalizes/displays adequate comfort level or baseline comfort level  6/25/2025 2359 by Kaylee Brower RN  Outcome: Progressing  Flowsheets (Taken 6/25/2025 2000)  Verbalizes/displays adequate comfort level or baseline comfort level: Encourage patient to monitor pain and request assistance  6/25/2025 1321 by Dee Boyd RN  Outcome: Progressing  6/25/2025 1321 by Dee Boyd RN  Outcome: Progressing     Problem: Skin/Tissue Integrity - Adult  Goal: Incisions, wounds, or drain sites healing without S/S of infection  6/25/2025 2359 by Kaylee Brower RN  Outcome: Progressing  Flowsheets (Taken 6/25/2025 2000)  Incisions, Wounds, or Drain Sites Healing Without Sign and Symptoms of Infection: Implement wound care per orders  6/25/2025 1321 by Dee Boyd RN  Outcome: Progressing  6/25/2025 1321 by Dee Boyd RN  Outcome: Progressing     Problem: Gastrointestinal - Adult  Goal: Maintains or returns to baseline bowel function  6/25/2025 2359 by Kaylee Brower RN  Outcome: Progressing  Flowsheets (Taken 6/25/2025 2000)  Maintains or returns to baseline bowel function: Administer ordered medications as needed  6/25/2025 1321 by Dee Boyd RN  Outcome: Progressing  6/25/2025 1321 by Dee Boyd RN  Outcome: Progressing     Problem: Genitourinary -

## 2025-06-26 NOTE — CONSULTS
Infectious Diseases   Consult Note      Reason for Consult: persistent leukocytosis    Requesting Physician: Dr. Gage    Date of Admission: 6/15/2025  Subjective:   CHIEF COMPLAINT: hypotension, falls     HPI:  74-year-old male with history of CAD, advanced bladder cancer currently on active immunotherapy, HTN, thyroid disease that presented on 6/15 with hypotension and falls.  Patient did endorse lightheadedness on presentation denied any chest pain or shortness of breath.  Upon presentation, he was noted to be tachycardic and hypotensive.  He did not have any leukocytosis or fevers on presentation however did spike a temp of 102 on 6/18.  He was initially on vancomycin and meropenem, vancomycin was stopped on 6/19 and he was maintained on meropenem.  No demetrius signs or symptoms of infectious source.  His blood cultures from admission were negative, also repeated on 6/18, 6/24 remains negative.  Urine culture from 6/16 and 6/25 remain no growth.  Patient's most recent chest x-ray on 6/24 showing perihilar hazy air space opacity and interstitial prominence, possible interstitial edema.  CT of the abdomen pelvis without contrast obtained on 6/17 showed bilateral ureteral stents with mild right-sided hydronephrosis and ureteral cysts.  Gordon catheter in place with obscuring bladder mass.  Currently, he remains afebrile, WBC today is 14.2.  He also remains on Solu-Cortef 50 mg IV daily                     Current abx: Vancomycin, meropenem       Past Surgical History:       Diagnosis Date    Actinic keratosis     Allergic rhinitis     CAD (coronary artery disease)     [facility] cardiology.  Dr. Reveles    Cancer (HCC)     bladder    Chronic uveitis, bilateral     History of blood transfusion     [place] (hard of hearing)     Hyperlipidemia     Hypertension     MI (myocardial infarction) (HCC)     Osteoarthritis     knees,     Thyroid disease     Uveitis of both eyes          Procedure Laterality Date    APPENDECTOMY       cough is not productive and wife actually states cough is improved.     AMARILYS on CKD stage III:  - Renally dose adjust antibiotics as needed.  Nephrology following  -Close monitoring of vancomycin and adjustment per goal trough 15-20 to avoid toxicities     Medical Decision Making:  The following items were considered in medical decision making:  Discussion of patient care with other providers  Reviewed clinical lab tests  Reviewed radiology tests  Reviewed other diagnostic tests/interventions  Independent review of radiologic images  Microbiology cultures and other micro tests reviewed      Risk of Complications/Morbidity: High   Illness(es)/ Infection present that pose threat to bodily function.   There is potential for severe exacerbation of infection/side effects of treatment.  Therapy requires intensive monitoring for antimicrobial agent toxicity    Please note that this chart was generated using Dragon dictation software. Although every effort was made to ensure the accuracy of this automated transcription, some errors in transcription may have occurred inadvertently.  Any pictures or media included in this note were obtained after taking informed verbal consent from the patient and with their approval to include those in the patient's medical record.     Discussed with patient, his wife, brother and sister in law at bedside, his RN and primary team, Dr. Gage resident team.  Lily Todd MD

## 2025-06-26 NOTE — PROGRESS NOTES
[facility] Medicine Progress Note  V 5.17      Date of Admission: 6/15/2025    Hospital Day: 12      Chief Admission Complaint: Falls    Subjective: Patient examined with wife and brother at bedside..  No current acute complaints.  Spent a considerable amount of time discussing all of patient's current medical issues as well as prognosis moving forward.  No complaints of chest pain, shortness of breath, palpitations or constipation endorsed.     Presenting Admission History:       Patient is a 74-year-old male with a past medical history of CAD, advanced bladder cancer undergoing active immunotherapy, hypertension, thyroid disease who presents for low blood pressure and falls. He states that he has had ongoing issues with his blood pressure since his last admission. He states last night he fell down and was unable to get up. He states that his blood pressure ranged from the 40s to the 80s. He is endorsing lightheadedness. He denies any chest pain or shortness of breath. On arrival to the ED he was found to be tachycardic and hypotensive. ED workup was remarkable for an elevated creatinine, lactic acidosis, elevated troponin, elevated BNP, leukocytosis. We were consulted for admission.     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   - Nephrology consulted appreciate recommendation  - LR 50 mL an hour  - Continue with midodrine 15 mg p.o. 3 times daily.  Maintain MAP 65  - A.m. cortisol pending   - Gordon management    Severe sepsis worsening  - On arrival, RR of 20,

## 2025-06-26 NOTE — CARE COORDINATION
CM met with the pt and family to discuss DC plans/options. We reviewed the option of [facility] or given the choices of other skilled SNF's.  The wife and brother would like for the pt to go to [facility] and are okay with the [facility] location in [place], [place]. The wife verbalized not being happy with any of the SNF's around [place] where they live.      Cm touched base with Mikki from [facility] and they accepted the pt and met with the family this afternoon. THe family did comment they weren't okay with the pt leaving today.  CM updated Dr Gage through PS.  Will re evaluate pt status tomorrow and discuss with the  treatment team.     Polina Juarez RN

## 2025-06-26 NOTE — PROGRESS NOTES
[facility] Progress Note           Subjective/   74 y.o. year old male who we are seeing in consultation for AMARILYS/CKD stage 3b.     Patient seen and examined at bedside. He feels a little better today, but is still weak and not eating much.  He denies any CP, SOB, or palpitations.    - Labs and notes reviewed  - BP low normal, HR improved   - non-oliguric, has baeza, 1500ml UOP  - Family at bedside     ROS:  +weak, intake reduced, now on RA.    Objective/   GEN:  Chronically ill, /65   Pulse 88   Temp 98.1 °F (36.7 °C) (Oral)   Resp 27   Ht 1.778 m (5' 10\")   Wt 99.3 kg (218 lb 14.7 oz)   SpO2 97%   BMI 31.41 kg/m²     GEN: resting in bed, ill appearing   HEENT: non-icteric, no JVD  CV: S1, S2, tachycardic, without m/r/g; Trace LE edema  RESP: CTA B without w/r/r; breathing wnl  ABD: +bs, soft, nt, nd  SKIN: warm, no rash    Data/  Recent Labs     06/24/25  0428 06/24/25  2200 06/25/25  0430 06/25/25  1200 06/25/25  2220 06/26/25  0410 06/26/25  1016   WBC 6.9  --  12.8*  --   --  14.2*  --    HGB 7.9*   < > 7.5*   < > 7.6* 8.3* 7.6*   HCT 22.8*   < > 21.9*   < > 22.7* 24.4* 22.6*   MCV 93.9  --  93.8  --   --  94.8  --      --  298  --   --  273  --     < > = values in this interval not displayed.     Recent Labs     06/24/25  0428 06/25/25  0430 06/26/25  0410    135* 136   K 3.2* 3.2* 3.5   CL 99 97* 98*   CO2 23 28 29   GLUCOSE 98 93 128*   PHOS 4.7 3.5 3.3   MG  --  1.70* 2.08   BUN 45* 45* 43*   CREATININE 3.3* 2.9* 2.4*   LABGLOM 19* 22* 27*     UA large blood, >300 protein, s.g. 1.015, >100 wbc's, 21-50 rbc's  Urine sodium 90  Urine chloride 59    CT A/P ->   1. Bilateral ureteral stents are seen. There is mild right hydronephrosis. Ureteral cysts are suboptimally visualized without contrast, it could be further characterized via ultrasound. The bladder is underdistended, obscuring potential bladder mass   lesion. There is a Baeza catheter in place.     2.

## 2025-06-26 NOTE — PROGRESS NOTES
[facility]     Electrophysiology                                     Progress Note    Admission date:  6/15/2025    Reason for follow up visit: AF    HPI/CC: Alfredo Chong was admitted on 6/15/2025 with sepsis secondary to likely UTI. EP following for rapid AF. On 2025, patient was transferred to the ICU for worsening hypotension.  IV heparin was started however this was briefly discontinued secondary to worsening anemia. Heparin was resumed today.    Has also been treated for AMARILYS/CKD. He is on Sven-synephrine, Levophed and vasopressin. On 2025 around noon, he went back into rate controlled AF. On 2025, he was transferred back to the ICU. Patient spontaneously converted to ST with PAC's and amiodarone was started. Rhythm has been sinus with frequent PAC's vs wandering atrial pacemaker.    Subjective: He is fatigued but conversant. Family at the bedside.     Vitals:  Blood pressure 117/69, pulse 79, temperature 98.6 °F (37 °C), temperature source Bladder, resp. rate 22, height 1.778 m (5' 10\"), weight 99.3 kg (218 lb 14.7 oz), SpO2 95%.  Temp  Av.5 °F (36.9 °C)  Min: 98 °F (36.7 °C)  Max: 98.8 °F (37.1 °C)  Pulse  Av.5  Min: 78  Max: 118  BP  Min: 102/75  Max: 129/79  SpO2  Av.7 %  Min: 94 %  Max: 100 %    24 hour I/O    Intake/Output Summary (Last 24 hours) at 2025 1527  Last data filed at 2025 1506  Gross per 24 hour   Intake 3023.78 ml   Output 1775 ml   Net 1248.78 ml     Current Facility-Administered Medications   Medication Dose Route Frequency Provider Last Rate Last Admin    lactated ringers infusion   IntraVENous Continuous CAMILLA Clemens MD 50 mL/hr at 25 1506 Rate Verify at 25 1506    meropenem (MERREM) 1,000 mg in sodium chloride 0.9 % 100 mL IVPB (addEASE)  1,000 mg IntraVENous Q12H Hesham Gage MD   Stopped at 25 1118    vancomycin (VANCOCIN) intermittent dosing (placeholder)   Other RX Placeholder Hesham Gage  Image quality is technically difficult. Contrast used: Lumason. Technically difficult study, technically difficult study with poor endocardial visualization and technically difficult study due to patient's body habitus.    Left Ventricle: Normal left ventricular systolic function with a visually estimated EF of 55 - 60%. Left ventricle size is normal. Mildly increased wall thickness.  Echocardiography contrast is used. No intracardiac thrombus. Normal wall motion. Grade I diastolic function.    Right Ventricle: Not well visualized. Reduced systolic function. TAPSE is 1.5 cm.    Aortic Valve: Not well visualized. Trileaflet valve. Mild sclerosis of the aortic valve cusps. No restricted motion. No regurgitation. No significant stenosis.    Mitral Valve: Not well visualized. There is mild annular calcification noted. No regurgitation. No stenosis noted.    Tricuspid Valve: No regurgitation. No stenosis noted.         STRESS ECHO DOBUTAMINE (PRN CONTRAST/BUBBLE) 04/15/2025  3:05 PM (Final)     Interpretation Summary    Post-stress Echo: Compared to baseline, the post-stress left ventricle systolic function is improved. Left ventricle systolic function is hyperdynamic post-stress. The EF is greater than 65%. The post-stress echo showed no new wall motion abnormalities. AV velocity increased mildly from 1.5 to maximum 2.20m/s.    ECG: The stress ECG was negative for ischemia.    Stress Test: A pharmacological stress test was performed using dobutamine. The patient reported no chest pain during the stress test. Onset of symptoms occurred at stage 2 of the protocol. Pt's BP decreased with Dobutamine. Dobutamine was held per MAYO [place]. Pt denied symptoms however reported BP has been running low lately.    Left Ventricle: Low normal left ventricular systolic function with a visually estimated EF of 50 - 55%.    Aortic Valve: Mildly thickened cusps. Mildly calcified cusps. Moderate sclerosis of the aortic valve cusps. AV

## 2025-06-27 ENCOUNTER — APPOINTMENT (OUTPATIENT)
Dept: CT IMAGING | Age: 75
End: 2025-06-27
Payer: MEDICARE

## 2025-06-27 PROBLEM — E44.0 MODERATE PROTEIN-CALORIE MALNUTRITION: Status: ACTIVE | Noted: 2025-06-27

## 2025-06-27 LAB
ALBUMIN SERPL-MCNC: 2.4 G/DL (ref 3.4–5)
ANION GAP SERPL CALCULATED.3IONS-SCNC: 9 MMOL/L (ref 3–16)
BACTERIA BLD CULT: NORMAL
BUN SERPL-MCNC: 42 MG/DL (ref 7–20)
CALCIUM SERPL-MCNC: 9.7 MG/DL (ref 8.3–10.6)
CHLORIDE SERPL-SCNC: 98 MMOL/L (ref 99–110)
CO2 SERPL-SCNC: 29 MMOL/L (ref 21–32)
CREAT SERPL-MCNC: 2.2 MG/DL (ref 0.8–1.3)
DEPRECATED RDW RBC AUTO: 16.7 % (ref 12.4–15.4)
GFR SERPLBLD CREATININE-BSD FMLA CKD-EPI: 30 ML/MIN/{1.73_M2}
GI PATHOGENS PNL STL NAA+PROBE: NORMAL
GLUCOSE BLD-MCNC: 106 MG/DL (ref 70–99)
GLUCOSE BLD-MCNC: 96 MG/DL (ref 70–99)
GLUCOSE SERPL-MCNC: 107 MG/DL (ref 70–99)
HCT VFR BLD AUTO: 22.2 % (ref 40.5–52.5)
HCT VFR BLD AUTO: 22.5 % (ref 40.5–52.5)
HGB BLD-MCNC: 7.5 G/DL (ref 13.5–17.5)
HGB BLD-MCNC: 7.6 G/DL (ref 13.5–17.5)
MCH RBC QN AUTO: 32.1 PG (ref 26–34)
MCHC RBC AUTO-ENTMCNC: 34 G/DL (ref 31–36)
MCV RBC AUTO: 94.3 FL (ref 80–100)
MRSA DNA SPEC QL NAA+PROBE: NORMAL
PERFORMED ON: ABNORMAL
PERFORMED ON: NORMAL
PHOSPHATE SERPL-MCNC: 3 MG/DL (ref 2.5–4.9)
PLATELET # BLD AUTO: 306 K/UL (ref 135–450)
PMV BLD AUTO: 8.7 FL (ref 5–10.5)
POTASSIUM SERPL-SCNC: 3.6 MMOL/L (ref 3.5–5.1)
RBC # BLD AUTO: 2.35 M/UL (ref 4.2–5.9)
SODIUM SERPL-SCNC: 136 MMOL/L (ref 136–145)
VANCOMYCIN SERPL-MCNC: 13.2 UG/ML
WBC # BLD AUTO: 17.5 K/UL (ref 4–11)

## 2025-06-27 PROCEDURE — 99233 SBSQ HOSP IP/OBS HIGH 50: CPT | Performed by: INTERNAL MEDICINE

## 2025-06-27 PROCEDURE — 2500000003 HC RX 250 WO HCPCS: Performed by: NURSE PRACTITIONER

## 2025-06-27 PROCEDURE — 6360000002 HC RX W HCPCS: Performed by: STUDENT IN AN ORGANIZED HEALTH CARE EDUCATION/TRAINING PROGRAM

## 2025-06-27 PROCEDURE — 2580000003 HC RX 258: Performed by: INTERNAL MEDICINE

## 2025-06-27 PROCEDURE — 71250 CT THORAX DX C-: CPT

## 2025-06-27 PROCEDURE — 85018 HEMOGLOBIN: CPT

## 2025-06-27 PROCEDURE — 6370000000 HC RX 637 (ALT 250 FOR IP): Performed by: INTERNAL MEDICINE

## 2025-06-27 PROCEDURE — 99233 SBSQ HOSP IP/OBS HIGH 50: CPT | Performed by: NURSE PRACTITIONER

## 2025-06-27 PROCEDURE — 6370000000 HC RX 637 (ALT 250 FOR IP): Performed by: NURSE PRACTITIONER

## 2025-06-27 PROCEDURE — 2500000003 HC RX 250 WO HCPCS

## 2025-06-27 PROCEDURE — 85027 COMPLETE CBC AUTOMATED: CPT

## 2025-06-27 PROCEDURE — 80202 ASSAY OF VANCOMYCIN: CPT

## 2025-06-27 PROCEDURE — 2060000000 HC ICU INTERMEDIATE R&B

## 2025-06-27 PROCEDURE — 6360000002 HC RX W HCPCS: Performed by: INTERNAL MEDICINE

## 2025-06-27 PROCEDURE — 6370000000 HC RX 637 (ALT 250 FOR IP)

## 2025-06-27 PROCEDURE — 85014 HEMATOCRIT: CPT

## 2025-06-27 PROCEDURE — 80069 RENAL FUNCTION PANEL: CPT

## 2025-06-27 RX ORDER — LOPERAMIDE HYDROCHLORIDE 2 MG/1
2 CAPSULE ORAL 4 TIMES DAILY PRN
Status: DISCONTINUED | OUTPATIENT
Start: 2025-06-27 | End: 2025-06-28 | Stop reason: HOSPADM

## 2025-06-27 RX ADMIN — MIDODRINE HYDROCHLORIDE 15 MG: 5 TABLET ORAL at 07:57

## 2025-06-27 RX ADMIN — AMIODARONE HYDROCHLORIDE 400 MG: 200 TABLET ORAL at 07:58

## 2025-06-27 RX ADMIN — METOPROLOL TARTRATE 25 MG: 25 TABLET, FILM COATED ORAL at 07:58

## 2025-06-27 RX ADMIN — AMIODARONE HYDROCHLORIDE 400 MG: 200 TABLET ORAL at 21:40

## 2025-06-27 RX ADMIN — MIDODRINE HYDROCHLORIDE 15 MG: 5 TABLET ORAL at 14:08

## 2025-06-27 RX ADMIN — MIDODRINE HYDROCHLORIDE 15 MG: 5 TABLET ORAL at 21:40

## 2025-06-27 RX ADMIN — AMIODARONE HYDROCHLORIDE 1 MG/MIN: 1.8 INJECTION, SOLUTION INTRAVENOUS at 06:45

## 2025-06-27 RX ADMIN — CETIRIZINE HYDROCHLORIDE 10 MG: 10 TABLET, FILM COATED ORAL at 07:58

## 2025-06-27 RX ADMIN — ACETAMINOPHEN 650 MG: 325 TABLET ORAL at 22:08

## 2025-06-27 RX ADMIN — VANCOMYCIN HYDROCHLORIDE 500 MG: 500 INJECTION, POWDER, LYOPHILIZED, FOR SOLUTION INTRAVENOUS at 11:45

## 2025-06-27 RX ADMIN — MICONAZOLE NITRATE: 2 POWDER TOPICAL at 21:42

## 2025-06-27 RX ADMIN — MEROPENEM 1000 MG: 1 INJECTION INTRAVENOUS at 21:40

## 2025-06-27 RX ADMIN — APIXABAN 5 MG: 5 TABLET, FILM COATED ORAL at 07:58

## 2025-06-27 RX ADMIN — PANTOPRAZOLE SODIUM 40 MG: 40 INJECTION, POWDER, FOR SOLUTION INTRAVENOUS at 07:57

## 2025-06-27 RX ADMIN — LOPERAMIDE HYDROCHLORIDE 2 MG: 2 CAPSULE ORAL at 14:08

## 2025-06-27 RX ADMIN — HYDROCORTISONE SODIUM SUCCINATE 50 MG: 100 INJECTION, POWDER, FOR SOLUTION INTRAMUSCULAR; INTRAVENOUS at 07:56

## 2025-06-27 RX ADMIN — MEROPENEM 1000 MG: 1 INJECTION INTRAVENOUS at 07:53

## 2025-06-27 RX ADMIN — AMIODARONE HYDROCHLORIDE 1 MG/MIN: 1.8 INJECTION, SOLUTION INTRAVENOUS at 00:03

## 2025-06-27 RX ADMIN — LEVOTHYROXINE SODIUM 75 MCG: 0.05 TABLET ORAL at 07:58

## 2025-06-27 RX ADMIN — METOPROLOL TARTRATE 25 MG: 25 TABLET, FILM COATED ORAL at 21:40

## 2025-06-27 RX ADMIN — MICONAZOLE NITRATE: 2 POWDER TOPICAL at 11:43

## 2025-06-27 RX ADMIN — SODIUM CHLORIDE, PRESERVATIVE FREE 10 ML: 5 INJECTION INTRAVENOUS at 07:59

## 2025-06-27 RX ADMIN — SODIUM CHLORIDE, SODIUM LACTATE, POTASSIUM CHLORIDE, AND CALCIUM CHLORIDE: .6; .31; .03; .02 INJECTION, SOLUTION INTRAVENOUS at 00:05

## 2025-06-27 RX ADMIN — APIXABAN 5 MG: 5 TABLET, FILM COATED ORAL at 21:40

## 2025-06-27 ASSESSMENT — PAIN DESCRIPTION - DESCRIPTORS
DESCRIPTORS: ACHING;DISCOMFORT
DESCRIPTORS: ACHING;SORE

## 2025-06-27 ASSESSMENT — PAIN SCALES - WONG BAKER: WONGBAKER_NUMERICALRESPONSE: NO HURT

## 2025-06-27 ASSESSMENT — PAIN DESCRIPTION - LOCATION: LOCATION: GENERALIZED;BACK

## 2025-06-27 ASSESSMENT — PAIN DESCRIPTION - PAIN TYPE: TYPE: CHRONIC PAIN

## 2025-06-27 ASSESSMENT — PAIN DESCRIPTION - ORIENTATION
ORIENTATION: PROXIMAL
ORIENTATION: RIGHT

## 2025-06-27 ASSESSMENT — PAIN DESCRIPTION - FREQUENCY: FREQUENCY: INTERMITTENT

## 2025-06-27 ASSESSMENT — PAIN SCALES - GENERAL
PAINLEVEL_OUTOF10: 0
PAINLEVEL_OUTOF10: 2
PAINLEVEL_OUTOF10: 3
PAINLEVEL_OUTOF10: 0

## 2025-06-27 ASSESSMENT — PAIN - FUNCTIONAL ASSESSMENT: PAIN_FUNCTIONAL_ASSESSMENT: ACTIVITIES ARE NOT PREVENTED

## 2025-06-27 NOTE — PROGRESS NOTES
Urology Progress Note    Subjective: Alfredo SWEENEY Arlette is feeling tired, denies issues with baeza       Vitals:  /83   Pulse 75   Temp 98.8 °F (37.1 °C) (Bladder)   Resp 19   Ht 1.778 m (5' 10\")   Wt 99.3 kg (218 lb 14.7 oz)   SpO2 99%   BMI 31.41 kg/m²   Temp  Av.6 °F (37 °C)  Min: 98.5 °F (36.9 °C)  Max: 98.8 °F (37.1 °C)    Intake/Output Summary (Last 24 hours) at 2025 0841  Last data filed at 2025 0645  Gross per 24 hour   Intake 2736.66 ml   Output 1845 ml   Net 891.66 ml       Exam:     Physical:  Well developed, well nourished in no acute distress  Mood indicates no abnormalities. Pt doesn’t appear depressed  Orientated to time and place        Male :  Baeza draining red, thin urine, no clots noted     Labs:  WBC:    Lab Results   Component Value Date/Time    WBC 17.5 2025 04:45 AM     Hemoglobin/Hematocrit:    Lab Results   Component Value Date/Time    HGB 7.5 2025 04:45 AM    HCT 22.2 2025 04:45 AM     BMP:    Lab Results   Component Value Date/Time     2025 04:45 AM    K 3.6 2025 04:45 AM    K 4.0 2025 05:23 AM    CL 98 2025 04:45 AM    CO2 29 2025 04:45 AM    BUN 42 2025 04:45 AM    CREATININE 2.2 2025 04:45 AM    CALCIUM 9.7 2025 04:45 AM    GFRAA >60 2021 03:50 PM    GFRAA >60 2013 08:24 AM    LABGLOM 30 2025 04:45 AM    LABGLOM 36 2024 12:21 PM     PT/INR:    Lab Results   Component Value Date/Time    PROTIME 16.9 06/15/2025 04:21 PM    INR 1.36 06/15/2025 04:21 PM     PTT:    Lab Results   Component Value Date/Time    APTT 37.9 06/15/2025 04:21 PM   [APTT          Impression/Plan:     73 yo male with high grade muscle invasive bladder cancer with mediastinal LAD admitted to [facility] on 6/15/25 after a fall with hypotension, suspected septic shock, as well as new atrial fib RVR.  He follows with [facility] and [facility] for his bladder cancer.  He is currently undergoing chemotherapy per [facility].

## 2025-06-27 NOTE — PLAN OF CARE
Problem: Chronic Conditions and Co-morbidities  Goal: Patient's chronic conditions and co-morbidity symptoms are monitored and maintained or improved  Outcome: Progressing  Flowsheets (Taken 6/26/2025 2000)  Care Plan - Patient's Chronic Conditions and Co-Morbidity Symptoms are Monitored and Maintained or Improved:   Monitor and assess patient's chronic conditions and comorbid symptoms for stability, deterioration, or improvement   Collaborate with multidisciplinary team to address chronic and comorbid conditions and prevent exacerbation or deterioration   Update acute care plan with appropriate goals if chronic or comorbid symptoms are exacerbated and prevent overall improvement and discharge     Problem: Discharge Planning  Goal: Discharge to home or other facility with appropriate resources  Outcome: Progressing  Flowsheets (Taken 6/26/2025 2000)  Discharge to home or other facility with appropriate resources:   Identify barriers to discharge with patient and caregiver   Arrange for needed discharge resources and transportation as appropriate   Identify discharge learning needs (meds, wound care, etc)   Arrange for interpreters to assist at discharge as needed   Refer to discharge planning if patient needs post-hospital services based on physician order or complex needs related to functional status, cognitive ability or social support system     Problem: Safety - Adult  Goal: Free from fall injury  Outcome: Progressing     Problem: Skin/Tissue Integrity  Goal: Skin integrity remains intact  Description: 1.  Monitor for areas of redness and/or skin breakdown  2.  Assess vascular access sites hourly  3.  Every 4-6 hours minimum:  Change oxygen saturation probe site  4.  Every 4-6 hours:  If on nasal continuous positive airway pressure, respiratory therapy assess nares and determine need for appliance change or resting period  Outcome: Progressing  Flowsheets (Taken 6/26/2025 2000)  Skin Integrity Remains  2000)  Glucose maintained within prescribed range:   Monitor blood glucose as ordered   Assess for signs and symptoms of hyperglycemia and hypoglycemia   Administer ordered medications to maintain glucose within target range   Assess barriers to adequate nutritional intake and initiate nutrition consult as needed   Instruct patient on self management of diabetes and initiate consult as needed     Problem: Hematologic - Adult  Goal: Maintains hematologic stability  Outcome: Progressing  Flowsheets (Taken 6/26/2025 2000)  Maintains hematologic stability:   Assess for signs and symptoms of bleeding or hemorrhage   Monitor labs for bleeding or clotting disorders   Administer blood products/factors as ordered     Problem: Nutrition Deficit:  Goal: Optimize nutritional status  Outcome: Progressing

## 2025-06-27 NOTE — CARE COORDINATION
CM update- Pt is still weak, no new changes. Gordon bag with gross hematuria. Vss. DC plan is for the pt to go to LTAC at [facility] for further treatment and rehabilitation. Family is in agreement to this plan.  has set up transport for 2 pm on 6/28/25, which can be cancelled if needed.   Polina Juarez RN

## 2025-06-27 NOTE — PROGRESS NOTES
Shift:  [id number]    Reason for ICU Admission: Afib with hypotension    Most recent vitals: /83   Pulse 75   Temp 98.8 °F (37.1 °C) (Bladder)   Resp 19   Ht 1.778 m (5' 10\")   Wt 99.3 kg (218 lb 14.7 oz)   SpO2 99%   BMI 31.41 kg/m²      Drip rates at handoff:    lactated ringers 50 mL/hr at 06/27/25 0200    amiodarone 1 mg/min (06/27/25 0645)    dextrose      sodium chloride 10 mL/hr at 06/20/25 2118       Current O2:   [x] Room Air   [] NC  2L   [] BiPaP    [] Vented  % Fi02,  Peep    Hospital Course:  ICU Day # 1   Transferred from  for follow up on afib with hypotension  History of bladder cancer, CAD, Hypothyroidism,   IV NaHCO3 at 100/hr  Wound care consult placed    ICU Day #1 Nights (6/24)  - UOP = 700 mL, rectal tube replaced  - 40 mEq K replaced    ICU Day #2 Nights (6/25)  - Bicarb switched to LR, decreased to 50 mL/hr  - Amio drip started on days, drip continued overnight- PO tablets given  - ABX restarted  - UOP = 750 mL       ICU Day #3 Days (6/26)  -MRSA, Cdiff, GI panel, & blood cultures x1 from port sent to lab (cdiff negative)  -new blisters noted on pubic/minna area & new possible pressure injuries to foreskin from baeza? Wound care re-consulted  -Urology recommending transitioning away from AC to help with hematuria. Cardiology aware and planning to continue eliquis for several more days until loaded with amio, then stopping.   -hem/onc checking epo level to see if patient would benefit from epoetin kp to help with anemia.  -ID consulted. Elevated WBC may be from infection, cancer, and/or steroid use. Continuing to monitor cultures and labs.   -UOP = 700  -Rectal output = 300  Nights  - Rectal output: 150  - UOP: 695  - Wound care completed during bath  - No acute events overnight

## 2025-06-27 NOTE — PROGRESS NOTES
[facility]  Follow-up Progress Note       NAME:  Alfredo Chong  MEDICAL RECORD NUMBER:  [id number]  AGE:  74 y.o.   GENDER:  male  :  1950  TODAY'S DATE:  2025    Subjective: Family at bedside.   Patient can speak softly.  \"I can go to right better\"  \"I itch a lot\", (dry skin).       Family, brother concerned over buttocks area.  Keep stating about someone they knew who was at [facility] and never had a bed sore.   Wound Care explained the mattress the patient is on in ICU is a specialized mattress. Isolibrium.  Wound Care told family she would speak to Unit Manager as to turning.  Family does not feel patient has been turned properly.   ICU follows strict rules of every two hour turning.   Wound Care tried to explain the area on buttocks no matter mattress and turning it is a hard area to heal related to warmth and moisture from body heat.  Wound Care spoke with unit manager, both feel staff are doing what they have been ordered to do.        Wound Identification:  Wound Type: pressure  Contributing Factors: chronic pressure, decreased mobility, shear force, and obesity        Patient Goal of Care:  [] Wound Healing  [] Odor Control  [] Palliative Care  [] Pain Control   [] Other:     Objective:    /83   Pulse 75   Temp 98.8 °F (37.1 °C) (Bladder)   Resp 19   Ht 1.778 m (5' 10\")   Wt 99.3 kg (218 lb 14.7 oz)   SpO2 99%   BMI 31.41 kg/m²   Mateus Risk Score: Matesu Scale Score: 13  Assessment:   Measurements:  Wound 25 Buttocks Stage III (Active)   Wound Image   25 1151   Wound Etiology Pressure Stage 3 25 1151   Dressing Status New dressing applied 25 1151   Wound Cleansed Cleansed with saline 25 1151   Dressing/Treatment Triad hydro/zinc oxide-based hydrophilic paste 25 1151   Offloading for Diabetic Foot Ulcers Offloading ordered 25 1151   Dressing Change Due 25 1151   Wound

## 2025-06-27 NOTE — PROGRESS NOTES
[facility] Medicine Progress Note  V 5.17      Date of Admission: 6/15/2025    Hospital Day: 13      Chief Admission Complaint: Falls    Subjective: Patient examined with wife and brother at bedside..  No current acute complaints.  Continued to spend a considerable amount of time discussing all of patient's current medical issues as well as prognosis moving forward.  They have concerns regarding the sacral wound and want to prevent it from progressing further.  No complaints of chest pain, shortness of breath, palpitations or constipation endorsed.     Presenting Admission History:       Patient is a 74-year-old male with a past medical history of CAD, advanced bladder cancer undergoing active immunotherapy, hypertension, thyroid disease who presents for low blood pressure and falls. He states that he has had ongoing issues with his blood pressure since his last admission. He states last night he fell down and was unable to get up. He states that his blood pressure ranged from the 40s to the 80s. He is endorsing lightheadedness. He denies any chest pain or shortness of breath. On arrival to the ED he was found to be tachycardic and hypotensive. ED workup was remarkable for an elevated creatinine, lactic acidosis, elevated troponin, elevated BNP, leukocytosis. We were consulted for admission.     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   - Nephrology consulted appreciate recommendation  - Stop IVF due to worsening edema  - Continue with midodrine 15 mg p.o. 3 times daily.

## 2025-06-27 NOTE — CONSULTS
Comprehensive Nutrition Assessment    Type and Reason for Visit:  Reassess    Nutrition Recommendations/Plan:   Continue soft and bite sized diet per pt preference.   Encourage PO intakes as tolerated. Family to bring in food from outside as needed.  Ensure ONS TID + Magic Cup BID - chocolate.  Daily snack (sorbet) added BID.   Monitor pertinent labs, bowel habits, weight, N/V, supplement acceptance, clinical progression.       Malnutrition Assessment:  Malnutrition Status:  Moderate malnutrition (06/27/25 1215)    Context:  Chronic Illness     Findings of the 6 clinical characteristics of malnutrition:  Energy Intake:  75% or less estimated energy requirements for 1 month or longer  Weight Loss:  No weight loss     Body Fat Loss:  Mild body fat loss Orbital   Muscle Mass Loss:  Mild muscle mass loss Temples (temporalis), Clavicles (pectoralis & deltoids)  Fluid Accumulation:  Unable to assess     Strength:  Not Performed    Nutrition Assessment:    Followup: Consulted for \"poor appetite, decreased intake.\" Patient seen resting in bed, family at bedside. Continues to be on soft and bite sized diet, tolerating texture well. Patient reports ongoing poor appetite for several months. States he typically has an Ensure shake in the morning with breakfast. He will then \"snack\" on something for lunch, then grab Butterfield or make spaghetti for dinner. Pt reports portion sizes here being \"too much.\" Also reports not enjoying come of the menu options offered. Pts family requesting oatmeal to be sent with each breakfast, in addition to sorbet with every lunch and dinner. RD encouraged family to bring in food/snacks from outside as needed due to pts food preferences. Will continue Ensure ONS, increased to TID per family request. Will also trial Magic Cup BID to better meet nutrient needs. RD discussed importance of adequate calorie and protein intake. Discussed small frequent meals, high protein snacks (yogurt, peanut butter,

## 2025-06-27 NOTE — PROGRESS NOTES
[facility] Progress Note           Subjective/   74 y.o. year old male who we are seeing in consultation for AMARILYS/CKD stage 3b.     Patient seen and examined at bedside. He feels a little better today. He tells me he is eating and drinking okay but family reports he is not eating much at all. They have requested dietitian to see him.     - Labs and notes reviewed  - BP and HR stable   - non-oliguric, has baeza, 1400ml UOP  - Family at bedside     ROS:  +weak, intake reduced, now on RA.    Objective/   GEN:  Chronically ill, /83   Pulse 75   Temp 98.8 °F (37.1 °C) (Bladder)   Resp 19   Ht 1.778 m (5' 10\")   Wt 99.3 kg (218 lb 14.7 oz)   SpO2 99%   BMI 31.41 kg/m²     GEN: resting in bed, ill appearing   HEENT: non-icteric, no JVD  CV: S1, S2, tachycardic, without m/r/g; Trace LE edema  RESP: CTA B without w/r/r; breathing wnl  ABD: +bs, soft, nt, nd  SKIN: warm, no rash    Data/  Recent Labs     06/25/25  0430 06/25/25  1200 06/26/25  0410 06/26/25  1016 06/26/25  2220 06/27/25  0445   WBC 12.8*  --  14.2*  --   --  17.5*   HGB 7.5*   < > 8.3* 7.6* 7.2* 7.5*   HCT 21.9*   < > 24.4* 22.6* 21.7* 22.2*   MCV 93.8  --  94.8  --   --  94.3     --  273  --   --  306    < > = values in this interval not displayed.     Recent Labs     06/25/25  0430 06/26/25  0410 06/27/25  0445   * 136 136   K 3.2* 3.5 3.6   CL 97* 98* 98*   CO2 28 29 29   GLUCOSE 93 128* 107*   PHOS 3.5 3.3 3.0   MG 1.70* 2.08  --    BUN 45* 43* 42*   CREATININE 2.9* 2.4* 2.2*   LABGLOM 22* 27* 30*     UA large blood, >300 protein, s.g. 1.015, >100 wbc's, 21-50 rbc's  Urine sodium 90  Urine chloride 59    CT A/P ->   1. Bilateral ureteral stents are seen. There is mild right hydronephrosis. Ureteral cysts are suboptimally visualized without contrast, it could be further characterized via ultrasound. The bladder is underdistended, obscuring potential bladder mass   lesion. There is a Baeza catheter in

## 2025-06-27 NOTE — PROGRESS NOTES
[facility]     Electrophysiology                                     Progress Note    Admission date:  6/15/2025    Reason for follow up visit: AF    HPI/CC: Alfredo Chong was admitted on 6/15/2025 with sepsis secondary to likely UTI. EP following for rapid AF. On 2025, patient was transferred to the ICU for worsening hypotension.  IV heparin was started however this was briefly discontinued secondary to worsening anemia. Heparin was resumed today.    Has also been treated for AMARILYS/CKD. He is on Sven-synephrine, Levophed and vasopressin. On 2025 around noon, he went back into rate controlled AF. On 2025, he was transferred back to the ICU. Patient spontaneously converted to ST with PAC's and amiodarone was started. Rhythm has been sinus with with intermittent episodes wandering atrial pacemaker.    Subjective: He has more energy this morning. Denies chest pain, palpitations, shortness of breath, and dizziness.       Vitals:  Blood pressure 124/83, pulse 75, temperature 98.8 °F (37.1 °C), temperature source Bladder, resp. rate 19, height 1.778 m (5' 10\"), weight 99.3 kg (218 lb 14.7 oz), SpO2 99%.  Temp  Av.6 °F (37 °C)  Min: 98.5 °F (36.9 °C)  Max: 98.8 °F (37.1 °C)  Pulse  Av.1  Min: 72  Max: 92  BP  Min: 108/74  Max: 131/80  SpO2  Av.5 %  Min: 93 %  Max: 99 %    24 hour I/O    Intake/Output Summary (Last 24 hours) at 2025 1031  Last data filed at 2025 0645  Gross per 24 hour   Intake 2256.66 ml   Output 1570 ml   Net 686.66 ml     Current Facility-Administered Medications   Medication Dose Route Frequency Provider Last Rate Last Admin    vancomycin (VANCOCIN) 500 mg in sodium chloride 0.9 % 100 mL IVPB ([id number])  500 mg IntraVENous Once Hesham Gage MD        [START ON 2025] levothyroxine (SYNTHROID) tablet 75 mcg  75 mcg Oral Daily Hesham Gage MD        meropenem (MERREM) 1,000 mg in sodium chloride 0.9 % 100 mL IVPB (addEASE)  1,000  02/13/2023 11:20 AM       Assessment:  Paroxysmal atrial fibrillation: improving    -[id number] score 5 (age, HTN, CHF, vasacular disease)    -amiodarone started 6/25/2025  WAP   Sinus tachycardia  Normal LVEF on echo this admission   Frequent PACs  First degree AVB   HFrEF  CAD s/p CABG   HTN: BP have been stable   HLD   Moderate AS   JOSE: ongoing   AMARILYS/CKD   Lactic acidosis  Sepsis  UTI  Advanced bladder cancer on immunotherapy    -urology with plans for cystectomy (outpatient)   Fever: Recurrent  Anemia: ongoing but stable   Elevated procalcitonin   Hematuria     Plan:   1. Continue to closely monitor on telemetry   2. Continue Lopressor   3. Continue PO amiodarone. Allow current bag of IV amiodarone to infuse then stop.   4. Discussed case with urology. Ultimately due to bladder tumor, it is unlikely that he will tolerate AC long term until cystectomy. We discussed continuing Eliquis for another 1-2 days as he is loaded with amiodarone and then stop. Hemoglobin currently stable. Patient and family understand stroke risk in the absence of AC.     Time Based Itemization  A total of 40 minutes was spent on today's patient encounter.  If applicable, non-patient-facing activities:  (*)Preparing to see the patient and reviewing records  (*) Individual interpretation of results  ( ) Discussion or coordination of care with other health care professionals  (*) Ordering of unique tests, medications, or procedures  (*) Documentation within the EHR         JOHNY Knight-CNP  [facility]  [phone]

## 2025-06-27 NOTE — PROGRESS NOTES
Shift:  8515-4089    Reason for ICU Admission: Afib with hypotension    Most recent vitals: /81   Pulse 84   Temp 99.1 °F (37.3 °C) (Bladder)   Resp 20   Ht 1.778 m (5' 10\")   Wt 99.3 kg (218 lb 14.7 oz)   SpO2 97%   BMI 31.41 kg/m²      Drip rates at handoff:    amiodarone Stopped (06/27/25 1400)    dextrose      sodium chloride 10 mL/hr at 06/20/25 2118       Current O2:   [x] Room Air   [] NC  2L   [] BiPaP    [] Vented  % Fi02,  Peep    Hospital Course:  ICU Day # 1   Transferred from  for follow up on afib with hypotension  History of bladder cancer, CAD, Hypothyroidism,   IV NaHCO3 at 100/hr  Wound care consult placed    ICU Day #1 Nights (6/24)  - UOP = 700 mL, rectal tube replaced  - 40 mEq K replaced    ICU Day #2 Nights (6/25)  - Bicarb switched to LR, decreased to 50 mL/hr  - Amio drip started on days, drip continued overnight- PO tablets given  - ABX restarted  - UOP = 750 mL       ICU Day #3 Days (6/26)  -MRSA, Cdiff, GI panel, & blood cultures x1 from port sent to lab (cdiff negative)  -new blisters noted on pubic/minna area & new possible pressure injuries to foreskin from baeza? Wound care re-consulted  -Urology recommending transitioning away from AC to help with hematuria. Cardiology aware and planning to continue eliquis for several more days until loaded with amio, then stopping.   -hem/onc checking epo level to see if patient would benefit from epoetin kp to help with anemia.  -ID consulted. Elevated WBC may be from infection, cancer, and/or steroid use. Continuing to monitor cultures and labs.   -UOP = 700  -Rectal output = 300  Nights  - Rectal output: 150  - UOP: 695  - Wound care completed during bath  - No acute events overnight     ICU Day #4 Days (6/27)  -Wound care eval- Sacrum, armpits, penis, and L thigh evaluated/documented.  -Pt family spoken with about care, turning and wounds  -Urine remains cherry red ([id number])  -Pt appetite minimal- able to drink an ensure

## 2025-06-27 NOTE — PROGRESS NOTES
ONCOLOGY HEMATOLOGY CARE PROGRESS NOTE      SUBJECTIVE:    Alfredo reports feeling the same. Fatigued. Family at bedside.   Afebrile   Remains on room air.     ROS:     Constitutional:  No fever, No chills, No night sweats. Fatigue.   Eyes:  No impairment or change in vision  ENT / Mouth:  No pain, abnormal ulceration, bleeding, nasal drip or change in voice or hearing  Cardiovascular:  No chest pain, palpitations, or calf discomfort  Respiratory:  No pain, hemoptysis, change to breathing  Breast:  No pain, discharge, change in appearance or texture  Gastrointestinal:  No pain, cramping, jaundice  Urinary:  No pain, bleeding or change in continence  Genitalia: No pain, bleeding or discharge  Musculoskeletal: weakness. No redness, pain  Skin:  No pruritus, rash, change to nodules or lesions  Neurologic:  No discomfort, change in mental status, speech, sensory or motor activity  Psychiatric:  No change in concentration or change to affect or mood  Endocrine:  No hot flashes, increased thirst, or change to urine production  Hematologic: No petechiae, ecchymosis or bleeding  Lymphatic:  No lymphadenopathy or lymphedema  Allergy / Immunologic:  No eczema, hives, frequent or recurrent infections    OBJECTIVE        Physical    VITALS:  Patient Vitals for the past 24 hrs:   BP Temp Temp src Pulse Resp SpO2   06/27/25 0400 108/74 -- -- 91 18 99 %   06/27/25 0300 120/63 -- -- 92 17 97 %   06/27/25 0200 121/79 -- -- 85 25 96 %   06/27/25 0100 120/69 -- -- 80 22 99 %   06/27/25 0000 114/71 98.5 °F (36.9 °C) Bladder 85 16 96 %   06/26/25 2300 110/67 -- -- 82 17 93 %   06/26/25 2200 119/77 -- -- 77 21 96 %   06/26/25 2100 114/67 -- -- 76 18 --   06/26/25 2000 117/73 98.6 °F (37 °C) Bladder 74 22 95 %   06/26/25 1900 126/87 -- -- 81 11 97 %   06/26/25 1800 131/80 -- -- 72 10 97 %   06/26/25 1700 117/63 -- -- 80 19 98 %   06/26/25 1600 120/73 98.5 °F (36.9 °C) Bladder 81 25 98 %   06/26/25 1500

## 2025-06-27 NOTE — PROGRESS NOTES
ID Follow-up NOTE    CC:   Hypotension and falls  Antibiotics: Vancomycin, meropenem    Admit Date: 6/15/2025  Hospital Day: 13    Subjective:     Patient states feeling better, still having some dry cough, not bringing anything up.  No fevers overnight.  Complains that he did not rest properly last night as there was a fire alarm      Objective:     Patient Vitals for the past 8 hrs:   BP Temp Temp src Pulse Resp SpO2   06/27/25 1800 126/81 -- -- 84 20 97 %   06/27/25 1700 123/83 -- -- 80 14 --   06/27/25 1600 130/88 99.1 °F (37.3 °C) Bladder 83 24 98 %   06/27/25 1500 134/84 -- -- 83 11 98 %   06/27/25 1400 115/65 -- -- 88 22 95 %   06/27/25 1300 113/78 -- -- (!) 101 23 96 %   06/27/25 1200 129/69 98.6 °F (37 °C) Bladder 78 23 91 %     I/O last 3 completed shifts:  In: 3336.7 [P.O.:1320; I.V.:1570.3; IV Piggyback:446.4]  Out: 2545 [Urine:2095; Stool:450]  No intake/output data recorded.    EXAM:  GENERAL: No apparent distress.    HEENT: Membranes moist, no oral lesion, hard of hearing  NECK:  Supple, no lymphadenopathy  LUNGS: Clear b/l, no rales, no dullness, on 2 L nasal cannula  CARDIAC: RRR  ABD:  + BS, soft / NT  EXT:  Skin tear over coccyx and bilateral groin and axillary regions, no evidence of active cellulitis or purulence.  NEURO: No focal neurologic findings  PSYCH: Orientation, sensorium, mood normal  LINES:  Peripheral iv, port in place in the right chest wall without induration       Data Review:  Lab Results   Component Value Date    WBC 17.5 (H) 06/27/2025    HGB 7.6 (L) 06/27/2025    HCT 22.5 (L) 06/27/2025    MCV 94.3 06/27/2025     06/27/2025     Lab Results   Component Value Date    CREATININE 2.2 (H) 06/27/2025    BUN 42 (H) 06/27/2025     06/27/2025    K 3.6 06/27/2025    CL 98 (L) 06/27/2025    CO2 29 06/27/2025       Hepatic Function Panel:   Lab Results   Component Value Date/Time    ALKPHOS 105 06/16/2025 05:23 AM    ALT 27 06/16/2025 05:23 AM    AST 39 06/16/2025 05:23 AM

## 2025-06-28 VITALS
HEART RATE: 86 BPM | HEIGHT: 70 IN | WEIGHT: 218.92 LBS | DIASTOLIC BLOOD PRESSURE: 74 MMHG | OXYGEN SATURATION: 99 % | BODY MASS INDEX: 31.34 KG/M2 | SYSTOLIC BLOOD PRESSURE: 105 MMHG | RESPIRATION RATE: 19 BRPM | TEMPERATURE: 98.9 F

## 2025-06-28 LAB
ALBUMIN SERPL-MCNC: 2.3 G/DL (ref 3.4–5)
ANION GAP SERPL CALCULATED.3IONS-SCNC: 11 MMOL/L (ref 3–16)
BACTERIA BLD CULT ORG #2: NORMAL
BACTERIA BLD CULT: NORMAL
BUN SERPL-MCNC: 48 MG/DL (ref 7–20)
CALCIUM SERPL-MCNC: 9.8 MG/DL (ref 8.3–10.6)
CHLORIDE SERPL-SCNC: 98 MMOL/L (ref 99–110)
CO2 SERPL-SCNC: 27 MMOL/L (ref 21–32)
CORTIS AM PEAK SERPL-MCNC: 14.9 UG/DL (ref 4.3–22.4)
CREAT SERPL-MCNC: 2.1 MG/DL (ref 0.8–1.3)
DEPRECATED RDW RBC AUTO: 17.1 % (ref 12.4–15.4)
EPO SERPL-ACNC: 52 MU/ML (ref 4–27)
GFR SERPLBLD CREATININE-BSD FMLA CKD-EPI: 32 ML/MIN/{1.73_M2}
GLUCOSE BLD-MCNC: 92 MG/DL (ref 70–99)
GLUCOSE SERPL-MCNC: 98 MG/DL (ref 70–99)
HCT VFR BLD AUTO: 22.2 % (ref 40.5–52.5)
HGB BLD-MCNC: 7.5 G/DL (ref 13.5–17.5)
MCH RBC QN AUTO: 32.4 PG (ref 26–34)
MCHC RBC AUTO-ENTMCNC: 34 G/DL (ref 31–36)
MCV RBC AUTO: 95.3 FL (ref 80–100)
PERFORMED ON: NORMAL
PHOSPHATE SERPL-MCNC: 3.3 MG/DL (ref 2.5–4.9)
PLATELET # BLD AUTO: 286 K/UL (ref 135–450)
PMV BLD AUTO: 8.6 FL (ref 5–10.5)
POTASSIUM SERPL-SCNC: 3.7 MMOL/L (ref 3.5–5.1)
PROCALCITONIN SERPL IA-MCNC: 0.92 NG/ML (ref 0–0.15)
RBC # BLD AUTO: 2.33 M/UL (ref 4.2–5.9)
SODIUM SERPL-SCNC: 136 MMOL/L (ref 136–145)
VANCOMYCIN SERPL-MCNC: 13.2 UG/ML
WBC # BLD AUTO: 14 K/UL (ref 4–11)

## 2025-06-28 PROCEDURE — 2500000003 HC RX 250 WO HCPCS

## 2025-06-28 PROCEDURE — 6360000002 HC RX W HCPCS: Performed by: INTERNAL MEDICINE

## 2025-06-28 PROCEDURE — 99232 SBSQ HOSP IP/OBS MODERATE 35: CPT | Performed by: NURSE PRACTITIONER

## 2025-06-28 PROCEDURE — 84145 PROCALCITONIN (PCT): CPT

## 2025-06-28 PROCEDURE — 6370000000 HC RX 637 (ALT 250 FOR IP)

## 2025-06-28 PROCEDURE — 6370000000 HC RX 637 (ALT 250 FOR IP): Performed by: INTERNAL MEDICINE

## 2025-06-28 PROCEDURE — 80069 RENAL FUNCTION PANEL: CPT

## 2025-06-28 PROCEDURE — 6360000002 HC RX W HCPCS: Performed by: STUDENT IN AN ORGANIZED HEALTH CARE EDUCATION/TRAINING PROGRAM

## 2025-06-28 PROCEDURE — 2580000003 HC RX 258: Performed by: INTERNAL MEDICINE

## 2025-06-28 PROCEDURE — 80202 ASSAY OF VANCOMYCIN: CPT

## 2025-06-28 PROCEDURE — 6370000000 HC RX 637 (ALT 250 FOR IP): Performed by: NURSE PRACTITIONER

## 2025-06-28 PROCEDURE — 85027 COMPLETE CBC AUTOMATED: CPT

## 2025-06-28 RX ORDER — PANTOPRAZOLE SODIUM 40 MG/10ML
40 INJECTION, POWDER, LYOPHILIZED, FOR SOLUTION INTRAVENOUS DAILY
DISCHARGE
Start: 2025-06-29

## 2025-06-28 RX ORDER — HYDROCORTISONE SODIUM SUCCINATE 100 MG/2ML
50 INJECTION INTRAMUSCULAR; INTRAVENOUS DAILY
DISCHARGE
Start: 2025-06-29 | End: 2025-07-03

## 2025-06-28 RX ORDER — MIDODRINE HYDROCHLORIDE 5 MG/1
15 TABLET ORAL 3 TIMES DAILY
DISCHARGE
Start: 2025-06-28

## 2025-06-28 RX ORDER — AMIODARONE HYDROCHLORIDE 400 MG/1
TABLET ORAL
DISCHARGE
Start: 2025-06-28 | End: 2025-09-16

## 2025-06-28 RX ORDER — LEVOTHYROXINE SODIUM 75 UG/1
75 TABLET ORAL DAILY
DISCHARGE
Start: 2025-06-29

## 2025-06-28 RX ORDER — METOPROLOL TARTRATE 25 MG/1
25 TABLET, FILM COATED ORAL 2 TIMES DAILY
DISCHARGE
Start: 2025-06-28

## 2025-06-28 RX ADMIN — MIDODRINE HYDROCHLORIDE 15 MG: 5 TABLET ORAL at 13:26

## 2025-06-28 RX ADMIN — SODIUM CHLORIDE, PRESERVATIVE FREE 10 ML: 5 INJECTION INTRAVENOUS at 08:45

## 2025-06-28 RX ADMIN — LEVOTHYROXINE SODIUM 75 MCG: 0.03 TABLET ORAL at 06:56

## 2025-06-28 RX ADMIN — LOPERAMIDE HYDROCHLORIDE 2 MG: 2 CAPSULE ORAL at 08:44

## 2025-06-28 RX ADMIN — HYDROCORTISONE SODIUM SUCCINATE 50 MG: 100 INJECTION, POWDER, FOR SOLUTION INTRAMUSCULAR; INTRAVENOUS at 08:26

## 2025-06-28 RX ADMIN — PANTOPRAZOLE SODIUM 40 MG: 40 INJECTION, POWDER, FOR SOLUTION INTRAVENOUS at 08:26

## 2025-06-28 RX ADMIN — VANCOMYCIN HYDROCHLORIDE 750 MG: 750 INJECTION, POWDER, LYOPHILIZED, FOR SOLUTION INTRAVENOUS at 08:37

## 2025-06-28 RX ADMIN — AMIODARONE HYDROCHLORIDE 400 MG: 200 TABLET ORAL at 08:25

## 2025-06-28 RX ADMIN — MICONAZOLE NITRATE: 2 POWDER TOPICAL at 08:44

## 2025-06-28 RX ADMIN — MEROPENEM 1000 MG: 1 INJECTION INTRAVENOUS at 09:46

## 2025-06-28 RX ADMIN — CETIRIZINE HYDROCHLORIDE 10 MG: 10 TABLET, FILM COATED ORAL at 08:26

## 2025-06-28 RX ADMIN — METOPROLOL TARTRATE 25 MG: 25 TABLET, FILM COATED ORAL at 08:25

## 2025-06-28 RX ADMIN — MIDODRINE HYDROCHLORIDE 15 MG: 5 TABLET ORAL at 08:25

## 2025-06-28 ASSESSMENT — PAIN SCALES - GENERAL
PAINLEVEL_OUTOF10: 0
PAINLEVEL_OUTOF10: 0

## 2025-06-28 ASSESSMENT — PAIN SCALES - WONG BAKER: WONGBAKER_NUMERICALRESPONSE: NO HURT

## 2025-06-28 NOTE — DISCHARGE SUMMARY
Hospital Medicine Discharge Summary    Patient: Alfredo Chong   : 1950     Hospital:  [facility]  Admit Date: 6/15/2025   Discharge Date:   2025  Disposition:  [facility]   Code status:  Full.  Reconfirmed CODE STATUS with patient is altered between full code and limited during this most recent inpatient stay  Condition at Discharge: Stable  Primary Care Provider: Heath Hernandez MD    Admitting Provider: Hesham Gage MD  Discharge Provider: Ramiro Moreno DO     Discharge Diagnoses:      Active Hospital Problems    Diagnosis     Chronic systolic heart failure (HCC) [I50.22]      Priority: Medium    Moderate protein-calorie malnutrition [E44.0]     Leukocytosis [D72.829]     Paroxysmal atrial fibrillation (HCC) [I48.0]     Rapid atrial fibrillation (HCC) [I48.91]     History of recent blood transfusion [Z92.89]     Dizziness [R42]     Hypotension [I95.9]     Iron deficiency anemia [D50.9]     Acute kidney injury superimposed on chronic kidney disease [N17.9, N18.9]        Presenting Admission History:      Patient is a 74-year-old male with a past medical history of CAD, advanced bladder cancer undergoing active immunotherapy, hypertension, thyroid disease who presents for low blood pressure and falls. He states that he has had ongoing issues with his blood pressure since his last admission. He states last night he fell down and was unable to get up. He states that his blood pressure ranged from the 40s to the 80s. He is endorsing lightheadedness. He denies any chest pain or shortness of breath. On arrival to the ED he was found to be tachycardic and hypotensive. ED workup was remarkable for an elevated creatinine, lactic acidosis, elevated troponin, elevated BNP, leukocytosis. We were consulted for admission.      Assessment/Plan:      Septic shock-improved  Hypotension-improved  - Patient fell prior to arriving in the ED due to lightheadedness  - Patient has had chronic  INDICATION: Acute renal insufficiency COMPARISON: PET/CT 5/5/2025 and CT 4/19/2025. TECHNIQUE: CT of the abdomen and pelvis was performed without oral or intravenous contrast according to the renal stone protocol.  Unless otherwise specified, incidental findings do not require dedicated imaging follow-up. This exam was performed according to our departmental dose-optimization program, which includes automated exposure control, adjustment of the mA and/or kV according to patient size and/or use of iterative reconstruction technique. FINDINGS: Lung bases show atelectasis. Evaluation the organ parenchyma is suboptimal without contrast. There appear to be cysts within the kidneys, suboptimally visualized. There are ureteral stents bilaterally. Mild right hydronephrosis. Urinary bladder is underdistended with a Gordon catheter present. Noncontrast appearance of the liver, gallbladder, spleen, adrenals and pancreas unremarkable. No evidence of bowel obstruction. There is colonic diverticulosis. Aorta nonaneurysmal. There is vascular disease. No obvious lymphadenopathy in the abdomen or pelvis. No drainable ascites. Prostate is unremarkable. Bones demonstrate no acute findings.     1. Bilateral ureteral stents are seen. There is mild right hydronephrosis. Ureteral cysts are suboptimally visualized without contrast, it could be further characterized via ultrasound. The bladder is underdistended, obscuring potential bladder mass lesion. There is a Gordon catheter in place. 2. Diverticulosis Electronically signed by Romulo Lagunas    Echo (TTE) complete (PRN contrast/bubble/strain/3D)  Result Date: 6/16/2025    Image quality is technically difficult. Contrast used: Lumason. Technically difficult study, technically difficult study with poor endocardial visualization and technically difficult study due to patient's body habitus.   Left Ventricle: Normal left ventricular systolic function with a visually estimated EF of 55 - 60%. Left

## 2025-06-28 NOTE — PLAN OF CARE
Problem: Chronic Conditions and Co-morbidities  Goal: Patient's chronic conditions and co-morbidity symptoms are monitored and maintained or improved  6/28/2025 0722 by Junior Chavira RN  Outcome: Adequate for Discharge  6/28/2025 0722 by Junior Chavira RN  Outcome: Progressing     Problem: Discharge Planning  Goal: Discharge to home or other facility with appropriate resources  6/28/2025 0722 by Junior Chavira RN  Outcome: Adequate for Discharge  6/28/2025 0722 by Junior Chavira RN  Outcome: Progressing     Problem: Safety - Adult  Goal: Free from fall injury  6/28/2025 0722 by Junior Chavira RN  Outcome: Adequate for Discharge  6/28/2025 0722 by Junior Chavira RN  Outcome: Progressing     Problem: Skin/Tissue Integrity  Goal: Skin integrity remains intact  Description: 1.  Monitor for areas of redness and/or skin breakdown  2.  Assess vascular access sites hourly  3.  Every 4-6 hours minimum:  Change oxygen saturation probe site  4.  Every 4-6 hours:  If on nasal continuous positive airway pressure, respiratory therapy assess nares and determine need for appliance change or resting period  6/28/2025 0722 by Junior Chavira RN  Outcome: Adequate for Discharge  6/28/2025 0722 by Junior Chavira RN  Outcome: Progressing  Flowsheets (Taken 6/27/2025 2100 by Astrid Loo, RN)  Skin Integrity Remains Intact:   Monitor for areas of redness and/or skin breakdown   Turn and reposition as indicated   Pressure redistribution bed/mattress (bed type)   Check visual cues for pain   Monitor skin under medical devices     Problem: ABCDS Injury Assessment  Goal: Absence of physical injury  6/28/2025 0722 by Junior Chavira RN  Outcome: Adequate for Discharge  6/28/2025 0722 by Junior Chavira RN  Outcome: Progressing     Problem: Pain  Goal: Verbalizes/displays adequate comfort level or baseline comfort level  6/28/2025 0722 by Junior Chavira RN  Outcome: Adequate for Discharge  6/28/2025 0722 by

## 2025-06-28 NOTE — PROGRESS NOTES
Physical Therapy    Therapy orders received and Pt chart reviewed. Attempted to see pt this morning. Pt not being seen for PT secondary to plans of discharge this early afternoon.     Nakita Cox, PT

## 2025-06-28 NOTE — CARE COORDINATION
CASE MANAGEMENT DISCHARGE SUMMARY      Discharge to: [facility]    IMM given: (date) 6/27    Transportation:    Family/car: no   Medical Transport explained to pt/family. Pt/family voice no agency preference.    Agency used: [facility]   time: 1400   Ambulance form completed: Yes    Confirmed discharge plan with:     Patient: yes per RN     Family:  yes per RN     Facility/Agency, name: [facility] faxed   Phone number for report to facility: [phone]     RN, name: Junior CUTLER    Note: Discharging nurse to complete [facility], reconcile [facility], and place final copy with patient's discharge packet. RN to ensure that written prescriptions for  Level II medications are sent with patient to the facility as per protocol.

## 2025-06-28 NOTE — PROGRESS NOTES
ONCOLOGY HEMATOLOGY CARE PROGRESS NOTE      SUBJECTIVE:    Alfredo reports feeling the same. Fatigued. Nurse at bedside performing mouth care.   Afebrile   Remains on room air.     ROS:     Constitutional:  No fever, No chills, No night sweats. Fatigue.   Eyes:  No impairment or change in vision  ENT / Mouth:  No pain, abnormal ulceration, bleeding, nasal drip or change in voice or hearing  Cardiovascular:  No chest pain, palpitations, or calf discomfort  Respiratory:  No pain, hemoptysis, change to breathing  Breast:  No pain, discharge, change in appearance or texture  Gastrointestinal:  No pain, cramping, jaundice  Urinary:  No pain, bleeding or change in continence  Genitalia: No pain, bleeding or discharge  Musculoskeletal: weakness. No redness, pain  Skin:  No pruritus, rash, change to nodules or lesions  Neurologic:  No discomfort, change in mental status, speech, sensory or motor activity  Psychiatric:  No change in concentration or change to affect or mood  Endocrine:  No hot flashes, increased thirst, or change to urine production  Hematologic: No petechiae, ecchymosis or bleeding  Lymphatic:  No lymphadenopathy or lymphedema  Allergy / Immunologic:  No eczema, hives, frequent or recurrent infections    OBJECTIVE        Physical    VITALS:  Patient Vitals for the past 24 hrs:   BP Temp Temp src Pulse Resp SpO2   06/28/25 0300 115/70 -- -- 82 16 --   06/28/25 0200 113/65 -- -- 86 15 --   06/28/25 0100 114/78 -- -- 85 21 97 %   06/28/25 0000 113/77 98.9 °F (37.2 °C) Bladder 80 17 97 %   06/27/25 2300 110/69 -- -- 82 13 96 %   06/27/25 2208 -- -- -- -- -- 97 %   06/27/25 2200 -- -- -- 96 13 --   06/27/25 2100 124/87 -- -- 84 24 98 %   06/27/25 2000 125/77 99 °F (37.2 °C) Bladder 81 23 97 %   06/27/25 1900 132/77 -- -- 85 15 96 %   06/27/25 1800 126/81 -- -- 84 20 97 %   06/27/25 1700 123/83 -- -- 80 14 --   06/27/25 1600 130/88 99.1 °F (37.3 °C) Bladder 83 24 98 %  2.34*   < > 2.79*   < > 3.28*   HGB 7.5* 7.6* 7.5* 7.2*   < > 8.3*   < > 7.5*   < > 9.1*   < > 10.5*   HCT 22.2* 22.5* 22.2* 21.7*   < > 24.4*   < > 21.9*   < > 26.8*   < > 31.7*   MCV 95.3  --  94.3  --   --  94.8  --  93.8   < > 95.8   < > 96.5   MCH 32.4  --  32.1  --   --  32.1  --  32.1   < > 32.5   < > 32.1   MCHC 34.0  --  34.0  --   --  33.9  --  34.2   < > 33.9   < > 33.3   RDW 17.1*  --  16.7*  --   --  16.6*  --  16.5*   < > 15.8*   < > 16.0*     --  306  --   --  273  --  298   < > 317   < > 399    < > = values in this interval not displayed.       PT/INR:    Recent Labs     06/15/25  1621 06/15/25  0729   INR 1.36* 0.97     PTT:    Recent Labs     06/15/25  1621   APTT 37.9*       CMP:    Recent Labs     06/28/25  0543 06/27/25  0445 06/26/25  0410 06/25/25  0430 06/23/25  0429 06/22/25  0758 06/21/25  1323 06/17/25  0426 06/16/25  0523 06/15/25  0729    136 136 135*   < > 133* 136   < > 133* 133*   K 3.7 3.6 3.5 3.2*   < > 3.5 3.4*   < > 4.0 4.0   CL 98* 98* 98* 97*   < > 99 101   < > 101 98*   CO2 27 29 29 28   < > 19* 18*   < > 20* 20*   GLUCOSE 98 107* 128* 93   < > 99 138*   < > 124* 139*   BUN 48* 42* 43* 45*   < > 41* 36*   < > 30* 29*   CREATININE 2.1* 2.2* 2.4* 2.9*   < > 3.6* 3.4*   < > 3.1* 3.1*   LABGLOM 32* 30* 27* 22*   < > 17* 18*   < > 20* 20*   CALCIUM 9.8 9.7 9.8 9.0   < > 6.8* 5.9*   < > 8.4 9.3   AGRATIO  --   --   --   --   --   --   --   --   --  0.9*   BILITOT  --   --   --   --   --   --   --   --  0.6 0.7   ALKPHOS  --   --   --   --   --   --   --   --  105 124   ALT  --   --   --   --   --   --   --   --  27 35   AST  --   --   --   --   --   --   --   --  39* 54*   MG  --   --  2.08 1.70*  --  1.95 1.93   < >  --  2.56*    < > = values in this interval not displayed.       Lab Results   Component Value Date    CALCIUM 9.8 06/28/2025    PHOS 3.3 06/28/2025       Radiology Review:  CT CHEST WO CONTRAST  Narrative: CT SCAN OF THE CHEST:    CLINICAL HISTORY: Opacity
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
## 2025-06-28 NOTE — DISCHARGE INSTRUCTIONS
Discharge Instructions  You were admitted to [facility] with falls.     You are now ready to discharge and will be discharging to: LTAC    Please take your medications as prescribed. Medication changes are listed below and a full list of medications is in this discharge packet. Please keep your follow-up appointments after the hospital for ongoing care. It has been a pleasure taking care of you, we wish you the best.     MEDICATION CHANGES:  -- There was many medicine changes made during this hospital stay.  Please review the current discharge list this is the most accurate representation of what medications you should be taking.  Some important reminders are listed below.  -- Take amiodarone for 400 mg twice daily until July 3.  Then for the next 2 weeks he will take amiodarone 400 mg once daily.  Thereafter you will take amiodarone 200 mg once daily for treatment of your A-fib  -- Continue to take metoprolol 25 mg twice daily  -- Will be continuing you on IV antibiotics: Vancomycin and meropenem for 4 more days for a total of 7  -- Will be continued you on IV steroids which can be stopped at the discretion of LTAC  -- Will continue on proton pump inhibitor IV currently but can transition to p.o. at LTAC  --Have increased midodrine to 15 mg 3 times daily    FOLLOW-UP:  -- It is important to see your primary care provider (PCP) after being in the hospital.  Please call within 1 to 2 days of discharge to schedule an appointment within 1 to 2 weeks.  Please confirm medication list with primary care provider at upcoming appointment.  --Continue to follow-up with your oncologist to continue evaluating your advanced bladder cancer  --Follow-up with cardiology due to new diagnosis of A-fib and medical management with amiodarone  --Needs to reschedule an appointment with Dr. Pearl to discuss surgical options for replacing ureteral stents

## 2025-06-28 NOTE — PROGRESS NOTES
[facility] Progress Note           Subjective/   74 y.o. year old male who we are seeing in consultation for AMARILYS/CKD stage 3b.     Patient seen and examined at bedside. He feels a little better today.  Wanting to go to [facility].  Eliquis stopped but still with hematuria   - Labs and notes reviewed  - Family at bedside     ROS:  +weak, intake reduced, now on RA.  Some edema     Objective/   GEN:  Chronically ill, /68   Pulse 86   Temp 98 °F (36.7 °C) (Bladder)   Resp 19   Ht 1.778 m (5' 10\")   Wt 99.3 kg (218 lb 14.7 oz)   SpO2 95%   BMI 31.41 kg/m²     GEN: resting in bed, ill appearing   HEENT: non-icteric, no JVD  CV: S1, S2, tachycardic, without m/r/g; Trace LE edema  RESP: CTA B without w/r/r; breathing wnl  ABD: +bs, soft, nt, nd  SKIN: warm, no rash    Data/  Recent Labs     06/26/25  0410 06/26/25  1016 06/27/25  0445 06/27/25  1030 06/28/25  0543   WBC 14.2*  --  17.5*  --  14.0*   HGB 8.3*   < > 7.5* 7.6* 7.5*   HCT 24.4*   < > 22.2* 22.5* 22.2*   MCV 94.8  --  94.3  --  95.3     --  306  --  286    < > = values in this interval not displayed.     Recent Labs     06/26/25  0410 06/27/25  0445 06/28/25  0543    136 136   K 3.5 3.6 3.7   CL 98* 98* 98*   CO2 29 29 27   GLUCOSE 128* 107* 98   PHOS 3.3 3.0 3.3   MG 2.08  --   --    BUN 43* 42* 48*   CREATININE 2.4* 2.2* 2.1*   LABGLOM 27* 30* 32*       Assessment/     - Acute kidney injury - renal hypoperfusion injury in setting of hypotension   ATN / now trending back mostly towards prior baseline    - Chronic kidney disease stage 3b - baseline SCr 1.6-2.0 since 2023, follows with Dr. Renard    - Cr today of 2.1  - Hypotension   - BP remains low normal, SBP 100s   - on Midodrine 15mg TID    - Anion-gap metabolic acidosis with elevated lactate   - Resolved     - Hypocalcemia   - Ca as low as 6.3, ical was 0.78 on 6/21/25  - now resolved     - HG urothelial cancer in 3/2024, recurrence of LG urothelial CA in  11/2024  - Completed induction BCG with [facility]  - S/p bilateral ureteral stents and due for Stent exchange in July 2025  - has baeza with hematuria / Eliquis stopped today  - Urology consulted and baeza needs to remain in place for now  - Much of additional options depends on his rehab / would need to recover significant functional status to tolerate chemotherapy or additional surgery     - Urinary retention - baeza mgmt from 6/16, Urology plans to keep folly for now.  Outpatient follow up with Dr. Moctezuma     - Anemia   - recheck of 7.5, on LILLY per hematology     Plan/     - d/c IV fluids as getting some mild peripheral edema   - trend hgb, recommend transfuse to keep > 7.0  - Encourage PO intake   - Monitoring off pressors & continue midodrine 15 mg po tid to maintain MAP of 65  - Continue baeza, monitor I/Os  - Ok for transfer to [facility]   ____________________________________  Alivia Pedroza MD  [facility]  [email or web address]  Office: [phone]

## 2025-06-28 NOTE — PROGRESS NOTES
[facility]     Electrophysiology                                     Progress Note    Admission date:  6/15/2025    Reason for follow up visit: AF    HPI/CC: Alfredo Chong was admitted on 6/15/2025 with sepsis secondary to likely UTI. EP following for rapid AF. On 2025, patient was transferred to the ICU for worsening hypotension.  IV heparin was started however this was briefly discontinued secondary to worsening anemia. Heparin was resumed today.    Has also been treated for AMARILYS/CKD. He is on Sven-synephrine, Levophed and vasopressin. On 2025 around noon, he went back into rate controlled AF. On 2025, he was transferred back to the ICU. Patient spontaneously converted to ST with PAC's and amiodarone was started. Rhythm has been sinus with with PAC's.     Subjective: He is feeling well. Transferring to LTAC today.  Denies chest pain, palpitations, shortness of breath, and dizziness.       Vitals:  Blood pressure 103/68, pulse 86, temperature 98 °F (36.7 °C), temperature source Bladder, resp. rate 19, height 1.778 m (5' 10\"), weight 99.3 kg (218 lb 14.7 oz), SpO2 95%.  Temp  Av.8 °F (37.1 °C)  Min: 98 °F (36.7 °C)  Max: 99.1 °F (37.3 °C)  Pulse  Av.4  Min: 78  Max: 101  BP  Min: 103/68  Max: 134/84  SpO2  Av.7 %  Min: 91 %  Max: 99 %    24 hour I/O    Intake/Output Summary (Last 24 hours) at 2025 0934  Last data filed at 2025 0400  Gross per 24 hour   Intake 1374.92 ml   Output 1075 ml   Net 299.92 ml     Current Facility-Administered Medications   Medication Dose Route Frequency Provider Last Rate Last Admin    vancomycin (VANCOCIN) 750 mg in sodium chloride 0.9 % 250 mL IVPB ([id number])  750 mg IntraVENous Once Hesham Gage  mL/hr at 25 0837 750 mg at 25 0837    levothyroxine (SYNTHROID) tablet 75 mcg  75 mcg Oral Daily Hesham Gage MD   75 mcg at 25 0656    loperamide (IMODIUM) capsule 2 mg  2 mg Oral 4x Daily PRN

## 2025-06-28 NOTE — PROGRESS NOTES
Shift:  [id number]    Reason for ICU Admission: Afib with hypotension    Most recent vitals: /70   Pulse 82   Temp 98.9 °F (37.2 °C) (Bladder)   Resp 16   Ht 1.778 m (5' 10\")   Wt 99.3 kg (218 lb 14.7 oz)   SpO2 97%   BMI 31.41 kg/m²      Drip rates at handoff:    amiodarone Stopped (06/27/25 1222)    dextrose      sodium chloride 10 mL/hr at 06/20/25 2118       Current O2:   [x] Room Air   [] NC  2L   [] BiPaP    [] Vented  % Fi02,  Peep    Hospital Course:  ICU Day # 1   Transferred from  for follow up on afib with hypotension  History of bladder cancer, CAD, Hypothyroidism,   IV NaHCO3 at 100/hr  Wound care consult placed    ICU Day #1 Nights (6/24)  - UOP = 700 mL, rectal tube replaced  - 40 mEq K replaced    ICU Day #2 Nights (6/25)  - Bicarb switched to LR, decreased to 50 mL/hr  - Amio drip started on days, drip continued overnight- PO tablets given  - ABX restarted  - UOP = 750 mL       ICU Day #3 Days (6/26)  -MRSA, Cdiff, GI panel, & blood cultures x1 from port sent to lab (cdiff negative)  -new blisters noted on pubic/minna area & new possible pressure injuries to foreskin from baeza? Wound care re-consulted  -Urology recommending transitioning away from AC to help with hematuria. Cardiology aware and planning to continue eliquis for several more days until loaded with amio, then stopping.   -hem/onc checking epo level to see if patient would benefit from epoetin kp to help with anemia.  -ID consulted. Elevated WBC may be from infection, cancer, and/or steroid use. Continuing to monitor cultures and labs.   -UOP = 700  -Rectal output = 300  Nights  - Rectal output: 150  - UOP: 695  - Wound care completed during bath  - No acute events overnight     ICU Day #4 Days (6/27)  -Wound care eval- Sacrum, armpits, penis, and L thigh evaluated/documented.  -Pt family spoken with about care, turning and wounds  -Urine remains cherry red ([id number])  -Pt appetite minimal- able to drink an ensure  per meal  -Amio gtt stopped per cards  -CT chest completed  -Nephro stopped fluids because of pt swelling  - Current plan to transfer to [facility] tomorrow pending pt meeting criteria    ICU Day #5 Days (6/28)  -Pt family stated they smelled something. this nurse turned and cleaned pt. Changing gown and bedding.  -Wound care performed to all Wound care orders on pt on all noted wounds prior to discharge    -Report given to [facility] Nurse. FMS, Urinary Catheter, L PIV, and Port kept in place. Pt discharged with proper paperwork, AVS reviewed, and medication list. Wound care completed before discharge.

## 2025-06-28 NOTE — PLAN OF CARE
Problem: Chronic Conditions and Co-morbidities  Goal: Patient's chronic conditions and co-morbidity symptoms are monitored and maintained or improved  Outcome: Progressing     Problem: Discharge Planning  Goal: Discharge to home or other facility with appropriate resources  Outcome: Progressing     Problem: Safety - Adult  Goal: Free from fall injury  Outcome: Progressing     Problem: Skin/Tissue Integrity  Goal: Skin integrity remains intact  Description: 1.  Monitor for areas of redness and/or skin breakdown  2.  Assess vascular access sites hourly  3.  Every 4-6 hours minimum:  Change oxygen saturation probe site  4.  Every 4-6 hours:  If on nasal continuous positive airway pressure, respiratory therapy assess nares and determine need for appliance change or resting period  Outcome: Progressing     Problem: ABCDS Injury Assessment  Goal: Absence of physical injury  Outcome: Progressing     Problem: Pain  Goal: Verbalizes/displays adequate comfort level or baseline comfort level  Outcome: Progressing     Problem: Neurosensory - Adult  Goal: Achieves stable or improved neurological status  Outcome: Progressing     Problem: Respiratory - Adult  Goal: Achieves optimal ventilation and oxygenation  Outcome: Progressing     Problem: Cardiovascular - Adult  Goal: Maintains optimal cardiac output and hemodynamic stability  Outcome: Progressing     Problem: Cardiovascular - Adult  Goal: Absence of cardiac dysrhythmias or at baseline  Outcome: Progressing     Problem: Skin/Tissue Integrity - Adult  Goal: Incisions, wounds, or drain sites healing without S/S of infection  Outcome: Progressing     Problem: Musculoskeletal - Adult  Goal: Return mobility to safest level of function  Outcome: Progressing     Problem: Gastrointestinal - Adult  Goal: Minimal or absence of nausea and vomiting  Outcome: Progressing     Problem: Gastrointestinal - Adult  Goal: Maintains or returns to baseline bowel function  Outcome: Progressing

## 2025-06-30 ENCOUNTER — TELEPHONE (OUTPATIENT)
Dept: CARDIOLOGY CLINIC | Age: 75
End: 2025-06-30

## 2025-06-30 ENCOUNTER — TELEPHONE (OUTPATIENT)
Dept: FAMILY MEDICINE CLINIC | Age: 75
End: 2025-06-30

## 2025-06-30 LAB — BACTERIA BLD CULT: NORMAL

## 2025-06-30 NOTE — TELEPHONE ENCOUNTER
----- Message from JOHNY Knight CNP sent at 6/28/2025  9:37 AM EDT -----  Please call patient wife next week and arrange for follow-up with me in about 3 months.    Front- please contact pt for appt with SHMUEL

## 2025-07-16 ENCOUNTER — CARE COORDINATION (OUTPATIENT)
Dept: CARE COORDINATION | Age: 75
End: 2025-07-16

## 2025-07-17 RX ORDER — LACTOBACILLUS ACIDOPHILUS 500MM CELL
1 CAPSULE ORAL
Status: ON HOLD | COMMUNITY
Start: 2025-07-16

## 2025-07-17 RX ORDER — DIPHENHYDRAMINE HCL 25 MG
25 CAPSULE ORAL EVERY 6 HOURS PRN
Status: ON HOLD | COMMUNITY

## 2025-07-17 RX ORDER — PANTOPRAZOLE SODIUM 40 MG/1
40 TABLET, DELAYED RELEASE ORAL DAILY
Status: ON HOLD | COMMUNITY

## 2025-07-17 RX ORDER — HEPARIN SODIUM 5000 [USP'U]/ML
5000 INJECTION, SOLUTION INTRAVENOUS; SUBCUTANEOUS EVERY 12 HOURS
Status: ON HOLD | COMMUNITY
Start: 2025-07-16

## 2025-07-17 RX ORDER — ASPIRIN 81 MG/1
81 TABLET, CHEWABLE ORAL 2 TIMES DAILY
Status: ON HOLD | COMMUNITY

## 2025-07-17 RX ORDER — LOPERAMIDE HYDROCHLORIDE 2 MG/1
2 CAPSULE ORAL 4 TIMES DAILY
Status: ON HOLD | COMMUNITY
Start: 2025-07-16

## 2025-07-17 RX ORDER — LOSARTAN POTASSIUM 25 MG/1
25 TABLET ORAL DAILY
Status: ON HOLD | COMMUNITY

## 2025-07-17 RX ORDER — ACETAMINOPHEN 325 MG/1
650 TABLET ORAL EVERY 6 HOURS PRN
Status: ON HOLD | COMMUNITY
Start: 2025-07-16

## 2025-07-17 NOTE — CARE COORDINATION
Patient admitted to [facility] following hospital admission for CHF, sepsis, bladder cancer, A fib- new onset, CKD IIIb, gross hematuria, malnutrition. MCG admission chart review complete

## 2025-07-20 ENCOUNTER — HOSPITAL ENCOUNTER (INPATIENT)
Age: 75
LOS: 7 days | Discharge: HOSPICE | DRG: 659 | End: 2025-07-27
Attending: EMERGENCY MEDICINE | Admitting: SURGERY
Payer: MEDICARE

## 2025-07-20 ENCOUNTER — APPOINTMENT (OUTPATIENT)
Dept: GENERAL RADIOLOGY | Age: 75
DRG: 659 | End: 2025-07-20
Payer: MEDICARE

## 2025-07-20 DIAGNOSIS — T83.511A URINARY TRACT INFECTION ASSOCIATED WITH INDWELLING URETHRAL CATHETER, INITIAL ENCOUNTER: ICD-10-CM

## 2025-07-20 DIAGNOSIS — N17.9 AKI (ACUTE KIDNEY INJURY): ICD-10-CM

## 2025-07-20 DIAGNOSIS — Z51.5 HOSPICE CARE: ICD-10-CM

## 2025-07-20 DIAGNOSIS — E86.0 DEHYDRATION: Primary | ICD-10-CM

## 2025-07-20 DIAGNOSIS — N39.0 URINARY TRACT INFECTION ASSOCIATED WITH INDWELLING URETHRAL CATHETER, INITIAL ENCOUNTER: ICD-10-CM

## 2025-07-20 LAB
ALBUMIN SERPL-MCNC: 3.1 G/DL (ref 3.4–5)
ALBUMIN/GLOB SERPL: 1.1 {RATIO} (ref 1.1–2.2)
ALP SERPL-CCNC: 191 U/L (ref 40–129)
ALT SERPL-CCNC: 41 U/L (ref 10–40)
ANION GAP SERPL CALCULATED.3IONS-SCNC: 10 MMOL/L (ref 3–16)
ANISOCYTOSIS BLD QL SMEAR: ABNORMAL
AST SERPL-CCNC: 63 U/L (ref 15–37)
BACTERIA URNS QL MICRO: ABNORMAL /HPF
BASOPHILS # BLD: 0.1 K/UL (ref 0–0.2)
BASOPHILS NFR BLD: 1 %
BILIRUB SERPL-MCNC: 0.3 MG/DL (ref 0–1)
BILIRUB UR QL STRIP.AUTO: NEGATIVE
BUN SERPL-MCNC: 46 MG/DL (ref 7–20)
CALCIUM SERPL-MCNC: 11.2 MG/DL (ref 8.3–10.6)
CHLORIDE SERPL-SCNC: 96 MMOL/L (ref 99–110)
CLARITY UR: ABNORMAL
CO2 SERPL-SCNC: 24 MMOL/L (ref 21–32)
COLOR UR: YELLOW
CREAT SERPL-MCNC: 3.5 MG/DL (ref 0.8–1.3)
DEPRECATED RDW RBC AUTO: 23.4 % (ref 12.4–15.4)
EOSINOPHIL # BLD: 0.6 K/UL (ref 0–0.6)
EOSINOPHIL NFR BLD: 5 %
GFR SERPLBLD CREATININE-BSD FMLA CKD-EPI: 17 ML/MIN/{1.73_M2}
GLUCOSE SERPL-MCNC: 101 MG/DL (ref 70–99)
GLUCOSE UR STRIP.AUTO-MCNC: NEGATIVE MG/DL
HCT VFR BLD AUTO: 27.4 % (ref 40.5–52.5)
HGB BLD-MCNC: 9 G/DL (ref 13.5–17.5)
HGB UR QL STRIP.AUTO: ABNORMAL
KETONES UR STRIP.AUTO-MCNC: NEGATIVE MG/DL
LACTATE BLDV-SCNC: 1.6 MMOL/L (ref 0.4–1.9)
LEUKOCYTE ESTERASE UR QL STRIP.AUTO: ABNORMAL
LYMPHOCYTES # BLD: 2.3 K/UL (ref 1–5.1)
LYMPHOCYTES NFR BLD: 18 %
MACROCYTES BLD QL SMEAR: ABNORMAL
MCH RBC QN AUTO: 33.2 PG (ref 26–34)
MCHC RBC AUTO-ENTMCNC: 32.8 G/DL (ref 31–36)
MCV RBC AUTO: 101.2 FL (ref 80–100)
MONOCYTES # BLD: 0.5 K/UL (ref 0–1.3)
MONOCYTES NFR BLD: 4 %
NEUTROPHILS # BLD: 9.3 K/UL (ref 1.7–7.7)
NEUTROPHILS NFR BLD: 66 %
NEUTS BAND NFR BLD MANUAL: 6 % (ref 0–7)
NITRITE UR QL STRIP.AUTO: NEGATIVE
PH UR STRIP.AUTO: 7 [PH] (ref 5–8)
PLATELET # BLD AUTO: 529 K/UL (ref 135–450)
PLATELET BLD QL SMEAR: ABNORMAL
PMV BLD AUTO: 7.3 FL (ref 5–10.5)
POIKILOCYTOSIS BLD QL SMEAR: ABNORMAL
POTASSIUM SERPL-SCNC: 5.7 MMOL/L (ref 3.5–5.1)
PROT SERPL-MCNC: 6 G/DL (ref 6.4–8.2)
PROT UR STRIP.AUTO-MCNC: >=300 MG/DL
RBC # BLD AUTO: 2.71 M/UL (ref 4.2–5.9)
RBC #/AREA URNS HPF: ABNORMAL /HPF (ref 0–4)
SLIDE REVIEW: ABNORMAL
SODIUM SERPL-SCNC: 130 MMOL/L (ref 136–145)
SP GR UR STRIP.AUTO: 1.02 (ref 1–1.03)
TOXIC GRANULES BLD QL SMEAR: PRESENT
UA COMPLETE W REFLEX CULTURE PNL UR: YES
UA DIPSTICK W REFLEX MICRO PNL UR: YES
URN SPEC COLLECT METH UR: ABNORMAL
UROBILINOGEN UR STRIP-ACNC: 0.2 E.U./DL
WBC # BLD AUTO: 12.9 K/UL (ref 4–11)
WBC #/AREA URNS HPF: >100 /HPF (ref 0–5)

## 2025-07-20 PROCEDURE — 99285 EMERGENCY DEPT VISIT HI MDM: CPT

## 2025-07-20 PROCEDURE — 87186 SC STD MICRODIL/AGAR DIL: CPT

## 2025-07-20 PROCEDURE — 96375 TX/PRO/DX INJ NEW DRUG ADDON: CPT

## 2025-07-20 PROCEDURE — 85025 COMPLETE CBC W/AUTO DIFF WBC: CPT

## 2025-07-20 PROCEDURE — 87077 CULTURE AEROBIC IDENTIFY: CPT

## 2025-07-20 PROCEDURE — 2580000003 HC RX 258

## 2025-07-20 PROCEDURE — 87086 URINE CULTURE/COLONY COUNT: CPT

## 2025-07-20 PROCEDURE — 6360000002 HC RX W HCPCS

## 2025-07-20 PROCEDURE — 87106 FUNGI IDENTIFICATION YEAST: CPT

## 2025-07-20 PROCEDURE — 96374 THER/PROPH/DIAG INJ IV PUSH: CPT

## 2025-07-20 PROCEDURE — 83605 ASSAY OF LACTIC ACID: CPT

## 2025-07-20 PROCEDURE — 81001 URINALYSIS AUTO W/SCOPE: CPT

## 2025-07-20 PROCEDURE — 1200000000 HC SEMI PRIVATE

## 2025-07-20 PROCEDURE — 2500000003 HC RX 250 WO HCPCS: Performed by: SURGERY

## 2025-07-20 PROCEDURE — 71045 X-RAY EXAM CHEST 1 VIEW: CPT

## 2025-07-20 PROCEDURE — 6370000000 HC RX 637 (ALT 250 FOR IP): Performed by: SURGERY

## 2025-07-20 PROCEDURE — 2500000003 HC RX 250 WO HCPCS

## 2025-07-20 PROCEDURE — 51702 INSERT TEMP BLADDER CATH: CPT

## 2025-07-20 PROCEDURE — 80053 COMPREHEN METABOLIC PANEL: CPT

## 2025-07-20 RX ORDER — LACTOBACILLUS RHAMNOSUS GG 10B CELL
1 CAPSULE ORAL
Status: DISCONTINUED | OUTPATIENT
Start: 2025-07-21 | End: 2025-07-27 | Stop reason: HOSPADM

## 2025-07-20 RX ORDER — SODIUM CHLORIDE 9 MG/ML
INJECTION, SOLUTION INTRAVENOUS PRN
Status: DISCONTINUED | OUTPATIENT
Start: 2025-07-20 | End: 2025-07-27 | Stop reason: HOSPADM

## 2025-07-20 RX ORDER — 0.9 % SODIUM CHLORIDE 0.9 %
1000 INTRAVENOUS SOLUTION INTRAVENOUS ONCE
Status: COMPLETED | OUTPATIENT
Start: 2025-07-20 | End: 2025-07-20

## 2025-07-20 RX ORDER — METOPROLOL TARTRATE 25 MG/1
25 TABLET, FILM COATED ORAL 2 TIMES DAILY
Status: DISCONTINUED | OUTPATIENT
Start: 2025-07-20 | End: 2025-07-27 | Stop reason: HOSPADM

## 2025-07-20 RX ORDER — ONDANSETRON 2 MG/ML
4 INJECTION INTRAMUSCULAR; INTRAVENOUS ONCE
Status: COMPLETED | OUTPATIENT
Start: 2025-07-20 | End: 2025-07-20

## 2025-07-20 RX ORDER — ONDANSETRON 4 MG/1
4 TABLET, ORALLY DISINTEGRATING ORAL EVERY 8 HOURS PRN
Status: DISCONTINUED | OUTPATIENT
Start: 2025-07-20 | End: 2025-07-21

## 2025-07-20 RX ORDER — HEPARIN SODIUM 5000 [USP'U]/ML
5000 INJECTION, SOLUTION INTRAVENOUS; SUBCUTANEOUS EVERY 8 HOURS SCHEDULED
Status: DISCONTINUED | OUTPATIENT
Start: 2025-07-21 | End: 2025-07-27 | Stop reason: HOSPADM

## 2025-07-20 RX ORDER — SODIUM CHLORIDE 0.9 % (FLUSH) 0.9 %
5-40 SYRINGE (ML) INJECTION EVERY 12 HOURS SCHEDULED
Status: DISCONTINUED | OUTPATIENT
Start: 2025-07-20 | End: 2025-07-27 | Stop reason: HOSPADM

## 2025-07-20 RX ORDER — AMIODARONE HYDROCHLORIDE 200 MG/1
200 TABLET ORAL DAILY
Status: DISCONTINUED | OUTPATIENT
Start: 2025-07-20 | End: 2025-07-27 | Stop reason: HOSPADM

## 2025-07-20 RX ORDER — ACETAMINOPHEN 650 MG/1
650 SUPPOSITORY RECTAL EVERY 6 HOURS PRN
Status: DISCONTINUED | OUTPATIENT
Start: 2025-07-20 | End: 2025-07-27 | Stop reason: HOSPADM

## 2025-07-20 RX ORDER — SODIUM CHLORIDE 0.9 % (FLUSH) 0.9 %
5-40 SYRINGE (ML) INJECTION PRN
Status: DISCONTINUED | OUTPATIENT
Start: 2025-07-20 | End: 2025-07-27 | Stop reason: HOSPADM

## 2025-07-20 RX ORDER — MIDODRINE HYDROCHLORIDE 5 MG/1
2.5 TABLET ORAL 3 TIMES DAILY
Status: DISCONTINUED | OUTPATIENT
Start: 2025-07-20 | End: 2025-07-27 | Stop reason: HOSPADM

## 2025-07-20 RX ORDER — PANTOPRAZOLE SODIUM 40 MG/1
40 TABLET, DELAYED RELEASE ORAL DAILY
Status: DISCONTINUED | OUTPATIENT
Start: 2025-07-20 | End: 2025-07-27 | Stop reason: HOSPADM

## 2025-07-20 RX ORDER — POLYETHYLENE GLYCOL 3350 17 G/17G
17 POWDER, FOR SOLUTION ORAL DAILY PRN
Status: DISCONTINUED | OUTPATIENT
Start: 2025-07-20 | End: 2025-07-27 | Stop reason: HOSPADM

## 2025-07-20 RX ORDER — ACETAMINOPHEN 325 MG/1
650 TABLET ORAL EVERY 6 HOURS PRN
Status: DISCONTINUED | OUTPATIENT
Start: 2025-07-20 | End: 2025-07-27 | Stop reason: HOSPADM

## 2025-07-20 RX ORDER — ONDANSETRON 2 MG/ML
4 INJECTION INTRAMUSCULAR; INTRAVENOUS EVERY 6 HOURS PRN
Status: DISCONTINUED | OUTPATIENT
Start: 2025-07-20 | End: 2025-07-21

## 2025-07-20 RX ADMIN — AMIODARONE HYDROCHLORIDE 200 MG: 200 TABLET ORAL at 21:04

## 2025-07-20 RX ADMIN — PANTOPRAZOLE SODIUM 40 MG: 40 TABLET, DELAYED RELEASE ORAL at 21:04

## 2025-07-20 RX ADMIN — SODIUM CHLORIDE, PRESERVATIVE FREE 10 ML: 5 INJECTION INTRAVENOUS at 21:04

## 2025-07-20 RX ADMIN — ONDANSETRON 4 MG: 2 INJECTION, SOLUTION INTRAMUSCULAR; INTRAVENOUS at 14:56

## 2025-07-20 RX ADMIN — SODIUM CHLORIDE 1000 ML: 0.9 INJECTION, SOLUTION INTRAVENOUS at 14:56

## 2025-07-20 RX ADMIN — WATER 2000 MG: 1 INJECTION INTRAMUSCULAR; INTRAVENOUS; SUBCUTANEOUS at 15:26

## 2025-07-20 RX ADMIN — METOPROLOL TARTRATE 25 MG: 25 TABLET, FILM COATED ORAL at 21:04

## 2025-07-20 ASSESSMENT — PAIN DESCRIPTION - LOCATION: LOCATION: ABDOMEN

## 2025-07-20 ASSESSMENT — PAIN - FUNCTIONAL ASSESSMENT: PAIN_FUNCTIONAL_ASSESSMENT: NONE - DENIES PAIN

## 2025-07-20 ASSESSMENT — PAIN SCALES - GENERAL: PAINLEVEL_OUTOF10: 3
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
## 2025-07-20 NOTE — H&P
V2.0  History and Physical      Name:  Alfredo Chong /Age/Sex: 1950  (74 y.o. male)   MRN & CSN:  [id number] & [id number] Encounter Date/Time: 2025 4:23 PM EDT   Location:   PCP: Heath Hernandez MD       Hospital Day: 1    Assessment and Plan:       Hospital Problems           Last Modified POA    * (Principal) Complicated UTI (urinary tract infection) 2025 Yes       Assessment/Plan: Admit patient to inpatient unit.    Complicated UTI  Severe sepsis  Indwelling Gordon POA  Gross hematuria  Hx of bladder cancer  - Gordon exchanged  - f/u urine culture  - ceftriaxone IV daily for 5 days  - Urology consulted  - daily CBC    AMARILYS  - s/p 1 liter NS bolus  - holding off on further IVF in setting of hx of CHF  - f/u AM creatinine to guide further fluid management    Prolonged QTC  - QT interval is 450 with Qtc 562  - telemetry to monitor     Chronic:  CAD s/p CABG  HTN - holding home po regimen othen than metoprolol, with IV prn antihypertensives for SBP > 170 only, nurse to request on vitals checks  HFrEF  Hypothyroidism  Hx of bladder cancer  Vit D def    Dispo: med/surg   Ppx:  indications for ulcer and DVT ppx reviewed and medications ordered as needed     Personally reviewed patient's home medications  Personally reviewed lab studies including but not limited to CBC, BMP/CMP with reflex to Mg, troponin, and/or BNP  Discussed management of the case with ED provider and agree with his/her recommended to admit patient for management of complicated UTI  EKG interpreted personally and results indicating NSR  Imaging modalities that were personally interpreted include plain radiograph and/or CT with results indicating Mild left basilar airspace disease/atelectasis with or without small pleural effusion on CXR  Drugs that require monitoring for toxicity include IV ceftriaxone and the method of monitoring was daily renal function monitoring     History from:     Patient, direct communication with ER

## 2025-07-20 NOTE — ED NOTES
Alfredo Chong is a 74 y.o. male admitted for  Principal Problem:    Complicated UTI (urinary tract infection)  Resolved Problems:    * No resolved hospital problems. *  .   Patient [facility] via EMS transportation with   Chief Complaint   Patient presents with    Nausea    Vomiting    Hypotension     [facility] for [facility] BP, nausea, vomiting. Patient was treated for sepsis; has hx of bladder cancer.   .  Patient is alert and Person, Place, Time, and Situation  Patient's baseline mobility: pt has not ambulated in ER, pt reports he does not get up much at [facility] because he is weak and his legs give out.   Code Status: Prior   Cardiac Rhythm:       Is patient on baseline Oxygen: no  Abnormal Assessment Findings: nausea, loss of appetite    Isolation: None      NIH Score:    C-SSRS: Risk of Suicide: No Risk  Bedside swallow:        Active LDA's:   Peripheral IV 07/20/25 Right Antecubital (Active)     Patient admitted with a baeza: yes, chronic baeza, 22F 3 way w 30cc balloon. If the baeza is chronic was it exchanged:Yes  Reason for baeza: Chronic         Family/Caregiver Present no Any Concerns: no   Restraints no  Sitter no         Vitals: MEWS Score: 1    Vitals:    07/20/25 1438 07/20/25 1508 07/20/25 1537 07/20/25 1608   BP: 93/71 103/65 98/66 115/72   Pulse: 76 74 74 73   Resp: 21 12 20 18   Temp:       TempSrc:       SpO2: 95% 92% 91% 94%   Weight:           Last documented pain score (0-10 scale)    Pain medication administered No.    Pertinent or High Risk Medications/Drips: No.    Pending Blood Product Administration: no    Abnormal labs:   Abnormal Labs Reviewed   CBC WITH AUTO DIFFERENTIAL - Abnormal; Notable for the following components:       Result Value    WBC 12.9 (*)     RBC 2.71 (*)     Hemoglobin 9.0 (*)     Hematocrit 27.4 (*)     .2 (*)     RDW 23.4 (*)     Platelets 529 (*)     Neutrophils Absolute 9.3 (*)     Toxic Granulation Present (*)     Anisocytosis 2+ (*)

## 2025-07-21 LAB
ANION GAP SERPL CALCULATED.3IONS-SCNC: 11 MMOL/L (ref 3–16)
BASOPHILS # BLD: 0.1 K/UL (ref 0–0.2)
BASOPHILS NFR BLD: 1.1 %
BUN SERPL-MCNC: 43 MG/DL (ref 7–20)
CALCIUM SERPL-MCNC: 10.6 MG/DL (ref 8.3–10.6)
CHLORIDE SERPL-SCNC: 99 MMOL/L (ref 99–110)
CO2 SERPL-SCNC: 21 MMOL/L (ref 21–32)
CREAT SERPL-MCNC: 3.1 MG/DL (ref 0.8–1.3)
DEPRECATED RDW RBC AUTO: 23.5 % (ref 12.4–15.4)
EOSINOPHIL # BLD: 0.4 K/UL (ref 0–0.6)
EOSINOPHIL NFR BLD: 3.2 %
GFR SERPLBLD CREATININE-BSD FMLA CKD-EPI: 20 ML/MIN/{1.73_M2}
GLUCOSE SERPL-MCNC: 83 MG/DL (ref 70–99)
HCT VFR BLD AUTO: 27.6 % (ref 40.5–52.5)
HGB BLD-MCNC: 9.1 G/DL (ref 13.5–17.5)
LYMPHOCYTES # BLD: 1.2 K/UL (ref 1–5.1)
LYMPHOCYTES NFR BLD: 10.1 %
MCH RBC QN AUTO: 33.7 PG (ref 26–34)
MCHC RBC AUTO-ENTMCNC: 33 G/DL (ref 31–36)
MCV RBC AUTO: 102.3 FL (ref 80–100)
MONOCYTES # BLD: 1.3 K/UL (ref 0–1.3)
MONOCYTES NFR BLD: 10.9 %
NEUTROPHILS # BLD: 9 K/UL (ref 1.7–7.7)
NEUTROPHILS NFR BLD: 74.7 %
PLATELET # BLD AUTO: 499 K/UL (ref 135–450)
PMV BLD AUTO: 7.1 FL (ref 5–10.5)
POTASSIUM SERPL-SCNC: 5 MMOL/L (ref 3.5–5.1)
RBC # BLD AUTO: 2.7 M/UL (ref 4.2–5.9)
SODIUM SERPL-SCNC: 131 MMOL/L (ref 136–145)
WBC # BLD AUTO: 12 K/UL (ref 4–11)

## 2025-07-21 PROCEDURE — 6370000000 HC RX 637 (ALT 250 FOR IP): Performed by: INTERNAL MEDICINE

## 2025-07-21 PROCEDURE — 80048 BASIC METABOLIC PNL TOTAL CA: CPT

## 2025-07-21 PROCEDURE — 85025 COMPLETE CBC W/AUTO DIFF WBC: CPT

## 2025-07-21 PROCEDURE — 1200000000 HC SEMI PRIVATE

## 2025-07-21 PROCEDURE — 6360000002 HC RX W HCPCS: Performed by: SURGERY

## 2025-07-21 PROCEDURE — 36415 COLL VENOUS BLD VENIPUNCTURE: CPT

## 2025-07-21 PROCEDURE — 51702 INSERT TEMP BLADDER CATH: CPT

## 2025-07-21 PROCEDURE — 6370000000 HC RX 637 (ALT 250 FOR IP): Performed by: SURGERY

## 2025-07-21 PROCEDURE — 2500000003 HC RX 250 WO HCPCS: Performed by: SURGERY

## 2025-07-21 RX ORDER — PROCHLORPERAZINE EDISYLATE 5 MG/ML
10 INJECTION INTRAMUSCULAR; INTRAVENOUS EVERY 6 HOURS PRN
Status: DISCONTINUED | OUTPATIENT
Start: 2025-07-21 | End: 2025-07-27 | Stop reason: HOSPADM

## 2025-07-21 RX ORDER — HONEY 100 %
PASTE (ML) TOPICAL DAILY
Status: DISCONTINUED | OUTPATIENT
Start: 2025-07-21 | End: 2025-07-27 | Stop reason: HOSPADM

## 2025-07-21 RX ADMIN — Medication 1 CAPSULE: at 09:42

## 2025-07-21 RX ADMIN — MIDODRINE HYDROCHLORIDE 2.5 MG: 5 TABLET ORAL at 09:42

## 2025-07-21 RX ADMIN — MIDODRINE HYDROCHLORIDE 2.5 MG: 5 TABLET ORAL at 20:36

## 2025-07-21 RX ADMIN — SODIUM CHLORIDE, PRESERVATIVE FREE 10 ML: 5 INJECTION INTRAVENOUS at 09:43

## 2025-07-21 RX ADMIN — WATER 1000 MG: 1 INJECTION INTRAMUSCULAR; INTRAVENOUS; SUBCUTANEOUS at 09:36

## 2025-07-21 RX ADMIN — HEPARIN SODIUM 5000 UNITS: 5000 INJECTION INTRAVENOUS; SUBCUTANEOUS at 06:29

## 2025-07-21 RX ADMIN — MIDODRINE HYDROCHLORIDE 2.5 MG: 5 TABLET ORAL at 14:59

## 2025-07-21 RX ADMIN — HEPARIN SODIUM 5000 UNITS: 5000 INJECTION INTRAVENOUS; SUBCUTANEOUS at 14:59

## 2025-07-21 RX ADMIN — SODIUM CHLORIDE, PRESERVATIVE FREE 10 ML: 5 INJECTION INTRAVENOUS at 20:38

## 2025-07-21 RX ADMIN — HEPARIN SODIUM 5000 UNITS: 5000 INJECTION INTRAVENOUS; SUBCUTANEOUS at 20:37

## 2025-07-21 RX ADMIN — Medication: at 16:00

## 2025-07-21 RX ADMIN — PANTOPRAZOLE SODIUM 40 MG: 40 TABLET, DELAYED RELEASE ORAL at 09:42

## 2025-07-21 RX ADMIN — AMIODARONE HYDROCHLORIDE 200 MG: 200 TABLET ORAL at 09:42

## 2025-07-21 RX ADMIN — LEVOTHYROXINE SODIUM 75 MCG: 0.03 TABLET ORAL at 06:29

## 2025-07-21 NOTE — CARE COORDINATION
Case Management Assessment  Initial Evaluation    Date/Time of Evaluation: 7/21/2025 11:37 AM  Assessment Completed by: Jaqueline Ye RN    If patient is discharged prior to next notation, then this note serves as note for discharge by case management.    Patient Name: Alfredo Chong                   YOB: 1950  Diagnosis: Dehydration [E86.0]  AMARILYS (acute kidney injury) [N17.9]  Complicated UTI (urinary tract infection) [N39.0]  Urinary tract infection associated with indwelling urethral catheter, initial encounter [T83.511A, N39.0]                   Date / Time: 7/20/2025 12:53 PM    Patient Admission Status: Inpatient   Readmission Risk (Low < 19, Mod (19-27), High > 27): Readmission Risk Score: 26.1    Current PCP: Heath Hernandez MD  PCP verified by CM? Yes    Chart Reviewed: Yes      History Provided by: Patient  Patient Orientation: Alert and Oriented, Person, Place, Situation    Patient Cognition: Alert    Hospitalization in the last 30 days (Readmission):  Yes    If yes, Readmission Assessment in CM Navigator will be completed.    Advance Directives:    Code Status: Full Code   Patient's Primary Decision Maker is: Named in Scanned ACP Document    Primary Decision Maker: SameeraMadelyn - Other - [phone]    Secondary Decision Maker: ArletteVanessa Herminia Tobin - [phone]    Discharge Planning:  Patient lives with: Spouse/Significant Other Type of Home: Other (Comment) (pt was most recently at SNF)  Primary Care Giver: Other (Comment) (staff at facilities)  Patient Support Systems include: Spouse/Significant Other, Family Members   Current Financial resources: Medicare  Current community resources: None  Current services prior to admission: Skilled Nursing Facility ([facility])            Current DME: Other (Comment) (SNF)            Type of Home Care services:  None    ADLS  Prior functional level: Other (see comment) (has been at [facility], then [facility], then [facility] SN[facility])  Current

## 2025-07-21 NOTE — ACP (ADVANCE CARE PLANNING)
Advance Care Planning   Healthcare Decision Maker:    Primary Decision Maker: Madelyn Brasher - Other - [phone]    Secondary Decision Maker: Vanessa Chong - Mahad - [phone]    Click here to complete Healthcare Decision Makers including selection of the Healthcare Decision Maker Relationship (ie \"Primary\").  Today we documented Decision Maker(s) consistent with ACP documents on file.     MARJAN Monge

## 2025-07-21 NOTE — PLAN OF CARE
Problem: Skin/Tissue Integrity  Goal: Skin integrity remains intact  Description: 1.  Monitor for areas of redness and/or skin breakdown  2.  Assess vascular access sites hourly  3.  Every 4-6 hours minimum:  Change oxygen saturation probe site  4.  Every 4-6 hours:  If on nasal continuous positive airway pressure, respiratory therapy assess nares and determine need for appliance change or resting period  7/21/2025 0945 by Tirso Dahl RN  Outcome: Progressing  7/21/2025 0258 by Belem Sewell RN  Outcome: Progressing  Flowsheets (Taken 7/21/2025 0258)  Skin Integrity Remains Intact:   Monitor for areas of redness and/or skin breakdown   Turn and reposition as indicated   Assess need for specialty bed   Positioning devices   Check visual cues for pain   Monitor skin under medical devices     Problem: Pain  Goal: Verbalizes/displays adequate comfort level or baseline comfort level  7/21/2025 0945 by Tirso Dahl RN  Outcome: Progressing  7/21/2025 0258 by Belem Sewell RN  Outcome: Progressing  Flowsheets (Taken 7/21/2025 0258)  Verbalizes/displays adequate comfort level or baseline comfort level:   Encourage patient to monitor pain and request assistance   Assess pain using appropriate pain scale   Administer analgesics based on type and severity of pain and evaluate response   Implement non-pharmacological measures as appropriate and evaluate response   Notify Licensed Independent Practitioner if interventions unsuccessful or patient reports new pain     Problem: Safety - Adult  Goal: Free from fall injury  7/21/2025 0945 by Tirso Dahl RN  Outcome: Progressing  7/21/2025 0258 by Belem Sewell RN  Outcome: Progressing  Flowsheets (Taken 7/21/2025 0258)  Free From Fall Injury: Instruct family/caregiver on patient safety

## 2025-07-21 NOTE — PLAN OF CARE
Problem: Skin/Tissue Integrity  Goal: Skin integrity remains intact  Description: 1.  Monitor for areas of redness and/or skin breakdown  2.  Assess vascular access sites hourly  3.  Every 4-6 hours minimum:  Change oxygen saturation probe site  4.  Every 4-6 hours:  If on nasal continuous positive airway pressure, respiratory therapy assess nares and determine need for appliance change or resting period  Outcome: Progressing  Flowsheets (Taken 7/21/2025 0258)  Skin Integrity Remains Intact:   Monitor for areas of redness and/or skin breakdown   Turn and reposition as indicated   Assess need for specialty bed   Positioning devices   Check visual cues for pain   Monitor skin under medical devices     Problem: Pain  Goal: Verbalizes/displays adequate comfort level or baseline comfort level  Outcome: Progressing  Flowsheets (Taken 7/21/2025 0258)  Verbalizes/displays adequate comfort level or baseline comfort level:   Encourage patient to monitor pain and request assistance   Assess pain using appropriate pain scale   Administer analgesics based on type and severity of pain and evaluate response   Implement non-pharmacological measures as appropriate and evaluate response   Notify Licensed Independent Practitioner if interventions unsuccessful or patient reports new pain     Problem: Safety - Adult  Goal: Free from fall injury  Outcome: Progressing  Flowsheets (Taken 7/21/2025 0258)  Free From Fall Injury: Instruct family/caregiver on patient safety

## 2025-07-22 PROBLEM — E44.0 MODERATE PROTEIN-CALORIE MALNUTRITION: Status: ACTIVE | Noted: 2025-07-22

## 2025-07-22 LAB
BACTERIA UR CULT: ABNORMAL
BACTERIA UR CULT: ABNORMAL
ORGANISM: ABNORMAL
ORGANISM: ABNORMAL

## 2025-07-22 PROCEDURE — 97166 OT EVAL MOD COMPLEX 45 MIN: CPT

## 2025-07-22 PROCEDURE — 6370000000 HC RX 637 (ALT 250 FOR IP): Performed by: SURGERY

## 2025-07-22 PROCEDURE — 2500000003 HC RX 250 WO HCPCS: Performed by: SURGERY

## 2025-07-22 PROCEDURE — 51702 INSERT TEMP BLADDER CATH: CPT

## 2025-07-22 PROCEDURE — 2580000003 HC RX 258: Performed by: INTERNAL MEDICINE

## 2025-07-22 PROCEDURE — 6360000002 HC RX W HCPCS: Performed by: SURGERY

## 2025-07-22 PROCEDURE — 1200000000 HC SEMI PRIVATE

## 2025-07-22 PROCEDURE — 97530 THERAPEUTIC ACTIVITIES: CPT

## 2025-07-22 PROCEDURE — 6360000002 HC RX W HCPCS: Performed by: INTERNAL MEDICINE

## 2025-07-22 RX ADMIN — MIDODRINE HYDROCHLORIDE 2.5 MG: 5 TABLET ORAL at 09:12

## 2025-07-22 RX ADMIN — PROCHLORPERAZINE EDISYLATE 10 MG: 5 INJECTION INTRAMUSCULAR; INTRAVENOUS at 22:19

## 2025-07-22 RX ADMIN — MIDODRINE HYDROCHLORIDE 2.5 MG: 5 TABLET ORAL at 13:30

## 2025-07-22 RX ADMIN — WATER 1000 MG: 1 INJECTION INTRAMUSCULAR; INTRAVENOUS; SUBCUTANEOUS at 09:11

## 2025-07-22 RX ADMIN — Medication: at 09:12

## 2025-07-22 RX ADMIN — Medication 1 CAPSULE: at 09:12

## 2025-07-22 RX ADMIN — VANCOMYCIN HYDROCHLORIDE 2000 MG: 10 INJECTION, POWDER, LYOPHILIZED, FOR SOLUTION INTRAVENOUS at 12:20

## 2025-07-22 RX ADMIN — HEPARIN SODIUM 5000 UNITS: 5000 INJECTION INTRAVENOUS; SUBCUTANEOUS at 22:16

## 2025-07-22 RX ADMIN — SODIUM CHLORIDE, PRESERVATIVE FREE 10 ML: 5 INJECTION INTRAVENOUS at 20:14

## 2025-07-22 RX ADMIN — HEPARIN SODIUM 5000 UNITS: 5000 INJECTION INTRAVENOUS; SUBCUTANEOUS at 05:06

## 2025-07-22 RX ADMIN — PANTOPRAZOLE SODIUM 40 MG: 40 TABLET, DELAYED RELEASE ORAL at 09:12

## 2025-07-22 RX ADMIN — LEVOTHYROXINE SODIUM 75 MCG: 0.03 TABLET ORAL at 05:06

## 2025-07-22 RX ADMIN — SODIUM CHLORIDE, PRESERVATIVE FREE 10 ML: 5 INJECTION INTRAVENOUS at 09:23

## 2025-07-22 RX ADMIN — HEPARIN SODIUM 5000 UNITS: 5000 INJECTION INTRAVENOUS; SUBCUTANEOUS at 13:30

## 2025-07-22 ASSESSMENT — PAIN SCALES - GENERAL: PAINLEVEL_OUTOF10: 3

## 2025-07-22 ASSESSMENT — PAIN DESCRIPTION - LOCATION: LOCATION: GENERALIZED

## 2025-07-22 ASSESSMENT — PAIN DESCRIPTION - DESCRIPTORS: DESCRIPTORS: ACHING

## 2025-07-22 NOTE — PLAN OF CARE
Problem: Skin/Tissue Integrity  Goal: Skin integrity remains intact  Description: 1.  Monitor for areas of redness and/or skin breakdown  2.  Assess vascular access sites hourly  3.  Every 4-6 hours minimum:  Change oxygen saturation probe site  4.  Every 4-6 hours:  If on nasal continuous positive airway pressure, respiratory therapy assess nares and determine need for appliance change or resting period  7/21/2025 2327 by Alexandra Blankenship LPN  Outcome: Progressing  Flowsheets (Taken 7/21/2025 2327)  Skin Integrity Remains Intact:   Monitor for areas of redness and/or skin breakdown   Every 4-6 hours minimum:  Change oxygen saturation probe site   Every 4-6 hours:  If on nasal continuous positive airway pressure, assess nares and determine need for appliance change or resting period   Turn and reposition as indicated   Check visual cues for pain   Assess need for specialty bed   Positioning devices   Assess vascular access sites hourly   Pressure redistribution bed/mattress (bed type)   Monitor skin under medical devices     Problem: Pain  Goal: Verbalizes/displays adequate comfort level or baseline comfort level  7/21/2025 2327 by Alexandra Blankenship LPN  Outcome: Progressing  Flowsheets (Taken 7/21/2025 2327)  Verbalizes/displays adequate comfort level or baseline comfort level:   Encourage patient to monitor pain and request assistance   Assess pain using appropriate pain scale   Administer analgesics based on type and severity of pain and evaluate response   Implement non-pharmacological measures as appropriate and evaluate response   Consider cultural and social influences on pain and pain management   Notify Licensed Independent Practitioner if interventions unsuccessful or patient reports new pain     Problem: Safety - Adult  Goal: Free from fall injury  7/21/2025 2327 by Alexandra Blankenship LPN  Outcome: Progressing  Flowsheets (Taken 7/21/2025 2327)  Free From Fall Injury:   Instruct family/caregiver on
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
## 2025-07-23 ENCOUNTER — ANESTHESIA (OUTPATIENT)
Dept: OPERATING ROOM | Age: 75
End: 2025-07-23
Payer: MEDICARE

## 2025-07-23 ENCOUNTER — ANESTHESIA EVENT (OUTPATIENT)
Dept: OPERATING ROOM | Age: 75
End: 2025-07-23
Payer: MEDICARE

## 2025-07-23 ENCOUNTER — APPOINTMENT (OUTPATIENT)
Dept: GENERAL RADIOLOGY | Age: 75
DRG: 659 | End: 2025-07-23
Payer: MEDICARE

## 2025-07-23 LAB — VANCOMYCIN SERPL-MCNC: 14.9 UG/ML

## 2025-07-23 PROCEDURE — 2500000003 HC RX 250 WO HCPCS: Performed by: UROLOGY

## 2025-07-23 PROCEDURE — 6360000002 HC RX W HCPCS: Performed by: NURSE ANESTHETIST, CERTIFIED REGISTERED

## 2025-07-23 PROCEDURE — 0TP98DZ REMOVAL OF INTRALUMINAL DEVICE FROM URETER, VIA NATURAL OR ARTIFICIAL OPENING ENDOSCOPIC: ICD-10-PCS | Performed by: UROLOGY

## 2025-07-23 PROCEDURE — 74018 RADEX ABDOMEN 1 VIEW: CPT

## 2025-07-23 PROCEDURE — 36415 COLL VENOUS BLD VENIPUNCTURE: CPT

## 2025-07-23 PROCEDURE — C1769 GUIDE WIRE: HCPCS | Performed by: UROLOGY

## 2025-07-23 PROCEDURE — 0T788DZ DILATION OF BILATERAL URETERS WITH INTRALUMINAL DEVICE, VIA NATURAL OR ARTIFICIAL OPENING ENDOSCOPIC: ICD-10-PCS | Performed by: UROLOGY

## 2025-07-23 PROCEDURE — 3700000000 HC ANESTHESIA ATTENDED CARE: Performed by: UROLOGY

## 2025-07-23 PROCEDURE — 3600000004 HC SURGERY LEVEL 4 BASE: Performed by: UROLOGY

## 2025-07-23 PROCEDURE — 2709999900 HC NON-CHARGEABLE SUPPLY: Performed by: UROLOGY

## 2025-07-23 PROCEDURE — 1200000000 HC SEMI PRIVATE

## 2025-07-23 PROCEDURE — C1758 CATHETER, URETERAL: HCPCS | Performed by: UROLOGY

## 2025-07-23 PROCEDURE — 2580000003 HC RX 258: Performed by: NURSE ANESTHETIST, CERTIFIED REGISTERED

## 2025-07-23 PROCEDURE — 6370000000 HC RX 637 (ALT 250 FOR IP): Performed by: SURGERY

## 2025-07-23 PROCEDURE — 6360000002 HC RX W HCPCS: Performed by: UROLOGY

## 2025-07-23 PROCEDURE — 6360000002 HC RX W HCPCS: Performed by: INTERNAL MEDICINE

## 2025-07-23 PROCEDURE — 97110 THERAPEUTIC EXERCISES: CPT

## 2025-07-23 PROCEDURE — 80202 ASSAY OF VANCOMYCIN: CPT

## 2025-07-23 PROCEDURE — 97530 THERAPEUTIC ACTIVITIES: CPT

## 2025-07-23 PROCEDURE — 97162 PT EVAL MOD COMPLEX 30 MIN: CPT

## 2025-07-23 PROCEDURE — 6370000000 HC RX 637 (ALT 250 FOR IP): Performed by: UROLOGY

## 2025-07-23 PROCEDURE — 3700000001 HC ADD 15 MINUTES (ANESTHESIA): Performed by: UROLOGY

## 2025-07-23 PROCEDURE — 3600000014 HC SURGERY LEVEL 4 ADDTL 15MIN: Performed by: UROLOGY

## 2025-07-23 PROCEDURE — C2617 STENT, NON-COR, TEM W/O DEL: HCPCS | Performed by: UROLOGY

## 2025-07-23 PROCEDURE — 7100000001 HC PACU RECOVERY - ADDTL 15 MIN: Performed by: UROLOGY

## 2025-07-23 PROCEDURE — 7100000000 HC PACU RECOVERY - FIRST 15 MIN: Performed by: UROLOGY

## 2025-07-23 PROCEDURE — 6360000002 HC RX W HCPCS: Performed by: SURGERY

## 2025-07-23 DEVICE — URETERAL STENT
Type: IMPLANTABLE DEVICE | Site: URETER | Status: FUNCTIONAL
Brand: CONTOUR™

## 2025-07-23 RX ORDER — FENTANYL CITRATE 50 UG/ML
INJECTION, SOLUTION INTRAMUSCULAR; INTRAVENOUS
Status: DISCONTINUED | OUTPATIENT
Start: 2025-07-23 | End: 2025-07-23 | Stop reason: SDUPTHER

## 2025-07-23 RX ORDER — IPRATROPIUM BROMIDE AND ALBUTEROL SULFATE 2.5; .5 MG/3ML; MG/3ML
1 SOLUTION RESPIRATORY (INHALATION)
Status: DISCONTINUED | OUTPATIENT
Start: 2025-07-23 | End: 2025-07-27 | Stop reason: HOSPADM

## 2025-07-23 RX ORDER — PROPOFOL 10 MG/ML
INJECTION, EMULSION INTRAVENOUS
Status: DISCONTINUED | OUTPATIENT
Start: 2025-07-23 | End: 2025-07-23 | Stop reason: SDUPTHER

## 2025-07-23 RX ORDER — SODIUM CHLORIDE 0.9 % (FLUSH) 0.9 %
5-40 SYRINGE (ML) INJECTION EVERY 12 HOURS SCHEDULED
Status: DISCONTINUED | OUTPATIENT
Start: 2025-07-23 | End: 2025-07-27 | Stop reason: HOSPADM

## 2025-07-23 RX ORDER — SODIUM CHLORIDE 9 MG/ML
INJECTION, SOLUTION INTRAVENOUS PRN
Status: DISCONTINUED | OUTPATIENT
Start: 2025-07-23 | End: 2025-07-27 | Stop reason: HOSPADM

## 2025-07-23 RX ORDER — ONDANSETRON 2 MG/ML
INJECTION INTRAMUSCULAR; INTRAVENOUS
Status: DISCONTINUED | OUTPATIENT
Start: 2025-07-23 | End: 2025-07-23 | Stop reason: SDUPTHER

## 2025-07-23 RX ORDER — MIDAZOLAM HYDROCHLORIDE 5 MG/ML
2 INJECTION, SOLUTION INTRAMUSCULAR; INTRAVENOUS
Status: DISCONTINUED | OUTPATIENT
Start: 2025-07-23 | End: 2025-07-27 | Stop reason: HOSPADM

## 2025-07-23 RX ORDER — DIPHENHYDRAMINE HYDROCHLORIDE 50 MG/ML
12.5 INJECTION, SOLUTION INTRAMUSCULAR; INTRAVENOUS
Status: DISCONTINUED | OUTPATIENT
Start: 2025-07-23 | End: 2025-07-27 | Stop reason: HOSPADM

## 2025-07-23 RX ORDER — SODIUM CHLORIDE 9 MG/ML
INJECTION, SOLUTION INTRAVENOUS
Status: DISCONTINUED | OUTPATIENT
Start: 2025-07-23 | End: 2025-07-23 | Stop reason: SDUPTHER

## 2025-07-23 RX ORDER — DEXAMETHASONE SODIUM PHOSPHATE 4 MG/ML
INJECTION, SOLUTION INTRA-ARTICULAR; INTRALESIONAL; INTRAMUSCULAR; INTRAVENOUS; SOFT TISSUE
Status: DISCONTINUED | OUTPATIENT
Start: 2025-07-23 | End: 2025-07-23 | Stop reason: SDUPTHER

## 2025-07-23 RX ORDER — LIDOCAINE HYDROCHLORIDE 20 MG/ML
INJECTION, SOLUTION INFILTRATION; PERINEURAL
Status: DISCONTINUED | OUTPATIENT
Start: 2025-07-23 | End: 2025-07-23 | Stop reason: SDUPTHER

## 2025-07-23 RX ORDER — LINEZOLID 2 MG/ML
600 INJECTION, SOLUTION INTRAVENOUS EVERY 12 HOURS
Status: DISCONTINUED | OUTPATIENT
Start: 2025-07-23 | End: 2025-07-27

## 2025-07-23 RX ORDER — LABETALOL HYDROCHLORIDE 5 MG/ML
10 INJECTION, SOLUTION INTRAVENOUS
Status: DISCONTINUED | OUTPATIENT
Start: 2025-07-23 | End: 2025-07-27 | Stop reason: HOSPADM

## 2025-07-23 RX ORDER — ONDANSETRON 2 MG/ML
4 INJECTION INTRAMUSCULAR; INTRAVENOUS
Status: COMPLETED | OUTPATIENT
Start: 2025-07-23 | End: 2025-07-24

## 2025-07-23 RX ORDER — MAGNESIUM HYDROXIDE 1200 MG/15ML
LIQUID ORAL CONTINUOUS PRN
Status: DISCONTINUED | OUTPATIENT
Start: 2025-07-23 | End: 2025-07-23 | Stop reason: HOSPADM

## 2025-07-23 RX ORDER — PROCHLORPERAZINE EDISYLATE 5 MG/ML
5 INJECTION INTRAMUSCULAR; INTRAVENOUS
Status: COMPLETED | OUTPATIENT
Start: 2025-07-23 | End: 2025-07-25

## 2025-07-23 RX ORDER — SODIUM CHLORIDE 0.9 % (FLUSH) 0.9 %
5-40 SYRINGE (ML) INJECTION PRN
Status: DISCONTINUED | OUTPATIENT
Start: 2025-07-23 | End: 2025-07-27 | Stop reason: HOSPADM

## 2025-07-23 RX ORDER — PHENYLEPHRINE HCL IN 0.9% NACL 1 MG/10 ML
SYRINGE (ML) INTRAVENOUS
Status: DISCONTINUED | OUTPATIENT
Start: 2025-07-23 | End: 2025-07-23 | Stop reason: SDUPTHER

## 2025-07-23 RX ORDER — OXYCODONE HYDROCHLORIDE 5 MG/1
5 TABLET ORAL
Status: DISCONTINUED | OUTPATIENT
Start: 2025-07-23 | End: 2025-07-27 | Stop reason: HOSPADM

## 2025-07-23 RX ADMIN — LINEZOLID 600 MG: 600 INJECTION, SOLUTION INTRAVENOUS at 08:16

## 2025-07-23 RX ADMIN — LEVOTHYROXINE SODIUM 75 MCG: 0.03 TABLET ORAL at 05:17

## 2025-07-23 RX ADMIN — METOPROLOL TARTRATE 25 MG: 25 TABLET, FILM COATED ORAL at 20:15

## 2025-07-23 RX ADMIN — HEPARIN SODIUM 5000 UNITS: 5000 INJECTION INTRAVENOUS; SUBCUTANEOUS at 13:49

## 2025-07-23 RX ADMIN — Medication 1 CAPSULE: at 08:02

## 2025-07-23 RX ADMIN — MIDODRINE HYDROCHLORIDE 2.5 MG: 5 TABLET ORAL at 08:02

## 2025-07-23 RX ADMIN — PROPOFOL 50 MG: 10 INJECTION, EMULSION INTRAVENOUS at 12:08

## 2025-07-23 RX ADMIN — LIDOCAINE HYDROCHLORIDE 100 MG: 20 INJECTION, SOLUTION INFILTRATION; PERINEURAL at 12:01

## 2025-07-23 RX ADMIN — LINEZOLID 600 MG: 600 INJECTION, SOLUTION INTRAVENOUS at 20:15

## 2025-07-23 RX ADMIN — PROPOFOL 50 MG: 10 INJECTION, EMULSION INTRAVENOUS at 12:13

## 2025-07-23 RX ADMIN — Medication 200 MCG: at 12:13

## 2025-07-23 RX ADMIN — FENTANYL CITRATE 25 MCG: 50 INJECTION, SOLUTION INTRAMUSCULAR; INTRAVENOUS at 12:04

## 2025-07-23 RX ADMIN — MIDODRINE HYDROCHLORIDE 2.5 MG: 5 TABLET ORAL at 13:48

## 2025-07-23 RX ADMIN — Medication: at 08:05

## 2025-07-23 RX ADMIN — MIDODRINE HYDROCHLORIDE 2.5 MG: 5 TABLET ORAL at 20:15

## 2025-07-23 RX ADMIN — HEPARIN SODIUM 5000 UNITS: 5000 INJECTION INTRAVENOUS; SUBCUTANEOUS at 22:00

## 2025-07-23 RX ADMIN — PROPOFOL 100 MG: 10 INJECTION, EMULSION INTRAVENOUS at 12:01

## 2025-07-23 RX ADMIN — SODIUM CHLORIDE: 9 INJECTION, SOLUTION INTRAVENOUS at 11:54

## 2025-07-23 RX ADMIN — PANTOPRAZOLE SODIUM 40 MG: 40 TABLET, DELAYED RELEASE ORAL at 08:02

## 2025-07-23 RX ADMIN — ACETAMINOPHEN 650 MG: 325 TABLET ORAL at 10:42

## 2025-07-23 RX ADMIN — ONDANSETRON 4 MG: 2 INJECTION INTRAMUSCULAR; INTRAVENOUS at 12:21

## 2025-07-23 RX ADMIN — DEXAMETHASONE SODIUM PHOSPHATE 4 MG: 4 INJECTION, SOLUTION INTRAMUSCULAR; INTRAVENOUS at 12:01

## 2025-07-23 RX ADMIN — SODIUM CHLORIDE, PRESERVATIVE FREE 10 ML: 5 INJECTION INTRAVENOUS at 20:16

## 2025-07-23 ASSESSMENT — PAIN DESCRIPTION - LOCATION
LOCATION: GENERALIZED

## 2025-07-23 ASSESSMENT — PAIN DESCRIPTION - DESCRIPTORS
DESCRIPTORS: ACHING;DISCOMFORT

## 2025-07-23 ASSESSMENT — PAIN SCALES - WONG BAKER: WONGBAKER_NUMERICALRESPONSE: NO HURT

## 2025-07-23 ASSESSMENT — ENCOUNTER SYMPTOMS: SHORTNESS OF BREATH: 1

## 2025-07-23 ASSESSMENT — PAIN SCALES - GENERAL
PAINLEVEL_OUTOF10: 4
PAINLEVEL_OUTOF10: 3
PAINLEVEL_OUTOF10: 3
PAINLEVEL_OUTOF10: 4

## 2025-07-23 ASSESSMENT — PAIN - FUNCTIONAL ASSESSMENT: PAIN_FUNCTIONAL_ASSESSMENT: 0-10

## 2025-07-23 NOTE — ANESTHESIA PRE PROCEDURE
Department of Anesthesiology  Preprocedure Note       Name:  Alfredo Chong   Age:  74 y.o.  :  1950                                          MRN:  [id number]         Date:  2025      Surgeon: Surgeon(s):  Fatou Restrepo MD    Procedure: Procedure(s):  CYSTOSCOPY, BILATERAL URETERAL STENT EXCHANGE    Medications prior to admission:   Prior to Admission medications    Medication Sig Start Date End Date Taking? Authorizing Provider   heparin, porcine, 5000 UNIT/ML injection Inject 1 mL into the skin in the morning and 1 mL in the evening. 25  Yes Hiren Choi MD   pantoprazole (PROTONIX) 40 MG tablet Take 1 tablet by mouth daily 2025- SNF MAR in [facility]   Yes Hiren Choi MD   loperamide (IMODIUM) 2 MG capsule Take 1 capsule by mouth 4 times daily 25  Yes Hiren Choi MD   aspirin 81 MG chewable tablet Take 1 tablet by mouth 2 times daily   Yes Hiren Choi MD   losartan (COZAAR) 25 MG tablet Take 1 tablet by mouth daily 2025- SNF MAR in [facility]   Yes Hiren Choi MD   metoprolol tartrate (LOPRESSOR) 25 MG tablet Take 1 tablet by mouth 2 times daily 25  Yes Ramiro Moreno DO   midodrine (PROAMATINE) 5 MG tablet Take 3 tablets by mouth 3 times daily  Patient taking differently: Take 0.5 tablets by mouth 3 times daily 2025- SNF MAR in [facility] 25  Yes Ramiro Moreno DO   levothyroxine (SYNTHROID) 75 MCG tablet Take 1 tablet by mouth Daily 25  Yes Ramiro Moreno DO   loratadine (CLARITIN) 10 MG tablet TAKE 1 TABLET BY MOUTH DAILY 25  Yes Heath Hernandez MD   epoetin kp-epbx (RETACRIT) 3000 UNIT/ML SOLN injection Inject 5,000 Units into the skin three times a week 2025- SNF MAR in [facility]- MWF (2000 + 3000 units in SNF) 25   Hiren Choi MD   banana flakes (BANATROL PLUS) powder Take 1 packet by mouth daily 2025- SNF MAR in [facility]  Patient not taking: Reported on 2025

## 2025-07-23 NOTE — PLAN OF CARE
Problem: Skin/Tissue Integrity  Goal: Skin integrity remains intact  Description: 1.  Monitor for areas of redness and/or skin breakdown  2.  Assess vascular access sites hourly  3.  Every 4-6 hours minimum:  Change oxygen saturation probe site  4.  Every 4-6 hours:  If on nasal continuous positive airway pressure, respiratory therapy assess nares and determine need for appliance change or resting period  Outcome: Progressing  Flowsheets (Taken 7/21/2025 2327 by Alexandra Blankenship LPN)  Skin Integrity Remains Intact:   Monitor for areas of redness and/or skin breakdown   Every 4-6 hours minimum:  Change oxygen saturation probe site   Every 4-6 hours:  If on nasal continuous positive airway pressure, assess nares and determine need for appliance change or resting period   Turn and reposition as indicated   Check visual cues for pain   Assess need for specialty bed   Positioning devices   Assess vascular access sites hourly   Pressure redistribution bed/mattress (bed type)   Monitor skin under medical devices     Problem: Pain  Goal: Verbalizes/displays adequate comfort level or baseline comfort level  Outcome: Progressing  Flowsheets (Taken 7/21/2025 2327 by Alexandra Blankenship LPN)  Verbalizes/displays adequate comfort level or baseline comfort level:   Encourage patient to monitor pain and request assistance   Assess pain using appropriate pain scale   Administer analgesics based on type and severity of pain and evaluate response   Implement non-pharmacological measures as appropriate and evaluate response   Consider cultural and social influences on pain and pain management   Notify Licensed Independent Practitioner if interventions unsuccessful or patient reports new pain     Problem: Safety - Adult  Goal: Free from fall injury  Outcome: Progressing  Flowsheets (Taken 7/21/2025 2327 by Alexandra Blankenship LPN)  Free From Fall Injury:   Instruct family/caregiver on patient safety   Based on caregiver fall risk

## 2025-07-23 NOTE — ANESTHESIA POSTPROCEDURE EVALUATION
Department of Anesthesiology  Postprocedure Note    Patient: Alfredo Chong  MRN: [id number]  YOB: 1950  Date of evaluation: 7/23/2025    Procedure Summary       Date: 07/23/25 Room / Location: [address]    Anesthesia Start: 1154 Anesthesia Stop: 1233    Procedure: CYSTOSCOPY, BILATERAL URETERAL STENT EXCHANGE (Bilateral: Bladder) Diagnosis:       Hx of bladder cancer      (Hx of bladder cancer [Z85.51])    Surgeons: Fatou Restrepo MD Responsible Provider: Arnaldo Crawford MD    Anesthesia Type: General ASA Status: 3            Anesthesia Type: General    Emily Phase I: Emily Score: 10    Emily Phase II:      Anesthesia Post Evaluation    Comments: Anes Post-op Note    Name:    Alfredo Chong  MRN:      [id number]    Patient Vitals in the past 12 hrs:  07/23/25 1344, BP:98/73, Temp:97.5 °F (36.4 °C), Temp src:Oral, Pulse:72, Resp:18, SpO2:95 %  07/23/25 1300, BP:104/67, Pulse:70, Resp:16, SpO2:93 %  07/23/25 1250, BP:104/69, Temp:97.7 °F (36.5 °C), Pulse:72, Resp:21, SpO2:95 %  07/23/25 1245, Pulse:74, Resp:15, SpO2:94 %  07/23/25 1240, BP:105/67, Pulse:75, Resp:21, SpO2:92 %  07/23/25 1235, Pulse:77, Resp:20, SpO2:90 %  07/23/25 1233, BP:105/69, Temp:97.5 °F (36.4 °C), Temp src:Temporal, Pulse:77, Resp:19, SpO2:92 %  07/23/25 1126, BP:110/69, Temp:97.9 °F (36.6 °C), Temp src:Oral, Pulse:81, Resp:16, SpO2:94 %  07/23/25 0948, BP:100/60, Pulse:80, SpO2:96 %  07/23/25 0745, BP:95/63, Temp:98.2 °F (36.8 °C), Temp src:Oral, Pulse:89, Resp:18, SpO2:97 %  07/23/25 0516, BP:90/60, Temp:98.2 °F (36.8 °C), Temp src:Oral, Pulse:76, Resp:18, SpO2:91 %     LABS:    CBC  Lab Results       Component                Value               Date/Time                  WBC                      12.0 (H)            07/21/2025 06:17 AM        HGB                      9.1 (L)             07/21/2025 06:17 AM        HCT                      27.6 (L)            07/21/2025 06:17 AM        PLT

## 2025-07-23 NOTE — CARE COORDINATION
Chart reviewed day 3. Care per urology and IM. Patient with cysto/stent exchange today. Spoke with patients significant other, Pat. Discussed d/c plans. Explained patient has had multiple stay in hospital/LTAC/SNF and back to hospital. Admitted from skilled at [facility]. Stated had bad experience there. Minimal therapy not getting changed etc. Thinks that at d/c would prefer to go home. Would like him to be able to get up with walker if possible. Stated even considered support of hospice. Stated she has had experience with [facility] and they are excellent would like referral there if possible. Spoke with dr Welch. Order obtained and referral faxed. Spoke with Sol will meet with Pat today. Latosha Saldana RN

## 2025-07-24 PROCEDURE — 97110 THERAPEUTIC EXERCISES: CPT

## 2025-07-24 PROCEDURE — 97535 SELF CARE MNGMENT TRAINING: CPT

## 2025-07-24 PROCEDURE — 6360000002 HC RX W HCPCS: Performed by: UROLOGY

## 2025-07-24 PROCEDURE — 6370000000 HC RX 637 (ALT 250 FOR IP): Performed by: UROLOGY

## 2025-07-24 PROCEDURE — 97530 THERAPEUTIC ACTIVITIES: CPT

## 2025-07-24 PROCEDURE — 1200000000 HC SEMI PRIVATE

## 2025-07-24 PROCEDURE — 2500000003 HC RX 250 WO HCPCS: Performed by: ANESTHESIOLOGY

## 2025-07-24 PROCEDURE — 6360000002 HC RX W HCPCS: Performed by: ANESTHESIOLOGY

## 2025-07-24 RX ORDER — AZELASTINE 1 MG/ML
1 SPRAY, METERED NASAL 2 TIMES DAILY
Status: DISCONTINUED | OUTPATIENT
Start: 2025-07-24 | End: 2025-07-27 | Stop reason: HOSPADM

## 2025-07-24 RX ADMIN — POLYETHYLENE GLYCOL 3350 17 G: 17 POWDER, FOR SOLUTION ORAL at 16:08

## 2025-07-24 RX ADMIN — HEPARIN SODIUM 5000 UNITS: 5000 INJECTION INTRAVENOUS; SUBCUTANEOUS at 22:29

## 2025-07-24 RX ADMIN — MIDODRINE HYDROCHLORIDE 2.5 MG: 5 TABLET ORAL at 08:23

## 2025-07-24 RX ADMIN — ONDANSETRON 4 MG: 2 INJECTION, SOLUTION INTRAMUSCULAR; INTRAVENOUS at 16:07

## 2025-07-24 RX ADMIN — LINEZOLID 600 MG: 600 INJECTION, SOLUTION INTRAVENOUS at 20:54

## 2025-07-24 RX ADMIN — METOPROLOL TARTRATE 25 MG: 25 TABLET, FILM COATED ORAL at 20:54

## 2025-07-24 RX ADMIN — HEPARIN SODIUM 5000 UNITS: 5000 INJECTION INTRAVENOUS; SUBCUTANEOUS at 05:35

## 2025-07-24 RX ADMIN — HEPARIN SODIUM 5000 UNITS: 5000 INJECTION INTRAVENOUS; SUBCUTANEOUS at 13:53

## 2025-07-24 RX ADMIN — MIDODRINE HYDROCHLORIDE 2.5 MG: 5 TABLET ORAL at 13:53

## 2025-07-24 RX ADMIN — ACETAMINOPHEN 650 MG: 325 TABLET ORAL at 12:01

## 2025-07-24 RX ADMIN — Medication 1 CAPSULE: at 08:23

## 2025-07-24 RX ADMIN — MIDODRINE HYDROCHLORIDE 2.5 MG: 5 TABLET ORAL at 20:54

## 2025-07-24 RX ADMIN — AZELASTINE 1 SPRAY: 1 SPRAY, METERED NASAL at 20:56

## 2025-07-24 RX ADMIN — LEVOTHYROXINE SODIUM 75 MCG: 0.03 TABLET ORAL at 05:35

## 2025-07-24 RX ADMIN — LINEZOLID 600 MG: 600 INJECTION, SOLUTION INTRAVENOUS at 08:22

## 2025-07-24 RX ADMIN — SODIUM CHLORIDE, PRESERVATIVE FREE 10 ML: 5 INJECTION INTRAVENOUS at 20:55

## 2025-07-24 RX ADMIN — PANTOPRAZOLE SODIUM 40 MG: 40 TABLET, DELAYED RELEASE ORAL at 08:23

## 2025-07-24 RX ADMIN — Medication: at 05:38

## 2025-07-24 ASSESSMENT — PAIN DESCRIPTION - ORIENTATION: ORIENTATION: RIGHT;LEFT

## 2025-07-24 ASSESSMENT — PAIN DESCRIPTION - LOCATION
LOCATION: KNEE
LOCATION: GENERALIZED

## 2025-07-24 ASSESSMENT — PAIN SCALES - GENERAL
PAINLEVEL_OUTOF10: 6
PAINLEVEL_OUTOF10: 3
PAINLEVEL_OUTOF10: 3

## 2025-07-24 ASSESSMENT — PAIN DESCRIPTION - DESCRIPTORS: DESCRIPTORS: ACHING;DISCOMFORT;SORE
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
## 2025-07-24 NOTE — PLAN OF CARE
Problem: Skin/Tissue Integrity  Goal: Skin integrity remains intact  Description: 1.  Monitor for areas of redness and/or skin breakdown  2.  Assess vascular access sites hourly  3.  Every 4-6 hours minimum:  Change oxygen saturation probe site  4.  Every 4-6 hours:  If on nasal continuous positive airway pressure, respiratory therapy assess nares and determine need for appliance change or resting period  Outcome: Progressing  Flowsheets (Taken 7/23/2025 2003)  Skin Integrity Remains Intact:   Monitor for areas of redness and/or skin breakdown   Turn and reposition as indicated     Problem: Safety - Adult  Goal: Free from fall injury  Outcome: Progressing     Problem: Nutrition Deficit:  Goal: Optimize nutritional status  Outcome: Progressing

## 2025-07-24 NOTE — CARE COORDINATION
Chart reviewed day 4. HOC met with patient and family today. Note is pending. Family has stated would like to complete ATBX prior to d/c as well as continue therapy while here to optimize at home. Has been seen by therapy today. Latosha Saldana RN

## 2025-07-24 NOTE — PLAN OF CARE
Problem: Skin/Tissue Integrity  Goal: Skin integrity remains intact  Description: 1.  Monitor for areas of redness and/or skin breakdown  2.  Assess vascular access sites hourly  3.  Every 4-6 hours minimum:  Change oxygen saturation probe site  4.  Every 4-6 hours:  If on nasal continuous positive airway pressure, respiratory therapy assess nares and determine need for appliance change or resting period  7/24/2025 1127 by Alysia Klein, RN  Outcome: Completed     Problem: Pain  Goal: Verbalizes/displays adequate comfort level or baseline comfort level  7/24/2025 1127 by Alysia Klein, RN  Outcome: Completed     Problem: Safety - Adult  Goal: Free from fall injury  7/24/2025 1127 by Alysia Klein, RN  Outcome: Completed     Problem: Nutrition Deficit:  Goal: Optimize nutritional status  7/24/2025 1127 by Alysia Klein, RN  Outcome: Completed     Problem: Discharge Planning  Goal: Discharge to home or other facility with appropriate resources  7/24/2025 1127 by Alysia Klein, RN  Outcome: Completed   Electronically signed by Alysia Klein RN on 7/24/2025 at 11:27 AM

## 2025-07-24 NOTE — PLAN OF CARE
Problem: Skin/Tissue Integrity  Goal: Skin integrity remains intact  Description: 1.  Monitor for areas of redness and/or skin breakdown  2.  Assess vascular access sites hourly  3.  Every 4-6 hours minimum:  Change oxygen saturation probe site  4.  Every 4-6 hours:  If on nasal continuous positive airway pressure, respiratory therapy assess nares and determine need for appliance change or resting period  7/24/2025 0925 by Alysia Klein RN  Outcome: Progressing  Flowsheets (Taken 7/23/2025 2003 by Aram Abbott RN)  Skin Integrity Remains Intact:   Monitor for areas of redness and/or skin breakdown   Turn and reposition as indicated     Problem: Pain  Goal: Verbalizes/displays adequate comfort level or baseline comfort level  7/24/2025 0925 by Alysia Klein RN  Outcome: Progressing  Flowsheets (Taken 7/21/2025 2327 by Alexandra Blankenship LPN)  Verbalizes/displays adequate comfort level or baseline comfort level:   Encourage patient to monitor pain and request assistance   Assess pain using appropriate pain scale   Administer analgesics based on type and severity of pain and evaluate response   Implement non-pharmacological measures as appropriate and evaluate response   Consider cultural and social influences on pain and pain management   Notify Licensed Independent Practitioner if interventions unsuccessful or patient reports new pain     Problem: Safety - Adult  Goal: Free from fall injury  7/24/2025 0925 by Alysia Klein RN  Outcome: Progressing  Flowsheets (Taken 7/21/2025 2327 by Alexandra Blankenship LPN)  Free From Fall Injury:   Instruct family/caregiver on patient safety   Based on caregiver fall risk screen, instruct family/caregiver to ask for assistance with transferring infant if caregiver noted to have fall risk factors     Problem: Nutrition Deficit:  Goal: Optimize nutritional status  7/24/2025 0925 by Alysia Klein RN  Outcome: Progressing  Flowsheets (Taken 7/23/2025

## 2025-07-25 PROCEDURE — 2500000003 HC RX 250 WO HCPCS: Performed by: UROLOGY

## 2025-07-25 PROCEDURE — 6370000000 HC RX 637 (ALT 250 FOR IP): Performed by: UROLOGY

## 2025-07-25 PROCEDURE — 6360000002 HC RX W HCPCS: Performed by: ANESTHESIOLOGY

## 2025-07-25 PROCEDURE — 6360000002 HC RX W HCPCS: Performed by: UROLOGY

## 2025-07-25 PROCEDURE — 97110 THERAPEUTIC EXERCISES: CPT

## 2025-07-25 PROCEDURE — 1200000000 HC SEMI PRIVATE

## 2025-07-25 PROCEDURE — 2500000003 HC RX 250 WO HCPCS: Performed by: ANESTHESIOLOGY

## 2025-07-25 PROCEDURE — 97530 THERAPEUTIC ACTIVITIES: CPT

## 2025-07-25 RX ADMIN — Medication: at 06:38

## 2025-07-25 RX ADMIN — ACETAMINOPHEN 650 MG: 325 TABLET ORAL at 08:40

## 2025-07-25 RX ADMIN — LEVOTHYROXINE SODIUM 75 MCG: 0.03 TABLET ORAL at 06:37

## 2025-07-25 RX ADMIN — Medication 1 CAPSULE: at 08:38

## 2025-07-25 RX ADMIN — PROCHLORPERAZINE EDISYLATE 5 MG: 5 INJECTION INTRAMUSCULAR; INTRAVENOUS at 16:22

## 2025-07-25 RX ADMIN — MIDODRINE HYDROCHLORIDE 2.5 MG: 5 TABLET ORAL at 08:40

## 2025-07-25 RX ADMIN — SODIUM CHLORIDE, PRESERVATIVE FREE 10 ML: 5 INJECTION INTRAVENOUS at 11:34

## 2025-07-25 RX ADMIN — LINEZOLID 600 MG: 600 INJECTION, SOLUTION INTRAVENOUS at 09:35

## 2025-07-25 RX ADMIN — POLYETHYLENE GLYCOL 3350 17 G: 17 POWDER, FOR SOLUTION ORAL at 15:10

## 2025-07-25 RX ADMIN — HEPARIN SODIUM 5000 UNITS: 5000 INJECTION INTRAVENOUS; SUBCUTANEOUS at 15:13

## 2025-07-25 RX ADMIN — HEPARIN SODIUM 5000 UNITS: 5000 INJECTION INTRAVENOUS; SUBCUTANEOUS at 06:37

## 2025-07-25 RX ADMIN — HEPARIN SODIUM 5000 UNITS: 5000 INJECTION INTRAVENOUS; SUBCUTANEOUS at 21:42

## 2025-07-25 RX ADMIN — MIDODRINE HYDROCHLORIDE 2.5 MG: 5 TABLET ORAL at 15:10

## 2025-07-25 RX ADMIN — PANTOPRAZOLE SODIUM 40 MG: 40 TABLET, DELAYED RELEASE ORAL at 08:40

## 2025-07-25 RX ADMIN — LINEZOLID 600 MG: 600 INJECTION, SOLUTION INTRAVENOUS at 21:42

## 2025-07-25 ASSESSMENT — PAIN SCALES - GENERAL: PAINLEVEL_OUTOF10: 2

## 2025-07-25 ASSESSMENT — PAIN DESCRIPTION - DESCRIPTORS: DESCRIPTORS: ACHING;DISCOMFORT

## 2025-07-25 ASSESSMENT — PAIN - FUNCTIONAL ASSESSMENT: PAIN_FUNCTIONAL_ASSESSMENT: ACTIVITIES ARE NOT PREVENTED

## 2025-07-25 ASSESSMENT — PAIN DESCRIPTION - LOCATION: LOCATION: GENERALIZED

## 2025-07-25 NOTE — PLAN OF CARE
Problem: Skin/Tissue Integrity  Goal: Skin integrity remains intact  Description: 1.  Monitor for areas of redness and/or skin breakdown  2.  Assess vascular access sites hourly  3.  Every 4-6 hours minimum:  Change oxygen saturation probe site  4.  Every 4-6 hours:  If on nasal continuous positive airway pressure, respiratory therapy assess nares and determine need for appliance change or resting period  7/25/2025 1422 by Addie Cortes RN  Outcome: Progressing  Flowsheets (Taken 7/24/2025 2054 by Aram Abbott RN)  Skin Integrity Remains Intact:   Monitor for areas of redness and/or skin breakdown   Turn and reposition as indicated  7/25/2025 0515 by Aram Abbott RN  Outcome: Progressing  Flowsheets (Taken 7/24/2025 2054)  Skin Integrity Remains Intact:   Monitor for areas of redness and/or skin breakdown   Turn and reposition as indicated     Problem: Pain  Goal: Verbalizes/displays adequate comfort level or baseline comfort level  7/25/2025 1422 by Addie Cortes RN  Outcome: Progressing  Flowsheets (Taken 7/21/2025 2327 by Alexandra Blankenship LPN)  Verbalizes/displays adequate comfort level or baseline comfort level:   Encourage patient to monitor pain and request assistance   Assess pain using appropriate pain scale   Administer analgesics based on type and severity of pain and evaluate response   Implement non-pharmacological measures as appropriate and evaluate response   Consider cultural and social influences on pain and pain management   Notify Licensed Independent Practitioner if interventions unsuccessful or patient reports new pain  7/25/2025 0515 by Aram Abbott RN  Outcome: Progressing     Problem: Safety - Adult  Goal: Free from fall injury  7/25/2025 1422 by Addie Cortes RN  Outcome: Progressing  Flowsheets (Taken 7/21/2025 2327 by Alexandra Blankenship LPN)  Free From Fall Injury:   Instruct family/caregiver on patient safety   Based on caregiver fall risk

## 2025-07-25 NOTE — CARE COORDINATION
Chart reviewed day 5. Care managed by IM. Spoke with wife. She stated her understanding is to remain in hospital another day or so for IVATBX to treat UTI home on maintenance atbx to prevent recurrence. Get therapy while IP and initiate HOC care in home setting at d/c. DOES NOT want to return to SNF. Latosha Saldana RN

## 2025-07-25 NOTE — PLAN OF CARE
Problem: Skin/Tissue Integrity  Goal: Skin integrity remains intact  Description: 1.  Monitor for areas of redness and/or skin breakdown  2.  Assess vascular access sites hourly  3.  Every 4-6 hours minimum:  Change oxygen saturation probe site  4.  Every 4-6 hours:  If on nasal continuous positive airway pressure, respiratory therapy assess nares and determine need for appliance change or resting period  Outcome: Progressing  Flowsheets (Taken 7/24/2025 2054)  Skin Integrity Remains Intact:   Monitor for areas of redness and/or skin breakdown   Turn and reposition as indicated     Problem: Safety - Adult  Goal: Free from fall injury  Outcome: Progressing     Problem: Nutrition Deficit:  Goal: Optimize nutritional status  Outcome: Progressing

## 2025-07-26 ENCOUNTER — APPOINTMENT (OUTPATIENT)
Dept: GENERAL RADIOLOGY | Age: 75
DRG: 659 | End: 2025-07-26
Payer: MEDICARE

## 2025-07-26 PROCEDURE — 6370000000 HC RX 637 (ALT 250 FOR IP)

## 2025-07-26 PROCEDURE — 2500000003 HC RX 250 WO HCPCS: Performed by: ANESTHESIOLOGY

## 2025-07-26 PROCEDURE — 74018 RADEX ABDOMEN 1 VIEW: CPT

## 2025-07-26 PROCEDURE — 6360000002 HC RX W HCPCS: Performed by: UROLOGY

## 2025-07-26 PROCEDURE — 6370000000 HC RX 637 (ALT 250 FOR IP): Performed by: UROLOGY

## 2025-07-26 PROCEDURE — 1200000000 HC SEMI PRIVATE

## 2025-07-26 RX ORDER — POLYETHYLENE GLYCOL 3350 17 G/17G
17 POWDER, FOR SOLUTION ORAL DAILY
Status: DISCONTINUED | OUTPATIENT
Start: 2025-07-26 | End: 2025-07-27 | Stop reason: HOSPADM

## 2025-07-26 RX ORDER — SENNOSIDES 8.6 MG/1
2 TABLET ORAL NIGHTLY
Status: DISCONTINUED | OUTPATIENT
Start: 2025-07-26 | End: 2025-07-27 | Stop reason: HOSPADM

## 2025-07-26 RX ADMIN — Medication: at 06:13

## 2025-07-26 RX ADMIN — PROCHLORPERAZINE EDISYLATE 10 MG: 5 INJECTION INTRAMUSCULAR; INTRAVENOUS at 09:06

## 2025-07-26 RX ADMIN — MIDODRINE HYDROCHLORIDE 2.5 MG: 5 TABLET ORAL at 09:08

## 2025-07-26 RX ADMIN — Medication 1 CAPSULE: at 09:08

## 2025-07-26 RX ADMIN — HEPARIN SODIUM 5000 UNITS: 5000 INJECTION INTRAVENOUS; SUBCUTANEOUS at 23:57

## 2025-07-26 RX ADMIN — LEVOTHYROXINE SODIUM 75 MCG: 0.03 TABLET ORAL at 06:05

## 2025-07-26 RX ADMIN — ACETAMINOPHEN 650 MG: 325 TABLET ORAL at 09:19

## 2025-07-26 RX ADMIN — SODIUM CHLORIDE, PRESERVATIVE FREE 10 ML: 5 INJECTION INTRAVENOUS at 20:08

## 2025-07-26 RX ADMIN — LINEZOLID 600 MG: 600 INJECTION, SOLUTION INTRAVENOUS at 09:06

## 2025-07-26 RX ADMIN — AMIODARONE HYDROCHLORIDE 200 MG: 200 TABLET ORAL at 09:08

## 2025-07-26 RX ADMIN — LINEZOLID 600 MG: 600 INJECTION, SOLUTION INTRAVENOUS at 20:15

## 2025-07-26 RX ADMIN — POLYETHYLENE GLYCOL 3350 17 G: 17 POWDER, FOR SOLUTION ORAL at 17:00

## 2025-07-26 RX ADMIN — MIDODRINE HYDROCHLORIDE 2.5 MG: 5 TABLET ORAL at 14:51

## 2025-07-26 RX ADMIN — PANTOPRAZOLE SODIUM 40 MG: 40 TABLET, DELAYED RELEASE ORAL at 09:08

## 2025-07-26 RX ADMIN — SODIUM CHLORIDE, PRESERVATIVE FREE 10 ML: 5 INJECTION INTRAVENOUS at 09:09

## 2025-07-26 RX ADMIN — HEPARIN SODIUM 5000 UNITS: 5000 INJECTION INTRAVENOUS; SUBCUTANEOUS at 06:05

## 2025-07-26 RX ADMIN — HEPARIN SODIUM 5000 UNITS: 5000 INJECTION INTRAVENOUS; SUBCUTANEOUS at 14:51

## 2025-07-26 RX ADMIN — SENNOSIDES 17.2 MG: 8.6 TABLET, FILM COATED ORAL at 20:08

## 2025-07-26 RX ADMIN — METOPROLOL TARTRATE 25 MG: 25 TABLET, FILM COATED ORAL at 09:08

## 2025-07-26 ASSESSMENT — PAIN SCALES - GENERAL
PAINLEVEL_OUTOF10: 2
PAINLEVEL_OUTOF10: 4
PAINLEVEL_OUTOF10: 4

## 2025-07-26 NOTE — PLAN OF CARE
Problem: Skin/Tissue Integrity  Goal: Skin integrity remains intact  Description: 1.  Monitor for areas of redness and/or skin breakdown  2.  Assess vascular access sites hourly  3.  Every 4-6 hours minimum:  Change oxygen saturation probe site  4.  Every 4-6 hours:  If on nasal continuous positive airway pressure, respiratory therapy assess nares and determine need for appliance change or resting period  Outcome: Progressing  Flowsheets (Taken 7/26/2025 1616)  Skin Integrity Remains Intact:   Monitor for areas of redness and/or skin breakdown   Turn and reposition as indicated   Positioning devices   Pressure redistribution bed/mattress (bed type)   Check visual cues for pain   Monitor skin under medical devices     Problem: Pain  Goal: Verbalizes/displays adequate comfort level or baseline comfort level  Outcome: Progressing  Flowsheets (Taken 7/26/2025 1616)  Verbalizes/displays adequate comfort level or baseline comfort level:   Encourage patient to monitor pain and request assistance   Assess pain using appropriate pain scale   Administer analgesics based on type and severity of pain and evaluate response   Consider cultural and social influences on pain and pain management   Implement non-pharmacological measures as appropriate and evaluate response   Notify Licensed Independent Practitioner if interventions unsuccessful or patient reports new pain     Problem: Safety - Adult  Goal: Free from fall injury  Outcome: Progressing  Flowsheets (Taken 7/26/2025 1616)  Free From Fall Injury: Instruct family/caregiver on patient safety     Problem: Discharge Planning  Goal: Discharge to home or other facility with appropriate resources  Outcome: Progressing  Flowsheets (Taken 7/26/2025 1616)  Discharge to home or other facility with appropriate resources:   Identify discharge learning needs (meds, wound care, etc)   Arrange for needed discharge resources and transportation as appropriate   Identify barriers to

## 2025-07-27 VITALS
HEIGHT: 70 IN | BODY MASS INDEX: 29.13 KG/M2 | RESPIRATION RATE: 16 BRPM | DIASTOLIC BLOOD PRESSURE: 70 MMHG | WEIGHT: 203.48 LBS | TEMPERATURE: 98.4 F | HEART RATE: 70 BPM | OXYGEN SATURATION: 90 % | SYSTOLIC BLOOD PRESSURE: 100 MMHG

## 2025-07-27 PROBLEM — Z51.5 HOSPICE CARE: Status: ACTIVE | Noted: 2025-07-27

## 2025-07-27 PROCEDURE — 6370000000 HC RX 637 (ALT 250 FOR IP)

## 2025-07-27 PROCEDURE — 6370000000 HC RX 637 (ALT 250 FOR IP): Performed by: UROLOGY

## 2025-07-27 PROCEDURE — 2500000003 HC RX 250 WO HCPCS: Performed by: ANESTHESIOLOGY

## 2025-07-27 PROCEDURE — 6360000002 HC RX W HCPCS: Performed by: UROLOGY

## 2025-07-27 RX ORDER — LORAZEPAM 2 MG/ML
0.5 CONCENTRATE ORAL EVERY 4 HOURS PRN
Qty: 45 ML | Refills: 0 | Status: SHIPPED | OUTPATIENT
Start: 2025-07-27 | End: 2025-08-26

## 2025-07-27 RX ORDER — CIPROFLOXACIN 500 MG/1
500 TABLET, FILM COATED ORAL EVERY 24 HOURS
Status: DISCONTINUED | OUTPATIENT
Start: 2025-07-27 | End: 2025-07-27 | Stop reason: HOSPADM

## 2025-07-27 RX ORDER — CIPROFLOXACIN 500 MG/1
500 TABLET, FILM COATED ORAL EVERY 24 HOURS
Qty: 6 TABLET | Refills: 0 | Status: SHIPPED | OUTPATIENT
Start: 2025-07-28 | End: 2025-08-03

## 2025-07-27 RX ORDER — SENNOSIDES 8.6 MG/1
2 TABLET ORAL NIGHTLY
Qty: 60 TABLET | Refills: 0 | Status: SHIPPED | OUTPATIENT
Start: 2025-07-27 | End: 2025-08-26

## 2025-07-27 RX ORDER — POLYETHYLENE GLYCOL 3350 17 G/17G
17 POWDER, FOR SOLUTION ORAL DAILY PRN
Qty: 30 PACKET | Refills: 0 | Status: SHIPPED | OUTPATIENT
Start: 2025-07-27 | End: 2025-08-26

## 2025-07-27 RX ORDER — MORPHINE SULFATE 100 MG/5ML
5 SOLUTION ORAL
Qty: 90 ML | Refills: 0 | Status: SHIPPED | OUTPATIENT
Start: 2025-07-27 | End: 2025-08-26

## 2025-07-27 RX ORDER — MIDODRINE HYDROCHLORIDE 2.5 MG/1
2.5 TABLET ORAL 3 TIMES DAILY
Qty: 90 TABLET | Refills: 3 | Status: SHIPPED | OUTPATIENT
Start: 2025-07-27

## 2025-07-27 RX ADMIN — SODIUM CHLORIDE, PRESERVATIVE FREE 10 ML: 5 INJECTION INTRAVENOUS at 08:25

## 2025-07-27 RX ADMIN — PANTOPRAZOLE SODIUM 40 MG: 40 TABLET, DELAYED RELEASE ORAL at 08:24

## 2025-07-27 RX ADMIN — ACETAMINOPHEN 650 MG: 325 TABLET ORAL at 03:22

## 2025-07-27 RX ADMIN — Medication 1 CAPSULE: at 08:24

## 2025-07-27 RX ADMIN — METOPROLOL TARTRATE 25 MG: 25 TABLET, FILM COATED ORAL at 08:24

## 2025-07-27 RX ADMIN — ACETAMINOPHEN 650 MG: 325 TABLET ORAL at 11:49

## 2025-07-27 RX ADMIN — AMIODARONE HYDROCHLORIDE 200 MG: 200 TABLET ORAL at 08:24

## 2025-07-27 RX ADMIN — CIPROFLOXACIN HYDROCHLORIDE 500 MG: 500 TABLET, FILM COATED ORAL at 08:24

## 2025-07-27 RX ADMIN — POLYETHYLENE GLYCOL 3350 17 G: 17 POWDER, FOR SOLUTION ORAL at 08:24

## 2025-07-27 RX ADMIN — HEPARIN SODIUM 5000 UNITS: 5000 INJECTION INTRAVENOUS; SUBCUTANEOUS at 08:23

## 2025-07-27 RX ADMIN — Medication: at 08:24

## 2025-07-27 RX ADMIN — MIDODRINE HYDROCHLORIDE 2.5 MG: 5 TABLET ORAL at 08:24

## 2025-07-27 RX ADMIN — LEVOTHYROXINE SODIUM 75 MCG: 0.03 TABLET ORAL at 08:24

## 2025-07-27 ASSESSMENT — PAIN SCALES - GENERAL
PAINLEVEL_OUTOF10: 3
PAINLEVEL_OUTOF10: 0
PAINLEVEL_OUTOF10: 3

## 2025-07-27 NOTE — CARE COORDINATION
Case Management Discharge Summary- Hospice Discharge    Destination:   home    Hospice Agency name and contact: [facility]/ [place]     Durable Equipment arrangements are completed by the Hospice nurse. Delivered on Saturday     [place] DNR signed and placed in discharge packet AND patient's hard chart. (This is required for DNR patients.)    Signed ECOC/AVS faxed to above agency/facility. Per HOC    Transportation arrangements per Hospice RN.  Transport agency name and  time: [place] [id number]    Transport form completed and signed by:  per HOC  Transport form placed with discharge packet: per HOC    Notified Family: per HOC  Contact name:    Patient's RN notified of plan: per HOC    Note:    Discharging nurse to complete nursing portion of ECOC, reconcile AVS, place final copy with patient's discharge packet.  RN to ensure signed prescriptions are sent home with patient or the facility as per nursing protocol.

## 2025-07-27 NOTE — PROGRESS NOTES
[facility] Medicine Progress Note  V 5.17      Date of Admission: 7/20/2025    Hospital Day: 3      Chief Admission Complaint:  N/V    Subjective:  Appetite is limited. No abdominal pain, N/V.     Presenting Admission History:        74 y.o. male who presents with n/v, weakness, low blood pressure. Hx of bladder cancer with indwelling Gordon catheter. Has been in place for 3-4 weeks. Was given midodrine at his facility d/t hypotension with improvement in pressure per EMS. Gordon catheter twith purulent gross hematuria. Gordon catheter replaced and urine culture sent. Patient found to have AMARILYS and thus meeting severe sepsis criteria. Admit for treatment of sepsis and urology evaluation.     Assessment/Plan:      Complicated UTI  Severe sepsis  Indwelling Gordon POA  Gross hematuria  Hx of bladder cancer  - Gordon exchanged  - UCx revealed VRE so will change to Linezolid.   - ceftriaxone IV daily for 5 days  - Urology consulted; plan for Cysto and bilateral stent exchange on 7/23  - Patient is considered low/intermediate risk for anesthesia. Based on the above evaluation, the benefits of the planned procedure likely exceed the risks.  The patient is medically optimized to proceed with the planned procedure without any further cardiopulmonary testing.     AMARILYS  - s/p 1 liter NS bolus  - holding off on further IVF in setting of hx of CHF  - Monitor    Prolonged QTC  - QT interval is 450 with Qtc 562  - telemetry to monitor     Chronic:  CAD s/p CABG  HTN - holding home po regimen othen than metoprolol, with IV prn antihypertensives for SBP > 170 only, nurse to request on vitals checks  HFrEF  Hypothyroidism  Hx of bladder cancer  Vit D def    Ongoing threat to life and/or bodily function without ongoing treatment due to:  Sepsis    Consults:      IP CONSULT TO UROLOGY    The following subspecialty service(s) Urology was/were consulted and any/all available notes from yesterday and today were reviewed on 7/22/2025, w/ 
        [facility] Medicine Progress Note  V 5.17      Date of Admission: 7/20/2025    Hospital Day: 5      Chief Admission Complaint:  N/V    Subjective:  Pain controlled. No N/V.     Presenting Admission History:       74 y.o. male who presents with n/v, weakness, low blood pressure. Hx of bladder cancer with indwelling Gordon catheter. Has been in place for 3-4 weeks. Was given midodrine at his facility d/t hypotension with improvement in pressure per EMS. Gordon catheter twith purulent gross hematuria. Gordon catheter replaced and urine culture sent. Patient found to have AMARILYS and thus meeting severe sepsis criteria. Admit for treatment of sepsis and urology evaluation.     Assessment/Plan:      Complicated UTI  Severe sepsis  Indwelling Gordon POA  Gross hematuria  Hx of bladder cancer  - Gordon exchanged  - UCx revealed VRE so changed to Linezolid.   - Urology consulted; Cysto and bilateral stent exchange on 7/23  - Recommend course of Linezolid 7-10 days. Family (including his Brother Romulo who is a pharmacist) is asking if he would benefit from prophylactic Abx after this.      AMARILYS  - s/p 1 liter NS bolus  - holding off on further IVF in setting of hx of CHF  - Monitor    Anemia: Previously on EPO injections 3 times weekly for anemia, malignancy, CKD. Family asking if this can be continued in Hospice;  discussed with [facility] and typically EPO would be discontinued in Hospice care.     Prolonged QTC  - QT interval is 450 with Qtc 562  - telemetry to monitor     Chronic:  CAD s/p CABG  HTN - holding home po regimen othen than metoprolol, with IV prn antihypertensives for SBP > 170 only.  HFrEF  Hypothyroidism  Hx of bladder cancer  Vit D def    Ongoing threat to life and/or bodily function without ongoing treatment due to:  Sepsis    Consults:      IP CONSULT TO UROLOGY  IP CONSULT TO PHARMACY  IP CONSULT TO HOSPICE    The following subspecialty service(s) Urology was/were consulted and any/all available notes from 
        [facility] Medicine Progress Note  V 5.17      Date of Admission: 7/20/2025    Hospital Day: 4      Chief Admission Complaint:  N/V    Subjective:  Cysto with stent exchange completed. Denies abdominal pain, N/V.      Presenting Admission History:        74 y.o. male who presents with n/v, weakness, low blood pressure. Hx of bladder cancer with indwelling Gordon catheter. Has been in place for 3-4 weeks. Was given midodrine at his facility d/t hypotension with improvement in pressure per EMS. Gordon catheter twith purulent gross hematuria. Gordon catheter replaced and urine culture sent. Patient found to have AMARILYS and thus meeting severe sepsis criteria. Admit for treatment of sepsis and urology evaluation.     Assessment/Plan:      Complicated UTI  Severe sepsis  Indwelling Gordon POA  Gross hematuria  Hx of bladder cancer  - Gordon exchanged  - UCx revealed VRE so changed to Linezolid.   - Urology consulted; Cysto and bilateral stent exchange on 7/23     AMARILYS  - s/p 1 liter NS bolus  - holding off on further IVF in setting of hx of CHF  - Monitor    Prolonged QTC  - QT interval is 450 with Qtc 562  - telemetry to monitor     Chronic:  CAD s/p CABG  HTN - holding home po regimen othen than metoprolol, with IV prn antihypertensives for SBP > 170 only.  HFrEF  Hypothyroidism  Hx of bladder cancer  Vit D def    Ongoing threat to life and/or bodily function without ongoing treatment due to:  Sepsis    Consults:      IP CONSULT TO UROLOGY  IP CONSULT TO PHARMACY    The following subspecialty service(s) Urology was/were consulted and any/all available notes from yesterday and today were reviewed on 7/23/2025, w/ plan for continued inpatient w/up and management as noted above in the assessment and plan       --------------------------------------------------      Medications:        Infusion Medications    sodium chloride       Scheduled Medications    linezolid  600 mg IntraVENous Q12H    wound/burn dressing   Topical 
        [facility] Medicine Progress Note  V 5.17      Date of Admission: 7/20/2025    Hospital Day: 6      Chief Admission Complaint:  N/V    Subjective: No significant pain. Tolerating diet with no N/V.     Presenting Admission History:       74 y.o. male who presents with n/v, weakness, low blood pressure. Hx of bladder cancer with indwelling Baeza catheter. Has been in place for 3-4 weeks. Was given midodrine at his facility d/t hypotension with improvement in pressure per EMS. Baeaz catheter twith purulent gross hematuria. Baeza catheter replaced and urine culture sent. Patient found to have AMARILYS and thus meeting severe sepsis criteria. Admited for treatment of sepsis and urology evaluation.     Assessment/Plan:      Complicated UTI  Severe sepsis  Indwelling Baeza POA  Gross hematuria  Hx of bladder cancer  - Baeza exchanged  - UCx revealed VRE so changed to Linezolid.   - Urology consulted; Cysto and bilateral stent exchange on 7/23  - Recommend course of Linezolid 7-10 days IV then PO at discharge. Family (including his Brother Romulo who is a pharmacist in [place]) is requesting that he be placed on prophylactic Abx after completing Linezolid because of the chronic baeza. Since he will be enrolled in Hospice, benefits of prophylactic Abx outweigh the risks in their opinion. Cipro or Macrobid would not be unreasonable prophylaxis. They requested that we prescribe the prophylactic antibiotic at discharge as Hospice may not be willing to prescribe.       AMARILYS  - Volume expansion was given.   - holding off on further IVF in setting of hx of CHF  - Monitor    Anemia: Previously on EPO injections 3 times weekly for anemia, malignancy, CKD. Family asking if this can be continued in Hospice;  discussed with [facility] and typically EPO would be discontinued in Hospice care.  [facility] would not be covering this typically.     Prolonged QTC  - Qtc 562  - telemetry monitor     Chronic:  CAD s/p CABG  HTN - holding home po regimen 
      [facility] Medicine Progress Note  V 7.24      Date of Admission: 7/20/2025    Hospital Day: 7      Chief Admission Complaint:  N/V    Subjective:  Patient seen at bedside this morning. Patient ongoing nausea, some pain in lower abdomen bilaterally. No vomiting, fevers, chills. Pending hospice discharge.    Presenting Admission History:       74 y.o. male who presents with n/v, weakness, low blood pressure. Hx of bladder cancer with indwelling Baeza catheter. Has been in place for 3-4 weeks. Was given midodrine at his facility d/t hypotension with improvement in pressure per EMS. Baeza catheter twith purulent gross hematuria. Baeza catheter replaced and urine culture sent. Patient found to have AMARILYS and thus meeting severe sepsis criteria. Admited for treatment of sepsis and urology evaluation.     Assessment/Plan:      Complicated UTI  Severe sepsis  Indwelling Baeza POA  Gross hematuria  Hx of bladder cancer  Baeza exchanged  UCx revealed VRE so changed to Linezolid.   Urology consulted; Cysto and bilateral stent exchange on 7/23  Plan:  Recommend course of Linezolid 7-10 days IV then PO at discharge. Family (including his Brother Romulo who is a pharmacist in [place]) is requesting that he be placed on prophylactic Abx after completing Linezolid because of the chronic baeza. Since he will be enrolled in Hospice, benefits of prophylactic Abx outweigh the risks in their opinion. Cipro or Macrobid would not be unreasonable prophylaxis. They requested that we prescribe the prophylactic antibiotic at discharge as Hospice may not be willing to prescribe.    Pending hospice     AMARILYS  Volume expansion was given.   holding off on further IVF in setting of hx of CHF     Anemia: Previously on EPO injections 3 times weekly for anemia, malignancy, CKD. Family asking if this can be continued in Hospice;  discussed with [facility] and typically EPO would be discontinued in Hospice care.  HOC would not be covering this typically.    
    Pt Name: Alfredo Chong  Medical Record Number: [id number]  Date of Birth 1950   Today's Date: 7/24/2025      Subjective:  Awake in bed, no complaints.    ROS: Constitutional: No fever    Vitals:  Vitals:    07/23/25 2003 07/24/25 0005 07/24/25 0351 07/24/25 0815   BP: 108/72 98/63 110/66 92/66   Pulse: 63 73 67 64   Resp: 18 18 18 18   Temp: 97.5 °F (36.4 °C) 97.5 °F (36.4 °C) 97.9 °F (36.6 °C) 97.6 °F (36.4 °C)   TempSrc: Oral Oral Oral Oral   SpO2: 93% 91% 93% 94%   Weight:       Height:         I/O last 3 completed shifts:  In: 1200 [I.V.:600; IV Piggyback:600]  Out: 3400 [Urine:3400]    Exam:  General: Awake, oriented, no acute distress  Respiratory: Nonlabored breathing  Abdomen: Soft, non-tender, non-distended, no masses  : baeza with yellow/sediment output  Skin: Skin color, texture, turgor normal, no rashes or lesions  Neurologic: no gross deficits    CURRENT MEDICATIONS   Scheduled Meds:   linezolid  600 mg IntraVENous Q12H    sodium chloride flush  5-40 mL IntraVENous 2 times per day    wound/burn dressing   Topical Daily    lactobacillus  1 capsule Oral Daily with breakfast    amiodarone  200 mg Oral Daily    levothyroxine  75 mcg Oral Daily    metoprolol tartrate  25 mg Oral BID    midodrine  2.5 mg Oral TID    pantoprazole  40 mg Oral Daily    sodium chloride flush  5-40 mL IntraVENous 2 times per day    heparin (porcine)  5,000 Units SubCUTAneous 3 times per day     Continuous Infusions:   sodium chloride      sodium chloride       PRN Meds:.naloxone 0.4 mg in 10 mL sodium chloride syringe, sodium chloride flush, sodium chloride, HYDROmorphone, HYDROmorphone, oxyCODONE, ondansetron, prochlorperazine, midazolam, diphenhydrAMINE, labetalol, ipratropium 0.5 mg-albuterol 2.5 mg, prochlorperazine, sodium chloride flush, sodium chloride, polyethylene glycol, acetaminophen **OR** acetaminophen    LABS     No results for input(s): \"WBC\", \"HGB\", \"HCT\", \"PLT\", \"NA\", \"K\", \"CL\", \"CO2\", \"BUN\", 
    Pt Name: Alfredo Chong  Medical Record Number: [id number]  Date of Birth 1950   Today's Date: 7/23/2025      Subjective:  Awake in bed, no complaints.    ROS: Constitutional: No fever    Vitals:  Vitals:    07/22/25 2010 07/22/25 2306 07/23/25 0516 07/23/25 0745   BP: 96/74 103/68 90/60 95/63   Pulse: 72 79 76 89   Resp: 16 18 18 18   Temp: 97.9 °F (36.6 °C) 98.1 °F (36.7 °C) 98.2 °F (36.8 °C) 98.2 °F (36.8 °C)   TempSrc: Oral Oral Oral Oral   SpO2: 96% 93% 91% 97%   Weight:       Height:         I/O last 3 completed shifts:  In: 480 [P.O.:480]  Out: 3150 [Urine:3150]    Exam:  General: Awake, oriented, no acute distress  Respiratory: Nonlabored breathing  Abdomen: Soft, non-tender, non-distended, no masses  : baeza with yellow/sediment output  Skin: Skin color, texture, turgor normal, no rashes or lesions  Neurologic: no gross deficits    CURRENT MEDICATIONS   Scheduled Meds:   linezolid  600 mg IntraVENous Q12H    wound/burn dressing   Topical Daily    lactobacillus  1 capsule Oral Daily with breakfast    amiodarone  200 mg Oral Daily    levothyroxine  75 mcg Oral Daily    metoprolol tartrate  25 mg Oral BID    midodrine  2.5 mg Oral TID    pantoprazole  40 mg Oral Daily    sodium chloride flush  5-40 mL IntraVENous 2 times per day    heparin (porcine)  5,000 Units SubCUTAneous 3 times per day     Continuous Infusions:   sodium chloride       PRN Meds:.prochlorperazine, sodium chloride flush, sodium chloride, polyethylene glycol, acetaminophen **OR** acetaminophen    LABS     Recent Labs     07/20/25  1319 07/21/25  0617   WBC 12.9* 12.0*   HGB 9.0* 9.1*   HCT 27.4* 27.6*   * 499*   * 131*   K 5.7* 5.0   CL 96* 99   CO2 24 21   BUN 46* 43*   CREATININE 3.5* 3.1*   CALCIUM 11.2* 10.6   AST 63*  --    ALT 41*  --    BILITOT 0.3  --        ASSESSMENT   Hospital day # 3  74y.o. male admitted with hypotension, AMARILYS, UTI  Urine culture positive Enterococcus faecium VRE and candida albicans. 
    Pt Name: Alfredo Chong  Medical Record Number: [id number]  Date of Birth 1950   Today's Date: 7/22/2025      Subjective:  Awake in bed, thirsty. No other complaints.     ROS: Constitutional: No fever    Vitals:  Vitals:    07/22/25 0900 07/22/25 0951 07/22/25 1101 07/22/25 1103   BP: 90/61   (!) 95/57   Pulse: 81  78    Resp: 16  16    Temp: 97.5 °F (36.4 °C)  98.2 °F (36.8 °C)    TempSrc: Oral  Oral    SpO2: 94%  96%    Weight:       Height:  1.778 m (5' 10\")       I/O last 3 completed shifts:  In: 480 [P.O.:480]  Out: 3250 [Urine:3250]    Exam:  General: Awake, oriented, no acute distress  Respiratory: Nonlabored breathing  Abdomen: Soft, non-tender, non-distended, no masses  : baeza with yellow/sediment output  Skin: Skin color, texture, turgor normal, no rashes or lesions  Neurologic: no gross deficits    CURRENT MEDICATIONS   Scheduled Meds:   vancomycin  2,000 mg IntraVENous Once    vancomycin (VANCOCIN) intermittent dosing (placeholder)   Other RX Placeholder    wound/burn dressing   Topical Daily    lactobacillus  1 capsule Oral Daily with breakfast    amiodarone  200 mg Oral Daily    levothyroxine  75 mcg Oral Daily    metoprolol tartrate  25 mg Oral BID    midodrine  2.5 mg Oral TID    pantoprazole  40 mg Oral Daily    sodium chloride flush  5-40 mL IntraVENous 2 times per day    heparin (porcine)  5,000 Units SubCUTAneous 3 times per day     Continuous Infusions:   sodium chloride       PRN Meds:.prochlorperazine, sodium chloride flush, sodium chloride, polyethylene glycol, acetaminophen **OR** acetaminophen    LABS     Recent Labs     07/20/25  1319 07/21/25  0617   WBC 12.9* 12.0*   HGB 9.0* 9.1*   HCT 27.4* 27.6*   * 499*   * 131*   K 5.7* 5.0   CL 96* 99   CO2 24 21   BUN 46* 43*   CREATININE 3.5* 3.1*   CALCIUM 11.2* 10.6   AST 63*  --    ALT 41*  --    BILITOT 0.3  --        ASSESSMENT   Hospital day # 2  74y.o. male admitted with hypotension, AMARILYS, UTI  Urine culture 
  4 Eyes Skin Assessment and Patient belongings     The patient is being assess for  Admission    I agree that 2 Nurses have performed a thorough Head to Toe Skin Assessment on the patient. ALL assessment sites listed below have been assessed.       Areas assessed by both nurses: Zehra RN, Belem RN  [x]   Head, Face, and Ears   [x]   Shoulders, Back, and Chest  [x]   Arms, Elbows, and Hands   [x]   Coccyx, Sacrum, and IschIum  [x]   Legs, Feet, and Heels        Does the Patient have Skin Breakdown?  Stage 3 on R buttock, redness on coccyx, scattered abrasions on R arm, scattered ecchymosis, skin tear R upper arm         Mateus Prevention initiated:  Yes   Wound Care Orders initiated:  Yes      WOC nurse consulted for Pressure Injury (Stage 3,4, Unstageable, DTI, NWPT, and Complex wounds), New and Established Ostomies:  Yes      I agree that 2 Nurses have reviewed patient belongings with the patient/family and documented in the flowsheet upon admission or transfer to the unit.     Belongings  Dental Appliances: None  Vision - Corrective Lenses: Eyeglasses  Hearing Aid: Right hearing aid, At home  Clothing: Pants, Shirt, Socks, Undergarments, Footwear, At home  Jewelry: None  Electronic Devices: None  Weapons (Notify Protective Services/Security): None  Home Medications: None  Valuables Given To: Family (Comment) (Wife)  Provide Name(s) of Who Valuable(s) Were Given To: Shirley       Nurse 1 eSignature: Electronically signed by ZEHRA BOURGEOIS RN on 7/20/25 at 8:31 PM EDT    **SHARE this note so that the co-signing nurse is able to place an eSignature**    Nurse 2 eSignature: Electronically signed by Belem Sewell RN on 7/20/25 at 10:29 PM EDT   
  4 Eyes Skin Assessment and Patient belongings     The patient is being assess for  Low Mateus    I agree that 2 Nurses have performed a thorough Head to Toe Skin Assessment on the patient. ALL assessment sites listed below have been assessed.       Areas assessed by both nurses: OMAYRA Real and OMAYRA Allen  [x]   Head, Face, and Ears   [x]   Shoulders, Back, and Chest  [x]   Arms, Elbows, and Hands   [x]   Coccyx, Sacrum, and IschIum  [x]   Legs, Feet, and Heels          Nurse 1 eSignature: Electronically signed by Addie Cortes RN on 7/25/25 at 2:23 PM EDT    **SHARE this note so that the co-signing nurse is able to place an eSignature**    Nurse 2 eSignature: Electronically signed by Tiffany Julian RN on 7/25/25 at 2:45 PM EDT   
  Physician Progress Note      PATIENT:               ALEXX APONTE  CSN #:                  [id number]  :                       1950  ADMIT DATE:       2025 12:53 PM  DISCH DATE:  RESPONDING  PROVIDER #:        Alek Welch MD          QUERY TEXT:    Noted documentation of Urinary tract infection associated with indwelling   urethral catheter on  ED note. Based on your medical judgment, please   clarify these findings and document if any of the following are being   evaluated and/or treated:    The clinical indicators include:   ED notes- \"He does have a chronic Gordon catheter in for his bladder   cancer.  The catheter had been in for 3 to 4 weeks and the output in the   catheter tubing appeared bloody and purulent concerning for infection.  The   catheter was removed and replaced by nursing staff and a fresh sample was   obtained which showed large amount of blood, large leukocytes no nitrates and   greater than 100 WBCs.  Urinary tract infection associated with indwelling urethral catheter\"    Urine cx- >100,000 CFU/ml Enterococcus faecium  ceftriaxone IV daily for 5 days  Urology consulted; plan for Cysto and bilateral stent exchange  Options provided:  -- Urinary tract infection associated with indwelling urethral catheter   confirmed to be present on admission  -- Other - I will add my own diagnosis  -- Disagree - Not applicable / Not valid  -- Disagree - Clinically unable to determine / Unknown  -- Refer to Clinical Documentation Reviewer    PROVIDER RESPONSE TEXT:    Urinary tract infection associated with indwelling urethral catheter confirmed   to be present on admission    Query created by: Maritza Gupta on 2025 12:42 PM      QUERY TEXT:    Moderate malnutrition is documented in the  note. Please specify the   degree/type:    The clinical indicators include:   note-  Malnutrition Assessment:  Malnutrition Status:  Moderate malnutrition (25 
Assumed care of pt from Shira RN. Pt resting in bed talking with OT. Pt denies needs.   
Care taken over from of going OMAYRA Real. Pt update on change in care. Pt resting in bed denies needs at this time. Triad paste applied to pt's sacrum wound.   
Comprehensive Nutrition Assessment    Type and Reason for Visit:  Initial, Wound    Nutrition Recommendations/Plan:   Continue Regular diet.  Encourage PO intakes and high protein foods with each meal.   Ensure ONS TID - chocolate.  Magic Cup once/day - chocolate.  Monitor pertinent labs, bowel habits, weight, N/V, supplement acceptance, clinical progression.       Malnutrition Assessment:  Malnutrition Status:  Moderate malnutrition (07/22/25 1158)    Context:  Chronic Illness     Findings of the 6 clinical characteristics of malnutrition:  Energy Intake:  75% or less estimated energy requirements for 1 month or longer  Weight Loss:  Mild weight loss     Body Fat Loss:  Mild body fat loss Orbital   Muscle Mass Loss:  Mild muscle mass loss Temples (temporalis), Clavicles (pectoralis & deltoids)  Fluid Accumulation:  Unable to assess     Strength:  Not Performed    Nutrition Assessment:    Patient seen for wound (pressure injury stage 3). Admitted for Complicated UTI, Severe sepsis. PMHx of CAD, bladder cancer, hypertension, thyroid disease. Currently on regular diet. Patient seen resting in bed. States his appetite has \"improved a bit\" the past few weeks. Reports he has a light breakfast (fruit or pastry), then eats a carryout meal/gas station meal for dinner. Pt does drink x1-3 Ensure ONS daily. Weight loss of -6.2% x3 months noted per EMR. No issues chewing or swallowing Will order Ensure ONS TID to help promote healing. Will also order Magic Cup once/day to better meet nutrient needs. Encouraged PO intakes and high protein foods with each meal. Will continue to monitor.    Nutrition Related Findings:    7/21; Na 131, BG x24hrs . Pitting edema RLE/LLE. Wound Type: Pressure Injury, Stage III       Current Nutrition Intake & Therapies:    Average Meal Intake: 51-75%  Average Supplements Intake: None Ordered  ADULT DIET; Regular  ADULT ORAL NUTRITION SUPPLEMENT; Breakfast, Lunch, Dinner; Standard High 
HOSPICE [facility]    Met ONLY with Pat, significant other, to provide hospice information.  She is going to talk to patient and other family tonight.  She stated that patient is still thinking in terms of rehab and is not aware that HOC has been consulted.  She would like to talk to patient first.  HOC liaison will contact Pat tomorrow AM to see if HOC can proceed with meeting with patient.  We will set up meeting from there.  Updated Kit CM.  
[facility]    Meeting scheduled @ [facility].  pao.     Favio VAN, RN  [facility]  C:[phone]  Main HOC: [phone]    If it is after 1900, a weekend, or holiday, please reach out to the main office at [phone].     
[facility]    Met with patient and family to discuss hospice philosophy, services, and levels of care. Spent time listening to events of illness, answering questions, and offering support.     Discussed potential d/c plans and appropriate needs for discharge with family. DME needs discussed- explained that turn around for equipment is typically around 6 hours.     Family wants patient to stay in the hospital a few more days for IV abx and therapy so that he is in the most optimal condition before going home. Patient's daughter  with concerns of him 'not having his mind' if he gets 'comfort meds'. Patient's wife agreeable to comfort measures.     HOC to f/u until d/c- will order DME one day prior to d/c.     Favio VAN, RN  [facility]  C:[phone]  Main HOC: [phone]    If it is after 1900, a weekend, or holiday, please reach out to the main office at [phone].     
[facility]    Met with patient and family to finalize discharge plans and sign consent forms. Patient is alert and oriented most of the time- but feels that his HCPOA, Jennie Brasher, needs to sign all forms at this time due to his condition. DIscussed prescreptions and patient's preferred pharmacy- they are currently using the [facility] in [place]. Billing info for HOC called in .     Consents signed by PAUL Cohen.     Admissions RN set up for 1700    Transportation arranged via [facility] @ 1200. Transportation packet completed. Comfort scripts for roxanol, ativan concentrate, and senna sent with wife to be filled at preferred pharmacy.     CM updated. Pilots updated.     Favio VAN, RN  [facility]  C:[phone]  Main HOC: [phone]    If it is after 1900, a weekend, or holiday, please reach out to the main office at [phone].     
Occupational Therapy  Facility/Department: [facility] C3 TELE/MED SURG/ONC  Daily Treatment Note  NAME: Alfredo Chong  : 1950  MRN: [id number]    Date of Service: 2025    Discharge Recommendations:  Subacute/Skilled Nursing Facility   Therapy discharge recommendations are subject to collaboration from the patient’s interdisciplinary healthcare team, including MD and case management recommendations.    Barriers to Home Discharge:   [] Steps to access home entry or bed/bath:   [x] Unable to transfer, ambulate, or propel wheelchair household distances without assist   [x] Limited available assist at home upon discharge    [] Patient or family requests d/c to post-acute facility    [] Poor cognition/safety awareness for d/c to home alone    [] Unable to maintain ordered weight bearing status    [] Patient with salient signs of long-standing immobility   [x] Decreased independence with ADLs   [x] Increased risk for falls   [] Other:    If pt is unable to be seen after this session, please let this note serve as discharge summary.  Please see case management note for discharge disposition.  Thank you.    Patient Diagnosis(es): The primary encounter diagnosis was Dehydration. Diagnoses of Urinary tract infection associated with indwelling urethral catheter, initial encounter and AMARILYS (acute kidney injury) were also pertinent to this visit.     Assessment   Assessment: Pt seen for OT tx for ADLs, mobility and UE exercises.  Pt reported 3/10 L knee and abdominal pain. Pt required CGA for bed mobility and sat EOB for UE/LE exercises 10-15x each.  He declined transfer to chair d/t fatigue and nausea. Instruction provided on energy conservation and basic exercises for home.  Pt is functioning below baseline and would benefit from continued OT tx in acute care. Recommend SNF at d/c.  Activity Tolerance: Patient limited by endurance;Patient limited by fatigue  Discharge Recommendations: Subacute/Skilled Nursing Facility   
Occupational Therapy  Facility/Department: [facility] C3 TELE/MED SURG/ONC  Occupational Therapy Initial Assessment    Name: Alfredo Chong  : 1950  MRN: [id number]  Date of Service: 2025    Discharge Recommendations:  Continue to assess pending progress  OT Equipment Recommendations  Equipment Needed: No  Other: defer     If pt is unable to be seen after this session, please let this note serve as discharge summary.  Please see case management note for discharge disposition.  Thank you.    Patient Diagnosis(es): The primary encounter diagnosis was Dehydration. Diagnoses of Urinary tract infection associated with indwelling urethral catheter, initial encounter and AMARILYS (acute kidney injury) were also pertinent to this visit.  Past Medical History:  has a past medical history of Actinic keratosis, Allergic rhinitis, CAD (coronary artery disease), Cancer (HCC), Chronic uveitis, bilateral, History of blood transfusion, Jena (hard of hearing), Hyperlipidemia, Hypertension, MI (myocardial infarction) ([facility]), Osteoarthritis, Thyroid disease, and Uveitis of both eyes.  Past Surgical History:  has a past surgical history that includes Diagnostic Cardiac Cath Lab Procedure; Appendectomy; fracture surgery; Coronary artery bypass graft (2006); Colonoscopy (); Tonsillectomy; Coronary angioplasty (2018); joint replacement (Left, 2024); Cystoscopy (2024); Cystoscopy (N/A, 2025); Cystoscopy (N/A, 2025); TURP (N/A, 2025); IR GUIDED NEPHROSTOMY CATH PLACEMENT LEFT (2025); and Port Surgery (N/A, 2025).           Assessment  Performance deficits / Impairments: Decreased ADL status;Decreased strength;Decreased safe awareness;Decreased endurance;Decreased balance;Decreased functional mobility   Assessment: Pt supine in bed at start of session and tolerates OT evaluation/ treatment fair. Pt is a(n) 74 year old male who presents to [facility] with UTI. Pt reports being independent with 
Occupational Therapy  Facility/Department: [facility] C3 TELE/MED SURG/ONC  Daily Treatment Note  NAME: Alfredo Chong  : 1950  MRN: [id number]    Date of Service: 2025    Discharge Recommendations:  Subacute/Skilled Nursing Facility  OT Equipment Recommendations  Equipment Needed: Yes  Mobility Devices: ADL Assistive Devices;Hospital Bed  Hospital Bed : Bed Rails - Partial;Electric - Full (Head of Bed)  ADL Assistive Devices: Toileting - Drop Arm Commode, Heavy Duty Drop Arm Commode      Patient Diagnosis(es): The primary encounter diagnosis was Dehydration. Diagnoses of Urinary tract infection associated with indwelling urethral catheter, initial encounter and AMARILYS (acute kidney injury) were also pertinent to this visit.     Assessment   Assessment: Patient tolerated OT session well despite weakness and fatigue. Patient able tolerate ~10 seconds consecutive standing before requiring return to sitting due to weakness, low BP and buckling right knee. Patient progressing from CGA to SBA with bed mobility. Patient able tolerate 3 sit<>stand transfers using RW Mod A and 1 stand pivot transfers Max A using RW today. Patient with buckling right knee upon pivot transfer requiring OT assist to safely complete pivot to chair. Patient continues to demonstrate performance component deficits in balance, activity tolerance, strength, safety. Patient remains a high fall risk and is below baseline level of occupational performance. Recommend continued OT services to address performance component deficits in order to maximize safety and independence with ADLs and functional transfers. Discharge recommendations  updated to SNF for continued therapy services. Patient reports desire to return home at discharge. If patient returns home at discharge would require  assist/supervision, HHOT, hospital bed and drop arm BSC.  Activity Tolerance: Patient tolerated treatment well;Treatment limited secondary to medical 
Patient admitted to [facility] bay 1 in preparation for surgery. Pt A&Ox4. VSS. Consents confirmed. PIV assessed, fluids infusing. Surgical site prep completed. Belongings bedside C3. NPO since 0000.   
Patient arrived to PACU bay 05, phase one initiated. Placed on bedside monitor, VSS. Report obtained from OR RN and anesthesia. Patient on room air. Assessment WNL. Warm blankets applied. Side rails in place, will monitor patient closely.    
Patient discharged home at this time with home hospice. Port de-accessed prior to discharge. Paper prescriptions given to wife to fill at outpatient pharmacy. AVS sent with patient at discharge. Discharged via ambulance transport at this time. Denies any further needs. Electronically signed by BJ HALEY RN on 7/27/25 at 1:51 PM EDT    
Patient removed LMA  
Physical Therapy  Facility/Department: [facility] C3 TELE/MED SURG/ONC  Daily Treatment Note  NAME: Alfredo Chong  : 1950  MRN: [id number]    Date of Service: 2025    Discharge Recommendations:  Subacute/Skilled Nursing Facility   PT Equipment Recommendations  Equipment Needed: No    Patient Diagnosis(es): The primary encounter diagnosis was Dehydration. Diagnoses of Urinary tract infection associated with indwelling urethral catheter, initial encounter and AMARILYS (acute kidney injury) were also pertinent to this visit.    Assessment  Assessment: Pt demos bed mobility Min A, pt can can sit lateral scoot ups toward HOB w/ CGA. Pt only able to stand  20\"due to c/o dizziness andorthostatic hypotension (see vitals). Pt able to sit EOB for ~15 minutes before requesting to lay back day. Pt tolerated BLE strengthening well seated EOB. Pt is currently below their baseline function and will continue to benefit from skilled PT intervention in order to address deficits, reach goals, and d/c safely. PT is recommending d/c to SNF.  Activity Tolerance: Patient tolerated treatment well;Treatment limited secondary to medical complications  Equipment Needed: No    Plan  Physical Therapy Plan  General Plan:  (3-5x a week in the acute care setting)  Current Treatment Recommendations: Strengthening;Balance training;Functional mobility training;Transfer training;Endurance training;Gait training;Stair training;Neuromuscular re-education;Home exercise program;Safety education & training;Patient/Caregiver education & training;Equipment evaluation, education, & procurement;Positioning;Therapeutic activities;Co-Treatment    Restrictions  Restrictions/Precautions  Restrictions/Precautions: Fall Risk, Contact Precautions  Activity Level: Up with Assist  Position Activity Restriction  Other Position/Activity Restrictions: IV, Contact (VRE), Tele     Subjective   Subjective  Subjective: Pt agrees to PT session  Pain: 
Pt admitted into room 334 from ER. Pt arrived in stable condition. VSS. Admission assessment completed. Pt oriented to room. Patient is A&O x4. Denies Pain. IV site patent/ flushed, and saline locked. Patient on RA. Gordon in place. Medication given per MAR.     Safety Measures in place:   Denies any needs at this time.   Bed/ Chair alarm on for safety.   Bed locked and in lowest position.    Call light within reach.   Gripper socks applied.   Patient in stable condition when RN leaving room.      
Pt assessment completed and charted. VSS, pt on RA. Patient is a/o. Port accessed today per MD approval d\t limited peripheral access. Pt tolerating IV abx without incident. Medication given per MAR. PRN tylenol administered this am for pain.    Safety Measures in place:   Bed in lowest position and wheels locked.   Call light within reach.   Bedside table within reach.   Non-skid socks in place.   Pt denies any other needs at this time.    Pt calls out appropriately.  Patient in stable condition when RN left room.    
Shift assessment completed. Patient is A&O x4. VSS. BP is still soft today 92/66. Amiodarone and metoprolol held this morning.  Pain 3/10 generalized pain. IV site patent and infusing. Patient on RA. Patient's last BM- 7/20/2025. Patient admits to passing gas. Patient has been ambulating with minimal assist per PT/OT. Pt has a baeza in place as well. Medication given per MAR.     Safety Measures in place:   Denies any needs at this time.   Bed/ Chair alarm on for safety.   Bed locked and in lowest position.    Call light within reach.   Gripper socks applied.   Patient in stable condition when RN leaving room.     Electronically signed by Alysia Klein RN on 7/24/2025 at 9:35 AM   
Shift assessment completed. Patient is A&O x4. VSS. Pain 3/10 generalized. IV site patent and infusing. Patient on RA. Patient's last BM- 7/20/2025. Patient admits to passing gas. Patient does not ambulate he is a complete Q2 turn which he is now refusing at this time. Pt has a baeza in place as well. Medication given per MAR. BP was 95/63 this morning and this RN held metoprolol and amiodarone due to soft BP. Heparin held as well due to pt having a cystoscopy today around 1600.    Safety Measures in place:   Denies any needs at this time.   Bed/ Chair alarm on for safety.   Bed locked and in lowest position.    Call light within reach.   Gripper socks applied.   Patient in stable condition when RN leaving room.    Electronically signed by Alysia Klein RN on 7/23/2025 at 9:20 AM   
[facility]    Spoke with CM, Kit, patient to d/c Sunday per MD.     Spoke with spouse- DME set up for delivery on Saturday afternoon/evening and patient to be set up to d/c around 1200 on Sunday. Will meet with patient's wife Sunday morning to sign consent.     Updated Kit on conversation with patient's wife and plan for discharge. CM on board with timeline.     HOC to f/u for changes and to complete d/c.     Addendum: transportation arranged via [facility] @1200 7/27/2025- transportation packet started.     DNRCC left on chart for signature. Comfort meds (roxanol 20mg/ml, ativan tablets 0.5 mg, and senna) will need prescribed for d/c    Favio VAN, RN  [facility]  C:[phone]  Main HOC: [phone]    If it is after 1900, a weekend, or holiday, please reach out to the main office at [phone].     
Tonsillectomy; Coronary angioplasty (07/27/2018); joint replacement (Left, 03/19/2024); Cystoscopy (03/12/2024); Cystoscopy (N/A, 04/20/2025); Cystoscopy (N/A, 04/20/2025); TURP (N/A, 04/20/2025); IR GUIDED NEPHROSTOMY CATH PLACEMENT LEFT (04/23/2025); and Port Surgery (N/A, 05/21/2025).    Assessment  Body Structures, Functions, Activity Limitations Requiring Skilled Therapeutic Intervention: Decreased functional mobility ;Decreased strength;Decreased endurance;Decreased balance;Decreased coordination;Decreased ROM  Assessment: Pt is a 74 y.o male admitted to [facility] for UTI. PTA patient reports coming from SNF but before recent hospital stay was IND w/ mobility and ambulation w/ cane PRN. Pt lives w/ wife in a on level home w/ 3 SAE and BHR. Pt currently demos bed mobility req Min A, pt can demo a half stand to clear the bed and scoot ups toward HOB w/ Min A. Pt unable to stand due to orthostatic hypotension (see vitals) w/ c/o light headedness. Pt able to sit EOB for ~10 minutes before requesting to lay back day. Pt tolerated BLE strengthening well seated EOB. Pt is currently below their baseline function and will continue to benefit from skilled PT intervention in order to address deficits, reach goals, and d/c safely. PT is continuing to assess patient for d/c recs secondary to BP status and due to deficits.  Treatment Diagnosis: Decreased Functional Mobility and balance  Therapy Prognosis: Fair  Decision Making: Medium Complexity  Requires PT Follow-Up: Yes  Activity Tolerance  Activity Tolerance: Patient tolerated evaluation without incident;Patient tolerated treatment well;Treatment limited secondary to medical complications (Orthostatic BP limiting session)    Plan  Physical Therapy Plan  General Plan:  (3-5x a week in the acute care setting)  Current Treatment Recommendations: Strengthening, Balance training, Functional mobility training, Transfer training, Endurance training, Gait training, Stair training, 
and management as noted above in the assessment and plan       --------------------------------------------------      Medications:        Infusion Medications    sodium chloride       Scheduled Medications    lactobacillus  1 capsule Oral Daily with breakfast    amiodarone  200 mg Oral Daily    levothyroxine  75 mcg Oral Daily    metoprolol tartrate  25 mg Oral BID    midodrine  2.5 mg Oral TID    pantoprazole  40 mg Oral Daily    sodium chloride flush  5-40 mL IntraVENous 2 times per day    heparin (porcine)  5,000 Units SubCUTAneous 3 times per day    cefTRIAXone (ROCEPHIN) IV  1,000 mg IntraVENous Q24H     PRN Meds: sodium chloride flush, sodium chloride, ondansetron **OR** ondansetron, polyethylene glycol, acetaminophen **OR** acetaminophen      Physical Exam Performed:    General appearance:  No apparent distress  Respiratory:  Normal respiratory effort.   Cardiovascular:  Regular rate and rhythm.  Abdomen:  Soft, non-tender, non-distended.  Musculoskeletal:  No edema  Neurologic:  Non-focal  Psychiatric:  Alert and oriented    BP 98/64   Pulse 66   Temp 98.1 °F (36.7 °C) (Oral)   Resp 18   Ht 1.778 m (5' 10\")   Wt 92.3 kg (203 lb 7.8 oz)   SpO2 90%   BMI 29.20 kg/m²     Telemetry:      Personally reviewed and interpreted telemetry (Rhythm Strip) on 7/21/2025.  Patient is currently NOT ON tele.    Diet: ADULT DIET; Regular    DVT Prophylaxis: SQ Heparin    Code status: Full Code    PT/OT Eval Status: Not yet ordered    Multi-Disciplinary Rounds with Case Management completed on 7/21/2025 with the following recs:     Anticipated Discharge Location: TBD     Anticipated Discharge Day/Date:  2-3 days       --------------------------------------------------    MDM (any 2 required for High level billing)    A. Problems (any 1)  [x] Acute/Chronic Illness/injury posing ongoing threat to life and/or bodily function without ongoing treatment    [] Severe exacerbation of chronic illness

## 2025-07-27 NOTE — PLAN OF CARE
Problem: Skin/Tissue Integrity  Goal: Skin integrity remains intact  Description: 1.  Monitor for areas of redness and/or skin breakdown  2.  Assess vascular access sites hourly  3.  Every 4-6 hours minimum:  Change oxygen saturation probe site  4.  Every 4-6 hours:  If on nasal continuous positive airway pressure, respiratory therapy assess nares and determine need for appliance change or resting period  7/26/2025 2213 by Zeny Johnson RN  Outcome: Progressing  7/26/2025 1616 by Lizabeth Rodriguez RN  Outcome: Progressing  Flowsheets (Taken 7/26/2025 1616)  Skin Integrity Remains Intact:   Monitor for areas of redness and/or skin breakdown   Turn and reposition as indicated   Positioning devices   Pressure redistribution bed/mattress (bed type)   Check visual cues for pain   Monitor skin under medical devices     Problem: Pain  Goal: Verbalizes/displays adequate comfort level or baseline comfort level  7/26/2025 2213 by Zeny Johnson RN  Outcome: Progressing  7/26/2025 1616 by Lizabeth Rodriguez RN  Outcome: Progressing  Flowsheets (Taken 7/26/2025 1616)  Verbalizes/displays adequate comfort level or baseline comfort level:   Encourage patient to monitor pain and request assistance   Assess pain using appropriate pain scale   Administer analgesics based on type and severity of pain and evaluate response   Consider cultural and social influences on pain and pain management   Implement non-pharmacological measures as appropriate and evaluate response   Notify Licensed Independent Practitioner if interventions unsuccessful or patient reports new pain     Problem: Safety - Adult  Goal: Free from fall injury  7/26/2025 2213 by Zeny Johnson RN  Outcome: Progressing  7/26/2025 1616 by Lizabeth Rodriguez RN  Outcome: Progressing  Flowsheets (Taken 7/26/2025 1616)  Free From Fall Injury: Instruct family/caregiver on patient safety     Problem: Nutrition Deficit:  Goal: Optimize nutritional status  Outcome:

## 2025-07-28 ENCOUNTER — CARE COORDINATION (OUTPATIENT)
Dept: CARE COORDINATION | Age: 75
End: 2025-07-28

## 2025-07-28 NOTE — CARE COORDINATION
Ambulatory Care Coordination Note     7/28/2025 1:08 PM        ACM: Emilia Garza, RN     Care Summary Note:  Pt dc'd prior day. Home with [facility] in place. HRCM program closed at this time, ACM removed from Tx team updates routed to PCP.    PCP/Specialist follow up:   Future Appointments         Provider Specialty Dept Phone    8/13/2025 11:00 AM Heath Hernandez MD Family Medicine [phone]    8/26/2025 10:00 AM Marlene Rodriguez MD Cardiology [phone]    9/30/2025 1:15 PM Zaira Westbrook APRN - CNP Cardiology [phone]          Emilia GUERRERON, RN  Respecting Choices® Advanced Steps ACP Facilitator  Ambulatory Care Manager  [facility]  [phone]

## 2025-08-01 NOTE — DISCHARGE SUMMARY
Hospital Medicine Discharge Summary    Patient: Alfredo SWEENEY Arlette   : 1950     Hospital:  [facility]  Admit Date: 2025   Discharge Date: 2025    Disposition:  SNF w/ Hospice   Code status:  Full  Condition at Discharge: Stable  Primary Care Provider: Heath Hernandez MD    Admitting Provider: Milton Argueta MD  Discharge Provider: Terese White MD     Discharge Diagnoses:      Active Hospital Problems    Diagnosis     Hospice care [Z51.5]     Moderate protein-calorie malnutrition [E44.0]     Complicated UTI (urinary tract infection) [N39.0]        Presenting Admission History:      74 y.o. male who presents with n/v, weakness, low blood pressure. Hx of bladder cancer with indwelling Baeza catheter. Has been in place for 3-4 weeks. Was given midodrine at his facility d/t hypotension with improvement in pressure per EMS. Baeza catheter twith purulent gross hematuria. Baeza catheter replaced and urine culture sent. Patient found to have AMARILYS and thus meeting severe sepsis criteria. Admited for treatment of sepsis and urology evaluation.      Assessment/Plan:      Complicated UTI  Severe sepsis  Indwelling Baeza POA  Gross hematuria  Hx of bladder cancer  Baeza exchanged  UCx revealed VRE so changed to Linezolid.   Urology consulted; Cysto and bilateral stent exchange on   Plan:  Recommended course of Linezolid 7-10 days IV then PO at discharge. Family (including his Brother Romulo who is a pharmacist in [place]) is requesting that he be placed on prophylactic Abx after completing Linezolid because of the chronic baeza. Since he will be enrolled in Hospice, benefits of prophylactic Abx outweigh the risks in their opinion. Cipro or Macrobid would not be unreasonable prophylaxis, considered. They requested that we prescribe the prophylactic antibiotic at discharge as Hospice may not be willing to prescribe.       AMARILYS  Volume expansion was given.   Held off on further IVF in setting

## (undated) DEVICE — OPEN-END FLEXI-TIP URETERAL CATHETER: Brand: FLEXI-TIP

## (undated) DEVICE — GAUZE,SPONGE,4"X4",8PLY,STRL,LF,10/TRAY: Brand: MEDLINE

## (undated) DEVICE — GOWN SIRUS NONREIN XL W/TWL: Brand: MEDLINE INDUSTRIES, INC.

## (undated) DEVICE — LIQUIBAND RAPID ADHESIVE 36/CS 0.8ML: Brand: MEDLINE

## (undated) DEVICE — CONTAINER,SPECIMEN,OR STERILE,4OZ: Brand: MEDLINE

## (undated) DEVICE — SOLUTION IRRIG 2000ML 0.9% SOD CHL USP UROMATIC PLAS CONT

## (undated) DEVICE — URETERAL STENT
Type: IMPLANTABLE DEVICE | Site: URETER | Status: NON-FUNCTIONAL
Brand: CONTOUR™

## (undated) DEVICE — DRAPE,T,LAPARO,TRANS,STERILE: Brand: MEDLINE

## (undated) DEVICE — CYSTO: Brand: MEDLINE INDUSTRIES, INC.

## (undated) DEVICE — DRAINBAG,ANTI-REFLUX TOWER,L/F,2000ML,LL: Brand: MEDLINE

## (undated) DEVICE — NEPTUNE E-SEP SMOKE EVACUATION PENCIL, COATED, 70MM BLADE, PUSH BUTTON SWITCH: Brand: NEPTUNE E-SEP

## (undated) DEVICE — DRAPE C ARM W46XL120IN XLN

## (undated) DEVICE — ELECTRODE ES BPLR WIRE DISP SUPERLOOP

## (undated) DEVICE — GUIDEWIRE ENDOSCP L150CM DIA0.035IN TIP 3CM PTFE NIT

## (undated) DEVICE — SUTURE VICRYL + SZ 3-0 L18IN ABSRB UD SH 1/2 CIR TAPERCUT NDL VCP864D

## (undated) DEVICE — MINOR SET UP MHAZ: Brand: MEDLINE INDUSTRIES, INC.

## (undated) DEVICE — CATHETER URETH 22FR 30CC BLLN F 3 W SPEC M RND TIP TWO

## (undated) DEVICE — BLADE,STAINLESS-STEEL,11,STRL,DISPOSABLE: Brand: MEDLINE

## (undated) DEVICE — SOLUTION IRRIG 1000ML STRL H2O USP PLAS POUR BTL

## (undated) DEVICE — SYRINGE MED 10ML LUERLOCK TIP W/O SFTY DISP

## (undated) DEVICE — PROVE COVER: Brand: UNBRANDED

## (undated) DEVICE — CATHETER URETH 18FR BLLN 5CC STD LTX 2 W F SGL DRNGE EYE M

## (undated) DEVICE — GLOVE,SURG,SENSICARE SLT,LF,PF,7: Brand: MEDLINE

## (undated) DEVICE — SUTURE MONOCRYL + SZ 4-0 L18IN ABSRB UD L19MM PS-2 3/8 CIR MCP496G

## (undated) DEVICE — GLOVE ORANGE PI 7 1/2   MSG9075

## (undated) DEVICE — SUTURE PROL 2-0 L48IN NONABSORBABLE BLU SH L26MM 1/2 CIR 8533H

## (undated) DEVICE — STERILE POLYISOPRENE POWDER-FREE SURGICAL GLOVES: Brand: PROTEXIS

## (undated) DEVICE — SOLUTION IRRIG 2000ML STRL H2O UROMATIC PLAS CONT USP

## (undated) DEVICE — BLADDER EVACUATOR: Brand: UROVAC BLADDER EVACUATOR